# Patient Record
Sex: FEMALE | Race: WHITE
[De-identification: names, ages, dates, MRNs, and addresses within clinical notes are randomized per-mention and may not be internally consistent; named-entity substitution may affect disease eponyms.]

---

## 2019-12-04 ENCOUNTER — HOSPITAL ENCOUNTER (INPATIENT)
Dept: HOSPITAL 46 - ED | Age: 84
LOS: 5 days | Discharge: HOME HEALTH | DRG: 605 | End: 2019-12-09
Attending: SURGERY | Admitting: SURGERY
Payer: MEDICARE

## 2019-12-04 VITALS — WEIGHT: 127.01 LBS | HEIGHT: 63 IN | BODY MASS INDEX: 22.5 KG/M2

## 2019-12-04 DIAGNOSIS — Z88.8: ICD-10-CM

## 2019-12-04 DIAGNOSIS — J44.9: ICD-10-CM

## 2019-12-04 DIAGNOSIS — D62: ICD-10-CM
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
## 2019-12-04 DIAGNOSIS — Z79.891: ICD-10-CM

## 2019-12-04 DIAGNOSIS — I10: ICD-10-CM

## 2019-12-04 DIAGNOSIS — I35.0: ICD-10-CM

## 2019-12-04 DIAGNOSIS — F39: ICD-10-CM

## 2019-12-04 DIAGNOSIS — E03.9: ICD-10-CM

## 2019-12-04 DIAGNOSIS — S30.1XXA: Primary | ICD-10-CM

## 2019-12-04 DIAGNOSIS — Z79.899: ICD-10-CM

## 2019-12-04 DIAGNOSIS — Z79.1: ICD-10-CM

## 2019-12-04 DIAGNOSIS — S80.12XA: ICD-10-CM

## 2019-12-04 DIAGNOSIS — D68.51: ICD-10-CM
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
## 2019-12-04 DIAGNOSIS — G25.81: ICD-10-CM

## 2019-12-04 PROCEDURE — P9016 RBC LEUKOCYTES REDUCED: HCPCS

## 2019-12-04 NOTE — XMS
Encounter Summary
  Created on: 2019
 
 Margaret Ferreira
 External Reference #: [id number]
 : 30
 Sex: Female
 
 Demographics
 
 
+-----------------------+----------------------+
| Address               | [address]        |
|                       | [place]  [address] |
+-----------------------+----------------------+
| Home Phone            | [phone]      |
+-----------------------+----------------------+
| Preferred Language    | Unknown              |
+-----------------------+----------------------+
| Marital Status        |               |
+-----------------------+----------------------+
| Taoism Affiliation | Unknown              |
+-----------------------+----------------------+
| Race                  | Unknown              |
+-----------------------+----------------------+
| Ethnic Group          | Unknown              |
+-----------------------+----------------------+
 
 
 Author
 
 
+--------------+--------------------------------------------+
| Author       | [facility]  |
|              | and [place]                                |
+--------------+--------------------------------------------+
| Organization | [facility]  |
|              | and [place]                                |
+--------------+--------------------------------------------+
| Address      | Unknown                                    |
+--------------+--------------------------------------------+
| Phone        | Unavailable                                |
+--------------+--------------------------------------------+
 
 
 
 Support
 
 
+--------------+--------------+---------+-----------------+
| Name         | Relationship | Address | Phone           |
+--------------+--------------+---------+-----------------+
| Lawson Hanna | ECON         | Unknown | [phone] |
+--------------+--------------+---------+-----------------+
 
 
 
 Care Team Providers
 
 
 
+-----------------------+------+-----------------+
| Care Team Member Name | Role | Phone           |
+-----------------------+------+-----------------+
| Calixto Chou MD | PCP  | [phone] |
+-----------------------+------+-----------------+
 
 
 
 Reason for Referral
 Evaluate & Treat (Routine)
 
+--------+--------------+-----------+--------------+--------------+---------------+
| Status | Reason       | Specialty | Diagnoses /  | Referred By  | Referred To   |
|        |              |           | Procedures   | Contact      | Contact       |
+--------+--------------+-----------+--------------+--------------+---------------+
| Closed |   Specialty  | Physical  |   Diagnoses  |              |   [facility]       |
|        | Services     | Therapy   |  Leg         | [place],  | [facility]       |
|        | Required     |           | weakness,    | Mary PADRON,   | HOSPITAL      |
|        |              |           | bilateral    | MD  [address]    | [address] |
|        |              |           | Chronic      | [address]    |  [place]          |
|        |              |           | obstructive  | [place], | [place], [place] |
|        |              |           | pulmonary    |  [place] [address]    |  [address]   |
|        |              |           | disease,     |              | Phone:        |
|        |              |           | unspecified  |              | [phone]  |
|        |              |           | COPD type    |              |  Fax:         |
|        |              |           | ([facility], located within [facility])        |              | [phone]  |
|        |              |           | Procedures   |              |               |
|        |              |           | [place] [facility]      |              |               |
|        |              |           | THERAPY      |              |               |
|        |              |           | EVALUATION   |              |               |
|        |              |           | [place]           |              |               |
|        |              |           | THERAPEUTIC  |              |               |
|        |              |           | EXERCISES    |              |               |
+--------+--------------+-----------+--------------+--------------+---------------+
 
 
 
 
 Reason for Visit
 
 
+--------+--------------------+
| Reason | Comments           |
+--------+--------------------+
| COPD   | 3 month follow up  |
+--------+--------------------+
 
 
 
 Encounter Details
 
 
+--------+---------+----------------------+----------------+----------------------+
| Date   | Type    | Department           | Care Team      | Description          |
+--------+---------+----------------------+----------------+----------------------+
| / | Office  |   [facility] [place]          |   [place],  | Chronic obstructive  |
| 2016   | Visit   | PULMONARY  [address]     | Mary PADRON MD  | pulmonary disease,   |
|        |         | [place]  [place], |                | unspecified COPD     |
 
|        |         |  [place] [address]       |                | type (HCC);          |
|        |         | [phone]         |                | Gastroesophageal     |
|        |         |                      |                | reflux disease,      |
|        |         |                      |                | esophagitis presence |
|        |         |                      |                |  not specified; Leg  |
|        |         |                      |                | weakness, bilateral  |
+--------+---------+----------------------+----------------+----------------------+
 
 
 
 Social History
 
 
+--------------+-------+-----------+--------+------+
| Tobacco Use  | Types | Packs/Day | Years  | Date |
|              |       |           | Used   |      |
+--------------+-------+-----------+--------+------+
| Never Smoker |       |           |        |      |
+--------------+-------+-----------+--------+------+
 
 
 
+---------------------+---+---+---+
| Smokeless Tobacco:  |   |   |   |
| Never Used          |   |   |   |
+---------------------+---+---+---+
 
 
 
+-------------------------------------------------------------------+
| Tobacco Cessation: Counseling Given: No                           |
| Comments: her parents both smoked, also worked at a [employer] |
+-------------------------------------------------------------------+
 
 
 
+-------------+----------------------+---------+----------+
| Alcohol Use | Drinks/Week          | oz/Week | Comments |
+-------------+----------------------+---------+----------+
| No          |   0 Standard drinks  | 0.0     |          |
|             | or equivalent        |         |          |
+-------------+----------------------+---------+----------+
 
 
 
+------------------+---------------+
| Sex Assigned at  | Date Recorded |
| Birth            |               |
+------------------+---------------+
| Not on file      |               |
+------------------+---------------+
 
 
 
+----------------+-------------+-------------+
| Job Start Date | Occupation  | Industry    |
+----------------+-------------+-------------+
| Not on file    | Not on file | Not on file |
+----------------+-------------+-------------+
 
 
 
 
+----------------+--------------+------------+
| Travel History | Travel Start | Travel End |
+----------------+--------------+------------+
 
 
 
+-------------------------------------+
| No recent travel history available. |
+-------------------------------------+
 documented as of this encounter
 
 Last Filed Vital Signs
 
 
+-------------------+------------------+----------------------+----------+
| Vital Sign        | Reading          | Time Taken           | Comments |
+-------------------+------------------+----------------------+----------+
| Blood Pressure    | 110/64           | 2016 11:32 AM  |          |
|                   |                  | PDT                  |          |
+-------------------+------------------+----------------------+----------+
| Pulse             | 78               | 2016 11:32 AM  |          |
|                   |                  | PDT                  |          |
+-------------------+------------------+----------------------+----------+
| Temperature       | -                | -                    |          |
+-------------------+------------------+----------------------+----------+
| Respiratory Rate  | -                | -                    |          |
+-------------------+------------------+----------------------+----------+
| Oxygen Saturation | 97%              | 2016 11:32 AM  |          |
|                   |                  | PDT                  |          |
+-------------------+------------------+----------------------+----------+
| Inhaled Oxygen    | -                | -                    |          |
| Concentration     |                  |                      |          |
+-------------------+------------------+----------------------+----------+
| Weight            | 61.2 kg (135 lb) | 2016 11:32 AM  |          |
|                   |                  | PDT                  |          |
+-------------------+------------------+----------------------+----------+
| Height            | 162.6 cm (5' 4") | 2016 11:32 AM  |          |
|                   |                  | PDT                  |          |
+-------------------+------------------+----------------------+----------+
| Body Mass Index   | 23.17            | 2016 11:32 AM  |          |
|                   |                  | PDT                  |          |
+-------------------+------------------+----------------------+----------+
 documented in this encounter
 
 Patient Instructions
 Patient Instructions Mary Astudillo MD - 2016 12:29 PM PDTI am ordering the w
arm water therapy for at the [facility] 
 
 I would ask the therapist if they can do a wheelchair evaluation through [facility] and 
have them let us know, or call us so we can order one there or here. 
 
 Stay on the Spiriva once daily. I reordered the albuterol (ProAir) to use as needed as well
. 
 
 You might see Dr. Chou about the hand/wrist and see if some other brace might allow you t
o more easily get in and out of the chair. Electronically signed by Mary Astudillo MD
 at 2016 12:32 PM PDT
 documented in this encounter
 
 
 Progress Notes
 Mary Astudillo MD - 2016 11:36 AM PDTFormatting of this note might be differe
nt from the original.
 
 
 Pulmonary Follow Up
 
 HPI 
  
 Margaret Ferreira is a 85 y.o. female patient of Calixto Chou MD here today for foll
ow up of COPD. 
 
 At their last visit, we had ordered Incruse in place of the Spiriva, but this was denied by
 the insurance. Since their last visit she feels like she has been doing overall pretty stab
le. She has not had any acute illnesses.  
 
 She notes that she has not woken up at night with coughing in several months. She does coug
h some when she first wakes up and is bringing up greyish in color. She is not coughing duri
ng the daytime. 
 
 She is currently on a regimen of Spiriva one capsule inhaled daily.  She is not using the r
escue inhaler at all, but thinks she should be using it about 2-3 times a week.  She returns
 today for routine follow up. 
 
 She has been out of breath a couple of times, for example when she is getting ready for bed
, or during the daytime. She would like to get a new rescue inhaler as her current ProAir in
[facility] is quite old. She notes if she sits and rests, her breathing gets better. She has to w
alk with a walker all the time, and is not doing as much walking as she should, mostly becau
se of her legs. She has also had a lot of family stressors, losing a grandchild, and her hus
band having heart surgery recently. She does think physical therapy would help, and would li
ke to do this once her  is more stable. 
 
 She has been evaluated for nocturnal oxygen and does not need to use it.
 
 She sleeps with her head elevated 6 inches at night and is on the omeprazole daily. She is 
not noticing the heartburn right now. 
 
 Past Medical History
 Past Medical History 
 Diagnosis Date 
   COPD (chronic obstructive pulmonary disease) (HCC)  
   Peptic ulcer disease  
   GERD (gastroesophageal reflux disease)  
   Hiatal hernia  
   Hypertension  
   Hypothyroidism  
   Urine incontinence  
   Aortic valve stenosis  
   trivial 
   Hearing loss  
   Osteoarthritis  
   Osteoporosis  
   Plantar wart  
   RLS (restless legs syndrome)  
   Scoliosis  
   Pneumonia  
   Pulmonary nodule  
   Macular degeneration  
   bilateral now 
 
  
  
 Past Surgical History
 Past Surgical History 
 Procedure Laterality Date 
   Jj and bso  1996 
   Cholecystectomy  1996 
   Total hip arthroplasty Left 1989 
   Abscess drainage on calf  2003 
   Bladder suspension   
   Upper gastrointestinal endoscopy  1996 
  
 
 Social History: 
 History 
 
 Social History 
   Marital Status:  
   Spouse Name: N/A 
   Number of Children: N/A 
   Years of Education: N/A 
 
 Occupational History 
     
 
 Social History Main Topics 
   Smoking status: Never Smoker  
   Smokeless tobacco: Never Used 
    Comment: her parents both smoked, also worked at a [employer] 
   Alcohol Use: No 
   Drug Use: No 
   Sexual Activity: Not on file 
 
 Other Topics Concern 
   None 
 
 Social History Narrative 
  Lives: in [place]  
  With: her  
  Grew up: in [place]  
  Has previously lived in: [place], [place] 
   
  Exposure to toxic chemicals: has been exposed to halon (1301) before, they had to evacuate
 the computer room at the time 
  Exposure to asbestos: no 
  Exposure to tuberculosis: no  
  Has had a PPD or Quantiferon before: no 
   
  Has pets at home: no  
  Has ever owned birds: no  
  Other animal exposures: has had a dog and cat before 
   
  Hobbies: playing cards 
    
   
   
 
 Allergies:
 Allergies 
 Allergen Reactions 
 
   Lisinopril  
   Cough 
   
 
 Medications:
 Outpatient Encounter Prescriptions as of 3/22/2016 
 Medication Sig Dispense Refill 
   albuterol (PROAIR HFA) 90 mcg/puff inhaler Inhale 2 puffs into the lungs every 6 hours 
as needed for Wheezing or Shortness of Breath. 1 Inhaler 5 
   benzonatate (TESSALON PERLES) 100 mg capsule Take 100 mg by mouth 3 times daily as need
ed.   
   Calcium Carbonate (CALCIUM 600 PO) Take  by mouth Daily.   
   Cholecalciferol (VITAMIN D-3) 2000 units CAPS Take  by mouth Daily.   
   Docosahexaenoic Acid (DHA OMEGA 3) 100 MG CAPS Take  by mouth.   
   gabapentin (NEURONTIN) 300 mg capsule Take 300 mg by mouth 2 times daily.   
   levothyroxine (SYNTHROID, LEVOTHROID) 150 mcg tablet Take 150 mcg by mouth every mornin
g (before breakfast).   
   Multiple Vitamins-Minerals (MULTIVITAMIN PO) Take  by mouth Daily.   
   [DISCONTINUED] olmesartan-hydrochlorothiazide (BENICAR HCT) 40-25 MG per tablet Take 0.
5 tablets by mouth Daily.   
   omeprazole (PRILOSEC) 20 mg capsule Take 20 mg by mouth every morning (before breakfast
).   
   pramipexole (MIRAPEX) 0.25 mg tablet Take 0.25 mg by mouth nightly.   
   pseudoePHEDrine (SUDAFED) 30 mg tablet Take 30 mg by mouth every 4 hours as needed.   
   Spacer/Aero Chamber Mouthpiece MISC Use with inhaler as directed. 1 each 1 
   tiotropium (SPIRIVA) 18 mcg inhalation capsule Inhale 1 capsule into the lungs Daily. 3
0 capsule 11 
   traMADol (ULTRAM) 50 mg tablet Take 50 mg by mouth every 6 hours as needed.   
 
 No facility-administered encounter medications on file as of 3/22/2016. 
 
 Review of Systems:
 General: 
 []Weight loss/gain (over 10 lbs) []Fever/chills/sweats  []Night sweats
 EENT: 
 []Hearing loss      []Vision loss/change []Sinus congestion/nasal drainage  []Nosebleeds  [
x]Hoarseness - "by the end of the day once in awhile"
 Cardiac: 
 []Chest pain []Palpitations/heart racing  []Swelling of legs/ankles  []Waking up at night s
hort of breath []Difficulty sleeping flat
 Gastrointestinal: 
 []Nausea/vomiting []Difficulty swallowing  [x]Heartburn/acid reflux - on Omeprazole []Loss 
of appetite []Abdominal pain 
 Urologic: 
 []Blood in urine []Frequent urination at night []Burning/painful urination [x]Difficulty wi
th urination - incontinence
 
 Objective 
 
 /64 mmHg | Pulse 78 | Ht 1.626 m (5' 4") | Wt 61.236 kg (135 lb) | BMI 23.16 kg/m2 | 
SpO2 97% | Breastfeeding? No RA
 
 General Appearance:  Alert, cooperative, no distress, appears stated age, in a wheelchair, 
accompanied by her  
 Head:  Normocephalic, without obvious abnormality, atraumatic 
 Eyes:  PERRL, conjunctiva clear, no scleral icterus, EOM's intact 
 Ears:  Normal TM's, external auditory canals, normal acuity 
 Nose: Nares normal, septum midline, mucosa normal 
 Mouth: No oral lesions or exudate 
 Neck: Supple, symmetrical, no adenopathy 
 
 Lungs:   No accessory muscle use, breath sounds are diminished bilaterally with prolongatio
n of the expiratory phase, no wheezes, crackles or rhonchi 
 Chest Wall:  No deformity 
 Heart:  Regular rate and rhythm, no murmur, rub or gallop 
 Abdomen:   Soft, non-tender, non-distended 
 Extremities:  No cyanosis, clubbing, 1+ bilateral lower extremity edema 
 Pulses: Radial pulses 2+ and symmetric 
 Skin: Warm and dry 
 Lymph nodes: Cervical and supraclavicular nodes normal 
 
 Data:
 Immunization History 
 Administered Date(s) Administered 
   INFLUENZA, HIGH DOSE SEASONAL (ADULT) 10/21/2015 
   INFLUENZA, TRIVALENT PRESERVATIVE FREE (PED/ADOL/ADULT) 2014 
   PNEUMOCOCCAL CONJUGATE 13-VALENT (PCV13) 10/12/2015 
   PNEUMOCOCCAL POLYSACCHARIDE 23-VALENT (PPSV23) 2012 
 
 Assessment  
 
   ICD-10-CM ICD-9-CM  
 1. Chronic obstructive pulmonary disease, unspecified COPD type (HCC) J44.9 496 Symptoms elin hernández well controlled on Spiriva alone, with good control of cough and dyspnea (though not very 
active due to leg issues). 
 We will try to get her in to PT to increase her activity. 
 Ambulatory referral to Physical Therapy 
 2. Gastroesophageal reflux disease, esophagitis presence not specified K21.9 530.81 This se
ems to be controlled with her bed elevated, and on medication.  
 3. Leg weakness, bilateral M62.81 729.89 Notes this is a big factor in her walking more. Trevor hernández is interested in doing PT again as she lost a lot of ground with being less active surroun
ding recent events. I referred her to warm water therapy at the [facility] ([place] I also suggested a whee
lchair evaluation so she can get in and out the chair more easily without a hand injury (rec
ently sustained). They will inquire if this can be done there, and call. 
 Ambulatory referral to Physical Therapy 
 
 Plan 
 1.Continue Spiriva once daily. 
 2.Referred for warm water therapy at the [facility] 
 3.Ask if wheelchair evaluation can be done at the [facility] and if not we can order here. 
 4. Continue on omeprazole with lifestyle measures to control reflux as well. 
 5. I asked her to ask Dr. Chou if a brace for her hand/wrist would help the pain so she c
an get out of her wheelchair more easily. 
 
 She was advised to call if new pulmonary symptoms were to develop.
 
 Return to clinic as needed. 
 
 CC: Calixto Chou MD
 
 Portions of this report were transcribed using voice recognition software.  Every effort wa
s made to ensure accuracy; however, inadvertent computerized transcription errors may be pre
sent.
 
 Electronically signed by: Mary Astudillo MD  Electronically signed by Mary Astudillo MD at 2016  1:19 PM PDTdocumented in this encounter
 
 Plan of Treatment
 
 
+----------------------+-------------+--------+----------------------+---------------------+
 
| Name                 | Type        | Priori | Associated Diagnoses | Order Schedule      |
|                      |             | ty     |                      |                     |
+----------------------+-------------+--------+----------------------+---------------------+
| Ambulatory referral  | Outpatient  | Routin |   Leg weakness,      | Ordered: 2016 |
| to Physical Therapy  | Referral    | e      | bilateral  Chronic   |                     |
|                      |             |        | obstructive          |                     |
|                      |             |        | pulmonary disease,   |                     |
|                      |             |        | unspecified COPD     |                     |
|                      |             |        | type (HCC)           |                     |
+----------------------+-------------+--------+----------------------+---------------------+
 documented as of this encounter
 
 Visit Diagnoses
 
 
+------------------------------------------------------------------------------+
| Diagnosis                                                                    |
+------------------------------------------------------------------------------+
|   Chronic obstructive pulmonary disease, unspecified COPD type (HCC)         |
+------------------------------------------------------------------------------+
|   Gastroesophageal reflux disease, esophagitis presence not specified        |
+------------------------------------------------------------------------------+
|   Leg weakness, bilateral  Other musculoskeletal symptoms referable to limbs |
+------------------------------------------------------------------------------+
 documented in this encounter

## 2019-12-04 NOTE — XMS
Encounter Summary
  Created on: 2019
 
 Margaret Ferreira
 External Reference #: [id number]
 : 30
 Sex: Female
 
 Demographics
 
 
+-----------------------+------------------------+
| Address               | [address]      |
|                       | [place]  [address]   |
+-----------------------+------------------------+
| Home Phone            | [phone]        |
+-----------------------+------------------------+
| Preferred Language    | Unknown                |
+-----------------------+------------------------+
| Marital Status        |                 |
+-----------------------+------------------------+
| Yazidi Affiliation | MET                    |
+-----------------------+------------------------+
| Race                  | White                  |
+-----------------------+------------------------+
| Ethnic Group          | Not  or  |
+-----------------------+------------------------+
 
 
 Author
 
 
+--------------+------------------------------+
| Author       | [facility] |
+--------------+------------------------------+
| Organization | [facility] |
+--------------+------------------------------+
| Address      | Unknown                      |
+--------------+------------------------------+
| Phone        | Unavailable                  |
+--------------+------------------------------+
 
 
 
 Support
 
 
+--------------+--------------+---------------------+-----------------+
| Name         | Relationship | Address             | Phone           |
+--------------+--------------+---------------------+-----------------+
| Lawson Ferreira | ECON         | [address]          | [phone] |
|              |              | [place], [place]   |                 |
|              |              | [address]               |                 |
+--------------+--------------+---------------------+-----------------+
 
 
 
 Care Team Providers
 
 
 
+-----------------------+------+-------------+
| Care Team Member Name | Role | Phone       |
+-----------------------+------+-------------+
 PCP  | Unavailable |
+-----------------------+------+-------------+
 
 
 
 Encounter Details
 
 
+--------+-------------+----------------------+--------------------+-------------+
| Date   | Type        | Department           | Care Team          | Description |
+--------+-------------+----------------------+--------------------+-------------+
| / | Transcribed |   [facility] at  |   Dictation, Other | Transcribed |
|    |             | [facility]  [address] [place]     |                    |             |
|        |             | [address]      |                    |             |
|        |             | Mailcode: [address]  [address]  |                    |             |
|        |             | [address]         |                    |             |
|        |             | [place]  [place],  |                    |             |
|        |             | [place] [address]        |                    |             |
|        |             | [phone]         |                    |             |
+--------+-------------+----------------------+--------------------+-------------+
 
 
 
 Social History
 
 
+----------------+-------+-----------+--------+------+
| Tobacco Use    | Types | Packs/Day | Years  | Date |
|                |       |           | Used   |      |
+----------------+-------+-----------+--------+------+
| Never Assessed |       |           |        |      |
+----------------+-------+-----------+--------+------+
 
 
 
+------------------+---------------+
| Sex Assigned at  | Date Recorded |
| Birth            |               |
+------------------+---------------+
| Not on file      |               |
+------------------+---------------+
 
 
 
+----------------+-------------+-------------+
| Job Start Date | Occupation  | Industry    |
+----------------+-------------+-------------+
| Not on file    | Not on file | Not on file |
+----------------+-------------+-------------+
 
 
 
+----------------+--------------+------------+
| Travel History | Travel Start | Travel End |
+----------------+--------------+------------+
 
 
 
 
+-------------------------------------+
| No recent travel history available. |
+-------------------------------------+
 documented as of this encounter
 
 Progress Notes
 Interface, Transcription In - 2007  6:36 AM PDT  [address] [address] [phone] or [phone]
   Department of Obstetrics and Gynecology, [address]
   [facility]
   Phone: [phone] Fax: [phone]
  
  1996
  
  
  
  NICK COLE MD
  [address] [facility]
  [place] [address]
  
  
  RE:Margaret Ferreira
  MR#:[id number]
  
  Dear Doctor Cole:
  
  I saw Margaret Ferreira in the office today for a postoperative visit. Ms. Ferreira's surgery was unfortunately complicated by a massive intraperitoneal
  hemorrhage that occurred approximately twelve hours after surgery was
  completed. It was necessary to take her back to the Operating Room, and
  perform a laparotomy to control the bleeding. No specific bleeding points
  were identified but a large hemoperitoneum and retroplacental clot was
  evacuated. Following this she made a steady recovery and was discharged
  home six days postoperatively. The surgery reports of the primary surgery
  and emergency surgery are accompanying this letter.
  
  She has been staying with her daughter since the surgery and is making slow
  but satisfactory progress. She had some transitory urge incontinence
  postoperatively but this seems to be improving. She was also troubled by
  abdominal discomfort and constipation. Her bowel function is now normal but
  she still has some residual abdominal discomfort. The abdominal wound has
  healed well and vaginal support seems quite adequate, with no evidence of
  infection in the vaginal incisions. She is going to stay in the [place]
  area with her daughter for one more week, before returning to [place].
  
  I much appreciate the original referral of this patient. I am sorry that
  her posoperative course was stormy and complicated but she seems to be doing
  well now and I hope that her further progress will be uncomplicated.
  
  With thanks and best wishes,
  
  
  
  BRITTANY Perez M.D.
 
  Professor and ,
  Obstetrics and Gynecology
  
  EPK/eduardo
  D: 96 T: 96 11:12 P
   Electronically signed by Interface, Transcription In at 2007  6:36 AM PDTdocumented
 in this encounter
 
 Plan of Treatment
 Not on filedocumented as of this encounter
 
 Visit Diagnoses
 Not on filedocumented in this encounter

## 2019-12-04 NOTE — XMS
Encounter Summary
  Created on: 2019
 
 Margaret Ferreira
 External Reference #: [id number]
 : 30
 Sex: Female
 
 Demographics
 
 
+-----------------------+----------------------+
| Address               | [address]        |
|                       | [place]  [address] |
+-----------------------+----------------------+
| Home Phone            | [phone]      |
+-----------------------+----------------------+
| Preferred Language    | Unknown              |
+-----------------------+----------------------+
| Marital Status        |               |
+-----------------------+----------------------+
| Adventism Affiliation | Unknown              |
+-----------------------+----------------------+
| Race                  | Unknown              |
+-----------------------+----------------------+
| Ethnic Group          | Unknown              |
+-----------------------+----------------------+
 
 
 Author
 
 
+--------------+--------------------------------------------+
| Author       | [facility]  |
|              | and [place]                                |
+--------------+--------------------------------------------+
| Organization | [facility]  |
|              | and [place]                                |
+--------------+--------------------------------------------+
| Address      | Unknown                                    |
+--------------+--------------------------------------------+
| Phone        | Unavailable                                |
+--------------+--------------------------------------------+
 
 
 
 Support
 
 
+--------------+--------------+---------+-----------------+
| Name         | Relationship | Address | Phone           |
+--------------+--------------+---------+-----------------+
| Lawson Hanna | ECON         | Unknown | [phone] |
+--------------+--------------+---------+-----------------+
 
 
 
 Care Team Providers
 
 
 
+-----------------------+------+-----------------+
| Care Team Member Name | Role | Phone           |
+-----------------------+------+-----------------+
| Calixto Chou MD | PCP  | [phone] |
+-----------------------+------+-----------------+
 
 
 
 Encounter Details
 
 
+--------+-----------+----------------------+----------------+---------------------+
| Date   | Type      | Department           | Care Team      | Description         |
+--------+-----------+----------------------+----------------+---------------------+
| / | [facility]  |   [facility] |   [place],  | COPD (chronic       |
| 2015   | Encounter |  [facility]   | Mary PADRON MD  | obstructive         |
|        |           | FUNCTION  [address]      |                | pulmonary disease)  |
|        |           | [place], |                | ([facility])               |
|        |           |  [place] [address]       |                |                     |
|        |           | [phone]         |                |                     |
+--------+-----------+----------------------+----------------+---------------------+
 
 
 
 Social History
 
 
+--------------+-------+-----------+--------+------+
| Tobacco Use  | Types | Packs/Day | Years  | Date |
|              |       |           | Used   |      |
+--------------+-------+-----------+--------+------+
| Never Smoker |       |           |        |      |
+--------------+-------+-----------+--------+------+
 
 
 
+-------------------------------------------------------------------+
| Comments: her parents both smoked, also worked at [employer] |
+-------------------------------------------------------------------+
 
 
 
+-------------+-------------+---------+----------+
| Alcohol Use | Drinks/Week | oz/Week | Comments |
+-------------+-------------+---------+----------+
| No          |             |         |          |
+-------------+-------------+---------+----------+
 
 
 
+------------------+---------------+
| Sex Assigned at  | Date Recorded |
| Birth            |               |
+------------------+---------------+
| Not on file      |               |
+------------------+---------------+
 
 
 
 
+----------------+-------------+-------------+
| Job Start Date | Occupation  | Industry    |
+----------------+-------------+-------------+
| Not on file    | Not on file | Not on file |
+----------------+-------------+-------------+
 
 
 
+----------------+--------------+------------+
| Travel History | Travel Start | Travel End |
+----------------+--------------+------------+
 
 
 
+-------------------------------------+
| No recent travel history available. |
+-------------------------------------+
 documented as of this encounter
 
 Medications at Time of Discharge
 
 
+----------------------+----------------------+-----------+---------+----------+-----------+
| Medication           | Sig                  | Dispensed | Refills | Start    | End Date  |
|                      |                      |           |         | Date     |           |
+----------------------+----------------------+-----------+---------+----------+-----------+
|   Calcium Carbonate  | Take 600 mg by mouth |           | 0       |          |           |
| (CALCIUM 600 PO)     |  Daily.              |           |         |          |           |
+----------------------+----------------------+-----------+---------+----------+-----------+
|   Cholecalciferol    | Take  by mouth       |           | 0       |          |           |
| (VITAMIN D-3) 2000   | Daily.               |           |         |          |           |
| units CAPS           |                      |           |         |          |           |
+----------------------+----------------------+-----------+---------+----------+-----------+
|   Docosahexaenoic    | Take  by mouth.      |           | 0       |          |           |
| Acid (DHA OMEGA 3)   |                      |           |         |          |           |
| 100 MG CAPS          |                      |           |         |          |           |
+----------------------+----------------------+-----------+---------+----------+-----------+
|   Multiple           | Take  by mouth       |           | 0       |          |           |
| Vitamins-Minerals    | Daily.               |           |         |          |           |
| (MULTIVITAMIN PO)    |                      |           |         |          |           |
+----------------------+----------------------+-----------+---------+----------+-----------+
|   omeprazole         | Take 20 mg by mouth  |           | 0       |          |           |
| (PRILOSEC) 20 mg     | every morning        |           |         |          |           |
| capsule              | (before breakfast).  |           |         |          |           |
+----------------------+----------------------+-----------+---------+----------+-----------+
|   Spacer/Aero        | Use with inhaler as  |   1 each  | 1       | 20 |           |
| Chamber Mouthpiece   | directed.            |           |         | 15       |           |
| MISC                 |                      |           |         |          |           |
+----------------------+----------------------+-----------+---------+----------+-----------+
|   traMADol (ULTRAM)  | Take 100 mg by mouth |           | 0       |          |           |
| 50 mg tablet         |  4 times daily.      |           |         |          |           |
+----------------------+----------------------+-----------+---------+----------+-----------+
|   albuterol (PROAIR  | Inhale 2 puffs into  |   1       | 5       | 20 |  |
| HFA) 90 mcg/puff     | the lungs every 6    | Inhaler   |         | 15       | 6         |
| inhaler              | hours as needed for  |           |         |          |           |
|                      | Wheezing or          |           |         |          |           |
|                      | Shortness of Breath. |           |         |          |           |
+----------------------+----------------------+-----------+---------+----------+-----------+
|   beclomethasone     | Inhale 2 puffs into  |           | 0       |          |  |
 
| (QVAR) 40 mcg/puff   | the lungs 2 times    |           |         |          | 5         |
| inhaler              | daily.               |           |         |          |           |
+----------------------+----------------------+-----------+---------+----------+-----------+
|   benzonatate        | Take 100 mg by mouth |           | 0       |          | 11/15/201 |
| (LEANDRA FUCHS)    |  3 times daily as    |           |         |          | 8         |
| 100 mg capsule       | needed.              |           |         |          |           |
+----------------------+----------------------+-----------+---------+----------+-----------+
|   diclofenac         | Take 100 mg by mouth |           | 0       |          |  |
| (VOLTAREN-XR) 100 mg |  daily (with         |           |         |          | 5         |
|  ER tablet           | breakfast).          |           |         |          |           |
+----------------------+----------------------+-----------+---------+----------+-----------+
|   Diclofenac         | Take  by mouth.      |           | 0       |          |  |
| Potassium 50 MG PACK |                      |           |         |          | 5         |
+----------------------+----------------------+-----------+---------+----------+-----------+
|   levothyroxine      | Take 150 mcg by      |           | 0       |          |  |
| (SYNTHROID,          | mouth every morning  |           |         |          | 8         |
| LEVOTHROID) 150 mcg  | (before breakfast).  |           |         |          |           |
| tablet               |                      |           |         |          |           |
+----------------------+----------------------+-----------+---------+----------+-----------+
|                      | Take 0.5 tablets by  |           | 0       |          |  |
| olmesartan-hydrochlo | mouth Daily.         |           |         |          | 6         |
| rothiazide (BENICAR  |                      |           |         |          |           |
| HCT) 40-25 MG per    |                      |           |         |          |           |
| tablet               |                      |           |         |          |           |
+----------------------+----------------------+-----------+---------+----------+-----------+
|   pramipexole        | Take 0.25 mg by      |           | 0       |          | 07/10/201 |
| (MIRAPEX) 0.25 mg    | mouth 2 times daily. |           |         |          | 8         |
| tablet               |                      |           |         |          |           |
+----------------------+----------------------+-----------+---------+----------+-----------+
|   pseudoePHEDrine    | Take 30 mg by mouth  |           | 0       |          | 11/15/201 |
| (SUDAFED) 30 mg      | every 4 hours as     |           |         |          | 8         |
| tablet               | needed.              |           |         |          |           |
+----------------------+----------------------+-----------+---------+----------+-----------+
|   tiotropium         | Inhale 1 capsule     |   30      | 11      | 20 |  |
| (SPIRIVA HANDIHALER) | into the lungs       | capsule   |         | 15       | 5         |
|  18 mcg inhalation   | Daily.               |           |         |          |           |
| capsule              |                      |           |         |          |           |
+----------------------+----------------------+-----------+---------+----------+-----------+
 documented as of this encounter
 
 Plan of Treatment
 Not on filedocumented as of this encounter
 
 Procedures
 
 
+----------------------+--------+-------------+----------------------+----------------------
+
| Procedure Name       | Priori | Date/Time   | Associated Diagnosis | Comments             
|
|                      | ty     |             |                      |                      
|
+----------------------+--------+-------------+----------------------+----------------------
+
| PFT PULMONARY        | ASAP   | 2015  |   COPD (chronic      |   Results for this   
|
| FUNCTION TESTING     |        |  6:25 PM    | obstructive          | procedure are in the 
|
| ORDERS               |        | PST         | pulmonary disease)   |  results section.    
|
 
|                      |        |             | ([facility])                |                      
|
+----------------------+--------+-------------+----------------------+----------------------
+
| PFT PULMONARY        | ASAP   | 2015  |   COPD (chronic      |   Results for this   
|
| FUNCTION TESTING     |        |  6:25 PM    | obstructive          | procedure are in the 
|
| ORDERS               |        | PST         | pulmonary disease)   |  results section.    
|
|                      |        |             | (HCC)                |                      
|
+----------------------+--------+-------------+----------------------+----------------------
+
| PFT PULMONARY        | ASAP   | 2015  |   COPD (chronic      |   Results for this   
|
| FUNCTION TESTING     |        |  6:25 PM    | obstructive          | procedure are in the 
|
| ORDERS               |        | PST         | pulmonary disease)   |  results section.    
|
|                      |        |             | (HCC)                |                      
|
+----------------------+--------+-------------+----------------------+----------------------
+
| DIAGNOSTIC REPORT -  |        | 2015  |                      |                      
|
| EXTERNAL SCAN        |        | 12:00 AM    |                      |                      
|
|                      |        | PST         |                      |                      
|
+----------------------+--------+-------------+----------------------+----------------------
+
 documented in this encounter
 
 Results
 PFT PULMONARY FUNCTION TESTING ORDERS Full PFT (Christiano w/BD, lung volumes, diffusion)?: Yes 
(2015  6:25 PM PST)
 
+------------------------------------------------------------------------+--------------+
| Narrative                                                              | Performed At |
+------------------------------------------------------------------------+--------------+
|   Mary Astudillo MD       2015 18:25        PULMONARY      |              |
| FUNCTION TESTING      METHOD: Spirometry was obtained pre and post     |              |
| administration of   inhaled bronchodilator. Lung volumes were not      |              |
| obtained, as the   patient was unable to pant fast enough. Diffusion   |              |
| capacity was   obtained by single breath method and was not corrected  |              |
| for a   measured hemoglobin.      ATS standards were met.              |              |
| SPIROMETRY: Prior to administration of inhaled bronchodilator,   FVC   |              |
| was normal at 2.01 L or 87% of predicted. FEV1 was moderately          |              |
| reduced at 0.85 L or 51% of predicted. FEV1/FVC ratio was reduced   at |              |
|  42%. After administration of inhaled bronchodilator, FVC   increased  |              |
| by 19 % to 2.38 L or 103% of predicted. FEV1 increased   by 22 % to    |              |
| 1.04 L or 63% of predicted. FEV1/FVC ratio was reduced   at 44%.       |              |
| DIFFUSION CAPACITY: Diffusion capacity was moderately reduced at       |              |
| 10.6 mL/mmHg per minute or 54% of predicted and was not corrected      |              |
| for a measured hemoglobin.     IMPRESSION: Spirometry is consistent    |              |
| with moderate obstructive   physiology. There was a significant        |              |
| response to inhaled   bronchodilator based on change in FVC. Diffusion |              |
|  capacity is   moderately reduced and is not corrected for measured    |              |
| hemoglobin.     Electronically signed by: Mary Astudillo MD    |              |
 
| 2015   18:20  [facility]     CC:    |              |
| Calixto Chou                                                      |              |
+------------------------------------------------------------------------+--------------+
 
 
 
+------------------------------------------------------------------------------------------+
| Procedure Note                                                                           |
+------------------------------------------------------------------------------------------+
|   Mary Astudillo MD - 2015  6:20 PM PST  PULMONARY FUNCTION TESTING        |
| METHOD: Spirometry was obtained pre and post administration of inhaled bronchodilator.   |
| Lung volumes were not obtained, as the patient was unable to pant fast enough. Diffusion |
|  capacity was obtained by single breath method and was not corrected for a measured      |
| hemoglobin. ATS standards were met. SPIROMETRY: Prior to administration of inhaled       |
| bronchodilator, FVC was normal at 2.01 L or 87% of predicted. FEV1 was moderately        |
| reduced at 0.85 L or 51% of predicted. FEV1/FVC ratio was reduced at 42%. After          |
| administration of inhaled bronchodilator, FVC increased by 19 % to 2.38 L or 103% of     |
| predicted. FEV1 increased by 22 % to 1.04 L or 63% of predicted. FEV1/FVC ratio was      |
| reduced at 44%.DIFFUSION CAPACITY: Diffusion capacity was moderately reduced at 10.6     |
| mL/mmHg per minute or 54% of predicted and was not corrected for a measured              |
| hemoglobin.IMPRESSION: Spirometry is consistent with moderate obstructive physiology.    |
| There was a significant response to inhaled bronchodilator based on change in FVC.       |
| Diffusion capacity is moderately reduced and is not corrected for measured               |
| hemoglobin.Electronically signed by: Mary Astudillo MD 2015 18:20[facility]        |
[facility] Calixto Chou                                |
|Electronically signed by: Mary Astudillo MD 2015 18:20                       |
|[facility]                                                  |
|                                                                                          |
|CC: Calixto Chou                                                                     |
+------------------------------------------------------------------------------------------+
 documented in this encounter
 
 Visit Diagnoses
 
 
+----------------------------------------------------------------------------------------+
| Diagnosis                                                                              |
+----------------------------------------------------------------------------------------+
|   COPD (chronic obstructive pulmonary disease) (HCC)  Chronic airway obstruction, not  |
| elsewhere classified                                                                   |
+----------------------------------------------------------------------------------------+
 documented in this encounter
 
 Administered Medications
 
 
+-----------------------------------+--------+----------+--------+------+------+
| Medication Order                  | MAR    | Action   | Dose   | Rate | Site |
|                                   | Action | Date     |        |      |      |
+-----------------------------------+--------+----------+--------+------+------+
|   albuterol 2.5 mg/3 mL nebulizer | Given  | 20 | 2.5 mg |      |      |
|  solution 2.5 mg  2.5 mg,         |        | 15  2:07 |        |      |      |
| Nebulization, RT Once, Tue        |        |  PM PST  |        |      |      |
| 1/13/15 at 1430, For 1 dose, RT   |        |          |        |      |      |
| will administer.,                 |        |          |        |      |      |
+-----------------------------------+--------+----------+--------+------+------+
 
 
 
+---+---+
 
|   |   |
+---+---+
 documented in this encounter

## 2019-12-04 NOTE — XMS
Encounter Summary
  Created on: 2019
 
 Margaret Ferreira
 External Reference #: [id number]
 : 30
 Sex: Female
 
 Demographics
 
 
+-----------------------+----------------------+
| Address               | [address]        |
|                       | [place]  [address] |
+-----------------------+----------------------+
| Home Phone            | [phone]      |
+-----------------------+----------------------+
| Preferred Language    | Unknown              |
+-----------------------+----------------------+
| Marital Status        |               |
+-----------------------+----------------------+
| Oriental orthodox Affiliation | Unknown              |
+-----------------------+----------------------+
| Race                  | Unknown              |
+-----------------------+----------------------+
| Ethnic Group          | Unknown              |
+-----------------------+----------------------+
 
 
 Author
 
 
+--------------+--------------------------------------------+
| Author       | [facility] [place]  |
|              | and [place]                                |
+--------------+--------------------------------------------+
| Organization | [facility] [place]  |
|              | and [place]                                |
+--------------+--------------------------------------------+
| Address      | Unknown                                    |
+--------------+--------------------------------------------+
| Phone        | Unavailable                                |
+--------------+--------------------------------------------+
 
 
 
 Support
 
 
+--------------+--------------+---------+-----------------+
| Name         | Relationship | Address | Phone           |
+--------------+--------------+---------+-----------------+
| Lawson Hanna | ECON         | Unknown | [phone] |
+--------------+--------------+---------+-----------------+
 
 
 
 Care Team Providers
 
 
 
+-----------------------------+------+-----------------+
| Care Team Member Name       | Role | Phone           |
+-----------------------------+------+-----------------+
| Bessy Paulino | PCP  | [phone] |
|  PA-C                       |      |                 |
+-----------------------------+------+-----------------+
 
 
 
 Encounter Details
 
 
+--------+-------------+---------------------+----------------------+-------------+
| Date   | Type        | Department          | Care Team            | Description |
+--------+-------------+---------------------+----------------------+-------------+
| / | Orders Only |   [facility]    |   Provider,          |             |
|    |             | SYSTEM GENERIC OP   | MD [address] |             |
|        |             | CONVERSION  [address]  |  [address]        |             |
|        |             | [address]  [place], [place]  | [place] [address]     |             |
|        |             | [address]          |                      |             |
|        |             | [phone]        |                      |             |
+--------+-------------+---------------------+----------------------+-------------+
 
 
 
 Social History
 
 
+-------------------+-------+-----------+--------+------+
| Tobacco Use       | Types | Packs/Day | Years  | Date |
|                   |       |           | Used   |      |
+-------------------+-------+-----------+--------+------+
| Passive Smoke     |       |           |        |      |
| Exposure - Never  |       |           |        |      |
| Smoker            |       |           |        |      |
+-------------------+-------+-----------+--------+------+
 
 
 
+---------------------+---+---+---+
| Smokeless Tobacco:  |   |   |   |
| Never Used          |   |   |   |
+---------------------+---+---+---+
 
 
 
+-------------+----------------------+---------+----------+
| Alcohol Use | Drinks/Week          | oz/Week | Comments |
+-------------+----------------------+---------+----------+
| No          |   0 Standard drinks  | 0.0     |          |
|             | or equivalent        |         |          |
+-------------+----------------------+---------+----------+
 
 
 
+------------------+---------------+
| Sex Assigned at  | Date Recorded |
| Birth            |               |
 
+------------------+---------------+
| Not on file      |               |
+------------------+---------------+
 
 
 
+----------------+-------------+-------------+
| Job Start Date | Occupation  | Industry    |
+----------------+-------------+-------------+
| Not on file    | Not on file | Not on file |
+----------------+-------------+-------------+
 
 
 
+----------------+--------------+------------+
| Travel History | Travel Start | Travel End |
+----------------+--------------+------------+
 
 
 
+-------------------------------------+
| No recent travel history available. |
+-------------------------------------+
 documented as of this encounter
 
 Plan of Treatment
 Not on filedocumented as of this encounter
 
 Visit Diagnoses
 Not on filedocumented in this encounter

## 2019-12-04 NOTE — XMS
Encounter Summary
  Created on: 2019
 
 Margaret Ferreira
 External Reference #: [id number]
 : 30
 Sex: Female
 
 Demographics
 
 
+-----------------------+----------------------+
| Address               | [address]        |
|                       | [place]  [address] |
+-----------------------+----------------------+
| Home Phone            | [phone]      |
+-----------------------+----------------------+
| Preferred Language    | Unknown              |
+-----------------------+----------------------+
| Marital Status        |               |
+-----------------------+----------------------+
| Orthodox Affiliation | Unknown              |
+-----------------------+----------------------+
| Race                  | Unknown              |
+-----------------------+----------------------+
| Ethnic Group          | Unknown              |
+-----------------------+----------------------+
 
 
 Author
 
 
+--------------+--------------------------------------------+
| Author       | [facility] [place]  |
|              | and [place]                                |
+--------------+--------------------------------------------+
| Organization | [facility] [place]  |
|              | and [place]                                |
+--------------+--------------------------------------------+
| Address      | Unknown                                    |
+--------------+--------------------------------------------+
| Phone        | Unavailable                                |
+--------------+--------------------------------------------+
 
 
 
 Support
 
 
+--------------+--------------+---------+-----------------+
| Name         | Relationship | Address | Phone           |
+--------------+--------------+---------+-----------------+
| Lawson Hanna | ECON         | Unknown | [phone] |
+--------------+--------------+---------+-----------------+
 
 
 
 Care Team Providers
 
 
 
+-----------------------+------+-----------------+
| Care Team Member Name | Role | Phone           |
+-----------------------+------+-----------------+
| Calixto Chou MD | PCP  | [phone] |
+-----------------------+------+-----------------+
 
 
 
 Reason for Visit
 
 
+---------+---------------------+
| Reason  | Comments            |
+---------+---------------------+
| Results | exertional oximetry |
+---------+---------------------+
 
 
 
 Encounter Details
 
 
+--------+-----------+----------------------+----------------+----------------------+
| Date   | Type      | Department           | Care Team      | Description          |
+--------+-----------+----------------------+----------------+----------------------+
| / | Telephone |   [facility]  [place]          |   [place],  | Results (exertional  |
|    |           | PULMONARY  [address]     | Mary PADRON MD  | oximetry)            |
|        |           | [place]  [place], |                |                      |
|        |           |  [place] [address]       |                |                      |
|        |           | [phone]         |                |                      |
+--------+-----------+----------------------+----------------+----------------------+
 
 
 
 Social History
 
 
+--------------+-------+-----------+--------+------+
| Tobacco Use  | Types | Packs/Day | Years  | Date |
|              |       |           | Used   |      |
+--------------+-------+-----------+--------+------+
| Never Smoker |       |           |        |      |
+--------------+-------+-----------+--------+------+
 
 
 
+-------------------------------------------------------------------+
| Comments: her parents both smoked, also worked at [employer] |
+-------------------------------------------------------------------+
 
 
 
+-------------+-------------+---------+----------+
| Alcohol Use | Drinks/Week | oz/Week | Comments |
+-------------+-------------+---------+----------+
| No          |             |         |          |
+-------------+-------------+---------+----------+
 
 
 
 
+------------------+---------------+
| Sex Assigned at  | Date Recorded |
| Birth            |               |
+------------------+---------------+
| Not on file      |               |
+------------------+---------------+
 
 
 
+----------------+-------------+-------------+
| Job Start Date | Occupation  | Industry    |
+----------------+-------------+-------------+
| Not on file    | Not on file | Not on file |
+----------------+-------------+-------------+
 
 
 
+----------------+--------------+------------+
| Travel History | Travel Start | Travel End |
+----------------+--------------+------------+
 
 
 
+-------------------------------------+
| No recent travel history available. |
+-------------------------------------+
 documented as of this encounter
 
 Plan of Treatment
 Not on filedocumented as of this encounter
 
 Visit Diagnoses
 Not on filedocumented in this encounter

## 2019-12-04 NOTE — XMS
Encounter Summary
  Created on: 2019
 
 Margaret Ferreira
 External Reference #: [id number]
 : 30
 Sex: Female
 
 Demographics
 
 
+-----------------------+----------------------+
| Address               | [address]        |
|                       | [place]  [address] |
+-----------------------+----------------------+
| Home Phone            | [phone]      |
+-----------------------+----------------------+
| Preferred Language    | Unknown              |
+-----------------------+----------------------+
| Marital Status        |               |
+-----------------------+----------------------+
| Hinduism Affiliation | Unknown              |
+-----------------------+----------------------+
| Race                  | Unknown              |
+-----------------------+----------------------+
| Ethnic Group          | Unknown              |
+-----------------------+----------------------+
 
 
 Author
 
 
+--------------+--------------------------------------------+
| Author       | [facility] [place]  |
|              | and [place]                                |
+--------------+--------------------------------------------+
| Organization | [facility] [place]  |
|              | and [place]                                |
+--------------+--------------------------------------------+
| Address      | Unknown                                    |
+--------------+--------------------------------------------+
| Phone        | Unavailable                                |
+--------------+--------------------------------------------+
 
 
 
 Support
 
 
+--------------+--------------+---------+-----------------+
| Name         | Relationship | Address | Phone           |
+--------------+--------------+---------+-----------------+
| Lawson Hanna | ECON         | Unknown | [phone] |
+--------------+--------------+---------+-----------------+
 
 
 
 Care Team Providers
 
 
 
+-----------------------+------+-----------------+
| Care Team Member Name | Role | Phone           |
+-----------------------+------+-----------------+
| Calixto Chou MD | PCP  | [phone] |
+-----------------------+------+-----------------+
 
 
 
 Reason for Visit
 
 
+--------+----------+
| Reason | Comments |
+--------+----------+
| Other  | PT       |
+--------+----------+
 
 
 
 Encounter Details
 
 
+--------+-----------+----------------------+----------------+-------------+
| Date   | Type      | Department           | Care Team      | Description |
+--------+-----------+----------------------+----------------+-------------+
| / | Telephone |   [facility]  [place]          |   [place],  | Other (PT)  |
|    |           | PULMONARY  [address]     | Mary PADRON MD  |             |
|        |           | [place]  [place], |                |             |
|        |           |  [place] [address]       |                |             |
|        |           | [phone]         |                |             |
+--------+-----------+----------------------+----------------+-------------+
 
 
 
 Social History
 
 
+--------------+-------+-----------+--------+------+
| Tobacco Use  | Types | Packs/Day | Years  | Date |
|              |       |           | Used   |      |
+--------------+-------+-----------+--------+------+
| Never Smoker |       |           |        |      |
+--------------+-------+-----------+--------+------+
 
 
 
+---------------------+---+---+---+
| Smokeless Tobacco:  |   |   |   |
| Never Used          |   |   |   |
+---------------------+---+---+---+
 
 
 
+-------------------------------------------------------------------+
| Comments: her parents both smoked, also worked at [employer] |
+-------------------------------------------------------------------+
 
 
 
 
+-------------+----------------------+---------+----------+
| Alcohol Use | Drinks/Week          | oz/Week | Comments |
+-------------+----------------------+---------+----------+
| No          |   0 Standard drinks  | 0.0     |          |
|             | or equivalent        |         |          |
+-------------+----------------------+---------+----------+
 
 
 
+------------------+---------------+
| Sex Assigned at  | Date Recorded |
| Birth            |               |
+------------------+---------------+
| Not on file      |               |
+------------------+---------------+
 
 
 
+----------------+-------------+-------------+
| Job Start Date | Occupation  | Industry    |
+----------------+-------------+-------------+
| Not on file    | Not on file | Not on file |
+----------------+-------------+-------------+
 
 
 
+----------------+--------------+------------+
| Travel History | Travel Start | Travel End |
+----------------+--------------+------------+
 
 
 
+-------------------------------------+
| No recent travel history available. |
+-------------------------------------+
 documented as of this encounter
 
 Plan of Treatment
 Not on filedocumented as of this encounter
 
 Visit Diagnoses
 Not on filedocumented in this encounter

## 2019-12-04 NOTE — XMS
Encounter Summary
  Created on: 2019
 
 Margaret Ferreira
 External Reference #: [id number]
 : 30
 Sex: Female
 
 Demographics
 
 
+-----------------------+----------------------+
| Address               | [address]        |
|                       | [place]  [address] |
+-----------------------+----------------------+
| Home Phone            | [phone]      |
+-----------------------+----------------------+
| Preferred Language    | Unknown              |
+-----------------------+----------------------+
| Marital Status        |               |
+-----------------------+----------------------+
| Taoism Affiliation | Unknown              |
+-----------------------+----------------------+
| Race                  | Unknown              |
+-----------------------+----------------------+
| Ethnic Group          | Unknown              |
+-----------------------+----------------------+
 
 
 Author
 
 
+--------------+--------------------------------------------+
| Author       | [facility] [place]  |
|              | and [place]                                |
+--------------+--------------------------------------------+
| Organization | [facility] [place]  |
|              | and [place]                                |
+--------------+--------------------------------------------+
| Address      | Unknown                                    |
+--------------+--------------------------------------------+
| Phone        | Unavailable                                |
+--------------+--------------------------------------------+
 
 
 
 Support
 
 
+--------------+--------------+---------+-----------------+
| Name         | Relationship | Address | Phone           |
+--------------+--------------+---------+-----------------+
| Lawson Hanna | ECON         | Unknown | [phone] |
+--------------+--------------+---------+-----------------+
 
 
 
 Care Team Providers
 
 
 
+-----------------------------+------+-----------------+
| Care Team Member Name       | Role | Phone           |
+-----------------------------+------+-----------------+
| Bessy Paulino | PCP  | [phone] |
|  PADONNY                       |      |                 |
+-----------------------------+------+-----------------+
 
 
 
 Reason for Visit
 
 
+-------------------+----------+
| Reason            | Comments |
+-------------------+----------+
| Medication Refill |          |
+-------------------+----------+
 
 
 
 Encounter Details
 
 
+--------+--------+----------------------+---------------------+-------------------+
| Date   | Type   | Department           | Care Team           | Description       |
+--------+--------+----------------------+---------------------+-------------------+
| / | Refill |   [facility]       |   Kvng Ontiveros  | Medication Refill |
|    |        | [facility]    | E, DO  [address]   |                   |
|        |        | [facility]  [address]  | [place], [place]       |                   |
|        |        | [address]  [place],   | [address]          |                   |
|        |        | [place] [address]        | [phone]        |                   |
|        |        | [phone]         | [phone] (Fax)  |                   |
+--------+--------+----------------------+---------------------+-------------------+
 
 
 
 Social History
 
 
+--------------+-------+-----------+--------+------+
| Tobacco Use  | Types | Packs/Day | Years  | Date |
|              |       |           | Used   |      |
+--------------+-------+-----------+--------+------+
| Never Smoker |       |           |        |      |
+--------------+-------+-----------+--------+------+
 
 
 
+---------------------+---+---+---+
| Smokeless Tobacco:  |   |   |   |
| Never Used          |   |   |   |
+---------------------+---+---+---+
 
 
 
+-------------------------------------------------------------------+
| Comments: her parents both smoked, also worked at a [employer] |
+-------------------------------------------------------------------+
 
 
 
 
+-------------+----------------------+---------+----------+
| Alcohol Use | Drinks/Week          | oz/Week | Comments |
+-------------+----------------------+---------+----------+
| No          |   0 Standard drinks  | 0.0     |          |
|             | or equivalent        |         |          |
+-------------+----------------------+---------+----------+
 
 
 
+------------------+---------------+
| Sex Assigned at  | Date Recorded |
| Birth            |               |
+------------------+---------------+
| Not on file      |               |
+------------------+---------------+
 
 
 
+----------------+-------------+-------------+
| Job Start Date | Occupation  | Industry    |
+----------------+-------------+-------------+
| Not on file    | Not on file | Not on file |
+----------------+-------------+-------------+
 
 
 
+----------------+--------------+------------+
| Travel History | Travel Start | Travel End |
+----------------+--------------+------------+
 
 
 
+-------------------------------------+
| No recent travel history available. |
+-------------------------------------+
 documented as of this encounter
 
 Plan of Treatment
 Not on filedocumented as of this encounter
 
 Visit Diagnoses
 Not on filedocumented in this encounter

## 2019-12-04 NOTE — XMS
Encounter Summary
  Created on: 2019
 
 Margaret Ferreira
 External Reference #: [id number]
 : 30
 Sex: Female
 
 Demographics
 
 
+-----------------------+------------------------+
| Address               | [address]      |
|                       | [place]  [address]   |
+-----------------------+------------------------+
| Home Phone            | [phone]        |
+-----------------------+------------------------+
| Preferred Language    | Unknown                |
+-----------------------+------------------------+
| Marital Status        |                 |
+-----------------------+------------------------+
| Evangelical Affiliation | MET                    |
+-----------------------+------------------------+
| Race                  | White                  |
+-----------------------+------------------------+
| Ethnic Group          | Not  or  |
+-----------------------+------------------------+
 
 
 Author
 
 
+--------------+------------------------------+
| Author       | [facility] |
+--------------+------------------------------+
| Organization | [facility] |
+--------------+------------------------------+
| Address      | Unknown                      |
+--------------+------------------------------+
| Phone        | Unavailable                  |
+--------------+------------------------------+
 
 
 
 Support
 
 
+--------------+--------------+---------------------+-----------------+
| Name         | Relationship | Address             | Phone           |
+--------------+--------------+---------------------+-----------------+
| Lawson Ferreira | ECON         | [address]          | [phone] |
|              |              | [place], [place]   |                 |
|              |              | [address]               |                 |
+--------------+--------------+---------------------+-----------------+
 
 
 
 Care Team Providers
 
 
 
+-----------------------+------+-------------+
| Care Team Member Name | Role | Phone       |
+-----------------------+------+-------------+
 PCP  | Unavailable |
+-----------------------+------+-------------+
 
 
 
 Encounter Details
 
 
+--------+----------+----------------------+-----------+-------------+
| Date   | Type     | Department           | Care Team | Description |
+--------+----------+----------------------+-----------+-------------+
| / | Results  |   Registration  [address] |           |             |
|    | Only     |  [address] |           |             |
|        |          |  [place]  Mailcode: [id number] |           |             |
|        |          |   [place], [place]       |           |             |
|        |          | [address]           |           |             |
|        |          | [phone]         |           |             |
+--------+----------+----------------------+-----------+-------------+
 
 
 
 Social History
 
 
+----------------+-------+-----------+--------+------+
| Tobacco Use    | Types | Packs/Day | Years  | Date |
|                |       |           | Used   |      |
+----------------+-------+-----------+--------+------+
| Never Assessed |       |           |        |      |
+----------------+-------+-----------+--------+------+
 
 
 
+------------------+---------------+
| Sex Assigned at  | Date Recorded |
| Birth            |               |
+------------------+---------------+
| Not on file      |               |
+------------------+---------------+
 
 
 
+----------------+-------------+-------------+
| Job Start Date | Occupation  | Industry    |
+----------------+-------------+-------------+
| Not on file    | Not on file | Not on file |
+----------------+-------------+-------------+
 
 
 
+----------------+--------------+------------+
| Travel History | Travel Start | Travel End |
+----------------+--------------+------------+
 
 
 
 
+-------------------------------------+
| No recent travel history available. |
+-------------------------------------+
 documented as of this encounter
 
 Plan of Treatment
 Not on filedocumented as of this encounter
 
 Procedures
 
 
+----------------------+--------+-------------+----------------------+----------------------
+
| Procedure Name       | Priori | Date/Time   | Associated Diagnosis | Comments             
|
|                      | ty     |             |                      |                      
|
+----------------------+--------+-------------+----------------------+----------------------
+
| MICROBIOLOGY TESTS 1 | Routin | 1996  |                      |   Results for this   
|
|                      | e      |  2:30 PM    |                      | procedure are in the 
|
|                      |        | PST         |                      |  results section.    
|
+----------------------+--------+-------------+----------------------+----------------------
+
| MICROBIOLOGY TESTS 1 | Routin | 1996  |                      |   Results for this   
|
|                      | e      |  2:30 PM    |                      | procedure are in the 
|
|                      |        | PST         |                      |  results section.    
|
+----------------------+--------+-------------+----------------------+----------------------
+
| CBC TESTS 2          | Routin | 1996  |                      |   Results for this   
|
|                      | e      | 12:45 PM    |                      | procedure are in the 
|
|                      |        | PST         |                      |  results section.    
|
+----------------------+--------+-------------+----------------------+----------------------
+
| CHEMISTRY TESTS 4    | Routin | 1996  |                      |   Results for this   
|
|                      | e      |  6:23 AM    |                      | procedure are in the 
|
|                      |        | PST         |                      |  results section.    
|
+----------------------+--------+-------------+----------------------+----------------------
+
| CHEMISTRY TESTS 2    | Routin | 1996  |                      |   Results for this   
|
|                      | e      |  6:23 AM    |                      | procedure are in the 
|
|                      |        | PST         |                      |  results section.    
|
+----------------------+--------+-------------+----------------------+----------------------
+
 
| CHEMISTRY TESTS 4    | Routin | 1996  |                      |   Results for this   
|
|                      | e      |  8:00 PM    |                      | procedure are in the 
|
|                      |        | PST         |                      |  results section.    
|
+----------------------+--------+-------------+----------------------+----------------------
+
| CBC TESTS 2          | Routin | 1996  |                      |   Results for this   
|
|                      | e      |  8:00 PM    |                      | procedure are in the 
|
|                      |        | PST         |                      |  results section.    
|
+----------------------+--------+-------------+----------------------+----------------------
+
| COAGULATION TESTS 2  | Routin | 1996  |                      |   Results for this   
|
|                      | e      |  8:00 PM    |                      | procedure are in the 
|
|                      |        | PST         |                      |  results section.    
|
+----------------------+--------+-------------+----------------------+----------------------
+
| TRANSFUSION MEDICINE | Routin | 1996  |                      |   Results for this   
|
|  TESTS               | e      |  4:55 PM    |                      | procedure are in the 
|
|                      |        | PST         |                      |  results section.    
|
+----------------------+--------+-------------+----------------------+----------------------
+
| CBC TESTS 2          | Routin | 1996  |                      |   Results for this   
|
|                      | e      | 11:50 AM    |                      | procedure are in the 
|
|                      |        | PST         |                      |  results section.    
|
+----------------------+--------+-------------+----------------------+----------------------
+
| CHEMISTRY TESTS 4    | Routin | 1996  |                      |   Results for this   
|
|                      | e      |  7:30 AM    |                      | procedure are in the 
|
|                      |        | PST         |                      |  results section.    
|
+----------------------+--------+-------------+----------------------+----------------------
+
| CBC TESTS 2          | Routin | 1996  |                      |   Results for this   
|
|                      | e      |  7:30 AM    |                      | procedure are in the 
|
|                      |        | PST         |                      |  results section.    
|
+----------------------+--------+-------------+----------------------+----------------------
+
| CHEMISTRY TESTS 2    | Routin | 1996  |                      |   Results for this   
|
|                      | e      |  7:30 AM    |                      | procedure are in the 
|
 
|                      |        | PST         |                      |  results section.    
|
+----------------------+--------+-------------+----------------------+----------------------
+
| CBC TESTS 2          | Routin | 1996  |                      |   Results for this   
|
|                      | e      |  1:50 PM    |                      | procedure are in the 
|
|                      |        | PST         |                      |  results section.    
|
+----------------------+--------+-------------+----------------------+----------------------
+
| CHEMISTRY TESTS 4    | Routin | 1996  |                      |   Results for this   
|
|                      | e      |  5:30 AM    |                      | procedure are in the 
|
|                      |        | PST         |                      |  results section.    
|
+----------------------+--------+-------------+----------------------+----------------------
+
| CBC TESTS 2          | Routin | 1996  |                      |   Results for this   
|
|                      | e      |  5:30 AM    |                      | procedure are in the 
|
|                      |        | PST         |                      |  results section.    
|
+----------------------+--------+-------------+----------------------+----------------------
+
| CHEMISTRY TESTS 2    | Routin | 1996  |                      |   Results for this   
|
|                      | e      |  5:30 AM    |                      | procedure are in the 
|
|                      |        | PST         |                      |  results section.    
|
+----------------------+--------+-------------+----------------------+----------------------
+
| BLOOD GASES,         | Routin | 1996  |                      |   Results for this   
|
| ARTERIAL - LAB       | e      |  5:30 AM    |                      | procedure are in the 
|
|                      |        | PST         |                      |  results section.    
|
+----------------------+--------+-------------+----------------------+----------------------
+
| TRANSFUSION MEDICINE | Routin | 1996  |                      |   Results for this   
|
|  TESTS               | e      | 11:59 PM    |                      | procedure are in the 
|
|                      |        | PST         |                      |  results section.    
|
+----------------------+--------+-------------+----------------------+----------------------
+
| CHEMISTRY TESTS 4    | Routin | 1996  |                      |   Results for this   
|
|                      | e      | 10:00 PM    |                      | procedure are in the 
|
|                      |        | PST         |                      |  results section.    
|
+----------------------+--------+-------------+----------------------+----------------------
+
 
| CBC TESTS 2          | Routin | 1996  |                      |   Results for this   
|
|                      | e      | 10:00 PM    |                      | procedure are in the 
|
|                      |        | PST         |                      |  results section.    
|
+----------------------+--------+-------------+----------------------+----------------------
+
| CHEMISTRY TESTS 4    | Routin | 1996  |                      |   Results for this   
|
|                      | e      |  2:15 PM    |                      | procedure are in the 
|
|                      |        | PST         |                      |  results section.    
|
+----------------------+--------+-------------+----------------------+----------------------
+
| CBC TESTS 2          | Routin | 1996  |                      |   Results for this   
|
|                      | e      |  2:15 PM    |                      | procedure are in the 
|
|                      |        | PST         |                      |  results section.    
|
+----------------------+--------+-------------+----------------------+----------------------
+
 documented in this encounter
 
 Results
 MICROBIOLOGY TESTS 1 (1996  2:30 PM PST)
 
+-----------+--------------------------+-----------+------------+--------------+
| Component | Value                    | Ref Range | Performed  | Pathologist  |
|           |                          |           | At         | Signature    |
+-----------+--------------------------+-----------+------------+--------------+
| CULTURE   | Diagnosis                |           |            |              |
| RESULT    |   RULE OUT INFECTIONTest |           |            |              |
|           |  Ordered          VIRAL  |           |            |              |
|           | CULTURE, HERPES          |           |            |              |
|           | ONLYOrdering Loc         |           |            |              |
|           |             163Spec Set  |           |            |              |
|           | Up Date    Spec Set  |           |            |              |
|           | Up Time    18:17Specimen |           |            |              |
|           |  Type         SWAB       |           |            |              |
|           | ABDOMENReport Status     |           |            |              |
|           |      FINALPrelim Result  |           |            |              |
|           |         NO VIRUS         |           |            |              |
|           | ISOLATED TO DATECulture  |           |            |              |
|           | Result       NO VIRUS    |           |            |              |
|           | ISOLATEDDate Of Final Re |           |            |              |
|           |           [id number]          |           |            |              |
+-----------+--------------------------+-----------+------------+--------------+
 
 
 
+----------+
| Specimen |
+----------+
|          |
+----------+
 
 
 
 
+----------------------+------------------------+--------------------+--------------+
| Performing           | Address                | City/State/Zipcode | Phone Number |
| Organization         |                        |                    |              |
+----------------------+------------------------+--------------------+--------------+
|   [facility] |   [address]  | [place] [address] |              |
|  PATHOLOGY           | [address]                |                    |              |
+----------------------+------------------------+--------------------+--------------+
 MICROBIOLOGY TESTS 1 (1996  2:30 PM PST)
 
+-----------+--------------------------+-----------+------------+--------------+
| Component | Value                    | Ref Range | Performed  | Pathologist  |
|           |                          |           | At         | Signature    |
+-----------+--------------------------+-----------+------------+--------------+
| CULTURE   | Diagnosis                |           |            |              |
| RESULT    |   RULE OUT URINARY TRACT |           |            |              |
|           |  INFECTIONTest Ordered   |           |            |              |
|           |          URINE CULTURE,  |           |            |              |
|           | ROUTINEOrdering Loc      |           |            |              |
|           |                163Spec   |           |            |              |
|           | Set Up Date    Spec  |           |            |              |
|           | Set Up Time              |           |            |              |
|           | 18:17Source Body Site    |           |            |              |
|           |  CLEAN CATCHColony Count |           |            |              |
|           |           25,000 -       |           |            |              |
|           | 49,000 CFU/MLReport      |           |            |              |
|           | Status                   |           |            |              |
|           |   FINALPrelim Result     |           |            |              |
|           |      GRAM POSITIVE       |           |            |              |
|           | GROWTHCulture Result     |           |            |              |
|           |    MIXED GRAM POSITIVE   |           |            |              |
|           | GROWTHDate Of Final Re   |           |            |              |
|           |          [id number]           |           |            |              |
+-----------+--------------------------+-----------+------------+--------------+
 
 
 
+----------+
| Specimen |
+----------+
|          |
+----------+
 
 
 
+----------------------+------------------------+--------------------+--------------+
| Performing           | Address                | City/State/Zipcode | Phone Number |
| Organization         |                        |                    |              |
+----------------------+------------------------+--------------------+--------------+
|   [facility] |   [address]  | [place] [address] |              |
|  PATHOLOGY           | [address]                |                    |              |
+----------------------+------------------------+--------------------+--------------+
 CBC TESTS 2 (1996 12:45 PM PST)
 
+-------------+-----------------+-----------+------------+--------------+
| Component   | Value           | Ref Range | Performed  | Pathologist  |
|             |                 |           | At         | Signature    |
+-------------+-----------------+-----------+------------+--------------+
| WHITE CELL  |        10.5 (H) | K/CU MM   |            |              |
| COUNT       |                 |           |            |              |
 
+-------------+-----------------+-----------+------------+--------------+
| RED CELL    |         4.08    | M/CU MM   |            |              |
| COUNT       |                 |           |            |              |
+-------------+-----------------+-----------+------------+--------------+
| HEMOGLOBIN  |        12.9     | GM/DL     |            |              |
+-------------+-----------------+-----------+------------+--------------+
| HEMATOCRIT  |        36.8 (L) | %         |            |              |
+-------------+-----------------+-----------+------------+--------------+
| MCV         |        90.1     | FL        |            |              |
+-------------+-----------------+-----------+------------+--------------+
| MCH         |        31.5     | PG        |            |              |
+-------------+-----------------+-----------+------------+--------------+
| MCHC        |        34.9 (H) | GM/DL     |            |              |
+-------------+-----------------+-----------+------------+--------------+
| RDW         |        13.2     | %         |            |              |
+-------------+-----------------+-----------+------------+--------------+
| PLATELET    |       577. (H)  | K/CU MM   |            |              |
| COUNT       |                 |           |            |              |
+-------------+-----------------+-----------+------------+--------------+
| MPV         |         6.7 (L) | FL        |            |              |
+-------------+-----------------+-----------+------------+--------------+
 
 
 
+----------+
| Specimen |
+----------+
|          |
+----------+
 
 
 
+----------------------+------------------------+--------------------+--------------+
| Performing           | Address                | City/State/Zipcode | Phone Number |
| Organization         |                        |                    |              |
+----------------------+------------------------+--------------------+--------------+
|   [facility] |   [address]  | [place] [address] |              |
|  PATHOLOGY           | [address]                |                    |              |
+----------------------+------------------------+--------------------+--------------+
 CHEMISTRY TESTS 2 (1996  6:23 AM PST)
 
+-------------+----------------+-----------+------------+--------------+
| Component   | Value          | Ref Range | Performed  | Pathologist  |
|             |                |           | At         | Signature    |
+-------------+----------------+-----------+------------+--------------+
| CHOLESTEROL |       108. (L) | mg/dL     |            |              |
|   (LAB)     |                |           |            |              |
+-------------+----------------+-----------+------------+--------------+
 
 
 
+----------+
| Specimen |
+----------+
|          |
+----------+
 
 
 
+----------------------+------------------------+--------------------+--------------+
 
| Performing           | Address                | City/State/Zipcode | Phone Number |
| Organization         |                        |                    |              |
+----------------------+------------------------+--------------------+--------------+
|   [facility] |   [address]  | [place] [address] |              |
|  PATHOLOGY           | [address]                |                    |              |
+----------------------+------------------------+--------------------+--------------+
 CHEMISTRY TESTS 4 (1996  6:23 AM PST)
 
+-------------+-----------------+-----------+------------+--------------+
| Component   | Value           | Ref Range | Performed  | Pathologist  |
|             |                 |           | At         | Signature    |
+-------------+-----------------+-----------+------------+--------------+
| SODIUM,     |       138.      | mmol/l    |            |              |
| PLASMA      |                 |           |            |              |
| (LAB)       |                 |           |            |              |
+-------------+-----------------+-----------+------------+--------------+
| POTASSIUM,  |         3.5     | mmol/l    |            |              |
| PLASMA      |                 |           |            |              |
| (LAB)       |                 |           |            |              |
+-------------+-----------------+-----------+------------+--------------+
| CHLORIDE,   |       103.      | mmol/l    |            |              |
| PLASMA      |                 |           |            |              |
| (LAB)       |                 |           |            |              |
+-------------+-----------------+-----------+------------+--------------+
| TOTAL CO2,  |        31. (H)  | mmol/l    |            |              |
| PLASMA      |                 |           |            |              |
| (LAB)       |                 |           |            |              |
+-------------+-----------------+-----------+------------+--------------+
| BUN, PLASMA |         7.      | mg/dL     |            |              |
|  (LAB)      |                 |           |            |              |
+-------------+-----------------+-----------+------------+--------------+
| CREATININE  |         0.8     | mg/dL     |            |              |
| PLASMA      |                 |           |            |              |
| (LAB)       |                 |           |            |              |
+-------------+-----------------+-----------+------------+--------------+
| GLUCOSE,    |       108.      | mg/dL     |            |              |
| PLASMA      |                 |           |            |              |
| (LAB)       |                 |           |            |              |
+-------------+-----------------+-----------+------------+--------------+
| CALCIUM,    |         7.9 (L) | mg/dL     |            |              |
| PLASMA      |                 |           |            |              |
| (LAB)       |                 |           |            |              |
+-------------+-----------------+-----------+------------+--------------+
| MAGNESIUM,P |         1.6 (L) | mg/dL     |            |              |
| LASMA       |                 |           |            |              |
+-------------+-----------------+-----------+------------+--------------+
| PHOSPHORUS, |         2.7     | mg/dL     |            |              |
|  PLASMA     |                 |           |            |              |
| (LAB)       |                 |           |            |              |
+-------------+-----------------+-----------+------------+--------------+
| URIC ACID,  |         4.9     | mg/dL     |            |              |
| PLASMA      |                 |           |            |              |
| (LAB)       |                 |           |            |              |
+-------------+-----------------+-----------+------------+--------------+
| AST(SGOT)   |        15.      | U/L       |            |              |
+-------------+-----------------+-----------+------------+--------------+
| ALT (SGPT)  |        15.      | U/L       |            |              |
+-------------+-----------------+-----------+------------+--------------+
| ALK PHOS    |        50.      | U/L       |            |              |
+-------------+-----------------+-----------+------------+--------------+
 
| LD TOTAL,   |       171.      | U/L       |            |              |
| PLASMA      |                 |           |            |              |
+-------------+-----------------+-----------+------------+--------------+
| BILIRUBIN   |         0.2     | mg/dL     |            |              |
| DIRECT      |                 |           |            |              |
+-------------+-----------------+-----------+------------+--------------+
| BILIRUBIN   |         0.9     | mg/dL     |            |              |
| TOTAL       |                 |           |            |              |
+-------------+-----------------+-----------+------------+--------------+
| TOTAL       |         4.3 (L) | GM/DL     |            |              |
| PROTEIN,    |                 |           |            |              |
| PLASMA      |                 |           |            |              |
| (LAB)       |                 |           |            |              |
+-------------+-----------------+-----------+------------+--------------+
| ALBUMIN,    |         2.4 (L) | GM/DL     |            |              |
| PLASMA      |                 |           |            |              |
| (LAB)       |                 |           |            |              |
+-------------+-----------------+-----------+------------+--------------+
 
 
 
+----------+
| Specimen |
+----------+
|          |
+----------+
 
 
 
+----------------------+------------------------+--------------------+--------------+
| Performing           | Address                | City/State/Zipcode | Phone Number |
| Organization         |                        |                    |              |
+----------------------+------------------------+--------------------+--------------+
|   [facility] |   [address]  | [place] [address] |              |
|  PATHOLOGY           | [address]                |                    |              |
+----------------------+------------------------+--------------------+--------------+
 CHEMISTRY TESTS 4 (1996  8:00 PM PST)
 
+-------------+-----------------+-----------+------------+--------------+
| Component   | Value           | Ref Range | Performed  | Pathologist  |
|             |                 |           | At         | Signature    |
+-------------+-----------------+-----------+------------+--------------+
| SODIUM,     |       141.      | mmol/l    |            |              |
| PLASMA      |                 |           |            |              |
| (LAB)       |                 |           |            |              |
+-------------+-----------------+-----------+------------+--------------+
| POTASSIUM,  |         3.4 (L) | mmol/l    |            |              |
| PLASMA      |                 |           |            |              |
| (LAB)       |                 |           |            |              |
+-------------+-----------------+-----------+------------+--------------+
| CHLORIDE,   |        98.      | mmol/l    |            |              |
| PLASMA      |                 |           |            |              |
| (LAB)       |                 |           |            |              |
+-------------+-----------------+-----------+------------+--------------+
| TOTAL CO2,  |        30. (H)  | mmol/l    |            |              |
| PLASMA      |                 |           |            |              |
| (LAB)       |                 |           |            |              |
+-------------+-----------------+-----------+------------+--------------+
| BUN, PLASMA |         5. (L)  | mg/dL     |            |              |
|  (LAB)      |                 |           |            |              |
 
+-------------+-----------------+-----------+------------+--------------+
| CREATININE  |         0.7     | mg/dL     |            |              |
| PLASMA      |                 |           |            |              |
| (LAB)       |                 |           |            |              |
+-------------+-----------------+-----------+------------+--------------+
| GLUCOSE,    |       118. (H)  | mg/dL     |            |              |
| PLASMA      |                 |           |            |              |
| (LAB)       |                 |           |            |              |
+-------------+-----------------+-----------+------------+--------------+
 
 
 
+----------+
| Specimen |
+----------+
|          |
+----------+
 
 
 
+----------------------+------------------------+--------------------+--------------+
| Performing           | Address                | City/State/Zipcode | Phone Number |
| Organization         |                        |                    |              |
+----------------------+------------------------+--------------------+--------------+
|   [facility] |   [address]  | [place] [address] |              |
|  PATHOLOGY           | [address]                |                    |              |
+----------------------+------------------------+--------------------+--------------+
 COAGULATION TESTS 2 (1996  8:00 PM PST)
 
+-------------+--------------+-----------+------------+--------------+
| Component   | Value        | Ref Range | Performed  | Pathologist  |
|             |              |           | At         | Signature    |
+-------------+--------------+-----------+------------+--------------+
| PROTHROMBIN |        12.3  | SECONDS   |            |              |
|  TIME       |              |           |            |              |
+-------------+--------------+-----------+------------+--------------+
| PROTIME     |         1.   | SECONDS   |            |              |
| RATIO       |              |           |            |              |
+-------------+--------------+-----------+------------+--------------+
| PROTHROMBIN |         1.09 | INR       |            |              |
|  INR        |              |           |            |              |
+-------------+--------------+-----------+------------+--------------+
| APTT        |        24.2  | SECONDS   |            |              |
+-------------+--------------+-----------+------------+--------------+
 
 
 
+----------+
| Specimen |
+----------+
|          |
+----------+
 
 
 
+----------------------+------------------------+--------------------+--------------+
| Performing           | Address                | City/State/Zipcode | Phone Number |
| Organization         |                        |                    |              |
+----------------------+------------------------+--------------------+--------------+
|   [facility] |   [address]  | [place] [address] |              |
 
|  PATHOLOGY           | [address]                |                    |              |
+----------------------+------------------------+--------------------+--------------+
 CBC TESTS 2 (1996  8:00 PM PST)
 
+-------------+-----------------+-----------+------------+--------------+
| Component   | Value           | Ref Range | Performed  | Pathologist  |
|             |                 |           | At         | Signature    |
+-------------+-----------------+-----------+------------+--------------+
| WHITE CELL  |         9.4     | K/CU MM   |            |              |
| COUNT       |                 |           |            |              |
+-------------+-----------------+-----------+------------+--------------+
| RED CELL    |         3.6 (L) | M/CU MM   |            |              |
| COUNT       |                 |           |            |              |
+-------------+-----------------+-----------+------------+--------------+
| HEMOGLOBIN  |        11.3 (L) | GM/DL     |            |              |
+-------------+-----------------+-----------+------------+--------------+
| HEMATOCRIT  |        31.9 (L) | %         |            |              |
+-------------+-----------------+-----------+------------+--------------+
| MCV         |        88.8     | FL        |            |              |
+-------------+-----------------+-----------+------------+--------------+
| MCH         |        31.3     | PG        |            |              |
+-------------+-----------------+-----------+------------+--------------+
| MCHC        |        35.3 (H) | GM/DL     |            |              |
+-------------+-----------------+-----------+------------+--------------+
| RDW         |        13.3     | %         |            |              |
+-------------+-----------------+-----------+------------+--------------+
| PLATELET    |       146. (L)  | K/CU MM   |            |              |
| COUNT       |                 |           |            |              |
+-------------+-----------------+-----------+------------+--------------+
| MPV         |         7.5     | FL        |            |              |
+-------------+-----------------+-----------+------------+--------------+
 
 
 
+----------+
| Specimen |
+----------+
|          |
+----------+
 
 
 
+----------------------+------------------------+--------------------+--------------+
| Performing           | Address                | City/State/Zipcode | Phone Number |
| Organization         |                        |                    |              |
+----------------------+------------------------+--------------------+--------------+
|   [facility] |   [address]  | [place] [address] |              |
|  PATHOLOGY           | [address]                |                    |              |
+----------------------+------------------------+--------------------+--------------+
 TRANSFUSION MEDICINE TESTS (1996  4:55 PM PST)
 
+-----------+----------+-----------+------------+--------------+
| Component | Value    | Ref Range | Performed  | Pathologist  |
|           |          |           | At         | Signature    |
+-----------+----------+-----------+------------+--------------+
| ABO GROUP | A        |           |            |              |
+-----------+----------+-----------+------------+--------------+
| RH TYPE   | POS      |           |            |              |
+-----------+----------+-----------+------------+--------------+
| ANTIBODY  | NEGATIVE |           |            |              |
 
| SCREEN    |          |           |            |              |
+-----------+----------+-----------+------------+--------------+
 
 
 
+----------+
| Specimen |
+----------+
|          |
+----------+
 
 
 
+----------------------+------------------------+--------------------+--------------+
| Performing           | Address                | City/State/Zipcode | Phone Number |
| Organization         |                        |                    |              |
+----------------------+------------------------+--------------------+--------------+
|   [facility] |   [address]  | [place] [address] |              |
|  PATHOLOGY           | [address]                |                    |              |
+----------------------+------------------------+--------------------+--------------+
 CBC TESTS 2 (1996 11:50 AM PST)
 
+-------------+------------------+-----------+------------+--------------+
| Component   | Value            | Ref Range | Performed  | Pathologist  |
|             |                  |           | At         | Signature    |
+-------------+------------------+-----------+------------+--------------+
| WHITE CELL  |         9.9      | K/CU MM   |            |              |
| COUNT       |                  |           |            |              |
+-------------+------------------+-----------+------------+--------------+
| RED CELL    |         3.73 (L) | M/CU MM   |            |              |
| COUNT       |                  |           |            |              |
+-------------+------------------+-----------+------------+--------------+
| HEMOGLOBIN  |        11.8 (L)  | GM/DL     |            |              |
+-------------+------------------+-----------+------------+--------------+
| HEMATOCRIT  |        33.2 (L)  | %         |            |              |
+-------------+------------------+-----------+------------+--------------+
| MCV         |        88.8      | FL        |            |              |
+-------------+------------------+-----------+------------+--------------+
| MCH         |        31.7      | PG        |            |              |
+-------------+------------------+-----------+------------+--------------+
| MCHC        |        35.6 (H)  | GM/DL     |            |              |
+-------------+------------------+-----------+------------+--------------+
| RDW         |        13.3      | %         |            |              |
+-------------+------------------+-----------+------------+--------------+
| PLATELET    |       137. (L)   | K/CU MM   |            |              |
| COUNT       |                  |           |            |              |
+-------------+------------------+-----------+------------+--------------+
| MPV         |         7.7      | FL        |            |              |
+-------------+------------------+-----------+------------+--------------+
 
 
 
+----------+
| Specimen |
+----------+
|          |
+----------+
 
 
 
 
+----------------------+------------------------+--------------------+--------------+
| Performing           | Address                | City/State/Zipcode | Phone Number |
| Organization         |                        |                    |              |
+----------------------+------------------------+--------------------+--------------+
|   [facility] |   [address]  | [place] [address] |              |
|  PATHOLOGY           | [address]                |                    |              |
+----------------------+------------------------+--------------------+--------------+
 CHEMISTRY TESTS 2 (1996  7:30 AM PST)
 
+-------------+----------------+-----------+------------+--------------+
| Component   | Value          | Ref Range | Performed  | Pathologist  |
|             |                |           | At         | Signature    |
+-------------+----------------+-----------+------------+--------------+
| CHOLESTEROL |       101. (L) | mg/dL     |            |              |
|   (LAB)     |                |           |            |              |
+-------------+----------------+-----------+------------+--------------+
 
 
 
+----------+
| Specimen |
+----------+
|          |
+----------+
 
 
 
+----------------------+------------------------+--------------------+--------------+
| Performing           | Address                | City/State/Zipcode | Phone Number |
| Organization         |                        |                    |              |
+----------------------+------------------------+--------------------+--------------+
|   [facility] |   [address]  | [place] [address] |              |
|  PATHOLOGY           | [address]                |                    |              |
+----------------------+------------------------+--------------------+--------------+
 CHEMISTRY TESTS 4 (1996  7:30 AM PST)
 
+-------------+-----------------+-----------+------------+--------------+
| Component   | Value           | Ref Range | Performed  | Pathologist  |
|             |                 |           | At         | Signature    |
+-------------+-----------------+-----------+------------+--------------+
| SODIUM,     |       137.      | mmol/l    |            |              |
| PLASMA      |                 |           |            |              |
| (LAB)       |                 |           |            |              |
+-------------+-----------------+-----------+------------+--------------+
| POTASSIUM,  |         3. (L)  | mmol/l    |            |              |
| PLASMA      |                 |           |            |              |
| (LAB)       |                 |           |            |              |
+-------------+-----------------+-----------+------------+--------------+
| CHLORIDE,   |       102.      | mmol/l    |            |              |
| PLASMA      |                 |           |            |              |
| (LAB)       |                 |           |            |              |
+-------------+-----------------+-----------+------------+--------------+
| TOTAL CO2,  |        34. (H)  | mmol/l    |            |              |
| PLASMA      |                 |           |            |              |
| (LAB)       |                 |           |            |              |
+-------------+-----------------+-----------+------------+--------------+
| BUN, PLASMA |         8.      | mg/dL     |            |              |
|  (LAB)      |                 |           |            |              |
+-------------+-----------------+-----------+------------+--------------+
| CREATININE  |         0.7     | mg/dL     |            |              |
 
| PLASMA      |                 |           |            |              |
| (LAB)       |                 |           |            |              |
+-------------+-----------------+-----------+------------+--------------+
| GLUCOSE,    |       101.      | mg/dL     |            |              |
| PLASMA      |                 |           |            |              |
| (LAB)       |                 |           |            |              |
+-------------+-----------------+-----------+------------+--------------+
| CALCIUM,    |         7.7 (L) | mg/dL     |            |              |
| PLASMA      |                 |           |            |              |
| (LAB)       |                 |           |            |              |
+-------------+-----------------+-----------+------------+--------------+
| MAGNESIUM,P |         1.5 (L) | mg/dL     |            |              |
| LASMA       |                 |           |            |              |
+-------------+-----------------+-----------+------------+--------------+
| PHOSPHORUS, |         1.3 (L) | mg/dL     |            |              |
|  PLASMA     |                 |           |            |              |
| (LAB)       |                 |           |            |              |
+-------------+-----------------+-----------+------------+--------------+
| URIC ACID,  |         4.7     | mg/dL     |            |              |
| PLASMA      |                 |           |            |              |
| (LAB)       |                 |           |            |              |
+-------------+-----------------+-----------+------------+--------------+
| AST(SGOT)   |        18.      | U/L       |            |              |
+-------------+-----------------+-----------+------------+--------------+
| ALT (SGPT)  |        19.      | U/L       |            |              |
+-------------+-----------------+-----------+------------+--------------+
| ALK PHOS    |        43.      | U/L       |            |              |
+-------------+-----------------+-----------+------------+--------------+
| LD TOTAL,   |       186.      | U/L       |            |              |
| PLASMA      |                 |           |            |              |
+-------------+-----------------+-----------+------------+--------------+
| BILIRUBIN   |         0.2     | mg/dL     |            |              |
| DIRECT      |                 |           |            |              |
+-------------+-----------------+-----------+------------+--------------+
| BILIRUBIN   |         0.9     | mg/dL     |            |              |
| TOTAL       |                 |           |            |              |
+-------------+-----------------+-----------+------------+--------------+
| TOTAL       |         4.4 (L) | GM/DL     |            |              |
| PROTEIN,    |                 |           |            |              |
| PLASMA      |                 |           |            |              |
| (LAB)       |                 |           |            |              |
+-------------+-----------------+-----------+------------+--------------+
| ALBUMIN,    |         2.5 (L) | GM/DL     |            |              |
| PLASMA      |                 |           |            |              |
| (LAB)       |                 |           |            |              |
+-------------+-----------------+-----------+------------+--------------+
 
 
 
+----------+
| Specimen |
+----------+
|          |
+----------+
 
 
 
+----------------------+------------------------+--------------------+--------------+
| Performing           | Address                | City/State/Zipcode | Phone Number |
| Organization         |                        |                    |              |
 
+----------------------+------------------------+--------------------+--------------+
|   [facility] |   [address]  | [place] [address] |              |
|  PATHOLOGY           | [address]                |                    |              |
+----------------------+------------------------+--------------------+--------------+
 CBC TESTS 2 (1996  7:30 AM PST)
 
+-------------+------------------+-----------+------------+--------------+
| Component   | Value            | Ref Range | Performed  | Pathologist  |
|             |                  |           | At         | Signature    |
+-------------+------------------+-----------+------------+--------------+
| WHITE CELL  |         7.6      | K/CU MM   |            |              |
| COUNT       |                  |           |            |              |
+-------------+------------------+-----------+------------+--------------+
| RED CELL    |         2.75 (L) | M/CU MM   |            |              |
| COUNT       |                  |           |            |              |
+-------------+------------------+-----------+------------+--------------+
| HEMOGLOBIN  |         8.8 (L)  | GM/DL     |            |              |
+-------------+------------------+-----------+------------+--------------+
| HEMATOCRIT  |        25.4 (L)  | %         |            |              |
+-------------+------------------+-----------+------------+--------------+
| MCV         |        92.1      | FL        |            |              |
+-------------+------------------+-----------+------------+--------------+
| MCH         |        32.       | PG        |            |              |
+-------------+------------------+-----------+------------+--------------+
| MCHC        |        34.7 (H)  | GM/DL     |            |              |
+-------------+------------------+-----------+------------+--------------+
| RDW         |        13.5      | %         |            |              |
+-------------+------------------+-----------+------------+--------------+
| PLATELET    |       101. (L)   | K/CU MM   |            |              |
| COUNT       |                  |           |            |              |
+-------------+------------------+-----------+------------+--------------+
| MPV         |         7.8      | FL        |            |              |
+-------------+------------------+-----------+------------+--------------+
 
 
 
+----------+
| Specimen |
+----------+
|          |
+----------+
 
 
 
+----------------------+------------------------+--------------------+--------------+
| Performing           | Address                | City/State/Zipcode | Phone Number |
| Organization         |                        |                    |              |
+----------------------+------------------------+--------------------+--------------+
|   [facility] |   [address]  | [place] [address] |              |
|  PATHOLOGY           | [address]                |                    |              |
+----------------------+------------------------+--------------------+--------------+
 CBC TESTS 2 (1996  1:50 PM PST)
 
+-------------+-----------------+-----------+------------+--------------+
| Component   | Value           | Ref Range | Performed  | Pathologist  |
|             |                 |           | At         | Signature    |
+-------------+-----------------+-----------+------------+--------------+
| WHITE CELL  |         9.6     | K/CU MM   |            |              |
| COUNT       |                 |           |            |              |
+-------------+-----------------+-----------+------------+--------------+
 
| RED CELL    |         4.      | M/CU MM   |            |              |
| COUNT       |                 |           |            |              |
+-------------+-----------------+-----------+------------+--------------+
| HEMOGLOBIN  |        12.6     | GM/DL     |            |              |
+-------------+-----------------+-----------+------------+--------------+
| HEMATOCRIT  |        35.4 (L) | %         |            |              |
+-------------+-----------------+-----------+------------+--------------+
| MCV         |        88.6     | FL        |            |              |
+-------------+-----------------+-----------+------------+--------------+
| MCH         |        31.6     | PG        |            |              |
+-------------+-----------------+-----------+------------+--------------+
| MCHC        |        35.7 (H) | GM/DL     |            |              |
+-------------+-----------------+-----------+------------+--------------+
| RDW         |        13.      | %         |            |              |
+-------------+-----------------+-----------+------------+--------------+
| PLATELET    |       141. (L)  | K/CU MM   |            |              |
| COUNT       |                 |           |            |              |
+-------------+-----------------+-----------+------------+--------------+
| MPV         |         7.6     | FL        |            |              |
+-------------+-----------------+-----------+------------+--------------+
 
 
 
+----------+
| Specimen |
+----------+
|          |
+----------+
 
 
 
+----------------------+------------------------+--------------------+--------------+
| Performing           | Address                | City/State/Zipcode | Phone Number |
| Organization         |                        |                    |              |
+----------------------+------------------------+--------------------+--------------+
|   [facility] |   [address]  | [place] [address] |              |
|  PATHOLOGY           | [address]                |                    |              |
+----------------------+------------------------+--------------------+--------------+
 CBC TESTS 2 (1996  5:30 AM PST)
 
+-------------+-----------------+-----------+------------+--------------+
| Component   | Value           | Ref Range | Performed  | Pathologist  |
|             |                 |           | At         | Signature    |
+-------------+-----------------+-----------+------------+--------------+
| WHITE CELL  |        11.4 (H) | K/CU MM   |            |              |
| COUNT       |                 |           |            |              |
+-------------+-----------------+-----------+------------+--------------+
| RED CELL    |         4.63    | M/CU MM   |            |              |
| COUNT       |                 |           |            |              |
+-------------+-----------------+-----------+------------+--------------+
| HEMOGLOBIN  |        14.3     | GM/DL     |            |              |
+-------------+-----------------+-----------+------------+--------------+
| HEMATOCRIT  |        41.8     | %         |            |              |
+-------------+-----------------+-----------+------------+--------------+
| MCV         |        90.2     | FL        |            |              |
+-------------+-----------------+-----------+------------+--------------+
| MCH         |        30.8     | PG        |            |              |
+-------------+-----------------+-----------+------------+--------------+
| MCHC        |        34.2 (H) | GM/DL     |            |              |
+-------------+-----------------+-----------+------------+--------------+
 
| RDW         |        12.5     | %         |            |              |
+-------------+-----------------+-----------+------------+--------------+
| PLATELET    |       151.      | K/CU MM   |            |              |
| COUNT       |                 |           |            |              |
+-------------+-----------------+-----------+------------+--------------+
| MPV         |         7.4     | FL        |            |              |
+-------------+-----------------+-----------+------------+--------------+
| NEUTROPHIL  |        87. (H)  | %         |            |              |
| %           |                 |           |            |              |
+-------------+-----------------+-----------+------------+--------------+
| LYMPHOCYTE  |         7. (L)  | %         |            |              |
| %           |                 |           |            |              |
+-------------+-----------------+-----------+------------+--------------+
| MONOCYTE %  |         6.      | %         |            |              |
+-------------+-----------------+-----------+------------+--------------+
| EOS %       |         0.      | %         |            |              |
+-------------+-----------------+-----------+------------+--------------+
| BASO %      |         0.      | %         |            |              |
+-------------+-----------------+-----------+------------+--------------+
| NEUTROPHIL  |         9.9 (H) | K/CU MM   |            |              |
| #           |                 |           |            |              |
+-------------+-----------------+-----------+------------+--------------+
| LYMPHOCYTE  |         0.8 (L) | K/CU MM   |            |              |
| #           |                 |           |            |              |
+-------------+-----------------+-----------+------------+--------------+
| MONOCYTE #  |         0.7 (H) | K/CU MM   |            |              |
+-------------+-----------------+-----------+------------+--------------+
| EOS #       |         0.      | K/CU MM   |            |              |
+-------------+-----------------+-----------+------------+--------------+
| BASO #      |         0.      | K/CU MM   |            |              |
+-------------+-----------------+-----------+------------+--------------+
 
 
 
+----------+
| Specimen |
+----------+
|          |
+----------+
 
 
 
+----------------------+------------------------+--------------------+--------------+
| Performing           | Address                | City/State/Zipcode | Phone Number |
| Organization         |                        |                    |              |
+----------------------+------------------------+--------------------+--------------+
|   [facility] |   [address]  | [place] [address] |              |
|  PATHOLOGY           | [address]                |                    |              |
+----------------------+------------------------+--------------------+--------------+
 BLOOD GASES, ARTERIAL - LAB (1996  5:30 AM PST)
 
+-------------+------------------+------------+------------+--------------+
| Component   | Value            | Ref Range  | Performed  | Pathologist  |
|             |                  |            | At         | Signature    |
+-------------+------------------+------------+------------+--------------+
| PAT TEMP    |        37.3      | DEGREES C. |            |              |
| ARTERIAL    |                  |            |            |              |
+-------------+------------------+------------+------------+--------------+
| FIO2        |         6. (L)   | DEGREES C. |            |              |
| ARTERIAL    |                  |            |            |              |
 
+-------------+------------------+------------+------------+--------------+
| PH ARTERIAL |         7.35 (L) | DEGREES C. |            |              |
+-------------+------------------+------------+------------+--------------+
| PCO2        |        45. (H)   | MM HG      |            |              |
| ARTERIAL    |                  |            |            |              |
+-------------+------------------+------------+------------+--------------+
| PO2         |       103.       | MM HG      |            |              |
| ARTERIAL    |                  |            |            |              |
+-------------+------------------+------------+------------+--------------+
| BASE EXCESS |        -0.6      | MM HG      |            |              |
|  ARTERIAL   |                  |            |            |              |
+-------------+------------------+------------+------------+--------------+
| HCO3        |        25.       | mmol/l     |            |              |
| ARTERIAL    |                  |            |            |              |
+-------------+------------------+------------+------------+--------------+
| TOTAL CO2   |        26.       | mmol/l     |            |              |
| ARTERIAL    |                  |            |            |              |
+-------------+------------------+------------+------------+--------------+
| CALC %O2    |        97.4      | % O2 Sat   |            |              |
| SAT ARTER   |                  |            |            |              |
+-------------+------------------+------------+------------+--------------+
 
 
 
+----------+
| Specimen |
+----------+
|          |
+----------+
 
 
 
+----------------------+------------------------+--------------------+--------------+
| Performing           | Address                | City/State/Zipcode | Phone Number |
| Organization         |                        |                    |              |
+----------------------+------------------------+--------------------+--------------+
|   [facility] |   [address]  | [place] [address] |              |
|  PATHOLOGY           | [address]                |                    |              |
+----------------------+------------------------+--------------------+--------------+
 CHEMISTRY TESTS 2 (1996  5:30 AM PST)
 
+-------------+----------------+-----------+------------+--------------+
| Component   | Value          | Ref Range | Performed  | Pathologist  |
|             |                |           | At         | Signature    |
+-------------+----------------+-----------+------------+--------------+
| CHOLESTEROL |       133. (L) | mg/dL     |            |              |
|   (LAB)     |                |           |            |              |
+-------------+----------------+-----------+------------+--------------+
 
 
 
+----------+
| Specimen |
+----------+
|          |
+----------+
 
 
 
+----------------------+------------------------+--------------------+--------------+
 
| Performing           | Address                | City/State/Zipcode | Phone Number |
| Organization         |                        |                    |              |
+----------------------+------------------------+--------------------+--------------+
|   [facility] |   [address]  | [place] [address] |              |
|  PATHOLOGY           | [address]                |                    |              |
+----------------------+------------------------+--------------------+--------------+
 CHEMISTRY TESTS 4 (1996  5:30 AM PST)
 
+-------------+-----------------+-----------+------------+--------------+
| Component   | Value           | Ref Range | Performed  | Pathologist  |
|             |                 |           | At         | Signature    |
+-------------+-----------------+-----------+------------+--------------+
| SODIUM,     |       143.      | mmol/l    |            |              |
| PLASMA      |                 |           |            |              |
| (LAB)       |                 |           |            |              |
+-------------+-----------------+-----------+------------+--------------+
| POTASSIUM,  |         3.8     | mmol/l    |            |              |
| PLASMA      |                 |           |            |              |
| (LAB)       |                 |           |            |              |
+-------------+-----------------+-----------+------------+--------------+
| CHLORIDE,   |       111. (H)  | mmol/l    |            |              |
| PLASMA      |                 |           |            |              |
| (LAB)       |                 |           |            |              |
+-------------+-----------------+-----------+------------+--------------+
| TOTAL CO2,  |        23.      | mmol/l    |            |              |
| PLASMA      |                 |           |            |              |
| (LAB)       |                 |           |            |              |
+-------------+-----------------+-----------+------------+--------------+
| BUN, PLASMA |        16.      | mg/dL     |            |              |
|  (LAB)      |                 |           |            |              |
+-------------+-----------------+-----------+------------+--------------+
| CREATININE  |         0.9     | mg/dL     |            |              |
| PLASMA      |                 |           |            |              |
| (LAB)       |                 |           |            |              |
+-------------+-----------------+-----------+------------+--------------+
| GLUCOSE,    |       157. (H)  | mg/dL     |            |              |
| PLASMA      |                 |           |            |              |
| (LAB)       |                 |           |            |              |
+-------------+-----------------+-----------+------------+--------------+
| CALCIUM,    |         7.8 (L) | mg/dL     |            |              |
| PLASMA      |                 |           |            |              |
| (LAB)       |                 |           |            |              |
+-------------+-----------------+-----------+------------+--------------+
| MAGNESIUM,P |         1.3 (L) | mg/dL     |            |              |
| LASMA       |                 |           |            |              |
+-------------+-----------------+-----------+------------+--------------+
| PHOSPHORUS, |         3.1     | mg/dL     |            |              |
|  PLASMA     |                 |           |            |              |
| (LAB)       |                 |           |            |              |
+-------------+-----------------+-----------+------------+--------------+
| URIC ACID,  |         5.5     | mg/dL     |            |              |
| PLASMA      |                 |           |            |              |
| (LAB)       |                 |           |            |              |
+-------------+-----------------+-----------+------------+--------------+
| AST(SGOT)   |        30.      | U/L       |            |              |
+-------------+-----------------+-----------+------------+--------------+
| ALT (SGPT)  |        23.      | U/L       |            |              |
+-------------+-----------------+-----------+------------+--------------+
| ALK PHOS    |        46.      | U/L       |            |              |
+-------------+-----------------+-----------+------------+--------------+
 
| LD TOTAL,   |       476. (H)  | U/L       |            |              |
| PLASMA      |                 |           |            |              |
+-------------+-----------------+-----------+------------+--------------+
| BILIRUBIN   |         0.3     | mg/dL     |            |              |
| DIRECT      |                 |           |            |              |
+-------------+-----------------+-----------+------------+--------------+
| BILIRUBIN   |         1.5 (H) | mg/dL     |            |              |
| TOTAL       |                 |           |            |              |
+-------------+-----------------+-----------+------------+--------------+
| TOTAL       |         5.2 (L) | GM/DL     |            |              |
| PROTEIN,    |                 |           |            |              |
| PLASMA      |                 |           |            |              |
| (LAB)       |                 |           |            |              |
+-------------+-----------------+-----------+------------+--------------+
| ALBUMIN,    |         3. (L)  | GM/DL     |            |              |
| PLASMA      |                 |           |            |              |
| (LAB)       |                 |           |            |              |
+-------------+-----------------+-----------+------------+--------------+
 
 
 
+----------+
| Specimen |
+----------+
|          |
+----------+
 
 
 
+----------------------+------------------------+--------------------+--------------+
| Performing           | Address                | City/State/Zipcode | Phone Number |
| Organization         |                        |                    |              |
+----------------------+------------------------+--------------------+--------------+
|   [facility] |   [address]  | [place] [address] |              |
|  PATHOLOGY           | [address]                |                    |              |
+----------------------+------------------------+--------------------+--------------+
 TRANSFUSION MEDICINE TESTS (1996 11:59 PM PST)
 
+-----------+----------+-----------+------------+--------------+
| Component | Value    | Ref Range | Performed  | Pathologist  |
|           |          |           | At         | Signature    |
+-----------+----------+-----------+------------+--------------+
| ABO GROUP | A        |           |            |              |
+-----------+----------+-----------+------------+--------------+
| RH TYPE   | POS      |           |            |              |
+-----------+----------+-----------+------------+--------------+
| ANTIBODY  | NEGATIVE |           |            |              |
| SCREEN    |          |           |            |              |
+-----------+----------+-----------+------------+--------------+
 
 
 
+----------+
| Specimen |
+----------+
|          |
+----------+
 
 
 
 
+----------------------+------------------------+--------------------+--------------+
| Performing           | Address                | City/State/Zipcode | Phone Number |
| Organization         |                        |                    |              |
+----------------------+------------------------+--------------------+--------------+
|   [facility] |   [address]  | [place] [address] |              |
|  PATHOLOGY           | [address]                |                    |              |
+----------------------+------------------------+--------------------+--------------+
 CHEMISTRY TESTS 4 (1996 10:00 PM PST)
 
+-------------+-------------+-----------+------------+--------------+
| Component   | Value       | Ref Range | Performed  | Pathologist  |
|             |             |           | At         | Signature    |
+-------------+-------------+-----------+------------+--------------+
| SODIUM,     |       137.  | mmol/l    |            |              |
| PLASMA      |             |           |            |              |
| (LAB)       |             |           |            |              |
+-------------+-------------+-----------+------------+--------------+
| POTASSIUM,  |         3.8 | mmol/l    |            |              |
| PLASMA      |             |           |            |              |
| (LAB)       |             |           |            |              |
+-------------+-------------+-----------+------------+--------------+
 
 
 
+----------+
| Specimen |
+----------+
|          |
+----------+
 
 
 
+----------------------+------------------------+--------------------+--------------+
| Performing           | Address                | City/State/Zipcode | Phone Number |
| Organization         |                        |                    |              |
+----------------------+------------------------+--------------------+--------------+
|   [facility] |   [address]  | [place] [address] |              |
|  PATHOLOGY           | [address]                |                    |              |
+----------------------+------------------------+--------------------+--------------+
 CBC TESTS 2 (1996 10:00 PM PST)
 
+-------------+------------------+-----------+------------+--------------+
| Component   | Value            | Ref Range | Performed  | Pathologist  |
|             |                  |           | At         | Signature    |
+-------------+------------------+-----------+------------+--------------+
| WHITE CELL  |        14.6 (H)  | K/CU MM   |            |              |
| COUNT       |                  |           |            |              |
+-------------+------------------+-----------+------------+--------------+
| RED CELL    |         2.75 (L) | M/CU MM   |            |              |
| COUNT       |                  |           |            |              |
+-------------+------------------+-----------+------------+--------------+
| HEMOGLOBIN  |         8.4 (L)  | GM/DL     |            |              |
+-------------+------------------+-----------+------------+--------------+
| HEMATOCRIT  |        24.3 (L)  | %         |            |              |
+-------------+------------------+-----------+------------+--------------+
| MCV         |        88.1      | FL        |            |              |
+-------------+------------------+-----------+------------+--------------+
| MCH         |        30.5      | PG        |            |              |
+-------------+------------------+-----------+------------+--------------+
| MCHC        |        34.5 (H)  | GM/DL     |            |              |
 
+-------------+------------------+-----------+------------+--------------+
| RDW         |        12.2      | %         |            |              |
+-------------+------------------+-----------+------------+--------------+
| PLATELET    |       267.       | K/CU MM   |            |              |
| COUNT       |                  |           |            |              |
+-------------+------------------+-----------+------------+--------------+
| MPV         |         7.7      | FL        |            |              |
+-------------+------------------+-----------+------------+--------------+
 
 
 
+----------+
| Specimen |
+----------+
|          |
+----------+
 
 
 
+----------------------+------------------------+--------------------+--------------+
| Performing           | Address                | City/State/Zipcode | Phone Number |
| Organization         |                        |                    |              |
+----------------------+------------------------+--------------------+--------------+
|   [facility] |   [address]  | [place] [address] |              |
|  PATHOLOGY           | [address]                |                    |              |
+----------------------+------------------------+--------------------+--------------+
 CHEMISTRY TESTS 4 (1996  2:15 PM PST)
 
+-------------+----------------+-----------+------------+--------------+
| Component   | Value          | Ref Range | Performed  | Pathologist  |
|             |                |           | At         | Signature    |
+-------------+----------------+-----------+------------+--------------+
| SODIUM,     |       140.     | mmol/l    |            |              |
| PLASMA      |                |           |            |              |
| (LAB)       |                |           |            |              |
+-------------+----------------+-----------+------------+--------------+
| POTASSIUM,  |         4.     | mmol/l    |            |              |
| PLASMA      |                |           |            |              |
| (LAB)       |                |           |            |              |
+-------------+----------------+-----------+------------+--------------+
| CHLORIDE,   |       102.     | mmol/l    |            |              |
| PLASMA      |                |           |            |              |
| (LAB)       |                |           |            |              |
+-------------+----------------+-----------+------------+--------------+
| TOTAL CO2,  |        31. (H) | mmol/l    |            |              |
| PLASMA      |                |           |            |              |
| (LAB)       |                |           |            |              |
+-------------+----------------+-----------+------------+--------------+
| BUN, PLASMA |        22.     | mg/dL     |            |              |
|  (LAB)      |                |           |            |              |
+-------------+----------------+-----------+------------+--------------+
| CREATININE  |         0.9    | mg/dL     |            |              |
| PLASMA      |                |           |            |              |
| (LAB)       |                |           |            |              |
+-------------+----------------+-----------+------------+--------------+
| GLUCOSE,    |        95.     | mg/dL     |            |              |
| PLASMA      |                |           |            |              |
| (LAB)       |                |           |            |              |
+-------------+----------------+-----------+------------+--------------+
 
 
 
 
+----------+
| Specimen |
+----------+
|          |
+----------+
 
 
 
+----------------------+------------------------+--------------------+--------------+
| Performing           | Address                | City/State/Zipcode | Phone Number |
| Organization         |                        |                    |              |
+----------------------+------------------------+--------------------+--------------+
|   [facility] |   [address]  | [place] [address] |              |
|  PATHOLOGY           | [address]                |                    |              |
+----------------------+------------------------+--------------------+--------------+
 CBC TESTS 2 (1996  2:15 PM PST)
 
+-------------+----------------+-----------+------------+--------------+
| Component   | Value          | Ref Range | Performed  | Pathologist  |
|             |                |           | At         | Signature    |
+-------------+----------------+-----------+------------+--------------+
| WHITE CELL  |         5.7    | K/CU MM   |            |              |
| COUNT       |                |           |            |              |
+-------------+----------------+-----------+------------+--------------+
| RED CELL    |         4.54   | M/CU MM   |            |              |
| COUNT       |                |           |            |              |
+-------------+----------------+-----------+------------+--------------+
| HEMOGLOBIN  |        13.8    | GM/DL     |            |              |
+-------------+----------------+-----------+------------+--------------+
| HEMATOCRIT  |        40.6    | %         |            |              |
+-------------+----------------+-----------+------------+--------------+
| MCV         |        89.5    | FL        |            |              |
+-------------+----------------+-----------+------------+--------------+
| MCH         |        30.4    | PG        |            |              |
+-------------+----------------+-----------+------------+--------------+
| MCHC        |        34. (H) | GM/DL     |            |              |
+-------------+----------------+-----------+------------+--------------+
| RDW         |        12.3    | %         |            |              |
+-------------+----------------+-----------+------------+--------------+
| PLATELET    |       306.     | K/CU MM   |            |              |
| COUNT       |                |           |            |              |
+-------------+----------------+-----------+------------+--------------+
| MPV         |         8.     | FL        |            |              |
+-------------+----------------+-----------+------------+--------------+
 
 
 
+----------+
| Specimen |
+----------+
|          |
+----------+
 
 
 
+----------------------+------------------------+--------------------+--------------+
| Performing           | Address                | City/State/Zipcode | Phone Number |
| Organization         |                        |                    |              |
 
+----------------------+------------------------+--------------------+--------------+
|   [facility] |   [address]  | [place] [address] |              |
|  PATHOLOGY           | [address]                |                    |              |
+----------------------+------------------------+--------------------+--------------+
 documented in this encounter
 
 Visit Diagnoses
 Not on filedocumented in this encounter

## 2019-12-04 NOTE — XMS
Clinical Summary
  Created on: 2019
 
 Margaret Ferreira
 External Reference #: [id number]
 : 30
 Sex: Female
 
 Demographics
 
 
+-----------------------+----------------------+
| Address               | [address]        |
|                       | [place]  [address] |
+-----------------------+----------------------+
| Home Phone            | [phone]      |
+-----------------------+----------------------+
| Preferred Language    | Unknown              |
+-----------------------+----------------------+
| Marital Status        |               |
+-----------------------+----------------------+
| Syn[employer] Affiliation | Unknown              |
+-----------------------+----------------------+
| Race                  | Unknown              |
+-----------------------+----------------------+
| Ethnic Group          | Unknown              |
+-----------------------+----------------------+
 
 
 Author
 
 
+--------------+--------------------------------------------+
| Author       | [facility]  |
|              | and [place]                                |
+--------------+--------------------------------------------+
| Organization | [facility]  |
|              | and [place]                                |
+--------------+--------------------------------------------+
| Address      | Unknown                                    |
+--------------+--------------------------------------------+
| Phone        | Unavailable                                |
+--------------+--------------------------------------------+
 
 
 
 Support
 
 
+--------------+--------------+---------+-----------------+
| Name         | Relationship | Address | Phone           |
+--------------+--------------+---------+-----------------+
| Lawson Hanna | ECON         | Unknown | [phone] |
+--------------+--------------+---------+-----------------+
 
 
 
 Care Team Providers
 
 
 
+-----------------------------+------+-----------------+
| Care Team Member Name       | Role | Phone           |
+-----------------------------+------+-----------------+
| Bessy Paulino | PCP  | [phone] |
|  PA-C                       |      |                 |
+-----------------------------+------+-----------------+
 
 
 
 Allergies
 
 
+----------------+----------------------+----------+----------+----------------------+
| Active Allergy | Reactions            | Severity | Noted    | Comments             |
|                |                      |          | Date     |                      |
+----------------+----------------------+----------+----------+----------------------+
| Lisinopril     | Other (See Comments) | Low      | 20 |   Cough              |
|                |                      |          | 14       |                      |
+----------------+----------------------+----------+----------+----------------------+
| Naproxen       | Other (See Comments) |          | 20 |   Reaction not       |
|                |                      |          | 18       | specified in outside |
|                |                      |          |          |  medical records     |
+----------------+----------------------+----------+----------+----------------------+
 
 
 
 Medications
 
 
+----------------------+----------------------+-----------+---------+------+------+-------+
| Medication           | Sig                  | Dispensed | Refills | Star | End  | Statu |
|                      |                      |           |         | t    | Date | s     |
|                      |                      |           |         | Date |      |       |
+----------------------+----------------------+-----------+---------+------+------+-------+
|   Calcium Carbonate  | Take 600 mg by mouth |           | 0       |      |      | Activ |
| (CALCIUM 600 PO)     |  Daily.              |           |         |      |      | e     |
+----------------------+----------------------+-----------+---------+------+------+-------+
|   Docosahexaenoic    | Take  by mouth.      |           | 0       |      |      | Activ |
| Acid (DHA OMEGA 3)   |                      |           |         |      |      | e     |
| 100 MG CAPS          |                      |           |         |      |      |       |
+----------------------+----------------------+-----------+---------+------+------+-------+
|   Multiple           | Take  by mouth       |           | 0       |      |      | Activ |
| Vitamins-Minerals    | Daily.               |           |         |      |      | e     |
| (MULTIVITAMIN PO)    |                      |           |         |      |      |       |
+----------------------+----------------------+-----------+---------+------+------+-------+
|   omeprazole         | Take 20 mg by mouth  |           | 0       |      |      | Activ |
| (PRILOSEC) 20 mg     | every morning        |           |         |      |      | e     |
| capsule              | (before breakfast).  |           |         |      |      |       |
+----------------------+----------------------+-----------+---------+------+------+-------+
|   traMADol (ULTRAM)  | Take 100 mg by mouth |           | 0       |      |      | Activ |
| 50 mg tablet         |  4 times daily.      |           |         |      |      | e     |
+----------------------+----------------------+-----------+---------+------+------+-------+
|   Cholecalciferol    | Take  by mouth       |           | 0       |      |      | Activ |
| (VITAMIN D-3) 2000   | Daily.               |           |         |      |      | e     |
| units CAPS           |                      |           |         |      |      |       |
+----------------------+----------------------+-----------+---------+------+------+-------+
|   Spacer/Aero        | Use with inhaler as  |   1 each  | 1       | 01/1 |      | Activ |
| Chamber Mouthpiece   | directed.            |           |         | 3/20 |      | e     |
 
| MISC                 |                      |           |         | 15   |      |       |
+----------------------+----------------------+-----------+---------+------+------+-------+
|   albuterol (PROAIR  | Inhale 2 puffs into  |   1       | 5       | 03/2 |      | Activ |
| HFA) 90 mcg/puff     | the lungs every 6    | Inhaler   |         | 2/20 |      | e     |
| inhaler              | hours as needed for  |           |         | 16   |      |       |
|                      | Wheezing or          |           |         |      |      |       |
|                      | Shortness of Breath. |           |         |      |      |       |
+----------------------+----------------------+-----------+---------+------+------+-------+
|   losartan (COZAAR)  | Take 100 mg by mouth |           | 0       |      |      | Activ |
| 100 MG tablet        |  Daily.              |           |         |      |      | e     |
+----------------------+----------------------+-----------+---------+------+------+-------+
|   NITROFURANTOIN     | Take  by mouth       |           | 0       |      |      | Activ |
| MACROCRYSTAL PO      | Daily.               |           |         |      |      | e     |
+----------------------+----------------------+-----------+---------+------+------+-------+
|   DULoxetine         | Take 60 mg by mouth  |           | 0       |      |      | Activ |
| (CYMBALTA) 60 mg DR  | Daily.               |           |         |      |      | e     |
| capsule              |                      |           |         |      |      |       |
+----------------------+----------------------+-----------+---------+------+------+-------+
|   gabapentin         | Take 300 mg by mouth |           | 0       |      |      | Activ |
| (NEURONTIN) 300 mg   |  3 times daily.      |           |         |      |      | e     |
| capsule              |                      |           |         |      |      |       |
+----------------------+----------------------+-----------+---------+------+------+-------+
|                      | Inhale 1 puff into   |           | 0       | 04/1 |      | Activ |
| umeclidinium-vilante | the lungs.           |           |         | 0/20 |      | e     |
| rol (ANORO ELLIPTA)  |                      |           |         | 18   |      |       |
| 62.5-25 mcg/puff     |                      |           |         |      |      |       |
| inhaler              |                      |           |         |      |      |       |
+----------------------+----------------------+-----------+---------+------+------+-------+
|   diclofenac         | Take 50 mg by mouth  |           | 0       |      |      | Activ |
| (VOLTAREN) 50 mg EC  | 2 times daily.       |           |         |      |      | e     |
| tablet               |                      |           |         |      |      |       |
+----------------------+----------------------+-----------+---------+------+------+-------+
|   pramipexole        | TAKE ONE TABLET BY   |   180     | 0       | 09/0 |      | Activ |
| (MIRAPEX) 0.25 mg    | MOUTH TWICE A DAY    | tablet    |         | 5/20 |      | e     |
| tablet               |                      |           |         | 18   |      |       |
+----------------------+----------------------+-----------+---------+------+------+-------+
|   levothyroxine      | Take 150 mcg by      |           | 0       |      |      | Activ |
| (SYNTHROID) 150 mcg  | mouth every morning. |           |         |      |      | e     |
| tablet               |                      |           |         |      |      |       |
+----------------------+----------------------+-----------+---------+------+------+-------+
|   Misc Natural       | Take  by mouth       |           | 0       |      |      | Activ |
| Products             | Daily.               |           |         |      |      | e     |
| (GLUCOS-CHONDROIT-MS |                      |           |         |      |      |       |
| M COMPLEX) TABS      |                      |           |         |      |      |       |
+----------------------+----------------------+-----------+---------+------+------+-------+
|                      | Take 1 tablet by     |           | 0       |      |      | Activ |
| HYDROcodone-acetamin | mouth every 6 hours  |           |         |      |      | e     |
| ophen (NORCO) 5-325  | as needed for Pain.  |           |         |      |      |       |
| mg per tablet        |                      |           |         |      |      |       |
+----------------------+----------------------+-----------+---------+------+------+-------+
|                      | Take  by mouth.      |           | 0       |      |      | Activ |
| GLUCOSAMINE-CHONDROI |                      |           |         |      |      | e     |
| TIN PO               |                      |           |         |      |      |       |
+----------------------+----------------------+-----------+---------+------+------+-------+
|   albuterol 90       | Inhale 2 puffs into  |           | 0       | 02/2 | 02/2 | Activ |
| mcg/puff inhaler     | the lungs every 4    |           |         | 0/20 | 0/20 | e     |
|                      | hours as needed for  |           |         | 19   | 20   |       |
|                      | Wheezing.            |           |         |      |      |       |
+----------------------+----------------------+-----------+---------+------+------+-------+
|                      | Inhale 1 puff into   |           | 0       | 07/2 |      | Activ |
 
| umeclidinium-vilante | the lungs Daily.     |           |         | 4/20 |      | e     |
| rol (ANORO ELLIPTA)  |                      |           |         | 19   |      |       |
| 62.5-25 mcg/puff     |                      |           |         |      |      |       |
| inhaler              |                      |           |         |      |      |       |
+----------------------+----------------------+-----------+---------+------+------+-------+
 
 
 
 Active Problems
 
 
+----------------------------------------------+------------+
| Problem                                      | Noted Date |
+----------------------------------------------+------------+
| COPD (chronic obstructive pulmonary disease) | 2014 |
+----------------------------------------------+------------+
| GERD (gastroesophageal reflux disease)       |            |
+----------------------------------------------+------------+
| Hiatal hernia                                |            |
+----------------------------------------------+------------+
| Hypertension                                 |            |
+----------------------------------------------+------------+
| Hypothyroidism                               |            |
+----------------------------------------------+------------+
| Urinary incontinence                         |            |
+----------------------------------------------+------------+
| Aortic valve stenosis                        |            |
+----------------------------------------------+------------+
 
 
 
+-----------------------+
|   Overview:   trivial |
+-----------------------+
 
 
 
+------------------------------+---+
| Hearing loss                 |   |
+------------------------------+---+
| Osteoarthrosis               |   |
+------------------------------+---+
| Osteoporosis                 |   |
+------------------------------+---+
| RLS (restless legs syndrome) |   |
+------------------------------+---+
| Scoliosis                    |   |
+------------------------------+---+
| Macular degeneration         |   |
+------------------------------+---+
 
 
 
 Resolved Problems
 
 
+-----------------------------------------------------------------+----------+-----------+
| Problem                                                         | Noted    | Resolved  |
|                                                                 | Date     | Date      |
+-----------------------------------------------------------------+----------+-----------+
 
| Cough                                                           | 20 |  |
|                                                                 | 15       | 6         |
+-----------------------------------------------------------------+----------+-----------+
| Need for vaccination with 13-polyvalent pneumococcal conjugate  | 20 |  |
| vaccine                                                         | 15       | 6         |
+-----------------------------------------------------------------+----------+-----------+
 
 
 
 Immunizations
 
 
+----------------------+----------------------+----------+
| Name                 | Administration Dates | Next Due |
+----------------------+----------------------+----------+
| INFLUENZA 65 Y OR >, | 10/21/2015           |          |
|  TRIVALENT HIGH-DOSE |                      |          |
+----------------------+----------------------+----------+
| INFLUENZA PF 18 Y OR | 2014           |          |
|  >,TRIVALENT         |                      |          |
| RECOMBINANT          |                      |          |
+----------------------+----------------------+----------+
| PNEUMOCOCCAL         | 10/12/2015           |          |
| CONJUGATE 13-VALENT  |                      |          |
| (PCV13)              |                      |          |
+----------------------+----------------------+----------+
| PNEUMOCOCCAL         | 2012           |          |
| POLYSACCHARIDE       |                      |          |
| 23-VALENT (PPSV23)   |                      |          |
+----------------------+----------------------+----------+
| TD PF (5 LF TETANUS) | 2009           |          |
|  (ADOL/ADULT)        |                      |          |
+----------------------+----------------------+----------+
 
 
 
 Family History
 
 
+-----------------+----------+------+----------+
| Medical History | Relation | Name | Comments |
+-----------------+----------+------+----------+
| Allergies       | Father   |      |          |
+-----------------+----------+------+----------+
| Asthma          | Father   |      |          |
+-----------------+----------+------+----------+
| COPD            | Father   |      |          |
+-----------------+----------+------+----------+
| Hypertension    | Father   |      |          |
+-----------------+----------+------+----------+
| Tobacco Use     | Father   |      |          |
+-----------------+----------+------+----------+
| Hypertension    | Mother   |      |          |
+-----------------+----------+------+----------+
| Stroke          | Mother   |      |          |
+-----------------+----------+------+----------+
| Asthma          | Sister   |      |          |
+-----------------+----------+------+----------+
 
 
 
 
+----------+------+----------+----------+
| Relation | Name | Status   | Comments |
+----------+------+----------+----------+
| Father   |      |  | COPD     |
+----------+------+----------+----------+
| Mother   |      |  | old age  |
+----------+------+----------+----------+
| Sister   |      | Alive    |          |
+----------+------+----------+----------+
 
 
 
 Social History
 
 
+-------------------+-------+-----------+--------+------+
| Tobacco Use       | Types | Packs/Day | Years  | Date |
|                   |       |           | Used   |      |
+-------------------+-------+-----------+--------+------+
| Passive Smoke     |       |           |        |      |
| Exposure - Never  |       |           |        |      |
| Smoker            |       |           |        |      |
+-------------------+-------+-----------+--------+------+
 
 
 
+---------------------+---+---+---+
| Smokeless Tobacco:  |   |   |   |
| Never Used          |   |   |   |
+---------------------+---+---+---+
 
 
 
+-----------------------------------------+
| Tobacco Cessation: Counseling Given: No |
+-----------------------------------------+
 
 
 
+-------------+----------------------+---------+----------+
| Alcohol Use | Drinks/Week          | oz/Week | Comments |
+-------------+----------------------+---------+----------+
| No          |   0 Standard drinks  | 0.0     |          |
|             | or equivalent        |         |          |
+-------------+----------------------+---------+----------+
 
 
 
+------------------+---------------+
| Sex Assigned at  | Date Recorded |
| Birth            |               |
+------------------+---------------+
| Not on file      |               |
+------------------+---------------+
 
 
 
+----------------+-------------+-------------+
| Job Start Date | Occupation  | Industry    |
 
+----------------+-------------+-------------+
| Not on file    | Not on file | Not on file |
+----------------+-------------+-------------+
 
 
 
+----------------+--------------+------------+
| Travel History | Travel Start | Travel End |
+----------------+--------------+------------+
 
 
 
+-------------------------------------+
| No recent travel history available. |
+-------------------------------------+
 
 
 
 Last Filed Vital Signs
 
 
+-------------------+---------------------+----------------------+----------+
| Vital Sign        | Reading             | Time Taken           | Comments |
+-------------------+---------------------+----------------------+----------+
| Blood Pressure    | 115/64              | 11/15/2018 11:06 AM  |          |
|                   |                     | PST                  |          |
+-------------------+---------------------+----------------------+----------+
| Pulse             | 77                  | 11/15/2018 11:06 AM  |          |
|                   |                     | PST                  |          |
+-------------------+---------------------+----------------------+----------+
| Temperature       | 36.8   C (98.2   F) | 2018 10:07 AM  |          |
|                   |                     | PDT                  |          |
+-------------------+---------------------+----------------------+----------+
| Respiratory Rate  | -                   | -                    |          |
+-------------------+---------------------+----------------------+----------+
| Oxygen Saturation | 97%                 | 2016 11:32 AM  |          |
|                   |                     | PDT                  |          |
+-------------------+---------------------+----------------------+----------+
| Inhaled Oxygen    | -                   | -                    |          |
| Concentration     |                     |                      |          |
+-------------------+---------------------+----------------------+----------+
| Weight            | 61.2 kg (135 lb)    | 11/15/2018 11:06 AM  |          |
|                   |                     | PST                  |          |
+-------------------+---------------------+----------------------+----------+
| Height            | 162.6 cm (5' 4")    | 11/15/2018 11:06 AM  |          |
|                   |                     | PST                  |          |
+-------------------+---------------------+----------------------+----------+
| Body Mass Index   | 23.17               | 11/15/2018 11:06 AM  |          |
|                   |                     | PST                  |          |
+-------------------+---------------------+----------------------+----------+
 
 
 
 Plan of Treatment
 
 
+---------------------+-----------+------------------------+----------+
| Health Maintenance  | Due Date  | Last Done              | Comments |
+---------------------+-----------+------------------------+----------+
| Vaccine: Zoster (1  |  |                        |          |
 
| of 2)               | 0         |                        |          |
+---------------------+-----------+------------------------+----------+
| Vaccine:            |  | 2009             |          |
| Dtap/Tdap/Td (1 -   | 9         |                        |          |
| Tdap)               |           |                        |          |
+---------------------+-----------+------------------------+----------+
| Adult Annual        |  |                        |          |
| Wellness Visit      | 5         |                        |          |
+---------------------+-----------+------------------------+----------+
| Vaccine: Influenza  |  | 10/21/2015, 2014 |          |
| (#1)                | 9         |                        |          |
+---------------------+-----------+------------------------+----------+
| Vaccine:            | Completed | 10/12/2015, 2012 |          |
| Pneumococcal 65+    |           |                        |          |
+---------------------+-----------+------------------------+----------+
 
 
 
 Results
 Not on filefrom Last 3 Months
 
 Insurance
 
 
+----------+--------+-------------+--------+-------------+---------+--------+
| Payer    | Benefi | Subscriber  | Effect | Phone       | Address | Type   |
|          | t Plan | ID          | venecia    |             |         |        |
|          |  /     |             | Dates  |             |         |        |
|          | Group  |             |        |             |         |        |
+----------+--------+-------------+--------+-------------+---------+--------+
| MEDICARE | MEDICA | [id number]  | 19 | [phone] |         | [employer] |
|          | RE     |             | 95-Pre | 5           |         | re     |
|          | PART A |             | sent   |             |         |        |
|          |  AND B |             |        |             |         |        |
+----------+--------+-------------+--------+-------------+---------+--------+
| [employer]     | [employer]ARP   | [id number] | 20 | [phone] |         | Indemn |
|          | MDCR   |             | 15-Pre | 0           |         | ity    |
|          | SUPPL  |             | sent   |             |         |        |
+----------+--------+-------------+--------+-------------+---------+--------+
 
 
 
+----------------------+--------+-------------+--------+-------------+---------------------+
| Guarantor Name       | Accoun | Relation to | Date   | Phone       | Billing Address     |
|                      | t Type |  Patient    | of     |             |                     |
|                      |        |             | Birth  |             |                     |
+----------------------+--------+-------------+--------+-------------+---------------------+
| Margaret Ferreira | Person | Self        | / |             |   [address]     |
|                      | al/Fam |             | 1930   | [phone] | [place] [address] |
|                      | angélica    |             |        | [address] (Home)    |                     |
+----------------------+--------+-------------+--------+-------------+---------------------+
 
 
 
 Advance Directives
 
 
+-----------+-----------------+----------------+-------------+
| Type      | Date Recorded   | Patient        | Explanation |
|           |                 | Representative |             |
 
+-----------+-----------------+----------------+-------------+
| Power of  |                 |                |             |
|   |                 |                |             |
+-----------+-----------------+----------------+-------------+
| Advance   | 2015  1:04 |                |             |
| Directive |  PM             |                |             |
+-----------+-----------------+----------------+-------------+

## 2019-12-04 NOTE — XMS
Encounter Summary
  Created on: 2019
 
 Margaret Ferreira
 External Reference #: [id number]
 : 30
 Sex: Female
 
 Demographics
 
 
+-----------------------+------------------------+
| Address               | [address]      |
|                       | [place]  [address]   |
+-----------------------+------------------------+
| Home Phone            | [phone]        |
+-----------------------+------------------------+
| Preferred Language    | Unknown                |
+-----------------------+------------------------+
| Marital Status        |                 |
+-----------------------+------------------------+
| Protestant Affiliation | MET                    |
+-----------------------+------------------------+
| Race                  | White                  |
+-----------------------+------------------------+
| Ethnic Group          | Not  or  |
+-----------------------+------------------------+
 
 
 Author
 
 
+--------------+------------------------------+
| Author       | [facility] |
+--------------+------------------------------+
| Organization | [facility] |
+--------------+------------------------------+
| Address      | Unknown                      |
+--------------+------------------------------+
| Phone        | Unavailable                  |
+--------------+------------------------------+
 
 
 
 Support
 
 
+--------------+--------------+---------------------+-----------------+
| Name         | Relationship | Address             | Phone           |
+--------------+--------------+---------------------+-----------------+
| Lawson Ferreira | ECON         | [address]          | [phone] |
|              |              | [place], [place]   |                 |
|              |              | [address]               |                 |
+--------------+--------------+---------------------+-----------------+
 
 
 
 Care Team Providers
 
 
 
+-----------------------+------+-------------+
| Care Team Member Name | Role | Phone       |
+-----------------------+------+-------------+
 PCP  | Unavailable |
+-----------------------+------+-------------+
 
 
 
 Encounter Details
 
 
+--------+----------+----------------------+----------------------+-------------+
| Date   | Type     | Department           | Care Team            | Description |
+--------+----------+----------------------+----------------------+-------------+
| / | Results  |   [facility] |   Félix Perez, |             |
|    | Only     |  [facility]  |  MD  [address]     |             |
|        |          |  [address] | [address]      |             |
|        |          |  Brigette Lovett  Mailcode:  | [place], [place]         |             |
|        |          | [id number]   | [address]           |             |
|        |          | [address]         |                      |             |
|        |          | [place], [place]         |                      |             |
|        |          | [address]           |                      |             |
|        |          | [phone]         |                      |             |
+--------+----------+----------------------+----------------------+-------------+
 
 
 
 Social History
 
 
+----------------+-------+-----------+--------+------+
| Tobacco Use    | Types | Packs/Day | Years  | Date |
|                |       |           | Used   |      |
+----------------+-------+-----------+--------+------+
| Never Assessed |       |           |        |      |
+----------------+-------+-----------+--------+------+
 
 
 
+------------------+---------------+
| Sex Assigned at  | Date Recorded |
| Birth            |               |
+------------------+---------------+
| Not on file      |               |
+------------------+---------------+
 
 
 
+----------------+-------------+-------------+
| Job Start Date | Occupation  | Industry    |
+----------------+-------------+-------------+
| Not on file    | Not on file | Not on file |
+----------------+-------------+-------------+
 
 
 
+----------------+--------------+------------+
| Travel History | Travel Start | Travel End |
 
+----------------+--------------+------------+
 
 
 
+-------------------------------------+
| No recent travel history available. |
+-------------------------------------+
 documented as of this encounter
 
 Plan of Treatment
 Not on filedocumented as of this encounter
 
 Procedures
 
 
+--------------------+--------+-------------+----------------------+----------------------+
| Procedure Name     | Priori | Date/Time   | Associated Diagnosis | Comments             |
|                    | ty     |             |                      |                      |
+--------------------+--------+-------------+----------------------+----------------------+
| X-RAY ABDOMEN 2    | Routin | 1996  |                      |   Results for this   |
| VIEWS              | e      |  2:38 PM    |                      | procedure are in the |
|                    |        | PST         |                      |  results section.    |
+--------------------+--------+-------------+----------------------+----------------------+
| X-RAY CHEST 2 VIEW | Routin | 1996  |                      |   Results for this   |
|                    | e      |  1:40 PM    |                      | procedure are in the |
|                    |        | PST         |                      |  results section.    |
+--------------------+--------+-------------+----------------------+----------------------+
 documented in this encounter
 
 Results
 ABDOMEN 2 VIEWS (1996  2:38 PM PST)
 
+-------------+--------------------------+-----------+------------+--------------+
| Component   | Value                    | Ref Range | Performed  | Pathologist  |
|             |                          |           | At         | Signature    |
+-------------+--------------------------+-----------+------------+--------------+
| ABDOMEN, 2  | Radiologist 1: Kezia LESTER           |            |              |
| [place]       |  JOAQUIN LEE            |           |            |              |
|             | M.D.-Radiologist 2:      |           |            |              |
|             | MARKIE GRUBBS           |           |            |              |
|             | MARGARET RALPH |           |            |              |
|             |                          |           |            |              |
|             |                          |           |            |              |
|             |             [phone]  |           |            |              |
|             |  ABDOMINAL, TWO VIEWS:   |           |            |              |
|             |   96   Dictated:    |           |            |              |
|             |   96 FINDINGS:   No |           |            |              |
|             |  previous films are      |           |            |              |
|             | available for            |           |            |              |
|             | comparison. The          |           |            |              |
|             | previously described     |           |            |              |
|             | 1-1/2 cm pulmonary       |           |            |              |
|             | nodule in the left       |           |            |              |
|             | lungbase is again noted. |           |            |              |
|             |    On supine examination |           |            |              |
|             |  gas is present in       |           |            |              |
|             | bothnondilated loops of  |           |            |              |
|             | both large and small     |           |            |              |
|             | bowel with a moderate    |           |            |              |
|             | amountof stool present   |           |            |              |
 
|             | in the ascending colon   |           |            |              |
|             | and cecal region.   Gas  |           |            |              |
|             | ispresent in the rectum. |           |            |              |
|             |    On the upright        |           |            |              |
|             | examination no air fluid |           |            |              |
|             |  levelsare identified.   |           |            |              |
|             |   There is a severe      |           |            |              |
|             | scoliosis of the lumbar  |           |            |              |
|             | spine withthe convexity  |           |            |              |
|             | oriented to the left.    |           |            |              |
|             |   A moderate amount of   |           |            |              |
|             | sclerosisadjacent to the |           |            |              |
|             |  endplates of the lumbar |           |            |              |
|             |  vertebral bodies in the |           |            |              |
|             |  regionof facet joints   |           |            |              |
|             | consistent with          |           |            |              |
|             | degenerative disease.    |           |            |              |
|             |   Left total             |           |            |              |
|             | hiparthroplasty is seen. |           |            |              |
|             |    There is a focal      |           |            |              |
|             | calcification            |           |            |              |
|             | presentoverlying the     |           |            |              |
|             | left renal shadow in the |           |            |              |
|             |  region of the mid to    |           |            |              |
|             | lower polemeasuring      |           |            |              |
|             | approximately 4 mm.   No |           |            |              |
|             |  abnormal calcifications |           |            |              |
|             |  are seenwithin the      |           |            |              |
|             | pelvis.  IMPRESSION: 1.  |           |            |              |
|             |   1-1/2 cm pulmonary     |           |            |              |
|             | nodule in the left lung  |           |            |              |
|             | base.   Comparison       |           |            |              |
|             | tomore remote chest      |           |            |              |
|             | radiographs is           |           |            |              |
|             | recommended to assess    |           |            |              |
|             | for stability. 2.        |           |            |              |
|             |   Probable left renal    |           |            |              |
|             | calculus. 3.             |           |            |              |
|             |   Nonspecific bowel gas  |           |            |              |
|             | pattern without evidence |           |            |              |
|             |  of obstruction          |           |            |              |
|             | orperforation. 4.        |           |            |              |
|             |   Severe scoliosis of    |           |            |              |
|             | the lumbar spine with    |           |            |              |
|             | degenerative disease.    |           |            |              |
|             | END OF IMPRESSION:       |           |            |              |
+-------------+--------------------------+-----------+------------+--------------+
 
 
 
+----------+
| Specimen |
+----------+
|          |
+----------+
 
 
 
+----------------------+---------+--------------------+--------------+
| Performing           | Address | City/State/Zipcode | Phone Number |
 
| Organization         |         |                    |              |
+----------------------+---------+--------------------+--------------+
|   [facility] DEPARTMENT OF |         |                    |              |
|  RADIOLOGY           |         |                    |              |
+----------------------+---------+--------------------+--------------+
 CHEST 2 VIEW (1996  1:40 PM PST)
 
+-----------+--------------------------+-----------+------------+--------------+
| Component | Value                    | Ref Range | Performed  | Pathologist  |
|           |                          |           | At         | Signature    |
+-----------+--------------------------+-----------+------------+--------------+
| CHEST, 2  | Radiologist 1: ROBBY,   |           |            |              |
| [place]  | [facility]              |           |            |              |
| [facility]    | J.-Radiologist 2: VAN    |           |            |              |
|           | [place],         |           |            |              |
|           | MARGARET MG       |           |            |              |
|           |                          |           |            |              |
|           |                          |           |            |              |
|           |        CHEST, |           |            |              |
|           |  2 VIEWS:   96 at   |           |            |              |
|           | 1340   hrs   Dictated:   |           |            |              |
|           |   96 COMPARISON:    |           |            |              |
|           |   No previous films are  |           |            |              |
|           | available for            |           |            |              |
|           | comparison. FINDINGS:    |           |            |              |
|           |   If prior chest         |           |            |              |
|           | radiographs are          |           |            |              |
|           | available, they would    |           |            |              |
|           | behelpful for comparing  |           |            |              |
|           | to the current study,    |           |            |              |
|           | which the                |           |            |              |
|           | RadiologyDepartment can  |           |            |              |
|           | do if these old films    |           |            |              |
|           | could be provided. Lung  |           |            |              |
|           | volumes are normal.      |           |            |              |
|           |   Cardiomediastinal      |           |            |              |
|           | silhouette               |           |            |              |
|           | isunremarkable without   |           |            |              |
|           | evidence of              |           |            |              |
|           | lymphadenopathy.   The   |           |            |              |
|           | righthemidiaphragm is    |           |            |              |
|           | somewhat eventrated.     |           |            |              |
|           |   No pleural effusion    |           |            |              |
|           | orpneumothorax is        |           |            |              |
|           | identified. A 1.5 x 1.5  |           |            |              |
|           | cm lung nodule is noted  |           |            |              |
|           | within the mid left      |           |            |              |
|           | lower lobe.This is       |           |            |              |
|           | suspicious for           |           |            |              |
|           | metastatic disease       |           |            |              |
|           | versus primary lung      |           |            |              |
|           | lesion. IMPRESSION: 1.   |           |            |              |
|           |   1.5 x 1.5 cm left      |           |            |              |
|           | lower lobe lung nodule.  |           |            |              |
|           | 2.   The lungs are       |           |            |              |
|           | otherwise grossly clear. |           |            |              |
|           |    END OF IMPRESSION:    |           |            |              |
+-----------+--------------------------+-----------+------------+--------------+
 
 
 
 
+----------+
| Specimen |
+----------+
|          |
+----------+
 
 
 
+----------------------+---------+--------------------+--------------+
| Performing           | Address | City/State/Zipcode | Phone Number |
| Organization         |         |                    |              |
+----------------------+---------+--------------------+--------------+
|   [facility] DEPARTMENT OF |         |                    |              |
|  RADIOLOGY           |         |                    |              |
+----------------------+---------+--------------------+--------------+
 documented in this encounter
 
 Visit Diagnoses
 Not on filedocumented in this encounter

## 2019-12-04 NOTE — XMS
Encounter Summary
  Created on: 2019
 
 Margaret Ferreira
 External Reference #: [id number]
 : 30
 Sex: Female
 
 Demographics
 
 
+-----------------------+----------------------+
| Address               | [address]        |
|                       | [place]  [address] |
+-----------------------+----------------------+
| Home Phone            | [phone]      |
+-----------------------+----------------------+
| Preferred Language    | Unknown              |
+-----------------------+----------------------+
| Marital Status        |               |
+-----------------------+----------------------+
| Buddhist Affiliation | Unknown              |
+-----------------------+----------------------+
| Race                  | Unknown              |
+-----------------------+----------------------+
| Ethnic Group          | Unknown              |
+-----------------------+----------------------+
 
 
 Author
 
 
+--------------+--------------------------------------------+
| Author       | [facility]  |
|              | and [place]                                |
+--------------+--------------------------------------------+
| Organization | [facility]  |
|              | and [place]                                |
+--------------+--------------------------------------------+
| Address      | Unknown                                    |
+--------------+--------------------------------------------+
| Phone        | Unavailable                                |
+--------------+--------------------------------------------+
 
 
 
 Support
 
 
+--------------+--------------+---------+-----------------+
| Name         | Relationship | Address | Phone           |
+--------------+--------------+---------+-----------------+
| Lawson Hanna | ECON         | Unknown | [phone] |
+--------------+--------------+---------+-----------------+
 
 
 
 Care Team Providers
 
 
 
+-----------------------+------+-----------------+
| Care Team Member Name | Role | Phone           |
+-----------------------+------+-----------------+
| Calixto Chou MD | PCP  | [phone] |
+-----------------------+------+-----------------+
 
 
 
 Reason for Visit
 
 
+---------+---------------------+
| Reason  | Comments            |
+---------+---------------------+
| Results | exertional oximetry |
+---------+---------------------+
 
 
 
 Encounter Details
 
 
+--------+-----------+----------------------+----------------+----------------------+
| Date   | Type      | Department           | Care Team      | Description          |
+--------+-----------+----------------------+----------------+----------------------+
| / | Telephone |   [facility]  [place]          |   [place],  | Results (exertional  |
|    |           | PULMONARY  [address]     | Mary PADRON MD  | oximetry)            |
|        |           | [place]  [place], |                |                      |
|        |           |  [place] [address]       |                |                      |
|        |           | [phone]         |                |                      |
+--------+-----------+----------------------+----------------+----------------------+
 
 
 
 Social History
 
 
+--------------+-------+-----------+--------+------+
| Tobacco Use  | Types | Packs/Day | Years  | Date |
|              |       |           | Used   |      |
+--------------+-------+-----------+--------+------+
| Never Smoker |       |           |        |      |
+--------------+-------+-----------+--------+------+
 
 
 
+-------------------------------------------------------------------+
| Comments: her parents both smoked, also worked at [employer] |
+-------------------------------------------------------------------+
 
 
 
+-------------+-------------+---------+----------+
| Alcohol Use | Drinks/Week | oz/Week | Comments |
+-------------+-------------+---------+----------+
| No          |             |         |          |
+-------------+-------------+---------+----------+
 
 
 
 
+------------------+---------------+
| Sex Assigned at  | Date Recorded |
| Birth            |               |
+------------------+---------------+
| Not on file      |               |
+------------------+---------------+
 
 
 
+----------------+-------------+-------------+
| Job Start Date | Occupation  | Industry    |
+----------------+-------------+-------------+
| Not on file    | Not on file | Not on file |
+----------------+-------------+-------------+
 
 
 
+----------------+--------------+------------+
| Travel History | Travel Start | Travel End |
+----------------+--------------+------------+
 
 
 
+-------------------------------------+
| No recent travel history available. |
+-------------------------------------+
 documented as of this encounter
 
 Plan of Treatment
 Not on filedocumented as of this encounter
 
 Visit Diagnoses
 Not on filedocumented in this encounter

## 2019-12-04 NOTE — HP
[facility]
                                    [address], [place]  [address]
_________________________________________________________________________________________
                                                                 Signed   
 
 
ADMISSION DATE:  
12/04/2019
 
REASON FOR ADMISSION:  
Ground level fall with expanding right flank hematoma.
 
HISTORY OF PRESENT ILLNESS:  
This very fragile 89-year-old white woman is accompanied by her .  At
approximately 7 in the morning, she was getting out of bed and fell initially bruising
her left leg, but with some amount of trauma by hitting a nightstand on her right flank
area.  She presented promptly to the emergency room traveling by ambulance and evaluated
thoroughly by Dr. Cottrell.  His evaluation included noting a contusion of her left leg,
but also her right flank and chest wall area.  She was noted to have expansion of
swelling and an obvious hematoma developing.  Initial x-ray of her leg showed no sign of
leg fracture, only a small hematoma.  On the chest wall, the hematoma that had developed
was associated with some tenderness and the chest x-ray showed no sign of pneumothorax.
On that basis, a CT scan of the chest was ordered, which did not clearly show any rib
fracture per se, but absolutely showed a hematoma of the chest wall and what appeared to
be possible active extravasation, but this is superficial in the skin and not near the
chest wall itself.  Equivocal buckle fractures of the 10th and 11th ribs were noted. 
 
I was called at that point and advised compression to the area, which has been
undertaken. 
 
Currently, the patient has no shortness of breath.  She is not tachycardic or otherwise
hemodynamically compromised.  The patient denies any use of anticoagulants other than a
diclofenac pill from time to time. 
 
MEDICATIONS:  
Review of her medications shows that she does take pramipexole, tramadol, omeprazole,
losartan, Synthroid, duloxetine, diclofenac, and a calcium tablet. 
 
ALLERGIES:  
She has no known drug allergies, though is sensitive to lisinopril, probably related to
cough. 
 
REVIEW OF SYSTEMS:  
She denies any shortness of breath or pain other than her right flank and posterior
chest area.  Left leg has some soreness. 
 
LABORATORY DATA:  
Initial lab studies showed a hematocrit of 34.5, a platelet count of 295,000,
 
    Electronically Signed By: AAMIR FOUNTAIN MD  12/04/19 2018
_________________________________________________________________________________________
PATIENT NAME:     PAMELA VALENCIA                  
MEDICAL RECORD #: [id number]            HISTORY AND PHYSICAL          
          ACCT #: [id number]  
DATE OF BIRTH:   [birth year]            REPORT #: [id number]      
PHYSICIAN:        AAMIR FOUNTAIN MD                 
PCP:              NO PRIMARY CARE PHYSICIAN     
REPORT IS CONFIDENTIAL AND NOT TO BE RELEASED WITHOUT AUTHORIZATION
 
 
                                  [facility]
                                    [address], [place]  [address]
_________________________________________________________________________________________
                                                                 Signed   
 
 
eosinophils 9.3.  Chem profile was normal.  Creatinine 0.67.  Liver enzymes normal.
Total protein 5.3, globulin 1.9, albumin 3.4.  Coag studies are pending. 
 
PHYSICAL EXAMINATION:
GENERAL:  A pleasant white woman, who looks to be alert and oriented.  She is
accompanied by her . 
NECK:  Trachea is midline.  There is no jugular venous distention.   
LUNGS:  Breath sounds are diminished bilaterally.  There is no wheeze or rhonchi.  A
large Ace wrap is noted around her torso with an ice pack.  Upon removal of this, there
is a sizable hematoma and ecchymosis of the soft tissue on right lateral chest wall and
flank area.  There is no sign of fluctuance particularly. 
ABDOMEN:  Soft and nontender.   
PELVIS:  Appears clinically stable.   
EXTREMITIES:  Left leg shows a small hematoma on the left lateral calf area.
 
DIAGNOSTIC DATA:  
CT scan was reviewed in detail.  Imaging as previously described.
 
ASSESSMENT:  
The patient has sustained a ground level fall and may or may not have had 10th and 11th
rib fracture associated with this, but certainly no pneumothorax.  Soft tissue swelling
of the right flank area and chest wall is clearly a hematoma based on imaging studies of
the CT scan.  It developed rapidly upon presentation to emergency room, but appears to
be stable clinically at this time. 
 
If there was active bleeding, then exploration and control of bleeding would certainly
be needed obviously, appears stable at this time.  I have asked an IV be started as she
is only with a Hep-Lock at this time.  I asked also that a CBC be repeated.  She still
may require evacuation of hematoma, though that is generally avoided if possible, so as
to avoid contamination and infection, but certainly exploration would be undertaken
without delay if ongoing bleeding was noted clinically or otherwise.  The patient is
disappointed and wants to go home, but she needs to be observed at minimum and managed
as described. 
 
 
 
            ________________________________________
            Aamir Fountain MD 
 
 
/MODL
Job #:  [id number]
 
    Electronically Signed By: AAMIR FOUNTAIN MD  12/04/19 2018
_________________________________________________________________________________________
PATIENT NAME:     PAMELA VALENCIA                  
MEDICAL RECORD #: [id number]            HISTORY AND PHYSICAL          
          ACCT #: [id number]  
DATE OF BIRTH:   [birth year]            REPORT #: [id number]      
PHYSICIAN:        AAMIR FOUNTAIN MD                 
PCP:              NO PRIMARY CARE PHYSICIAN     
REPORT IS CONFIDENTIAL AND NOT TO BE RELEASED WITHOUT AUTHORIZATION
 
 
                                  [address]  [address]
_________________________________________________________________________________________
                                                                 Signed   
 
 
DD:  12/04/2019 13:11:23
DT:  12/04/2019 19:23:23
 
cc:            Dr. Cottrell
 
 
Copies:                                
~
 
 
 
 
 
 
 
 
 
 
 
 
 
 
 
 
 
 
 
 
 
 
 
 
 
 
 
 
 
 
 
 
 
 
 
    Electronically Signed By: AAMIR FOUNTAIN MD  12/04/19 2018
_________________________________________________________________________________________
PATIENT NAME:     PAMELA VALENCIA CARMINE                  
MEDICAL RECORD #: [id number]            HISTORY AND PHYSICAL          
          ACCT #: [id number]  
DATE OF BIRTH:   [birth year]            REPORT #: [id number]      
PHYSICIAN:        AAMIR FOUNTAIN MD                 
PCP:              NO PRIMARY CARE PHYSICIAN     
REPORT IS CONFIDENTIAL AND NOT TO BE RELEASED WITHOUT AUTHORIZATION

## 2019-12-04 NOTE — XMS
Encounter Summary
  Created on: 2019
 
 Margaret Ferreira
 External Reference #: [id number]
 : 30
 Sex: Female
 
 Demographics
 
 
+-----------------------+----------------------+
| Address               | [address]        |
|                       | [place]  [address] |
+-----------------------+----------------------+
| Home Phone            | [phone]      |
+-----------------------+----------------------+
| Preferred Language    | Unknown              |
+-----------------------+----------------------+
| Marital Status        |               |
+-----------------------+----------------------+
| [employer] Affiliation | Unknown              |
+-----------------------+----------------------+
| Race                  | Unknown              |
+-----------------------+----------------------+
| Ethnic Group          | Unknown              |
+-----------------------+----------------------+
 
 
 Author
 
 
+--------------+--------------------------------------------+
| Author       | Located within [facility]  |
|              | and [place]                                |
+--------------+--------------------------------------------+
| Organization | Located within [facility]  |
|              | and [place]                                |
+--------------+--------------------------------------------+
| Address      | Unknown                                    |
+--------------+--------------------------------------------+
| Phone        | Unavailable                                |
+--------------+--------------------------------------------+
 
 
 
 Support
 
 
+--------------+--------------+---------+-----------------+
| Name         | Relationship | Address | Phone           |
+--------------+--------------+---------+-----------------+
| Lawson Hanna | ECON         | Unknown | [phone] |
+--------------+--------------+---------+-----------------+
 
 
 
 Care Team Providers
 
 
 
+-----------------------+------+-----------------+
| Care Team Member Name | Role | Phone           |
+-----------------------+------+-----------------+
| Calixto Chou MD | PCP  | [phone] |
+-----------------------+------+-----------------+
 
 
 
 Reason for Visit
 
 
+-------------------+----------+
| Reason            | Comments |
+-------------------+----------+
| Medication Refill |          |
+-------------------+----------+
 
 
 
 Encounter Details
 
 
+--------+-----------+----------------------+----------------+-------------------+
| Date   | Type      | Department           | Care Team      | Description       |
+--------+-----------+----------------------+----------------+-------------------+
| / | Telephone |   [facility]          |   [place],  | Medication Refill |
| 2016   |           | PULMONARY  [address]     | Mary PADRON MD  |                   |
|        |           | [place]  [place], |                |                   |
|        |           |  [place] [address]       |                |                   |
|        |           | [phone]         |                |                   |
+--------+-----------+----------------------+----------------+-------------------+
 
 
 
 Social History
 
 
+--------------+-------+-----------+--------+------+
| Tobacco Use  | Types | Packs/Day | Years  | Date |
|              |       |           | Used   |      |
+--------------+-------+-----------+--------+------+
| Never Smoker |       |           |        |      |
+--------------+-------+-----------+--------+------+
 
 
 
+---------------------+---+---+---+
| Smokeless Tobacco:  |   |   |   |
| Never Used          |   |   |   |
+---------------------+---+---+---+
 
 
 
+-------------------------------------------------------------------+
| Comments: her parents both smoked, also worked at a [employer] |
+-------------------------------------------------------------------+
 
 
 
 
+-------------+----------------------+---------+----------+
| Alcohol Use | Drinks/Week          | oz/Week | Comments |
+-------------+----------------------+---------+----------+
| No          |   0 Standard drinks  | 0.0     |          |
|             | or equivalent        |         |          |
+-------------+----------------------+---------+----------+
 
 
 
+------------------+---------------+
| Sex Assigned at  | Date Recorded |
| Birth            |               |
+------------------+---------------+
| Not on file      |               |
+------------------+---------------+
 
 
 
+----------------+-------------+-------------+
| Job Start Date | Occupation  | Industry    |
+----------------+-------------+-------------+
| Not on file    | Not on file | Not on file |
+----------------+-------------+-------------+
 
 
 
+----------------+--------------+------------+
| Travel History | Travel Start | Travel End |
+----------------+--------------+------------+
 
 
 
+-------------------------------------+
| No recent travel history available. |
+-------------------------------------+
 documented as of this encounter
 
 Plan of Treatment
 Not on filedocumented as of this encounter
 
 Visit Diagnoses
 Not on filedocumented in this encounter

## 2019-12-04 NOTE — XMS
Encounter Summary
  Created on: 2019
 
 Margaret Ferreira
 External Reference #: [id number]
 : 30
 Sex: Female
 
 Demographics
 
 
+-----------------------+----------------------+
| Address               | [address]        |
|                       | [place]  [address] |
+-----------------------+----------------------+
| Home Phone            | [phone]      |
+-----------------------+----------------------+
| Preferred Language    | Unknown              |
+-----------------------+----------------------+
| Marital Status        |               |
+-----------------------+----------------------+
| Rastafarian Affiliation | Unknown              |
+-----------------------+----------------------+
| Race                  | Unknown              |
+-----------------------+----------------------+
| Ethnic Group          | Unknown              |
+-----------------------+----------------------+
 
 
 Author
 
 
+--------------+--------------------------------------------+
| Author       | [facility]  |
|              | and [place]                                |
+--------------+--------------------------------------------+
| Organization | [facility]  |
|              | and [place]                                |
+--------------+--------------------------------------------+
| Address      | Unknown                                    |
+--------------+--------------------------------------------+
| Phone        | Unavailable                                |
+--------------+--------------------------------------------+
 
 
 
 Support
 
 
+--------------+--------------+---------+-----------------+
| Name         | Relationship | Address | Phone           |
+--------------+--------------+---------+-----------------+
| Lawson Hanna | ECON         | Unknown | [phone] |
+--------------+--------------+---------+-----------------+
 
 
 
 Care Team Providers
 
 
 
+-----------------------+------+-----------------+
| Care Team Member Name | Role | Phone           |
+-----------------------+------+-----------------+
| Calixto Chou MD | PCP  | [phone] |
+-----------------------+------+-----------------+
 
 
 
 Encounter Details
 
 
+--------+-----------+----------------------+----------------+---------------------+
| Date   | Type      | Department           | Care Team      | Description         |
+--------+-----------+----------------------+----------------+---------------------+
| / | [facility]  |   [facility] |   [place],  | COPD (chronic       |
| 2015   | Encounter |  [facility]   | Mary PADRON MD  | obstructive         |
|        |           | FUNCTION  [address]      |                | pulmonary disease)  |
|        |           | [place], |                | ([facility])               |
|        |           |  [place] [address]       |                |                     |
|        |           | [phone]         |                |                     |
+--------+-----------+----------------------+----------------+---------------------+
 
 
 
 Social History
 
 
+--------------+-------+-----------+--------+------+
| Tobacco Use  | Types | Packs/Day | Years  | Date |
|              |       |           | Used   |      |
+--------------+-------+-----------+--------+------+
| Never Smoker |       |           |        |      |
+--------------+-------+-----------+--------+------+
 
 
 
+-------------------------------------------------------------------+
| Comments: her parents both smoked, also worked at [employer] |
+-------------------------------------------------------------------+
 
 
 
+-------------+-------------+---------+----------+
| Alcohol Use | Drinks/Week | oz/Week | Comments |
+-------------+-------------+---------+----------+
| No          |             |         |          |
+-------------+-------------+---------+----------+
 
 
 
+------------------+---------------+
| Sex Assigned at  | Date Recorded |
| Birth            |               |
+------------------+---------------+
| Not on file      |               |
+------------------+---------------+
 
 
 
 
+----------------+-------------+-------------+
| Job Start Date | Occupation  | Industry    |
+----------------+-------------+-------------+
| Not on file    | Not on file | Not on file |
+----------------+-------------+-------------+
 
 
 
+----------------+--------------+------------+
| Travel History | Travel Start | Travel End |
+----------------+--------------+------------+
 
 
 
+-------------------------------------+
| No recent travel history available. |
+-------------------------------------+
 documented as of this encounter
 
 Medications at Time of Discharge
 
 
+----------------------+----------------------+-----------+---------+----------+-----------+
| Medication           | Sig                  | Dispensed | Refills | Start    | End Date  |
|                      |                      |           |         | Date     |           |
+----------------------+----------------------+-----------+---------+----------+-----------+
|   Calcium Carbonate  | Take 600 mg by mouth |           | 0       |          |           |
| (CALCIUM 600 PO)     |  Daily.              |           |         |          |           |
+----------------------+----------------------+-----------+---------+----------+-----------+
|   Cholecalciferol    | Take  by mouth       |           | 0       |          |           |
| (VITAMIN D-3) 2000   | Daily.               |           |         |          |           |
| units CAPS           |                      |           |         |          |           |
+----------------------+----------------------+-----------+---------+----------+-----------+
|   Docosahexaenoic    | Take  by mouth.      |           | 0       |          |           |
| Acid (DHA OMEGA 3)   |                      |           |         |          |           |
| 100 MG CAPS          |                      |           |         |          |           |
+----------------------+----------------------+-----------+---------+----------+-----------+
|   Multiple           | Take  by mouth       |           | 0       |          |           |
| Vitamins-Minerals    | Daily.               |           |         |          |           |
| (MULTIVITAMIN PO)    |                      |           |         |          |           |
+----------------------+----------------------+-----------+---------+----------+-----------+
|   omeprazole         | Take 20 mg by mouth  |           | 0       |          |           |
| (PRILOSEC) 20 mg     | every morning        |           |         |          |           |
| capsule              | (before breakfast).  |           |         |          |           |
+----------------------+----------------------+-----------+---------+----------+-----------+
|   Spacer/Aero        | Use with inhaler as  |   1 each  | 1       | 20 |           |
| Chamber Mouthpiece   | directed.            |           |         | 15       |           |
| MISC                 |                      |           |         |          |           |
+----------------------+----------------------+-----------+---------+----------+-----------+
|   traMADol (ULTRAM)  | Take 100 mg by mouth |           | 0       |          |           |
| 50 mg tablet         |  4 times daily.      |           |         |          |           |
+----------------------+----------------------+-----------+---------+----------+-----------+
|   albuterol (PROAIR  | Inhale 2 puffs into  |   1       | 5       | 20 |  |
| HFA) 90 mcg/puff     | the lungs every 6    | Inhaler   |         | 15       | 6         |
| inhaler              | hours as needed for  |           |         |          |           |
|                      | Wheezing or          |           |         |          |           |
|                      | Shortness of Breath. |           |         |          |           |
+----------------------+----------------------+-----------+---------+----------+-----------+
|   beclomethasone     | Inhale 2 puffs into  |           | 0       |          |  |
 
| (QVAR) 40 mcg/puff   | the lungs 2 times    |           |         |          | 5         |
| inhaler              | daily.               |           |         |          |           |
+----------------------+----------------------+-----------+---------+----------+-----------+
|   benzonatate        | Take 100 mg by mouth |           | 0       |          | 11/15/201 |
| (LEANDRA FUCHS)    |  3 times daily as    |           |         |          | 8         |
| 100 mg capsule       | needed.              |           |         |          |           |
+----------------------+----------------------+-----------+---------+----------+-----------+
|   diclofenac         | Take 100 mg by mouth |           | 0       |          |  |
| (VOLTAREN-XR) 100 mg |  daily (with         |           |         |          | 5         |
|  ER tablet           | breakfast).          |           |         |          |           |
+----------------------+----------------------+-----------+---------+----------+-----------+
|   Diclofenac         | Take  by mouth.      |           | 0       |          |  |
| Potassium 50 MG PACK |                      |           |         |          | 5         |
+----------------------+----------------------+-----------+---------+----------+-----------+
|   levothyroxine      | Take 150 mcg by      |           | 0       |          |  |
| (SYNTHROID,          | mouth every morning  |           |         |          | 8         |
| LEVOTHROID) 150 mcg  | (before breakfast).  |           |         |          |           |
| tablet               |                      |           |         |          |           |
+----------------------+----------------------+-----------+---------+----------+-----------+
|                      | Take 0.5 tablets by  |           | 0       |          |  |
| olmesartan-hydrochlo | mouth Daily.         |           |         |          | 6         |
| rothiazide (BENICAR  |                      |           |         |          |           |
| HCT) 40-25 MG per    |                      |           |         |          |           |
| tablet               |                      |           |         |          |           |
+----------------------+----------------------+-----------+---------+----------+-----------+
|   pramipexole        | Take 0.25 mg by      |           | 0       |          | 07/10/201 |
| (MIRAPEX) 0.25 mg    | mouth 2 times daily. |           |         |          | 8         |
| tablet               |                      |           |         |          |           |
+----------------------+----------------------+-----------+---------+----------+-----------+
|   pseudoePHEDrine    | Take 30 mg by mouth  |           | 0       |          | 11/15/201 |
| (SUDAFED) 30 mg      | every 4 hours as     |           |         |          | 8         |
| tablet               | needed.              |           |         |          |           |
+----------------------+----------------------+-----------+---------+----------+-----------+
|   tiotropium         | Inhale 1 capsule     |   30      | 11      | 20 |  |
| (SPIRIVA HANDIHALER) | into the lungs       | capsule   |         | 15       | 5         |
|  18 mcg inhalation   | Daily.               |           |         |          |           |
| capsule              |                      |           |         |          |           |
+----------------------+----------------------+-----------+---------+----------+-----------+
 documented as of this encounter
 
 Plan of Treatment
 Not on filedocumented as of this encounter
 
 Procedures
 
 
+----------------------+--------+-------------+----------------------+----------------------
+
| Procedure Name       | Priori | Date/Time   | Associated Diagnosis | Comments             
|
|                      | ty     |             |                      |                      
|
+----------------------+--------+-------------+----------------------+----------------------
+
| PFT PULMONARY        | ASAP   | 2015  |   COPD (chronic      |   Results for this   
|
| FUNCTION TESTING     |        |  6:25 PM    | obstructive          | procedure are in the 
|
| ORDERS               |        | PST         | pulmonary disease)   |  results section.    
|
 
|                      |        |             | ([facility])                |                      
|
+----------------------+--------+-------------+----------------------+----------------------
+
| PFT PULMONARY        | ASAP   | 2015  |   COPD (chronic      |   Results for this   
|
| FUNCTION TESTING     |        |  6:25 PM    | obstructive          | procedure are in the 
|
| ORDERS               |        | PST         | pulmonary disease)   |  results section.    
|
|                      |        |             | (HCC)                |                      
|
+----------------------+--------+-------------+----------------------+----------------------
+
| PFT PULMONARY        | ASAP   | 2015  |   COPD (chronic      |   Results for this   
|
| FUNCTION TESTING     |        |  6:25 PM    | obstructive          | procedure are in the 
|
| ORDERS               |        | PST         | pulmonary disease)   |  results section.    
|
|                      |        |             | (HCC)                |                      
|
+----------------------+--------+-------------+----------------------+----------------------
+
| DIAGNOSTIC REPORT -  |        | 2015  |                      |                      
|
| EXTERNAL SCAN        |        | 12:00 AM    |                      |                      
|
|                      |        | PST         |                      |                      
|
+----------------------+--------+-------------+----------------------+----------------------
+
 documented in this encounter
 
 Results
 PFT PULMONARY FUNCTION TESTING ORDERS Full PFT (Christiano w/BD, lung volumes, diffusion)?: Yes 
(2015  6:25 PM PST)
 
+------------------------------------------------------------------------+--------------+
| Narrative                                                              | Performed At |
+------------------------------------------------------------------------+--------------+
|   Mary Astudillo MD       2015 18:25        PULMONARY      |              |
| FUNCTION TESTING      METHOD: Spirometry was obtained pre and post     |              |
| administration of   inhaled bronchodilator. Lung volumes were not      |              |
| obtained, as the   patient was unable to pant fast enough. Diffusion   |              |
| capacity was   obtained by single breath method and was not corrected  |              |
| for a   measured hemoglobin.      ATS standards were met.              |              |
| SPIROMETRY: Prior to administration of inhaled bronchodilator,   FVC   |              |
| was normal at 2.01 L or 87% of predicted. FEV1 was moderately          |              |
| reduced at 0.85 L or 51% of predicted. FEV1/FVC ratio was reduced   at |              |
|  42%. After administration of inhaled bronchodilator, FVC   increased  |              |
| by 19 % to 2.38 L or 103% of predicted. FEV1 increased   by 22 % to    |              |
| 1.04 L or 63% of predicted. FEV1/FVC ratio was reduced   at 44%.       |              |
| DIFFUSION CAPACITY: Diffusion capacity was moderately reduced at       |              |
| 10.6 mL/mmHg per minute or 54% of predicted and was not corrected      |              |
| for a measured hemoglobin.     IMPRESSION: Spirometry is consistent    |              |
| with moderate obstructive   physiology. There was a significant        |              |
| response to inhaled   bronchodilator based on change in FVC. Diffusion |              |
|  capacity is   moderately reduced and is not corrected for measured    |              |
| hemoglobin.     Electronically signed by: Mary Astudillo MD    |              |
 
| 2015   18:20  [facility]     CC:    |              |
| Calixto Chou                                                      |              |
+------------------------------------------------------------------------+--------------+
 
 
 
+------------------------------------------------------------------------------------------+
| Procedure Note                                                                           |
+------------------------------------------------------------------------------------------+
|   Mary Astudillo MD - 2015  6:20 PM PST  PULMONARY FUNCTION TESTING        |
| METHOD: Spirometry was obtained pre and post administration of inhaled bronchodilator.   |
| Lung volumes were not obtained, as the patient was unable to pant fast enough. Diffusion |
|  capacity was obtained by single breath method and was not corrected for a measured      |
| hemoglobin. ATS standards were met. SPIROMETRY: Prior to administration of inhaled       |
| bronchodilator, FVC was normal at 2.01 L or 87% of predicted. FEV1 was moderately        |
| reduced at 0.85 L or 51% of predicted. FEV1/FVC ratio was reduced at 42%. After          |
| administration of inhaled bronchodilator, FVC increased by 19 % to 2.38 L or 103% of     |
| predicted. FEV1 increased by 22 % to 1.04 L or 63% of predicted. FEV1/FVC ratio was      |
| reduced at 44%.DIFFUSION CAPACITY: Diffusion capacity was moderately reduced at 10.6     |
| mL/mmHg per minute or 54% of predicted and was not corrected for a measured              |
| hemoglobin.IMPRESSION: Spirometry is consistent with moderate obstructive physiology.    |
| There was a significant response to inhaled bronchodilator based on change in FVC.       |
| Diffusion capacity is moderately reduced and is not corrected for measured               |
| hemoglobin.Electronically signed by: Mary Astudillo MD 2015 18:20[facility]        |
[facility] Calixto Chou                                |
|Electronically signed by: Mary Astudillo MD 2015 18:20                       |
|[facility]                                                  |
|                                                                                          |
|CC: Calixto Chou                                                                     |
+------------------------------------------------------------------------------------------+
 documented in this encounter
 
 Visit Diagnoses
 
 
+----------------------------------------------------------------------------------------+
| Diagnosis                                                                              |
+----------------------------------------------------------------------------------------+
|   COPD (chronic obstructive pulmonary disease) (HCC)  Chronic airway obstruction, not  |
| elsewhere classified                                                                   |
+----------------------------------------------------------------------------------------+
 documented in this encounter
 
 Administered Medications
 
 
+-----------------------------------+--------+----------+--------+------+------+
| Medication Order                  | MAR    | Action   | Dose   | Rate | Site |
|                                   | Action | Date     |        |      |      |
+-----------------------------------+--------+----------+--------+------+------+
|   albuterol 2.5 mg/3 mL nebulizer | Given  | 20 | 2.5 mg |      |      |
|  solution 2.5 mg  2.5 mg,         |        | 15  2:07 |        |      |      |
| Nebulization, RT Once, Tue        |        |  PM PST  |        |      |      |
| 1/13/15 at 1430, For 1 dose, RT   |        |          |        |      |      |
| will administer.,                 |        |          |        |      |      |
+-----------------------------------+--------+----------+--------+------+------+
 
 
 
+---+---+
 
|   |   |
+---+---+
 documented in this encounter

## 2019-12-04 NOTE — XMS
Encounter Summary
  Created on: 2019
 
 Margaret Ferreira
 External Reference #: [id number]
 : 30
 Sex: Female
 
 Demographics
 
 
+-----------------------+----------------------+
| Address               | [address]        |
|                       | [place]  [address] |
+-----------------------+----------------------+
| Home Phone            | [phone]      |
+-----------------------+----------------------+
| Preferred Language    | Unknown              |
+-----------------------+----------------------+
| Marital Status        |               |
+-----------------------+----------------------+
| Church Affiliation | Unknown              |
+-----------------------+----------------------+
| Race                  | Unknown              |
+-----------------------+----------------------+
| Ethnic Group          | Unknown              |
+-----------------------+----------------------+
 
 
 Author
 
 
+--------------+--------------------------------------------+
| Author       | [facility]  |
|              | and [place]                                |
+--------------+--------------------------------------------+
| Organization | [facility]  |
|              | and [place]                                |
+--------------+--------------------------------------------+
| Address      | Unknown                                    |
+--------------+--------------------------------------------+
| Phone        | Unavailable                                |
+--------------+--------------------------------------------+
 
 
 
 Support
 
 
+--------------+--------------+---------+-----------------+
| Name         | Relationship | Address | Phone           |
+--------------+--------------+---------+-----------------+
| Lawson Hanna | ECON         | Unknown | [phone] |
+--------------+--------------+---------+-----------------+
 
 
 
 Care Team Providers
 
 
 
+-----------------------------+------+-----------------+
| Care Team Member Name       | Role | Phone           |
+-----------------------------+------+-----------------+
| Bessy Paulino | PCP  | [phone] |
|  [facility]                       |      |                 |
+-----------------------------+------+-----------------+
 
 
 
 Reason for Visit
 
 
+-------------------+----------+
| Reason            | Comments |
+-------------------+----------+
| Medication Refill |          |
+-------------------+----------+
 
 
 
 Encounter Details
 
 
+--------+--------+----------------------+---------------------+-------------------+
| Date   | Type   | Department           | Care Team           | Description       |
+--------+--------+----------------------+---------------------+-------------------+
| / | Refill |   [facility]       |   Kv[facility] Ontiveros  | Medication Refill |
|    |        | [facility]    | E, DO  [address]   |                   |
|        |        | [address] [facility]  [address]  | [place], [place]       |                   |
|        |        | [address]  [place],   | [address]          |                   |
|        |        | [place] [address]        | [phone]        |                   |
|        |        | [phone]         | [phone] (Fax)  |                   |
+--------+--------+----------------------+---------------------+-------------------+
 
 
 
 Social History
 
 
+--------------+-------+-----------+--------+------+
| Tobacco Use  | Types | Packs/Day | Years  | Date |
|              |       |           | Used   |      |
+--------------+-------+-----------+--------+------+
| Never Smoker |       |           |        |      |
+--------------+-------+-----------+--------+------+
 
 
 
+---------------------+---+---+---+
| Smokeless Tobacco:  |   |   |   |
| Never Used          |   |   |   |
+---------------------+---+---+---+
 
 
 
+-------------------------------------------------------------------+
| Comments: her parents both smoked, also worked at a [employer] |
+-------------------------------------------------------------------+
 
 
 
 
+-------------+----------------------+---------+----------+
| Alcohol Use | Drinks/Week          | oz/Week | Comments |
+-------------+----------------------+---------+----------+
| No          |   0 Standard drinks  | 0.0     |          |
|             | or equivalent        |         |          |
+-------------+----------------------+---------+----------+
 
 
 
+------------------+---------------+
| Sex Assigned at  | Date Recorded |
| Birth            |               |
+------------------+---------------+
| Not on file      |               |
+------------------+---------------+
 
 
 
+----------------+-------------+-------------+
| Job Start Date | Occupation  | Industry    |
+----------------+-------------+-------------+
| Not on file    | Not on file | Not on file |
+----------------+-------------+-------------+
 
 
 
+----------------+--------------+------------+
| Travel History | Travel Start | Travel End |
+----------------+--------------+------------+
 
 
 
+-------------------------------------+
| No recent travel history available. |
+-------------------------------------+
 documented as of this encounter
 
 Plan of Treatment
 Not on filedocumented as of this encounter
 
 Visit Diagnoses
 Not on filedocumented in this encounter

## 2019-12-04 NOTE — XMS
Clinical Summary
  Created on: 2019
 
 PaulMargaret baum
 : 30
 Sex: Female
 
 Demographics
 
 
+-----------------------+------------------------+
| Address               | [address]      |
|                       | [place]  [address]   |
+-----------------------+------------------------+
| Home Phone            | [phone]        |
+-----------------------+------------------------+
| Preferred Language    | Unknown                |
+-----------------------+------------------------+
| Marital Status        |                 |
+-----------------------+------------------------+
| [employer] Affiliation | MET                    |
+-----------------------+------------------------+
| Race                  | White                  |
+-----------------------+------------------------+
| Ethnic Group          | Not  or  |
+-----------------------+------------------------+
 
 
 Author
 
 
+--------------+-------------+
| Organization | Unknown     |
+--------------+-------------+
| Address      | Unknown     |
+--------------+-------------+
| Phone        | Unavailable |
+--------------+-------------+
 
 
 
 Support
 
 
+--------------+--------------+---------------------+-----------------+
| Name         | Relationship | Address             | Phone           |
+--------------+--------------+---------------------+-----------------+
| Lawson Ferreira | ECON         | [address]          | [phone] |
|              |              | [place], [place]   |                 |
|              |              | [address]               |                 |
+--------------+--------------+---------------------+-----------------+
 
 
 
 Care Team Providers
 
 
+-----------------------+------+-------------+
 
| Care Team Member Name | Role | Phone       |
+-----------------------+------+-------------+
 PCP  | Unavailable |
+-----------------------+------+-------------+
 
 
 
 Source Comments
 [facility] is fully live on both [facility] and [facility]
 
 Allergies
 No Known Allergies
 
 Medications
 Not on file
 
 Active Problems
 Not on file
 
 Social History
 
 
+----------------+-------+-----------+--------+------+
| Tobacco Use    | Types | Packs/Day | Years  | Date |
|                |       |           | Used   |      |
+----------------+-------+-----------+--------+------+
| Never Assessed |       |           |        |      |
+----------------+-------+-----------+--------+------+
 
 
 
+------------------+---------------+
| Sex Assigned at  | Date Recorded |
| Birth            |               |
+------------------+---------------+
| Not on file      |               |
+------------------+---------------+
 
 
 
+----------------+-------------+-------------+
| Job Start Date | Occupation  | Industry    |
+----------------+-------------+-------------+
| Not on file    | Not on file | Not on file |
+----------------+-------------+-------------+
 
 
 
+----------------+--------------+------------+
| Travel History | Travel Start | Travel End |
+----------------+--------------+------------+
 
 
 
+-------------------------------------+
| No recent travel history available. |
+-------------------------------------+
 
 
 
 
 Last Filed Vital Signs
 Not on file
 
 Plan of Treatment
 
 
+----------------------+-----------+-----------+----------+
| Health Maintenance   | Due Date  | Last Done | Comments |
+----------------------+-----------+-----------+----------+
| Pneumococcal         |  |           |          |
| vaccination (1 of 2  | 5         |           |          |
| - PCV13)             |           |           |          |
+----------------------+-----------+-----------+----------+
| Influenza (Flu)      | 10/01/201 |           |          |
| vaccination (#1)     | 9         |           |          |
+----------------------+-----------+-----------+----------+
 
 
 
 Results
 Not on filefrom Last 3 Months

## 2019-12-04 NOTE — XMS
Encounter Summary
  Created on: 2019
 
 Margaret Ferreira
 External Reference #: [id number]
 : 30
 Sex: Female
 
 Demographics
 
 
+-----------------------+----------------------+
| Address               | [address]        |
|                       | [place]  [address] |
+-----------------------+----------------------+
| Home Phone            | [phone]      |
+-----------------------+----------------------+
| Preferred Language    | Unknown              |
+-----------------------+----------------------+
| Marital Status        |               |
+-----------------------+----------------------+
| [employer]ology Affiliation | Unknown              |
+-----------------------+----------------------+
| Race                  | Unknown              |
+-----------------------+----------------------+
| Ethnic Group          | Unknown              |
+-----------------------+----------------------+
 
 
 Author
 
 
+--------------+--------------------------------------------+
| Author       | [facility]  |
|              | and [place]                                |
+--------------+--------------------------------------------+
| Organization | [facility]  |
|              | and [place]                                |
+--------------+--------------------------------------------+
| Address      | Unknown                                    |
+--------------+--------------------------------------------+
| Phone        | Unavailable                                |
+--------------+--------------------------------------------+
 
 
 
 Support
 
 
+--------------+--------------+---------+-----------------+
| Name         | Relationship | Address | Phone           |
+--------------+--------------+---------+-----------------+
| Lawson Hanna | ECON         | Unknown | [phone] |
+--------------+--------------+---------+-----------------+
 
 
 
 Care Team Providers
 
 
 
+-----------------------------+------+-----------------+
| Care Team Member Name       | Role | Phone           |
+-----------------------------+------+-----------------+
| Bessy Paulino | PCP  | [phone] |
|  [place]                       |      |                 |
+-----------------------------+------+-----------------+
 
 
 
 Reason for Visit
 
 
+-------------------+----------+
| Reason            | Comments |
+-------------------+----------+
| Medication Refill |          |
+-------------------+----------+
 
 
 
 Encounter Details
 
 
+--------+--------+----------------------+---------------------+-------------------+
| Date   | Type   | Department           | Care Team           | Description       |
+--------+--------+----------------------+---------------------+-------------------+
| / | Refill |   [facility]       |   Kvng Ontiveros  | Medication Refill |
|    |        | [facility]    | E, DO  [address]   |                   |
|        |        | [address]  | [place], [place]       |                   |
|        |        | [address]  [place],   | [address]          |                   |
|        |        | [place] [address]        | [phone]        |                   |
|        |        | [phone]         | [phone] (Fax)  |                   |
+--------+--------+----------------------+---------------------+-------------------+
 
 
 
 Social History
 
 
+-------------------+-------+-----------+--------+------+
| Tobacco Use       | Types | Packs/Day | Years  | Date |
|                   |       |           | Used   |      |
+-------------------+-------+-----------+--------+------+
| Passive Smoke     |       |           |        |      |
| Exposure - Never  |       |           |        |      |
| Smoker            |       |           |        |      |
+-------------------+-------+-----------+--------+------+
 
 
 
+---------------------+---+---+---+
| Smokeless Tobacco:  |   |   |   |
| Never Used          |   |   |   |
+---------------------+---+---+---+
 
 
 
+-------------+----------------------+---------+----------+
 
| Alcohol Use | Drinks/Week          | oz/Week | Comments |
+-------------+----------------------+---------+----------+
| No          |   0 Standard drinks  | 0.0     |          |
|             | or equivalent        |         |          |
+-------------+----------------------+---------+----------+
 
 
 
+------------------+---------------+
| Sex Assigned at  | Date Recorded |
| Birth            |               |
+------------------+---------------+
| Not on file      |               |
+------------------+---------------+
 
 
 
+----------------+-------------+-------------+
| Job Start Date | Occupation  | Industry    |
+----------------+-------------+-------------+
| Not on file    | Not on file | Not on file |
+----------------+-------------+-------------+
 
 
 
+----------------+--------------+------------+
| Travel History | Travel Start | Travel End |
+----------------+--------------+------------+
 
 
 
+-------------------------------------+
| No recent travel history available. |
+-------------------------------------+
 documented as of this encounter
 
 Plan of Treatment
 Not on filedocumented as of this encounter
 
 Visit Diagnoses
 Not on filedocumented in this encounter

## 2019-12-04 NOTE — XMS
Encounter Summary
  Created on: 2019
 
 Margaret Ferreira
 External Reference #: [id number]
 : 30
 Sex: Female
 
 Demographics
 
 
+-----------------------+------------------------+
| Address               | [address]      |
|                       | [place]  [address]   |
+-----------------------+------------------------+
| Home Phone            | [phone]        |
+-----------------------+------------------------+
| Preferred Language    | Unknown                |
+-----------------------+------------------------+
| Marital Status        |                 |
+-----------------------+------------------------+
| Confucianism Affiliation | MET                    |
+-----------------------+------------------------+
| Race                  | White                  |
+-----------------------+------------------------+
| Ethnic Group          | Not  or  |
+-----------------------+------------------------+
 
 
 Author
 
 
+--------------+------------------------------+
| Author       | [facility] |
+--------------+------------------------------+
| Organization | [facility] |
+--------------+------------------------------+
| Address      | Unknown                      |
+--------------+------------------------------+
| Phone        | Unavailable                  |
+--------------+------------------------------+
 
 
 
 Support
 
 
+--------------+--------------+---------------------+-----------------+
| Name         | Relationship | Address             | Phone           |
+--------------+--------------+---------------------+-----------------+
| Lawson Ferreira | ECON         | [address]          | [phone] |
|              |              | [place], [place]   |                 |
|              |              | [address]               |                 |
+--------------+--------------+---------------------+-----------------+
 
 
 
 Care Team Providers
 
 
 
+-----------------------+------+-------------+
| Care Team Member Name | Role | Phone       |
+-----------------------+------+-------------+
 PCP  | Unavailable |
+-----------------------+------+-------------+
 
 
 
 Encounter Details
 
 
+--------+-------------+----------------------+---------------------+------------------+
| Date   | Type        | Department           | Care Team           | Description      |
+--------+-------------+----------------------+---------------------+------------------+
| / | Procedure - |   Digestive Health   |   Record, Operation | Operative Report |
|    |             | [place] [facility]  [address] |                     |                  |
|        | Transcribed |  [facility]         |                     |                  |
|        |             | Mailcode:  Center    |                     |                  |
|        |             | for [facility] and       |                     |                  |
|        |             | Healing, [address]  |                     |                  |
|        |             |  [place], [place]        |                     |                  |
|        |             | [address]           |                     |                  |
|        |             | [phone]         |                     |                  |
+--------+-------------+----------------------+---------------------+------------------+
 
 
 
 Social History
 
 
+----------------+-------+-----------+--------+------+
| Tobacco Use    | Types | Packs/Day | Years  | Date |
|                |       |           | Used   |      |
+----------------+-------+-----------+--------+------+
| Never Assessed |       |           |        |      |
+----------------+-------+-----------+--------+------+
 
 
 
+------------------+---------------+
| Sex Assigned at  | Date Recorded |
| Birth            |               |
+------------------+---------------+
| Not on file      |               |
+------------------+---------------+
 
 
 
+----------------+-------------+-------------+
| Job Start Date | Occupation  | Industry    |
+----------------+-------------+-------------+
| Not on file    | Not on file | Not on file |
+----------------+-------------+-------------+
 
 
 
+----------------+--------------+------------+
| Travel History | Travel Start | Travel End |
 
+----------------+--------------+------------+
 
 
 
+-------------------------------------+
| No recent travel history available. |
+-------------------------------------+
 documented as of this encounter
 
 Plan of Treatment
 Not on filedocumented as of this encounter
 
 Procedures
 
 
+------------------+--------+-------------+----------------------+----------------------+
| Procedure Name   | Priori | Date/Time   | Associated Diagnosis | Comments             |
|                  | ty     |             |                      |                      |
+------------------+--------+-------------+----------------------+----------------------+
| OPERATION RECORD |        | 1996  |                      |   Results for this   |
|                  |        | 12:00 AM    |                      | procedure are in the |
|                  |        | PST         |                      |  results section.    |
+------------------+--------+-------------+----------------------+----------------------+
 documented in this encounter
 
 Results
 OPERATION RECORD (1996 12:00 AM PST)
 
+-------------------------------------------------------------------------------+
| Procedure Note                                                                |
+-------------------------------------------------------------------------------+
|   1996 12:00 AM [facility]                                           |
|   [facility]                                                  |
|  [address] [place], [place] [address] [phone]   |
|   [facility]                                            |
|                                                                               |
|  OPERATION RECORD                                                             |
|                                                                               |
|  Med Rec No.: [id number] Date: 96                                      |
|                                                                               |
|  Name: Margaret Ferreira                                                       |
|                                                                               |
|                                                                               |
|  ATTENDING SURGEON: BRITTANY Perez M.D.                                        |
|   Professor and ,                                                     |
|   Obstetrics and Gynecology                                                   |
|                                                                               |
|  ASSISTANT(S): Nadja Carcamo M.D.                                                 |
|   Resident, Obstetrics and Gynecology                                         |
|                                                                               |
|  POSTOPERATIVE DIAGNOSIS(ES): 1. Complete procidentia.                        |
|   2. Cystocele.                                                               |
|                                                                               |
|  OPERATION(S) PERFORMED: 1. Total vaginal hysterectomy.                       |
|   2. Anterior colporrhaphy.                                                   |
|   3. Transvaginal sacrospinous ligament                                       |
|   fixation.                                                                   |
|                                                                               |
|  ANESTHESIA: General endotracheal anesthesia.                                 |
|                                                                               |
 
|  ESTIMATED BLOOD LOSS: 450 cc.                                                |
|                                                                               |
|  SPECIMEN(S) REMOVED: Uterus with cervix and vaginal mucosa to                |
|   Pathology.                                                                  |
|                                                                               |
|  FLUIDS: 2100 cc Crystalloid.                                                 |
|                                                                               |
|  INDICATIONS: The patient is a 65-year-old, multiparous                       |
|   woman with a progressively worsening vaginal                                |
|   vault relaxation. She                                                       |
|  presented with complete procidentia. She had no associated with              |
|  incontinence of stool or urine. She presented for definite surgical          |
|  correction of her vaginal vault relaxation.                                  |
|                                                                               |
|  FINDINGS: There was complete eversion of the vaginal                         |
|   vault and prolapse of the cervix and uterus.                                |
|   There was a large                                                           |
|  cystocele and an unsupported vaginal apex. At the termination of the case,   |
|  there was excellent elevation of the vaginal apex. There was no obvious      |
|  rectocele or enterocele sac present. The ovaries were not well seen but      |
|  were palpably quite atrophic and small.                                      |
|                                                                               |
|  PROCEDURE: The patient was taken to the Operating Room                       |
|   where she was placed in the dorsolithotomy                                  |
|   position with her legs in                                                   |
|  sling stirrups. She was positioned while awake to help position her          |
|  comfortably given her history of left hip replacement. General anesthesia    |
|  was administered without complication. She was prepped and draped in the     |
|  usual sterile fashion. Her bladder was emptied prior to beginning the case.  |
|                                                                               |
|  The hysterectomy was performed first. The cervix was grasped with a          |
|  single-tooth tenaculum. The mucosa was injected at the cervicovaginal        |
|  junction using 1% lidocaine with dilute epinephrine. The vaginal mucosa was  |
|  incised using the scalpel at the cervicovaginal junction circumferentially   |
|  around the cervix. The anterior colpotomy was then attempted but was not     |
|  initially successful. Therefore, attention was turned to a posterior         |
|  colpotomy. Using sharp dissection, the posterior peritoneum and the          |
|  posterior cul-de-sac were identified and entered sharply without trauma to   |
|  bowel. The uterosacral ligaments were clamped bilaterally, transected, and   |
|  ligated using 0 Vicryl suture ligature.                                      |
|                                                                               |
|  The anterior colpotomy was performed, carefully identifying the anterior     |
|  peritoneum. The vesicouterine pouch was entered atraumatically. The          |
|  cardinal ligaments were sequentially clamped, cut, and ligated using 0       |
|  Vicryl suture ligature. Ends of suture for both the uterosacral ligament     |
|  and cardinal ligaments were held for plication at the termination of the     |
|  case. The uterine vessels were clamped bilaterally, transected, and doubly   |
|  ligated using 0 Vicryl suture ligature. The utero-ovarian ligaments were     |
|  identified, clamped, and transected. These were ligated bilaterally using a  |
|  free tie of 0 Vicryl suture ligature. The uterus was removed. Pedicles       |
|  were inspected and all were found to be hemostatic. The cuff was packed      |
|  temporarily using a pediatric lap sponge.                                    |
|                                                                               |
|  Attention was turned to the anterior colporrhaphy. The vaginal mucosa was    |
|  grasped at the apex using Allis clamps. Using 1% lidocaine with dilute       |
|  epinephrine, the vaginal mucosa was injected in a linear fashion from just   |
|  below the urethra to the vaginal apex in the midline. The vaginal mucosa     |
|  was undermined using Metzenbaum scissors and incised in the midline to       |
|  approximately 2 cm below the urethral meatus. The vaginal mucosa was         |
|  dissected laterally away from the underlying bladder, with care being taken  |
 
|  to avoid injury to bladder during the dissection. When the redundant         |
|  vaginal mucosa had been completely dissected away from the bladder, the      |
|  vagina was plicated in the midline using 0 Vicryl suture ligature in         |
|  interrupted stitches to completely obliterate the cystocele. Upon placing    |
|  the initial stitch on the right side of the urethra, some bleeding was       |
|  encountered. Hemostasis was obtained using figure-of-eight stitches. When    |
|  the cystocele had been completely obliterated by medial plication, excess    |
|  vaginal mucosa was excised. The vaginal mucosa was closed using mattress     |
|  sutures with 0 Vicryl suture ligature.                                       |
|                                                                               |
|  Attention was now turned to the sacrospinous ligament fixation. An incision  |
|  was made just inside the hymenal ring in the posterior vaginal mucosa. The   |
|                                                                               |
|  vaginal mucosa was undermined for a length of approximately 3 cm for entry   |
|  into the right ischiorectal fossa. Using sharp and blunt dissection, the     |
|  sacrospinous ligament was identified and dissected for adequate              |
|  visualization. Using retractors for visualization and a Delgado ligature        |
|  carrier, two stitches of Riverside-Jerman suture were placed through the coccygeus   |
|  muscle and uterosacral ligament, approximately 1-1/2 to 2 fingerbreadths     |
|  medial to the ischial spine. These sutures were plicated to the apex of the  |
|  vagina. During plication of the second stitch, it was noted to have pulled   |
|  through the coccygeus muscle and was removed. The remaining stitch was       |
|  plicated in a pulley-like stitch and tied down, thus elevating the apex of   |
|  the vagina in the usual fashion. The vaginal mucosa was closed using 0       |
|  Vicryl suture in interrupted mattress stitches. The vagina was packed with   |
|  vaginal packing soaked in Sultrin Cream.                                     |
|                                                                               |
|  After the termination of the surgical procedure and just prior to attempts   |
|  at extubation, the patient was noted to have a bradycardic episode to        |
|  approximately 46 beats/minute with associated hypotension. Her lowest blood  |
|  pressure was 80/44. She received 10 mg of ephedrine and 200 cc of            |
|  additional Crystalloid fluid. She responded to this with an elevation in     |
|  her blood pressure to approximately 100/50 and an increase in her pulse rate |
|  to the 60s.                                                                  |
|                                                                               |
|  Sponge and needle counts were correct times two. The patient tolerated the   |
|  procedure well. She was taken to the Post Anesthesia Care Unit, awake but    |
|  intubated. Once the Post Anesthesia Care Unit, she was extubated without     |
|  difficulty. She was breathing spontaneously.                                 |
|                                                                               |
|  KAREN Fontanez M.D.                                            |
|  Resident, Professor and ,                                            |
|  Obstetrics & Gynecology Obstetrics and Gynecology                            |
|                                                                               |
|  CL[facility]/marietat                                                                        |
|  D: 96                                                                  |
|  T: 96 9:15 A                                                           |
|                                                                               |
|  cc:                                                                          |
|                                                                               |
|  GWEN RAMESH MD                                                             |
|  [address] [facility] [address]                                                       |
|  [place] [address]                                                           |
|                                                                               |
 
|  NICK COLE MD                                                          |
|   [address]                                                                  |
|  [place] [address]                                                           |
|                                                                               |
+-------------------------------------------------------------------------------+
 documented in this encounter
 
 Visit Diagnoses
 Not on filedocumented in this encounter

## 2019-12-04 NOTE — XMS
Encounter Summary
  Created on: 2019
 
 Margaret Ferreira
 External Reference #: [id number]
 : 30
 Sex: Female
 
 Demographics
 
 
+-----------------------+------------------------+
| Address               | [address]      |
|                       | [place]  [address]   |
+-----------------------+------------------------+
| Home Phone            | [phone]        |
+-----------------------+------------------------+
| Preferred Language    | Unknown                |
+-----------------------+------------------------+
| Marital Status        |                 |
+-----------------------+------------------------+
| Evangelical Affiliation | MET                    |
+-----------------------+------------------------+
| Race                  | White                  |
+-----------------------+------------------------+
| Ethnic Group          | Not  or  |
+-----------------------+------------------------+
 
 
 Author
 
 
+--------------+------------------------------+
| Author       | [facility] |
+--------------+------------------------------+
| Organization | [facility] |
+--------------+------------------------------+
| Address      | Unknown                      |
+--------------+------------------------------+
| Phone        | Unavailable                  |
+--------------+------------------------------+
 
 
 
 Support
 
 
+--------------+--------------+---------------------+-----------------+
| Name         | Relationship | Address             | Phone           |
+--------------+--------------+---------------------+-----------------+
| Lawson Ferreira | ECON         | [address]          | [phone] |
|              |              | [place], [place]   |                 |
|              |              | [address]               |                 |
+--------------+--------------+---------------------+-----------------+
 
 
 
 Care Team Providers
 
 
 
+-----------------------+------+-------------+
| Care Team Member Name | Role | Phone       |
+-----------------------+------+-------------+
 PCP  | Unavailable |
+-----------------------+------+-------------+
 
 
 
 Encounter Details
 
 
+--------+-------------+----------------------+--------------------+-------------+
| Date   | Type        | Department           | Care Team          | Description |
+--------+-------------+----------------------+--------------------+-------------+
| / | Transcribed |   Allergy Clinic at  |   Dictation, Other | Transcribed |
|    |             | [facility]  [address]     |                    |             |
|        |             | [address]      |                    |             |
|        |             | Mailcode: [address]  [address]  |                    |             |
|        |             | [address]         |                    |             |
|        |             | [place]  [place],  |                    |             |
|        |             | [place] [address]        |                    |             |
|        |             | [phone]         |                    |             |
+--------+-------------+----------------------+--------------------+-------------+
 
 
 
 Social History
 
 
+----------------+-------+-----------+--------+------+
| Tobacco Use    | Types | Packs/Day | Years  | Date |
|                |       |           | Used   |      |
+----------------+-------+-----------+--------+------+
| Never Assessed |       |           |        |      |
+----------------+-------+-----------+--------+------+
 
 
 
+------------------+---------------+
| Sex Assigned at  | Date Recorded |
| Birth            |               |
+------------------+---------------+
| Not on file      |               |
+------------------+---------------+
 
 
 
+----------------+-------------+-------------+
| Job Start Date | Occupation  | Industry    |
+----------------+-------------+-------------+
| Not on file    | Not on file | Not on file |
+----------------+-------------+-------------+
 
 
 
+----------------+--------------+------------+
| Travel History | Travel Start | Travel End |
+----------------+--------------+------------+
 
 
 
 
+-------------------------------------+
| No recent travel history available. |
+-------------------------------------+
 documented as of this encounter
 
 Progress Notes
 Interface, Transcription In - 2007  5:06 AM PDT  [address] [address] [phone] or [phone]
   Department of Obstetrics and Gynecology, [address]
   [facility]
   Telephone: [phone] Fax: [phone]
  
  
  1996
  
  
  BHUMIKA BOBO MD
  [address] [place] [address]
  
  
  RE: MARGARET FERREIRA
  MR#: [id number]
  
  Dear Dr. Bobo:
  
  I am enclosing some operative reports of your patient, Margaret Ferreira. We
  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
  the surgery was complicated by postoperative hemorrhage necessitating a
  laparotomy. She had a somewhat stormy immediate postoperative course but has
  subsequently done quite well. She now feels more confident about her
  progress. She has previously been under the care of Dr. Frazier, in [place],
  for her hypertension and arthritis, and I have continued to see her
  postoperatively, where her main residual concern has been of urge
  incontinence that troubles her, particularly at nighttime. On today's visit,
  I gave her some Levsinex for use at night to see whether we could suppress
  some of the bladder irritability. She is going to be staying in
  [place] for the next month and was anxious that you should be fully
  informed of the recent medical events in case she needs to consult with you.
  
  Thank you for being available. If you have any questions, I could be reached
  at [phone].
  
  With thanks,
  
  Yours sincerely,
  
  
  
  BRITTANY Perez M.D.
  Professor and ,
  Obstetrics and Gynecology
  
  EPK:ks
 
  D: 96
  T: 96
  C: 96 rh
  
  enclosures (2)
  operative report
  discharge summary
   Electronically signed by Interface, Transcription In at 2007  5:06 AM PDTdocumented
 in this encounter
 
 Plan of Treatment
 Not on filedocumented as of this encounter
 
 Visit Diagnoses
 Not on filedocumented in this encounter

## 2019-12-04 NOTE — XMS
Encounter Summary
  Created on: 2019
 
 Margaret Ferreira
 External Reference #: [id number]
 : 30
 Sex: Female
 
 Demographics
 
 
+-----------------------+----------------------+
| Address               | [address]        |
|                       | [place]  [address] |
+-----------------------+----------------------+
| Home Phone            | [phone]      |
+-----------------------+----------------------+
| Preferred Language    | Unknown              |
+-----------------------+----------------------+
| Marital Status        |               |
+-----------------------+----------------------+
| Taoism Affiliation | Unknown              |
+-----------------------+----------------------+
| Race                  | Unknown              |
+-----------------------+----------------------+
| Ethnic Group          | Unknown              |
+-----------------------+----------------------+
 
 
 Author
 
 
+--------------+--------------------------------------------+
| Author       | [facility]  |
|              | and [place]                                |
+--------------+--------------------------------------------+
| Organization | [facility]  |
|              | and [place]                                |
+--------------+--------------------------------------------+
| Address      | Unknown                                    |
+--------------+--------------------------------------------+
| Phone        | Unavailable                                |
+--------------+--------------------------------------------+
 
 
 
 Support
 
 
+--------------+--------------+---------+-----------------+
| Name         | Relationship | Address | Phone           |
+--------------+--------------+---------+-----------------+
| Lawson Hanna | ECON         | Unknown | [phone] |
+--------------+--------------+---------+-----------------+
 
 
 
 Care Team Providers
 
 
 
+-----------------------------+------+-----------------+
| Care Team Member Name       | Role | Phone           |
+-----------------------------+------+-----------------+
| Bessy Paulino | PCP  | [phone] |
|  PA-C                       |      |                 |
+-----------------------------+------+-----------------+
 
 
 
 Encounter Details
 
 
+--------+-------------+---------------------+----------------------+-------------+
| Date   | Type        | Department          | Care Team            | Description |
+--------+-------------+---------------------+----------------------+-------------+
| / | Orders Only |   [facility]    |   Provider,          |             |
|    |             | SYSTEM GENERIC OP   | [address] |             |
|        |             | CONVERSION  [address]  |  [address]        |             |
|        |             | [address]  [place], [place]  | [place] [address]     |             |
|        |             | [address]          |                      |             |
|        |             | [phone]        |                      |             |
+--------+-------------+---------------------+----------------------+-------------+
 
 
 
 Social History
 
 
+-------------------+-------+-----------+--------+------+
| Tobacco Use       | Types | Packs/Day | Years  | Date |
|                   |       |           | Used   |      |
+-------------------+-------+-----------+--------+------+
| Passive Smoke     |       |           |        |      |
| Exposure - Never  |       |           |        |      |
| Smoker            |       |           |        |      |
+-------------------+-------+-----------+--------+------+
 
 
 
+---------------------+---+---+---+
| Smokeless Tobacco:  |   |   |   |
| Never Used          |   |   |   |
+---------------------+---+---+---+
 
 
 
+-------------+----------------------+---------+----------+
| Alcohol Use | Drinks/Week          | oz/Week | Comments |
+-------------+----------------------+---------+----------+
| No          |   0 Standard drinks  | 0.0     |          |
|             | or equivalent        |         |          |
+-------------+----------------------+---------+----------+
 
 
 
+------------------+---------------+
| Sex Assigned at  | Date Recorded |
| Birth            |               |
 
+------------------+---------------+
| Not on file      |               |
+------------------+---------------+
 
 
 
+----------------+-------------+-------------+
| Job Start Date | Occupation  | Industry    |
+----------------+-------------+-------------+
| Not on file    | Not on file | Not on file |
+----------------+-------------+-------------+
 
 
 
+----------------+--------------+------------+
| Travel History | Travel Start | Travel End |
+----------------+--------------+------------+
 
 
 
+-------------------------------------+
| No recent travel history available. |
+-------------------------------------+
 documented as of this encounter
 
 Plan of Treatment
 Not on filedocumented as of this encounter
 
 Visit Diagnoses
 Not on filedocumented in this encounter

## 2019-12-04 NOTE — XMS
Encounter Summary
  Created on: 2019
 
 Margaret Ferreira
 External Reference #: [id number]
 : 30
 Sex: Female
 
 Demographics
 
 
+-----------------------+------------------------+
| Address               | [address]      |
|                       | [place]  [address]   |
+-----------------------+------------------------+
| Home Phone            | [phone]        |
+-----------------------+------------------------+
| Preferred Language    | Unknown                |
+-----------------------+------------------------+
| Marital Status        |                 |
+-----------------------+------------------------+
| Islam Affiliation | MET                    |
+-----------------------+------------------------+
| Race                  | White                  |
+-----------------------+------------------------+
| Ethnic Group          | Not  or  |
+-----------------------+------------------------+
 
 
 Author
 
 
+--------------+------------------------------+
| Author       | [facility] |
+--------------+------------------------------+
| Organization | [facility] |
+--------------+------------------------------+
| Address      | Unknown                      |
+--------------+------------------------------+
| Phone        | Unavailable                  |
+--------------+------------------------------+
 
 
 
 Support
 
 
+--------------+--------------+---------------------+-----------------+
| Name         | Relationship | Address             | Phone           |
+--------------+--------------+---------------------+-----------------+
| Lawson Ferreira | ECON         | [address]          | [phone] |
|              |              | [place], [place]   |                 |
|              |              | [address]               |                 |
+--------------+--------------+---------------------+-----------------+
 
 
 
 Care Team Providers
 
 
 
+-----------------------+------+-------------+
| Care Team Member Name | Role | Phone       |
+-----------------------+------+-------------+
 PCP  | Unavailable |
+-----------------------+------+-------------+
 
 
 
 Encounter Details
 
 
+--------+----------+----------------------+----------------------+-------------+
| Date   | Type     | Department           | Care Team            | Description |
+--------+----------+----------------------+----------------------+-------------+
| / | Results  |   [facility] |   Félix Perez, |             |
|    | Only     |  [facility]  |  MD  [address]     |             |
|        |          |  [address] | [address]      |             |
|        |          |  Brigette Lovett  Mailcode:  | [place], [place]         |             |
|        |          | [id number]   | [address]           |             |
|        |          | [address]         |                      |             |
|        |          | [place], [place]         |                      |             |
|        |          | [address]           |                      |             |
|        |          | [phone]         |                      |             |
+--------+----------+----------------------+----------------------+-------------+
 
 
 
 Social History
 
 
+----------------+-------+-----------+--------+------+
| Tobacco Use    | Types | Packs/Day | Years  | Date |
|                |       |           | Used   |      |
+----------------+-------+-----------+--------+------+
| Never Assessed |       |           |        |      |
+----------------+-------+-----------+--------+------+
 
 
 
+------------------+---------------+
| Sex Assigned at  | Date Recorded |
| Birth            |               |
+------------------+---------------+
| Not on file      |               |
+------------------+---------------+
 
 
 
+----------------+-------------+-------------+
| Job Start Date | Occupation  | Industry    |
+----------------+-------------+-------------+
| Not on file    | Not on file | Not on file |
+----------------+-------------+-------------+
 
 
 
+----------------+--------------+------------+
| Travel History | Travel Start | Travel End |
 
+----------------+--------------+------------+
 
 
 
+-------------------------------------+
| No recent travel history available. |
+-------------------------------------+
 documented as of this encounter
 
 Plan of Treatment
 Not on filedocumented as of this encounter
 
 Procedures
 
 
+--------------------+--------+-------------+----------------------+----------------------+
| Procedure Name     | Priori | Date/Time   | Associated Diagnosis | Comments             |
|                    | ty     |             |                      |                      |
+--------------------+--------+-------------+----------------------+----------------------+
| X-RAY ABDOMEN 2    | Routin | 1996  |                      |   Results for this   |
| VIEWS              | e      |  2:38 PM    |                      | procedure are in the |
|                    |        | PST         |                      |  results section.    |
+--------------------+--------+-------------+----------------------+----------------------+
| X-RAY CHEST 2 VIEW | Routin | 1996  |                      |   Results for this   |
|                    | e      |  1:40 PM    |                      | procedure are in the |
|                    |        | PST         |                      |  results section.    |
+--------------------+--------+-------------+----------------------+----------------------+
 documented in this encounter
 
 Results
 ABDOMEN 2 VIEWS (1996  2:38 PM PST)
 
+-------------+--------------------------+-----------+------------+--------------+
| Component   | Value                    | Ref Range | Performed  | Pathologist  |
|             |                          |           | At         | Signature    |
+-------------+--------------------------+-----------+------------+--------------+
| ABDOMEN, 2  | Radiologist 1: Kezia LESTER           |            |              |
| [place]       |  JOAQUIN LEE            |           |            |              |
|             | M.D.-Radiologist 2:      |           |            |              |
|             | MARKIE GRUBBS           |           |            |              |
|             | MARGARET RALPH |           |            |              |
|             |                          |           |            |              |
|             |                          |           |            |              |
|             |             [phone]  |           |            |              |
|             |  ABDOMINAL, TWO VIEWS:   |           |            |              |
|             |   96   Dictated:    |           |            |              |
|             |   96 FINDINGS:   No |           |            |              |
|             |  previous films are      |           |            |              |
|             | available for            |           |            |              |
|             | comparison. The          |           |            |              |
|             | previously described     |           |            |              |
|             | 1-1/2 cm pulmonary       |           |            |              |
|             | nodule in the left       |           |            |              |
|             | lungbase is again noted. |           |            |              |
|             |    On supine examination |           |            |              |
|             |  gas is present in       |           |            |              |
|             | bothnondilated loops of  |           |            |              |
|             | both large and small     |           |            |              |
|             | bowel with a moderate    |           |            |              |
|             | amountof stool present   |           |            |              |
 
|             | in the ascending colon   |           |            |              |
|             | and cecal region.   Gas  |           |            |              |
|             | ispresent in the rectum. |           |            |              |
|             |    On the upright        |           |            |              |
|             | examination no air fluid |           |            |              |
|             |  levelsare identified.   |           |            |              |
|             |   There is a severe      |           |            |              |
|             | scoliosis of the lumbar  |           |            |              |
|             | spine withthe convexity  |           |            |              |
|             | oriented to the left.    |           |            |              |
|             |   A moderate amount of   |           |            |              |
|             | sclerosisadjacent to the |           |            |              |
|             |  endplates of the lumbar |           |            |              |
|             |  vertebral bodies in the |           |            |              |
|             |  regionof facet joints   |           |            |              |
|             | consistent with          |           |            |              |
|             | degenerative disease.    |           |            |              |
|             |   Left total             |           |            |              |
|             | hiparthroplasty is seen. |           |            |              |
|             |    There is a focal      |           |            |              |
|             | calcification            |           |            |              |
|             | presentoverlying the     |           |            |              |
|             | left renal shadow in the |           |            |              |
|             |  region of the mid to    |           |            |              |
|             | lower polemeasuring      |           |            |              |
|             | approximately 4 mm.   No |           |            |              |
|             |  abnormal calcifications |           |            |              |
|             |  are seenwithin the      |           |            |              |
|             | pelvis.  IMPRESSION: 1.  |           |            |              |
|             |   1-1/2 cm pulmonary     |           |            |              |
|             | nodule in the left lung  |           |            |              |
|             | base.   Comparison       |           |            |              |
|             | tomore remote chest      |           |            |              |
|             | radiographs is           |           |            |              |
|             | recommended to assess    |           |            |              |
|             | for stability. 2.        |           |            |              |
|             |   Probable left renal    |           |            |              |
|             | calculus. 3.             |           |            |              |
|             |   Nonspecific bowel gas  |           |            |              |
|             | pattern without evidence |           |            |              |
|             |  of obstruction          |           |            |              |
|             | orperforation. 4.        |           |            |              |
|             |   Severe scoliosis of    |           |            |              |
|             | the lumbar spine with    |           |            |              |
|             | degenerative disease.    |           |            |              |
|             | END OF IMPRESSION:       |           |            |              |
+-------------+--------------------------+-----------+------------+--------------+
 
 
 
+----------+
| Specimen |
+----------+
|          |
+----------+
 
 
 
+----------------------+---------+--------------------+--------------+
| Performing           | Address | City/State/Zipcode | Phone Number |
 
| Organization         |         |                    |              |
+----------------------+---------+--------------------+--------------+
|   [facility] DEPARTMENT OF |         |                    |              |
|  RADIOLOGY           |         |                    |              |
+----------------------+---------+--------------------+--------------+
 CHEST 2 VIEW (1996  1:40 PM PST)
 
+-----------+--------------------------+-----------+------------+--------------+
| Component | Value                    | Ref Range | Performed  | Pathologist  |
|           |                          |           | At         | Signature    |
+-----------+--------------------------+-----------+------------+--------------+
| CHEST, 2  | Radiologist 1: ROBBY,   |           |            |              |
| [place]  | [facility]              |           |            |              |
| [facility]    | J.-Radiologist 2: VAN    |           |            |              |
|           | [address],         |           |            |              |
|           | MARGARET MG       |           |            |              |
|           |                          |           |            |              |
|           |                          |           |            |              |
|           |        CHEST, |           |            |              |
|           |  2 VIEWS:   96 at   |           |            |              |
|           | 1340   hrs   Dictated:   |           |            |              |
|           |   96 COMPARISON:    |           |            |              |
|           |   No previous films are  |           |            |              |
|           | available for            |           |            |              |
|           | comparison. FINDINGS:    |           |            |              |
|           |   If prior chest         |           |            |              |
|           | radiographs are          |           |            |              |
|           | available, they would    |           |            |              |
|           | behelpful for comparing  |           |            |              |
|           | to the current study,    |           |            |              |
|           | which the                |           |            |              |
|           | RadiologyDepartment can  |           |            |              |
|           | do if these old films    |           |            |              |
|           | could be provided. Lung  |           |            |              |
|           | volumes are normal.      |           |            |              |
|           |   Cardiomediastinal      |           |            |              |
|           | silhouette               |           |            |              |
|           | isunremarkable without   |           |            |              |
|           | evidence of              |           |            |              |
|           | lymphadenopathy.   The   |           |            |              |
|           | righthemidiaphragm is    |           |            |              |
|           | somewhat eventrated.     |           |            |              |
|           |   No pleural effusion    |           |            |              |
|           | orpneumothorax is        |           |            |              |
|           | identified. A 1.5 x 1.5  |           |            |              |
|           | cm lung nodule is noted  |           |            |              |
|           | within the mid left      |           |            |              |
|           | lower lobe.This is       |           |            |              |
|           | suspicious for           |           |            |              |
|           | metastatic disease       |           |            |              |
|           | versus primary lung      |           |            |              |
|           | lesion. IMPRESSION: 1.   |           |            |              |
|           |   1.5 x 1.5 cm left      |           |            |              |
|           | lower lobe lung nodule.  |           |            |              |
|           | 2.   The lungs are       |           |            |              |
|           | otherwise grossly clear. |           |            |              |
|           |    END OF IMPRESSION:    |           |            |              |
+-----------+--------------------------+-----------+------------+--------------+
 
 
 
 
+----------+
| Specimen |
+----------+
|          |
+----------+
 
 
 
+----------------------+---------+--------------------+--------------+
| Performing           | Address | City/State/Zipcode | Phone Number |
| Organization         |         |                    |              |
+----------------------+---------+--------------------+--------------+
|   [facility] DEPARTMENT OF |         |                    |              |
|  RADIOLOGY           |         |                    |              |
+----------------------+---------+--------------------+--------------+
 documented in this encounter
 
 Visit Diagnoses
 Not on filedocumented in this encounter

## 2019-12-04 NOTE — XMS
Encounter Summary
  Created on: 2019
 
 Margaret Ferreira
 External Reference #: [id number]
 : 30
 Sex: Female
 
 Demographics
 
 
+-----------------------+----------------------+
| Address               | [address]        |
|                       | [place]  [address] |
+-----------------------+----------------------+
| Home Phone            | [phone]      |
+-----------------------+----------------------+
| Preferred Language    | Unknown              |
+-----------------------+----------------------+
| Marital Status        |               |
+-----------------------+----------------------+
| Synagogue Affiliation | Unknown              |
+-----------------------+----------------------+
| Race                  | Unknown              |
+-----------------------+----------------------+
| Ethnic Group          | Unknown              |
+-----------------------+----------------------+
 
 
 Author
 
 
+--------------+--------------------------------------------+
| Author       | [facility]  |
|              | and [place]                                |
+--------------+--------------------------------------------+
| Organization | [facility]  |
|              | and [place]                                |
+--------------+--------------------------------------------+
| Address      | Unknown                                    |
+--------------+--------------------------------------------+
| Phone        | Unavailable                                |
+--------------+--------------------------------------------+
 
 
 
 Support
 
 
+--------------+--------------+---------+-----------------+
| Name         | Relationship | Address | Phone           |
+--------------+--------------+---------+-----------------+
| Lawson Hanna | ECON         | Unknown | [phone] |
+--------------+--------------+---------+-----------------+
 
 
 
 Care Team Providers
 
 
 
+-----------------------------+------+-----------------+
| Care Team Member Name       | Role | Phone           |
+-----------------------------+------+-----------------+
| Bessy Paulino | PCP  | [phone] |
|  [place]                       |      |                 |
+-----------------------------+------+-----------------+
 
 
 
 Reason for Visit
 
 
+-------------------+----------+
| Reason            | Comments |
+-------------------+----------+
| Medication Refill |          |
+-------------------+----------+
 
 
 
 Encounter Details
 
 
+--------+--------+----------------------+---------------------+-------------------+
| Date   | Type   | Department           | Care Team           | Description       |
+--------+--------+----------------------+---------------------+-------------------+
| / | Refill |   [facility]       |   Kvng Ontiveros  | Medication Refill |
|    |        | [facility]    | E, DO  [address]   |                   |
|        |        | [facility]  [address]  | [place], [place]       |                   |
|        |        | [address]  [place],   | [address]          |                   |
|        |        | [place] [address]        | [phone]        |                   |
|        |        | [phone]         | [phone] (Fax)  |                   |
+--------+--------+----------------------+---------------------+-------------------+
 
 
 
 Social History
 
 
+--------------+-------+-----------+--------+------+
| Tobacco Use  | Types | Packs/Day | Years  | Date |
|              |       |           | Used   |      |
+--------------+-------+-----------+--------+------+
| Never Smoker |       |           |        |      |
+--------------+-------+-----------+--------+------+
 
 
 
+---------------------+---+---+---+
| Smokeless Tobacco:  |   |   |   |
| Never Used          |   |   |   |
+---------------------+---+---+---+
 
 
 
+-------------------------------------------------------------------+
| Comments: her parents both smoked, also worked at a [employer] |
+-------------------------------------------------------------------+
 
 
 
 
+-------------+----------------------+---------+----------+
| Alcohol Use | Drinks/Week          | oz/Week | Comments |
+-------------+----------------------+---------+----------+
| No          |   0 Standard drinks  | 0.0     |          |
|             | or equivalent        |         |          |
+-------------+----------------------+---------+----------+
 
 
 
+------------------+---------------+
| Sex Assigned at  | Date Recorded |
| Birth            |               |
+------------------+---------------+
| Not on file      |               |
+------------------+---------------+
 
 
 
+----------------+-------------+-------------+
| Job Start Date | Occupation  | Industry    |
+----------------+-------------+-------------+
| Not on file    | Not on file | Not on file |
+----------------+-------------+-------------+
 
 
 
+----------------+--------------+------------+
| Travel History | Travel Start | Travel End |
+----------------+--------------+------------+
 
 
 
+-------------------------------------+
| No recent travel history available. |
+-------------------------------------+
 documented as of this encounter
 
 Plan of Treatment
 Not on filedocumented as of this encounter
 
 Visit Diagnoses
 Not on filedocumented in this encounter

## 2019-12-04 NOTE — XMS
Encounter Summary
  Created on: 2019
 
 Margaret Ferreira
 External Reference #: [id number]
 : 30
 Sex: Female
 
 Demographics
 
 
+-----------------------+----------------------+
| Address               | [address]        |
|                       | [place]  [address] |
+-----------------------+----------------------+
| Home Phone            | [phone]      |
+-----------------------+----------------------+
| Preferred Language    | Unknown              |
+-----------------------+----------------------+
| Marital Status        |               |
+-----------------------+----------------------+
| Gnosticism Affiliation | Unknown              |
+-----------------------+----------------------+
| Race                  | Unknown              |
+-----------------------+----------------------+
| Ethnic Group          | Unknown              |
+-----------------------+----------------------+
 
 
 Author
 
 
+--------------+--------------------------------------------+
| Author       | [facility] [place]  |
|              | and [place]                                |
+--------------+--------------------------------------------+
| Organization | [facility] [place]  |
|              | and [place]                                |
+--------------+--------------------------------------------+
| Address      | Unknown                                    |
+--------------+--------------------------------------------+
| Phone        | Unavailable                                |
+--------------+--------------------------------------------+
 
 
 
 Support
 
 
+--------------+--------------+---------+-----------------+
| Name         | Relationship | Address | Phone           |
+--------------+--------------+---------+-----------------+
| Lawson Hanna | ECON         | Unknown | [phone] |
+--------------+--------------+---------+-----------------+
 
 
 
 Care Team Providers
 
 
 
+-----------------------+------+-------------+
| Care Team Member Name | Role | Phone       |
+-----------------------+------+-------------+
 PCP  | Unavailable |
+-----------------------+------+-------------+
 
 
 
 Encounter Details
 
 
+--------+-----------+----------------------+---------------------+-------------+
| Date   | Type      | Department           | Care Team           | Description |
+--------+-----------+----------------------+---------------------+-------------+
| / | Hospital  |   [facility] |   Sarmad Rangel  |             |
|    | Encounter |  MED CTR XRAY  [address]    |             |
|        |           |  [place]       | [address] [place]  |             |
|        |           | [place] [address] | [place] [address]     |             |
|        |           |   [phone]       | [phone]        |             |
|        |           |                      | [phone] (Fax)  |             |
+--------+-----------+----------------------+---------------------+-------------+
 
 
 
 Social History
 
 
+----------------+-------+-----------+--------+------+
| Tobacco Use    | Types | Packs/Day | Years  | Date |
|                |       |           | Used   |      |
+----------------+-------+-----------+--------+------+
| Never Assessed |       |           |        |      |
+----------------+-------+-----------+--------+------+
 
 
 
+------------------+---------------+
| Sex Assigned at  | Date Recorded |
| Birth            |               |
+------------------+---------------+
| Not on file      |               |
+------------------+---------------+
 
 
 
+----------------+-------------+-------------+
| Job Start Date | Occupation  | Industry    |
+----------------+-------------+-------------+
| Not on file    | Not on file | Not on file |
+----------------+-------------+-------------+
 
 
 
+----------------+--------------+------------+
| Travel History | Travel Start | Travel End |
+----------------+--------------+------------+
 
 
 
 
+-------------------------------------+
| No recent travel history available. |
+-------------------------------------+
 documented as of this encounter
 
 Plan of Treatment
 Not on filedocumented as of this encounter
 
 Visit Diagnoses
 Not on filedocumented in this encounter

## 2019-12-04 NOTE — XMS
Encounter Summary
  Created on: 2019
 
 Margaret Ferreira
 External Reference #: [id number]
 : 30
 Sex: Female
 
 Demographics
 
 
+-----------------------+----------------------+
| Address               | [address]        |
|                       | [place]  [address] |
+-----------------------+----------------------+
| Home Phone            | [phone]      |
+-----------------------+----------------------+
| Preferred Language    | Unknown              |
+-----------------------+----------------------+
| Marital Status        |               |
+-----------------------+----------------------+
| Amish Affiliation | Unknown              |
+-----------------------+----------------------+
| Race                  | Unknown              |
+-----------------------+----------------------+
| Ethnic Group          | Unknown              |
+-----------------------+----------------------+
 
 
 Author
 
 
+--------------+--------------------------------------------+
| Author       | [facility] [place]  |
|              | and [place]                                |
+--------------+--------------------------------------------+
| Organization | [facility] [place]  |
|              | and [place]                                |
+--------------+--------------------------------------------+
| Address      | Unknown                                    |
+--------------+--------------------------------------------+
| Phone        | Unavailable                                |
+--------------+--------------------------------------------+
 
 
 
 Support
 
 
+--------------+--------------+---------+-----------------+
| Name         | Relationship | Address | Phone           |
+--------------+--------------+---------+-----------------+
| Lawson Hanna | ECON         | Unknown | [phone] |
+--------------+--------------+---------+-----------------+
 
 
 
 Care Team Providers
 
 
 
+-----------------------+------+-----------------+
| Care Team Member Name | Role | Phone           |
+-----------------------+------+-----------------+
| Calixto Chou MD | PCP  | [phone] |
+-----------------------+------+-----------------+
 
 
 
 Encounter Details
 
 
+--------+----------+----------------------+----------------------+-------------+
| Date   | Type     | Department           | Care Team            | Description |
+--------+----------+----------------------+----------------------+-------------+
| / | Abstract |   [facility] [place]          |   Michelle Vanegas,  |             |
|    |          | PULMONARY  [address]     | Medical Assistant    |             |
|        |          | [place], |                      |             |
|        |          |  [place] [address]       |                      |             |
|        |          | [phone]         |                      |             |
+--------+----------+----------------------+----------------------+-------------+
 
 
 
 Social History
 
 
+--------------+-------+-----------+--------+------+
| Tobacco Use  | Types | Packs/Day | Years  | Date |
|              |       |           | Used   |      |
+--------------+-------+-----------+--------+------+
| Never Smoker |       |           |        |      |
+--------------+-------+-----------+--------+------+
 
 
 
+---------------------+---+---+---+
| Smokeless Tobacco:  |   |   |   |
| Never Used          |   |   |   |
+---------------------+---+---+---+
 
 
 
+-------------------------------------------------------------------+
| Comments: her parents both smoked, also worked at a [employer] |
+-------------------------------------------------------------------+
 
 
 
+-------------+----------------------+---------+----------+
| Alcohol Use | Drinks/Week          | oz/Week | Comments |
+-------------+----------------------+---------+----------+
| No          |   0 Standard drinks  | 0.0     |          |
|             | or equivalent        |         |          |
+-------------+----------------------+---------+----------+
 
 
 
+------------------+---------------+
 
| Sex Assigned at  | Date Recorded |
| Birth            |               |
+------------------+---------------+
| Not on file      |               |
+------------------+---------------+
 
 
 
+----------------+-------------+-------------+
| Job Start Date | Occupation  | Industry    |
+----------------+-------------+-------------+
| Not on file    | Not on file | Not on file |
+----------------+-------------+-------------+
 
 
 
+----------------+--------------+------------+
| Travel History | Travel Start | Travel End |
+----------------+--------------+------------+
 
 
 
+-------------------------------------+
| No recent travel history available. |
+-------------------------------------+
 documented as of this encounter
 
 Plan of Treatment
 Not on filedocumented as of this encounter
 
 Visit Diagnoses
 Not on filedocumented in this encounter

## 2019-12-04 NOTE — XMS
Encounter Summary
  Created on: 2019
 
 Margaret Ferreira
 External Reference #: [id number]
 : 30
 Sex: Female
 
 Demographics
 
 
+-----------------------+----------------------+
| Address               | [address]        |
|                       | [place]  [address] |
+-----------------------+----------------------+
| Home Phone            | [phone]      |
+-----------------------+----------------------+
| Preferred Language    | Unknown              |
+-----------------------+----------------------+
| Marital Status        |               |
+-----------------------+----------------------+
| Cheondoism Affiliation | Unknown              |
+-----------------------+----------------------+
| Race                  | Unknown              |
+-----------------------+----------------------+
| Ethnic Group          | Unknown              |
+-----------------------+----------------------+
 
 
 Author
 
 
+--------------+--------------------------------------------+
| Author       | [facility] [place]  |
|              | and [place]                                |
+--------------+--------------------------------------------+
| Organization | [facility] [place]  |
|              | and [place]                                |
+--------------+--------------------------------------------+
| Address      | Unknown                                    |
+--------------+--------------------------------------------+
| Phone        | Unavailable                                |
+--------------+--------------------------------------------+
 
 
 
 Support
 
 
+--------------+--------------+---------+-----------------+
| Name         | Relationship | Address | Phone           |
+--------------+--------------+---------+-----------------+
| Lawson Hanna | ECON         | Unknown | [phone] |
+--------------+--------------+---------+-----------------+
 
 
 
 Care Team Providers
 
 
 
+-----------------------+------+-----------------+
| Care Team Member Name | Role | Phone           |
+-----------------------+------+-----------------+
| Calixto Chou MD | PCP  | [phone] |
+-----------------------+------+-----------------+
 
 
 
 Reason for Visit
 
 
+-------------------+----------+
| Reason            | Comments |
+-------------------+----------+
| Medication Refill |          |
+-------------------+----------+
 
 
 
 Encounter Details
 
 
+--------+-----------+----------------------+----------------+-------------------+
| Date   | Type      | Department           | Care Team      | Description       |
+--------+-----------+----------------------+----------------+-------------------+
| / | Telephone |   [facility] [place]          |   Off[place],  | Medication Refill |
| 2016   |           | PULMONARY  [address]     | Mary PADRON MD  |                   |
|        |           | [place]  [place], |                |                   |
|        |           |  [place] [address]       |                |                   |
|        |           | [phone]         |                |                   |
+--------+-----------+----------------------+----------------+-------------------+
 
 
 
 Social History
 
 
+--------------+-------+-----------+--------+------+
| Tobacco Use  | Types | Packs/Day | Years  | Date |
|              |       |           | Used   |      |
+--------------+-------+-----------+--------+------+
| Never Smoker |       |           |        |      |
+--------------+-------+-----------+--------+------+
 
 
 
+---------------------+---+---+---+
| Smokeless Tobacco:  |   |   |   |
| Never Used          |   |   |   |
+---------------------+---+---+---+
 
 
 
+-------------------------------------------------------------------+
| Comments: her parents both smoked, also worked at a [employer] |
+-------------------------------------------------------------------+
 
 
 
 
+-------------+----------------------+---------+----------+
| Alcohol Use | Drinks/Week          | oz/Week | Comments |
+-------------+----------------------+---------+----------+
| No          |   0 Standard drinks  | 0.0     |          |
|             | or equivalent        |         |          |
+-------------+----------------------+---------+----------+
 
 
 
+------------------+---------------+
| Sex Assigned at  | Date Recorded |
| Birth            |               |
+------------------+---------------+
| Not on file      |               |
+------------------+---------------+
 
 
 
+----------------+-------------+-------------+
| Job Start Date | Occupation  | Industry    |
+----------------+-------------+-------------+
| Not on file    | Not on file | Not on file |
+----------------+-------------+-------------+
 
 
 
+----------------+--------------+------------+
| Travel History | Travel Start | Travel End |
+----------------+--------------+------------+
 
 
 
+-------------------------------------+
| No recent travel history available. |
+-------------------------------------+
 documented as of this encounter
 
 Plan of Treatment
 Not on filedocumented as of this encounter
 
 Visit Diagnoses
 Not on filedocumented in this encounter

## 2019-12-04 NOTE — XMS
Encounter Summary
  Created on: 2019
 
 Margaret Ferreira
 External Reference #: [id number]
 : 30
 Sex: Female
 
 Demographics
 
 
+-----------------------+------------------------+
| Address               | [address]      |
|                       | [place]  [address]   |
+-----------------------+------------------------+
| Home Phone            | [phone]        |
+-----------------------+------------------------+
| Preferred Language    | Unknown                |
+-----------------------+------------------------+
| Marital Status        |                 |
+-----------------------+------------------------+
| Congregational Affiliation | MET                    |
+-----------------------+------------------------+
| Race                  | White                  |
+-----------------------+------------------------+
| Ethnic Group          | Not  or  |
+-----------------------+------------------------+
 
 
 Author
 
 
+--------------+------------------------------+
| Author       | [facility] |
+--------------+------------------------------+
| Organization | [facility] |
+--------------+------------------------------+
| Address      | Unknown                      |
+--------------+------------------------------+
| Phone        | Unavailable                  |
+--------------+------------------------------+
 
 
 
 Support
 
 
+--------------+--------------+---------------------+-----------------+
| Name         | Relationship | Address             | Phone           |
+--------------+--------------+---------------------+-----------------+
| Lawson Ferreira | [facility]         | [address]          | [phone] |
|              |              | [place], [place]   |                 |
|              |              | [address]               |                 |
+--------------+--------------+---------------------+-----------------+
 
 
 
 Care Team Providers
 
 
 
+-----------------------+------+-------------+
| Care Team Member Name | Role | Phone       |
+-----------------------+------+-------------+
 PCP  | Unavailable |
+-----------------------+------+-------------+
 
 
 
 Encounter Details
 
 
+--------+----------+----------------------+--------------------+-------------+
| Date   | Type     | Department           | Care Team          | Description |
+--------+----------+----------------------+--------------------+-------------+
| / | Results  |   [facility]  [address]  |   [facility]   |             |
|    | Only     | [address]  | [phone]       |             |
|        |          |  [place], [place]        |                    |             |
|        |          | [address]           |                    |             |
|        |          | [phone]         |                    |             |
+--------+----------+----------------------+--------------------+-------------+
 
 
 
 Social History
 
 
+----------------+-------+-----------+--------+------+
| Tobacco Use    | Types | Packs/Day | Years  | Date |
|                |       |           | Used   |      |
+----------------+-------+-----------+--------+------+
| Never Assessed |       |           |        |      |
+----------------+-------+-----------+--------+------+
 
 
 
+------------------+---------------+
| Sex Assigned at  | Date Recorded |
| Birth            |               |
+------------------+---------------+
| Not on file      |               |
+------------------+---------------+
 
 
 
+----------------+-------------+-------------+
| Job Start Date | Occupation  | Industry    |
+----------------+-------------+-------------+
| Not on file    | Not on file | Not on file |
+----------------+-------------+-------------+
 
 
 
+----------------+--------------+------------+
| Travel History | Travel Start | Travel End |
+----------------+--------------+------------+
 
 
 
 
+-------------------------------------+
| No recent travel history available. |
+-------------------------------------+
 documented as of this encounter
 
 Plan of Treatment
 Not on filedocumented as of this encounter
 
 Procedures
 
 
+--------------------+--------+------------+----------------------+----------------------+
| Procedure Name     | Priori | Date/Time  | Associated Diagnosis | Comments             |
|                    | ty     |            |                      |                      |
+--------------------+--------+------------+----------------------+----------------------+
| SURGICAL PATHOLOGY | Routin | 1996 |                      |   Results for this   |
|                    | e      |            |                      | procedure are in the |
|                    |        |            |                      |  results section.    |
+--------------------+--------+------------+----------------------+----------------------+
 documented in this encounter
 
 Results
 SURGICAL PATHOLOGY (1996)
 
+-----------+--------------------------+-----------+-------------+--------------+
| Component | Value                    | Ref Range | Performed   | Pathologist  |
|           |                          |           | At          | Signature    |
+-----------+--------------------------+-----------+-------------+--------------+
| SURGICAL  | SOURCE OF SPECIMEN: [facility]  |           | [facility]        |              |
| PATHOLOGY | RESULTS                  |           | DEPARTMENT  |              |
|           | Preliminary              |           | OF          |              |
|           | History:CLINICAL         |           | PATHOLOGY   |              |
|           | HISTORYThe patient is a  |           |             |              |
|           | 65 year old              |           |             |              |
|           | postmenopausal female    |           |             |              |
|           | with                     |           |             |              |
|           | completeprocidentia.     |           |             |              |
|           |   She is  4 para  |           |             |              |
|           | 4 and does not receive   |           |             |              |
|           | any hormonaltherapy.     |           |             |              |
|           |    GROSS DESCRIPTIONTwo  |           |             |              |
|           | specimens are received   |           |             |              |
|           | labelled as follows:     |           |             |              |
|           |    #1 UTERUS AND CERVIX: |           |             |              |
|           |  The specimen is         |           |             |              |
|           | received fresh and       |           |             |              |
|           | consists of auterus with |           |             |              |
|           |  attached cervix and     |           |             |              |
|           | vaginal cuff weighing 50 |           |             |              |
|           |  grams in toto.Bilateral |           |             |              |
|           |  fallopian tubes,        |           |             |              |
|           | ovaries and parametria   |           |             |              |
|           | are not present.         |           |             |              |
|           | Theuterus and cervix     |           |             |              |
|           | measure 8.5 cm x 5.0 x   |           |             |              |
|           | 2.5 cm. The serosal      |           |             |              |
|           | surface issmooth,        |           |             |              |
|           | pink-tan and glistening. |           |             |              |
|           |  The uterus is opened at |           |             |              |
|           |  3:00. Thevaginal cuff   |           |             |              |
 
|           | is 0.2 to 0.3 cm in      |           |             |              |
|           | length and has a smooth  |           |             |              |
|           | white-tanmucosa without  |           |             |              |
|           | grossly evident lesions. |           |             |              |
|           |  The ectocervical mucosa |           |             |              |
|           |  issmooth and pale tan.  |           |             |              |
|           | A firm nodule measuring  |           |             |              |
|           | 1.5 x 0.8 x 0.8 cm       |           |             |              |
|           | ispalpated within the    |           |             |              |
|           | posterior portion of the |           |             |              |
|           |  cervix. The cervical    |           |             |              |
|           | mucosais white-tan,      |           |             |              |
|           | smooth and glistening.   |           |             |              |
|           | The endometrial cavity   |           |             |              |
|           | istriangular and         |           |             |              |
|           | measures 2.5 cm in       |           |             |              |
|           | intercornual diameter    |           |             |              |
|           | and 3.5 cm inlength. The |           |             |              |
|           |  endometrium is          |           |             |              |
|           | white-tan and thin,      |           |             |              |
|           | measuring 1 mm           |           |             |              |
|           | inthickness. No focal    |           |             |              |
|           | lesions are seen. Serial |           |             |              |
|           |  cross sections reveal   |           |             |              |
|           | themyometrium to be      |           |             |              |
|           | generally tan and        |           |             |              |
|           | unremarkable, measuring  |           |             |              |
|           | 1.5 cm ingreatest        |           |             |              |
|           | thickness.               |           |             |              |
|           | Representative sections  |           |             |              |
|           | are submitted as         |           |             |              |
|           | follows:Cassette A,      |           |             |              |
|           | posterior ectocervix;    |           |             |              |
|           | Cassette B, anterior     |           |             |              |
|           | cervix; CassetteC, full  |           |             |              |
|           | thickness section of     |           |             |              |
|           | endomyometrium; Cassette |           |             |              |
|           |  D,                      |           |             |              |
|           | representativesections   |           |             |              |
|           | of endometrium.       #2 |           |             |              |
|           |    VAGINAL MUCOSA:   The |           |             |              |
|           |  specimen is received in |           |             |              |
|           |  formalin and consistsof |           |             |              |
|           |  two fragments of        |           |             |              |
|           | white-tan mucosa.   One  |           |             |              |
|           | fragment measures 7.0 x  |           |             |              |
|           | 3.0 x0.5 cm and the      |           |             |              |
|           | other fragment measures  |           |             |              |
|           | 6.0 x 3.0 x 0.5 cm.      |           |             |              |
|           |   The mucosalsurfaces    |           |             |              |
|           | are white-tan and shiny  |           |             |              |
|           | with no grossly visible  |           |             |              |
|           | lesions.   Thespecimen   |           |             |              |
|           | is serially sectioned    |           |             |              |
|           | and representative       |           |             |              |
|           | sections are submittedin |           |             |              |
|           |  cassettes 2A-B.         |           |             |              |
|           | All tissue sections      |           |             |              |
|           | taken are submitted for  |           |             |              |
|           | microscopic              |           |             |              |
 
|           | evaluation.Case dictated |           |             |              |
|           |  by: Daniel Mulligan        |           |             |              |
|           | MSIII/cc/kag             |           |             |              |
|           |    FINAL DIAGNOSISUTERUS |           |             |              |
|           |  AND CERVIX:   CERVIX:   |           |             |              |
|           |      NABOTHIAN CYSTS     |           |             |              |
|           |   SQUAMOUS METAPLASIA    |           |             |              |
|           |    HYPERKERATOSIS        |           |             |              |
|           |   ENDOMETRIUM:      NO   |           |             |              |
|           | DIAGNOSTIC ABNORMALITY   |           |             |              |
|           |   MYOMETRIUM:            |           |             |              |
|           |   ADENOMYOSISVAGINAL     |           |             |              |
|           | MUCOSA:   NO DIAGNOSTIC  |           |             |              |
|           | ABNORMALITY       Case   |           |             |              |
|           | reviewed by:   Marshall   |           |             |              |
|           | KAREN WashingtonT:          |           |             |              |
|           | 96/f       My       |           |             |              |
|           | electronic signature     |           |             |              |
|           | indicates that I have    |           |             |              |
|           | personally reviewed      |           |             |              |
|           | alldiagnostic slides,    |           |             |              |
|           | the gross and/or         |           |             |              |
|           | microscopic portion of   |           |             |              |
|           | thisreport and           |           |             |              |
|           | formulated the final     |           |             |              |
|           | diagnosis.               |           |             |              |
+-----------+--------------------------+-----------+-------------+--------------+
 
 
 
+----------+
| Specimen |
+----------+
| Other    |
+----------+
 
 
 
+----------------------+------------------------+--------------------+--------------+
| Performing           | Address                | City/State/Zipcode | Phone Number |
| Organization         |                        |                    |              |
+----------------------+------------------------+--------------------+--------------+
|   [facility] |   [address]  | [place] [address] |              |
|  PATHOLOGY           | [address]                |                    |              |
+----------------------+------------------------+--------------------+--------------+
 documented in this encounter
 
 Visit Diagnoses
 Not on filedocumented in this encounter

## 2019-12-04 NOTE — NUR
Patient laying in bed with family at bedside. Vitals taken. Hematoma on right
abdomen ecchymotic within ouline. Swelling appears unchanged from previous
assessment. Patient denies pain. No further needs at this time, call light
within reach.

## 2019-12-04 NOTE — XMS
Encounter Summary
  Created on: 2019
 
 Margaret Ferreira
 External Reference #: [id number]
 : 30
 Sex: Female
 
 Demographics
 
 
+-----------------------+----------------------+
| Address               | [address]        |
|                       | [place]  [address] |
+-----------------------+----------------------+
| Home Phone            | [phone]      |
+-----------------------+----------------------+
| Preferred Language    | Unknown              |
+-----------------------+----------------------+
| Marital Status        |               |
+-----------------------+----------------------+
| [employer] Affiliation | Unknown              |
+-----------------------+----------------------+
| Race                  | Unknown              |
+-----------------------+----------------------+
| Ethnic Group          | Unknown              |
+-----------------------+----------------------+
 
 
 Author
 
 
+--------------+--------------------------------------------+
| Author       | [facility]  |
|              | and [place]                                |
+--------------+--------------------------------------------+
| Organization | [facility]  |
|              | and [place]                                |
+--------------+--------------------------------------------+
| Address      | Unknown                                    |
+--------------+--------------------------------------------+
| Phone        | Unavailable                                |
+--------------+--------------------------------------------+
 
 
 
 Support
 
 
+--------------+--------------+---------+-----------------+
| Name         | Relationship | Address | Phone           |
+--------------+--------------+---------+-----------------+
| Lawson Hanna | ECON         | Unknown | [phone] |
+--------------+--------------+---------+-----------------+
 
 
 
 Care Team Providers
 
 
 
+-----------------------------+------+-----------------+
| Care Team Member Name       | Role | Phone           |
+-----------------------------+------+-----------------+
| Bessy Paulino | PCP  | [phone] |
|  PA-C                       |      |                 |
+-----------------------------+------+-----------------+
 
 
 
 Reason for Referral
 Diagnostic/Screening (Routine)
 
+--------+--------+-----------+--------------+--------------+---------------+
| Status | Reason | Specialty | Diagnoses /  | Referred By  | Referred To   |
|        |        |           | Procedures   | Contact      | Contact       |
+--------+--------+-----------+--------------+--------------+---------------+
| Closed |        | Radiology |   Diagnoses  |   [place]     |   Jason Mri     |
|        |        |           |  Gait        | Joel SCALES MD | [address]  |
|        |        |           | abnormality  |   [address]      |  [place], |
|        |        |           |  Postural    | [address]    |  [place]           |
|        |        |           | kyphosis of  | [place], | [address]    |
|        |        |           | thoracic     |  [place] [address]    | Phone:        |
|        |        |           | region       | Phone:       | [phone]  |
|        |        |           | Weakness of  | [phone] |  Fax:         |
|        |        |           | both lower   |   Fax:       | [phone]  |
|        |        |           | extremities  | [phone] |               |
|        |        |           |  Chronic     |              |               |
|        |        |           | bilateral    |              |               |
|        |        |           | low back     |              |               |
|        |        |           | pain without |              |               |
|        |        |           |  sciatica    |              |               |
|        |        |           | Urinary      |              |               |
|        |        |           | incontinence |              |               |
|        |        |           | ,            |              |               |
|        |        |           | unspecified  |              |               |
|        |        |           | type         |              |               |
|        |        |           | Procedures   |              |               |
|        |        |           | MRI Lumbar   |              |               |
|        |        |           | Spine wo     |              |               |
|        |        |           | Contrast     |              |               |
+--------+--------+-----------+--------------+--------------+---------------+
 
 
 Diagnostic/Screening (Routine)
 
+--------+--------+-----------+--------------+--------------+---------------+
| Status | Reason | Specialty | Diagnoses /  | Referred By  | Referred To   |
|        |        |           | Procedures   | Contact      | Contact       |
+--------+--------+-----------+--------------+--------------+---------------+
| Closed |        | Radiology |   Diagnoses  |   [place],     |   [address] |
|        |        |           |  Gait        | Joel E A, MD |  [address]     |
|        |        |           | abnormality  |   [address]      | [place],  |
|        |        |           |  Postural    | [address]    | [place] [address] |
|        |        |           | kyphosis of  | [place], |   Phone:      |
|        |        |           | thoracic     |  [place] [address]    | [phone]  |
|        |        |           | region       | Phone:       |  Fax:         |
|        |        |           | Weakness of  | [phone] | [phone]  |
|        |        |           | both lower   |   Fax:       |               |
 
|        |        |           | extremities  | [phone] |               |
|        |        |           |  Chronic     |              |               |
|        |        |           | bilateral    |              |               |
|        |        |           | low back     |              |               |
|        |        |           | pain without |              |               |
|        |        |           |  sciatica    |              |               |
|        |        |           | Urinary      |              |               |
|        |        |           | incontinence |              |               |
|        |        |           | ,            |              |               |
|        |        |           | unspecified  |              |               |
|        |        |           | type  NPH    |              |               |
|        |        |           | (normal      |              |               |
|        |        |           | pressure     |              |               |
|        |        |           | hydrocephalu |              |               |
|        |        |           | s) (HCC)     |              |               |
|        |        |           | Procedures   |              |               |
|        |        |           | CT Head wo   |              |               |
|        |        |           | Contrast     |              |               |
+--------+--------+-----------+--------------+--------------+---------------+
 
 
 
 
 Reason for Visit
 
 
+--------------+----------+
| Reason       | Comments |
+--------------+----------+
| Gait Problem |          |
+--------------+----------+
| Back Pain    |          |
+--------------+----------+
 Evaluate & Treat (Routine)
 
+--------+--------+---------------+--------------+--------------+---------------+
| Status | Reason | Specialty     | Diagnoses /  | Referred By  | Referred To   |
|        |        |               | Procedures   | Contact      | Contact       |
+--------+--------+---------------+--------------+--------------+---------------+
| Closed |        | Physical      |   Diagnoses  |   [place],   |   Joel Cottrell |
|        |        | Medicine and  |  Abnormality | [place],   |  MARQUITA SCALES MD  [address] |
|        |        | Rehabilitatio |  of gait and | DO  [address]  |  [address]  |
|        |        | n             |  mobility    | [address]  [place]       |  [place], |
|        |        |               |              | [place]   |  [place] [address]     |
|        |        |               |              | [address]   | Phone:        |
|        |        |               |              | Phone:       | [phone]  |
|        |        |               |              | [phone] |  Fax:         |
|        |        |               |              |   Fax:       | [phone]  |
|        |        |               |              | [phone] |               |
+--------+--------+---------------+--------------+--------------+---------------+
 
 
 
 
 Encounter Details
 
 
+--------+---------+----------------------+----------------------+----------------------+
| Date   | Type    | Department           | Care Team            | Description          |
+--------+---------+----------------------+----------------------+----------------------+
 
| 11/15/ | Office  |   [facility]          |   Joel Cottrell,   | Gait abnormality     |
| 2018   | Visit   | PHYSIATRY  [address]     | MD  [address]  | (Primary Dx);        |
|        |         | [address]  [place], |  [place], [place]     | Postural kyphosis of |
|        |         |  [place] [address]       | [address]  [phone]  |  thoracic region;    |
|        |         | [phone]         |  [phone] (Fax)  | Weakness of both     |
|        |         |                      |                      | lower extremities;   |
|        |         |                      |                      | Chronic bilateral    |
|        |         |                      |                      | low back pain        |
|        |         |                      |                      | without sciatica;    |
|        |         |                      |                      | Urinary              |
|        |         |                      |                      | incontinence,        |
|        |         |                      |                      | unspecified type;    |
|        |         |                      |                      | Hyperreflexia;       |
|        |         |                      |                      | Impaired mobility    |
|        |         |                      |                      | and activities of    |
|        |         |                      |                      | daily living; NPH    |
|        |         |                      |                      | (normal pressure     |
|        |         |                      |                      | hydrocephalus)       |
+--------+---------+----------------------+----------------------+----------------------+
 
 
 
 Social History
 
 
+-------------------+-------+-----------+--------+------+
| Tobacco Use       | Types | Packs/Day | Years  | Date |
|                   |       |           | Used   |      |
+-------------------+-------+-----------+--------+------+
| Passive Smoke     |       |           |        |      |
| Exposure - Never  |       |           |        |      |
| Smoker            |       |           |        |      |
+-------------------+-------+-----------+--------+------+
 
 
 
+---------------------+---+---+---+
| Smokeless Tobacco:  |   |   |   |
| Never Used          |   |   |   |
+---------------------+---+---+---+
 
 
 
+-------------+----------------------+---------+----------+
| Alcohol Use | Drinks/Week          | oz/Week | Comments |
+-------------+----------------------+---------+----------+
| No          |   0 Standard drinks  | 0.0     |          |
|             | or equivalent        |         |          |
+-------------+----------------------+---------+----------+
 
 
 
+------------------+---------------+
| Sex Assigned at  | Date Recorded |
| Birth            |               |
+------------------+---------------+
| Not on file      |               |
+------------------+---------------+
 
 
 
 
+----------------+-------------+-------------+
| Job Start Date | Occupation  | Industry    |
+----------------+-------------+-------------+
| Not on file    | Not on file | Not on file |
+----------------+-------------+-------------+
 
 
 
+----------------+--------------+------------+
| Travel History | Travel Start | Travel End |
+----------------+--------------+------------+
 
 
 
+-------------------------------------+
| No recent travel history available. |
+-------------------------------------+
 documented as of this encounter
 
 Last Filed Vital Signs
 
 
+-------------------+------------------+----------------------+----------+
| Vital Sign        | Reading          | Time Taken           | Comments |
+-------------------+------------------+----------------------+----------+
| Blood Pressure    | 115/64           | 11/15/2018 11:06 AM  |          |
|                   |                  | PST                  |          |
+-------------------+------------------+----------------------+----------+
| Pulse             | 77               | 11/15/2018 11:06 AM  |          |
|                   |                  | PST                  |          |
+-------------------+------------------+----------------------+----------+
| Temperature       | -                | -                    |          |
+-------------------+------------------+----------------------+----------+
| Respiratory Rate  | -                | -                    |          |
+-------------------+------------------+----------------------+----------+
| Oxygen Saturation | -                | -                    |          |
+-------------------+------------------+----------------------+----------+
| Inhaled Oxygen    | -                | -                    |          |
| Concentration     |                  |                      |          |
+-------------------+------------------+----------------------+----------+
| Weight            | 61.2 kg (135 lb) | 11/15/2018 11:06 AM  |          |
|                   |                  | PST                  |          |
+-------------------+------------------+----------------------+----------+
| Height            | 162.6 cm (5' 4") | 11/15/2018 11:06 AM  |          |
|                   |                  | PST                  |          |
+-------------------+------------------+----------------------+----------+
| Body Mass Index   | 23.17            | 11/15/2018 11:06 AM  |          |
|                   |                  | PST                  |          |
+-------------------+------------------+----------------------+----------+
 documented in this encounter
 
 Patient Instructions
 Patient Instructions Ashleigh Abad, Medical Assistant - 11/15/2018 10:40 AM PSTX-rays
 have been requested.  Please go to the x-ray department after your appointment to complete 
these x-rays.  The results of your x-rays will be reviewed at your next appointment.  If you
r x-rays demonstrate any emergent results, the clinic will contact you.
 
 A MRI has been requested.  Please complete the requested imaging.  Within one week, you mayra
uld receive a call to schedule your MRI.  If you have not heard from anyone within one week,
 
 please call the clinic.  The results of your MRI will be reviewed at your next appointment.
  If your MRI demonstrates any emergent results, the clinic will contact you.
 
 A CT scan has been requested.  Please complete the requested imaging.  Within one week, you
 should receive a call to schedule your CT.  If you have not heard from anyone within one we
ek, please call the clinic.  The results of your CT scan will be reviewed at your next appoi
ntment.  If your CT scan demonstrates any emergent results, the clinic will contact you.
 
 Electronically signed by Ashleigh Abad, Medical Assistant at 11/15/2018 11:51 AM PST
 documented in this encounter
 
 Progress Notes
 Joel Cottrell MD - 11/15/2018 10:40 AM PSTFormatting of this note might be different fro
m the original.
 Joel Cottrell MD
 [address], [address]
 [place] [address] [phone]
 FAX: [phone]
 
 PHYSICAL MEDICINE AND REHABILITATION H&P
 
 CHIEF COMPLAINT: 
 Chief Complaint 
 Patient presents with 
   Gait Problem 
   Back Pain 
 
 HISTORY OF PRESENT ILLNESS:  Margaret Ferreira s a 88 y.o. female being seen today at 
e request of Bessy John PA-C for the complaint of back pain and gait abnomality. 
 She  reports that the pain started without any inciting event  The symptoms have been gradu
ally worsening.  
 
 Margaret Ferreira reports that pain interferes with walking. She reports that she can no
t walk for more than 10 minutes. Margaret Fererira reports that she ambulates with walker
 at home. She uses wheel chair when outside of the home. 
 
 Margaret Ferreira reports balance impairment. Margaret Ferreira denies memory changes
. 
 
 Margaret Ferreira rates the pain as mild.  The symptoms are intermittent.  Margaret Ferreira describes the pain as sharp.  
 
 Margaret Ferreira denies lower extremity pain. Margaret Ferreira denies spasticity in
 the bilateral lower extremities.  The patient  does not report numbness in the bilateral lo
wer extremities.  She  does report weakness of the bilateral lower extremities. 
 
 Margaret Ferreira does not report any change in bowel or bladder function or saddle anes
thesia recently.  
 
 Her symptoms improve with sitting.
 
 Her symptoms worsen with walking and laying down. 
 
 Margaret Ferreira  has tried PT and NSAIDS.   Margaret Ferreira most recent course of
 aquatic therapy was completed this summer with no improvement in pain. 
 
 PAST MEDICAL HISTORY:
 Past Medical History: 
 Diagnosis Date 
 
   Aortic valve stenosis  
  trivial 
   Back problem  
  reported prior back trouble 
   Caffeine use  
   Chronic pain  
   COPD (chronic obstructive pulmonary disease) (HCC)  
   Essential hypertension  
   Gait disturbance  
   GERD (gastroesophageal reflux disease)  
   Hearing loss  
   Hiatal hernia  
   History of scarlet fever  
   Hypothyroidism  
   Macular degeneration  
  bilateral now 
   Osteoarthritis  
  localized vertebral 
   Osteoarthritis of both knees  
   Osteoporosis  
   Peptic ulcer disease  
   Plantar wart  
   Pneumonia  
   Pulmonary nodule  
   RLS (restless legs syndrome)  
   Scarlet fever  
   Scoliosis  
   Systemic hypertension  
   Unspecified abnormalities of gait and mobility  
   Urine incontinence  
   UTI (urinary tract infection)  
 
 PAST SURGICAL HISTORY:
 Past Surgical History: 
 Procedure Laterality Date 
   ABSCESS DRAINAGE ON CALF   
   BLADDER SUSPENSION  1996 
   CHOLECYSTECTOMY  1996 
   PEPPER AND BSO  1996 
   TOTAL HIP ARTHROPLASTY Left  
   UPPER GASTROINTESTINAL ENDOSCOPY   
 
 CURRENT MEDICATIONS: 
 Current Outpatient Prescriptions 
 Medication Sig Dispense Refill 
   albuterol (PROAIR HFA) 90 mcg/puff inhaler Inhale 2 puffs into the lungs every 6 hours 
as needed for Wheezing or Shortness of Breath. 1 Inhaler 5 
   Calcium Carbonate (CALCIUM 600 PO) Take 600 mg by mouth Daily.   
   Cholecalciferol (VITAMIN D-3) 2000 units CAPS Take  by mouth Daily.   
   diclofenac (VOLTAREN) 50 mg EC tablet Take 50 mg by mouth 2 times daily.   
   Docosahexaenoic Acid (DHA OMEGA 3) 100 MG CAPS Take  by mouth.   
   DULoxetine (CYMBALTA) 60 mg DR capsule Take 60 mg by mouth Daily.   
   gabapentin (NEURONTIN) 300 mg capsule Take 300 mg by mouth 3 times daily.   
   GLUCOSAMINE-CHONDROITIN PO Take  by mouth.   
   HYDROcodone-acetaminophen (NORCO) 5-325 mg per tablet Take 1 tablet by mouth every 6 ho
urs as needed for Pain.   
   levothyroxine (SYNTHROID) 150 mcg tablet Take 150 mcg by mouth every morning.   
   losartan (COZAAR) 100 MG tablet Take 100 mg by mouth Daily.   
   Misc Natural Products (GLUCOS-CHONDROIT-MSM COMPLEX) TABS Take  by mouth Daily.   
   Multiple Vitamins-Minerals (MULTIVITAMIN PO) Take  by mouth Daily.   
 
   NITROFURANTOIN MACROCRYSTAL PO Take  by mouth Daily.   
   omeprazole (PRILOSEC) 20 mg capsule Take 20 mg by mouth every morning (before breakfast
).   
   pramipexole (MIRAPEX) 0.25 mg tablet TAKE ONE TABLET BY MOUTH TWICE A  tablet 0 
   Spacer/Aero Chamber Mouthpiece MISC Use with inhaler as directed. 1 each 1 
   traMADol (ULTRAM) 50 mg tablet Take 100 mg by mouth 4 times daily.   
   umeclidinium-vilanterol (ANORO ELLIPTA) 62.5-25 mcg/puff inhaler Inhale 1 puff into the
 lungs.   
 
 No current facility-administered medications for this visit.  
 
 ALLERGIES: 
 Allergies 
 Allergen Reactions 
   Naproxen Other (See Comments) 
   Reaction not specified in outside medical records
  
   Lisinopril Other (See Comments) 
   Cough 
 
 SOCIAL HISTORY:
 The patient  reports that she is a non-smoker but has been exposed to tobacco smoke. She ha
s never used smokeless tobacco. She reports that she does not drink alcohol or use drugs.
 
 FAMILY HISTORY:
 Family History 
 Problem Relation Age of Onset 
   COPD Father  
   Asthma Father  
   Allergies Father  
   Hypertension Father  
   Tobacco Use Father  
   Asthma Sister  
   Hypertension Mother  
   Stroke Mother  
 
 REVIEW OF SYSTEMS: ROS
 
 GENERALLY:   No fever,+ night sweats, no anemia, no fatigue, no recent profound weight russell
ges.  
 EYES:  No eye problems, no use of corrective lenses, no eye injury, no double vision, no bl
indness.
 EARS, NOSE, AND THROAT:  No changes in taste or smell, + hearing difficulty, no ringing in 
the ears, no ear drainage, no dizziness, no voice changes, + difficulty swallowing, no signi
ficant snoring, no sleep apnea, no sinus problems, + major dental work.
 NEUROLOGICALLY:The patient has no numbness/pain of arms, no numbness/pain of legs, no awake
 with numbness/pain, +weakness, no muscle aching, + coordination difficulty,+ change in walk
, no head injury, no neck injury, no back injury, + pain in neck, + pain in back, no stroke,
 no fainting spells, no loss of consciousness, no tremor/shaking, no seizures, no headaches,
 no migraine, no memory loss, no speech difficulty, no confusion and no numbness of face.
 PSYCHIATRIC:  No depression, no sleep disorders, no anxiety, no bipolar disorder, no psycho
tic episodes.
 CARDIOVASCULAR:  No heart attacks, no heart murmur, no heart fluttering, no chest pain, no 
ankle swelling.  
 LUNG DISEASE:  No shortness of breath, no cough, no tuberculosis, no bloody cough,  no asth
ma, +emphysema/COPD.
 GASTROINTESTINAL:  No bowel disease, no nausea or vomiting, no rectal bleeding, no constipa
tion, no stool incontinence, no liver disease, no gallbladder disease, no abdominal pain, no
 ulcers.
 KIDNEY DISEASE:  No urinary frequency, no painful or difficult urination, + incontinence.
 
 ENDOCRINE:  No diabetes, + thyroid disease, + osteopenia or osteoporosis, no breast drainag
e.  
 SKIN:  No breast lumps, no skin changes, no rashes, no itches.
 HEMATOLOGIC/LYMPHATIC:  No enlarged lymph nodes, no easy or unusual bleeding, no personal h
istory of cancer. 
 RHEUMATOLOGIC:  + joint arthritis, no rheumatoid arthritis.
 
 PHYSICAL EXAMINATION:
 Blood pressure 115/64, pulse 77, height 1.626 m (5' 4"), weight 61.2 kg (135 lb), not curre
ntly breastfeeding. Body mass index is 23.17 kg/m.
 
 GENERAL: She does not appear uncomfortable when seated.   
 HEENT:
 HEAD/FACE:
 EYES:
  
 Normocephalic and atraumatic.  There are no areas of recent trauma.  
 Normal sclerae without icterus.   
 SKIN There are not scars in the lumbar region.   
 CHEST: The patient is in no acute respiratory distress with unlabored respirations.   
 HEART: There is not lower extremity edema.   
 ABDOMEN: The patient is not overweight.   
 NEUROLOGIC: The patient is awake, alert, and oriented to time, place, person.  
 She follows simple and complex commands.
 Her speech is fluent. She comprehends speech well.  
 She has no apparent deficits with short or long term memory.
 She has appropriate fund of knowledge
 
 Cranial nerves appear grossly intact.
 
 MOTOR EXAM:
 
 (5 IS NORMAL)
 
 * Indicates pain limited 
 MUSCLE/   MOVEMENT: 
 RIGHT  
 LEFT 
 Hip Flexion 4 3 
 Hip Extension 5 5 
 Knee Flexion 4 4 
 Knee Extension 5 4 
 Extensor Hallicus Longus 5 5 
 Ankle Dorsiflexion 4 5 
 Plantarflexion 5 5
  
  
 
 REFLEX: 
 RIGHT 
 LEFT 
 PATELLAR 4+ 4+ 
 ACHILLES 4+ 4+ 
  
 MUSCULOSKELETAL Crepitis within left knee
 
 Atrophy of intrinsic muscles of the hands
 
 Uribe's test negative bilaterally 
 
 
 Mild ataxia with finger to nose test bilaterally 
 
  
 
 RADIOGRAPHIC REVIEW:
 No new imaging is available for review. 
 
 IMPRESSION:
 1. Gait abnormality  
 2. Postural kyphosis of thoracic region  
 3. Weakness of both lower extremities  
 4. Chronic bilateral low back pain without sciatica  
 5. Urinary incontinence, unspecified type  
 6. Hyperreflexia  
 7. Impaired mobility and activities of daily living  
 
 PLAN:
 1. Margaret Ferreira presents to clinic today for the treatment of gait abnormality and 
back pain. Differential diagnosis includes but is not limited to: normal pressure hydrocepha
kristal, parkinson's disease, cervical spinal stenosis, thoracic spinal stenosis, lumbar spinal 
stenosis and lumbar radiculopathy. There is gait abnormality and significant lower extremity
 weakness. There is a history of urinary incontinence.   Today we reviewed treatment options
 for low back pain include physical therapy, neuropathic pain medication, lumbar steroid inj
ections and last resort surgery. Treatment for hydrocephalus include surgical treatment.  
 
 2. Margaret Ferreira  has completed physical therapy in the past with no improvement in 
pain. Margaret Ferreira would significantly benefit from subacute therapy in hopes of reg
aining strength to maintain functions such as walking and standing. Upon exam there is signi
ficant weakness in bilateral lower extremities.  Today we emphasized the importance of maint
aining strength through daily exercise. Query significant lumbar spondylosis contributing to
 lower extremity weakness. 
 
 Physical therapy was offered today for the treatment of back pain, gait abnormality and low
er extremity weakness. Margaret Ferreira declines formal physical therapy. Margaret MONCADA
roger reports that she would like to work on at home exercises and strengthening before con
sidering repeat physical therapy. 
 
 3. Neuropathic pain medicaitons were discussed today. No medicaiton changes were made today
. Margaret Ferreira is currently taking Tramadol, Cymbalta, Gabapentin and Hydrocodone. ANG Ferreira has a significant fall risk due to gait instability and lower extremity 
weakness. Medications such as Gabapentin, Hydrocodone and Tramadol may contribute to fall ri
sk. 
 
 4. Surgical intervention was discussed today with Margaret Ferreira. We discussed that s
urgical treatment is usually considered last resort. We discussed that surgical treatment 
is recommended if weakness is affecting function. We discussed that surgical treatment is 
recommended if there are changes to bowel/bladder function. We discussed that surgical oscar
atment may be considered if there is progressive weakness. We discussed that surgical stephany
tment may be helpful for radicular symptoms, but back pain may persist after surgical treatm
ent. Surgery may not be a beneficial consideration due to poor health and age. 
 
 5. Based on physical exam there is significant lower extremity weakness and  lower extremit
y hyperreflexia.  There is history of urinary incontinence. Margaret Ferreira is persisti
ng for more than 3 months. Query underlying lumbar spinal stenosis or neural foraminal narro
wing contributing to lower extremity weakness. Margaret Ferreira will have lumbar xray to
 evaluate for lumbar spondylosis or instability that may be contributing to lower extremity 
weakness. 
 
 In addition Margaret Ferreira will have lumbar MRI to evaluate for lumbar spinal stenosi
s or neural foraminal narrowing that may be contributing to symptoms. There is history of ur
 
inary incontinence. Imaging may help from a diagnostic stand point to evaluate origin of low
er extremity weakness. Margaret Ferreira is unable to maintain strength to stand without 
assistance. 
 
 6. Margaret Ferreira will have head CT to evaluate for hydrocephalus that may be contrib
uting to gait abnormality. Margaret Ferreira has history of urinary incontinence. She den
ies changes in memory. Today we reviewed pathology of hydrocephaly and its contributing fact
ors to gait, bladder control and memory. 
 
 7. Margaret Ferreira should return to clinic as scheduled to review imaging. Future cons
iderations include subacute physical therapy, assisted walking and ambulation evaluation and
 lumbar steroid injections. May consider neurosurgery consult pending head CT. 
 
 ----------------------
 
 Margaret Ferreira has a complex presentation.  She has profound lower extremity weakness
.  She is loosing her ability to stand and walk.  She does limited walking and standing at t
his time.  She presents to the clinic in a wheel chair.  She has incontinence.  She has hype
rreflexia.  She has gait instability.  She may have upper motor neuron pathology; such as ce
rvical or thoracic spinal stenosis.  She may have NPH, but she maintains intact memory and c
ognition.  She has profound weakness in the lower extremities.  She has severe deformity of 
back posture.  She likely has superimposed lumbar spinal stenosis.  She has tried and failed
 conservative measures.  I would like her to return to PT with the goal of maintaining her a
bility to walk.  We discussed the importance of daily activity.  We discussed the goals of P
M&R is to maintain her function, if possible and also determine etiology of her symptoms and
 if it is treatable.  She may have a surgical condition affecting her spine.  Unfortunately 
because of her multiple medical co-morbidities, she may not be a surgical candidate.  She lerma
s severe arthritis in both knees.  She reports that she was not offered knee replacement bec
ause of her overall health problems.
 
 Once we have determined origin of symptoms we will further discuss treatment options beyond
 continued PT.  We may consider neurology consult if origin of symptoms cannot be establishe
d with brain CT and lumbar MRI.  I suspect she has both lumbar spinal stenosis and intracran
ial pathology.
 
 Thank you for allowing me to be involved in the care of your patient.  If you have any ques
tions regarding the care of your patient please don't hesitate to call.
 
 Approximately 60 minutes was spent face to face with Margaret Ferreira, over half of [facility]
ch was spent formulating and discussing their medical treatment plan.
 
 I, Joel Cottrell MD personally performed the services described in this documentation, 
as scribed by in my presence, JASPAL Mason and are both accurate and complete.
 
 Joel Cottrell MD - 11/15/2018
 
 Electronically signed by Joel Cottrell MD at 11/15/2018  1:47 PM PSTdocumented in this en
counter
 
 Plan of Treatment
 
 
+----------------------+---------+--------+----------------------+----------------------+
| Name                 | Type    | Priori | Associated Diagnoses | Order Schedule       |
|                      |         | ty     |                      |                      |
+----------------------+---------+--------+----------------------+----------------------+
| CT Head wo Contrast  | Imaging | Routin |   Gait abnormality   | Expected:            |
|                      |         | e      | Postural kyphosis of | 11/15/2018, Expires: |
|                      |         |        |  thoracic region     |  11/15/2019          |
|                      |         |        | Weakness of both     |                      |
 
|                      |         |        | lower extremities    |                      |
|                      |         |        | Chronic bilateral    |                      |
|                      |         |        | low back pain        |                      |
|                      |         |        | without sciatica     |                      |
|                      |         |        | Urinary              |                      |
|                      |         |        | incontinence,        |                      |
|                      |         |        | unspecified type     |                      |
|                      |         |        | NPH (normal pressure |                      |
|                      |         |        |  hydrocephalus)      |                      |
+----------------------+---------+--------+----------------------+----------------------+
| XR Lumbar Spine 4 +  | Imaging | Routin |   Gait abnormality   | Expected:            |
| Vw                   |         | e      | Postural kyphosis of | 11/15/2018, Expires: |
|                      |         |        |  thoracic region     |  11/15/2019          |
|                      |         |        | Weakness of both     |                      |
|                      |         |        | lower extremities    |                      |
|                      |         |        | Chronic bilateral    |                      |
|                      |         |        | low back pain        |                      |
|                      |         |        | without sciatica     |                      |
|                      |         |        | Urinary              |                      |
|                      |         |        | incontinence,        |                      |
|                      |         |        | unspecified type     |                      |
+----------------------+---------+--------+----------------------+----------------------+
| MRI Lumbar Spine wo  | Imaging | Routin |   Gait abnormality   | Expected:            |
| Contrast             |         | e      | Postural kyphosis of | 11/15/2018, Expires: |
|                      |         |        |  thoracic region     |  11/15/2019          |
|                      |         |        | Weakness of both     |                      |
|                      |         |        | lower extremities    |                      |
|                      |         |        | Chronic bilateral    |                      |
|                      |         |        | low back pain        |                      |
|                      |         |        | without sciatica     |                      |
|                      |         |        | Urinary              |                      |
|                      |         |        | incontinence,        |                      |
|                      |         |        | unspecified type     |                      |
+----------------------+---------+--------+----------------------+----------------------+
 documented as of this encounter
 
 Visit Diagnoses
 
 
+----------------------------------------------------------------------------------------+
| Diagnosis                                                                              |
+----------------------------------------------------------------------------------------+
|   Gait abnormality - Primary  Abnormality of gait                                      |
+----------------------------------------------------------------------------------------+
|   Postural kyphosis of thoracic region  Kyphosis (acquired) (postural)                 |
+----------------------------------------------------------------------------------------+
|   Weakness of both lower extremities                                                   |
+----------------------------------------------------------------------------------------+
|   Chronic bilateral low back pain without sciatica                                     |
+----------------------------------------------------------------------------------------+
|   Urinary incontinence, unspecified type                                               |
+----------------------------------------------------------------------------------------+
|   Hyperreflexia  Abnormal reflex                                                       |
+----------------------------------------------------------------------------------------+
|   Impaired mobility and activities of daily living  Mechanical problems with limbs     |
+----------------------------------------------------------------------------------------+
|   NPH (normal pressure hydrocephalus) (HCC)  Idiopathic normal pressure hydrocephalus  |
| (INPH)                                                                                 |
+----------------------------------------------------------------------------------------+
 documented in this encounter

## 2019-12-04 NOTE — EKG
[facility]
                                    [address]
                                  [place], [place]  [address]
_________________________________________________________________________________________
                                                                 Signed   
 
 
Normal sinus rhythm
Possible Left atrial enlargement
Borderline ECG
No previous ECGs available
Confirmed by LINDA GEORGE DO ([id number]) on 12/4/2019 5:50:35 PM
 
 
 
 
 
 
 
 
 
 
 
 
 
 
 
 
 
 
 
 
 
 
 
 
 
 
 
 
 
 
 
 
 
 
 
 
 
 
 
 
    Electronically Signed By: LINDA GEORGE DO  12/04/19 1750
_________________________________________________________________________________________
PATIENT NAME:     PAMELA VALENCIA                  
MEDICAL RECORD #: [id number]                     Electrocardiogram             
          ACCT #: [id number]  
DATE OF BIRTH:   [birth year]                                       
PHYSICIAN:   LINDA GEORGE DO                     REPORT #: [id number]
REPORT IS CONFIDENTIAL AND NOT TO BE RELEASED WITHOUT AUTHORIZATION

## 2019-12-04 NOTE — NUR
ASSESSMENT COMPLETE, SCHEDULED MEDS GIVEN (SEE EMAR). PT RESTING IN BED AT
THIS TIME, DENIES PAIN AT REST. WILL MONITOR. NO CHANGES IN HEMATOMA SINCE
SHIFT REPORT, HEMATOMA REMIANS WITHIN OUTLINED AREA, SOMEWHAT FIRM AND TENDER
TO THE TOUCH. IV FLUIDS INFUSING AT 85MLS/HR, SITE WNL. NO FURTHER NEEDS, CALL
LIGHT IN REACH.

## 2019-12-04 NOTE — XMS
Encounter Summary
  Created on: 2019
 
 Margaret Ferreira
 External Reference #: [id number]
 : 30
 Sex: Female
 
 Demographics
 
 
+-----------------------+----------------------+
| Address               | [address]        |
|                       | [place]  [address] |
+-----------------------+----------------------+
| Home Phone            | [phone]      |
+-----------------------+----------------------+
| Preferred Language    | Unknown              |
+-----------------------+----------------------+
| Marital Status        |               |
+-----------------------+----------------------+
| Synagogue Affiliation | Unknown              |
+-----------------------+----------------------+
| Race                  | Unknown              |
+-----------------------+----------------------+
| Ethnic Group          | Unknown              |
+-----------------------+----------------------+
 
 
 Author
 
 
+--------------+--------------------------------------------+
| Author       | [facility]  |
|              | and [place]                                |
+--------------+--------------------------------------------+
| Organization | [facility]  |
|              | and [place]                                |
+--------------+--------------------------------------------+
| Address      | Unknown                                    |
+--------------+--------------------------------------------+
| Phone        | Unavailable                                |
+--------------+--------------------------------------------+
 
 
 
 Support
 
 
+--------------+--------------+---------+-----------------+
| Name         | Relationship | Address | Phone           |
+--------------+--------------+---------+-----------------+
| Lawson Hanna | ECON         | Unknown | [phone] |
+--------------+--------------+---------+-----------------+
 
 
 
 Care Team Providers
 
 
 
+-----------------------+------+-----------------+
| Care Team Member Name | Role | Phone           |
+-----------------------+------+-----------------+
| Calixto Chou MD | PCP  | [phone] |
+-----------------------+------+-----------------+
 
 
 
 Encounter Details
 
 
+--------+----------+----------------------+----------------+---------------------+
| Date   | Type     | Department           | Care Team      | Description         |
+--------+----------+----------------------+----------------+---------------------+
| / | Abstract |   [facility] [place]          |   [place],  | COPD (chronic       |
|    |          | PULMONARY  [address]     | Mary PADRON MD  | obstructive         |
|        |          | [place]  [place], |                | pulmonary disease)  |
|        |          |  [place] [address]       |                | ([facility]) (Primary Dx)  |
|        |          | [phone]         |                |                     |
+--------+----------+----------------------+----------------+---------------------+
 
 
 
 Social History
 
 
+----------------+-------+-----------+--------+------+
| Tobacco Use    | Types | Packs/Day | Years  | Date |
|                |       |           | Used   |      |
+----------------+-------+-----------+--------+------+
| Never Assessed |       |           |        |      |
+----------------+-------+-----------+--------+------+
 
 
 
+------------------+---------------+
| Sex Assigned at  | Date Recorded |
| Birth            |               |
+------------------+---------------+
| Not on file      |               |
+------------------+---------------+
 
 
 
+----------------+-------------+-------------+
| Job Start Date | Occupation  | Industry    |
+----------------+-------------+-------------+
| Not on file    | Not on file | Not on file |
+----------------+-------------+-------------+
 
 
 
+----------------+--------------+------------+
| Travel History | Travel Start | Travel End |
+----------------+--------------+------------+
 
 
 
 
+-------------------------------------+
| No recent travel history available. |
+-------------------------------------+
 documented as of this encounter
 
 Plan of Treatment
 Not on filedocumented as of this encounter
 
 Visit Diagnoses
 
 
+---------------------------------------------------------------------------------+
| Diagnosis                                                                       |
+---------------------------------------------------------------------------------+
|   COPD (chronic obstructive pulmonary disease) (HCC) - Primary  Chronic airway  |
| obstruction, not elsewhere classified                                           |
+---------------------------------------------------------------------------------+
 documented in this encounter

## 2019-12-04 NOTE — XMS
Encounter Summary
  Created on: 2019
 
 Margaret Ferreira
 External Reference #: [id number]
 : 30
 Sex: Female
 
 Demographics
 
 
+-----------------------+----------------------+
| Address               | [address]        |
|                       | [place]  [address] |
+-----------------------+----------------------+
| Home Phone            | [phone]      |
+-----------------------+----------------------+
| Preferred Language    | Unknown              |
+-----------------------+----------------------+
| Marital Status        |               |
+-----------------------+----------------------+
| [employer] Affiliation | Unknown              |
+-----------------------+----------------------+
| Race                  | Unknown              |
+-----------------------+----------------------+
| Ethnic Group          | Unknown              |
+-----------------------+----------------------+
 
 
 Author
 
 
+--------------+--------------------------------------------+
| Author       | [facility] [place]  |
|              | and [place]                                |
+--------------+--------------------------------------------+
| Organization | [facility]  |
|              | and [place]                                |
+--------------+--------------------------------------------+
| Address      | Unknown                                    |
+--------------+--------------------------------------------+
| Phone        | Unavailable                                |
+--------------+--------------------------------------------+
 
 
 
 Support
 
 
+--------------+--------------+---------+-----------------+
| Name         | Relationship | Address | Phone           |
+--------------+--------------+---------+-----------------+
| Lawson Hanna | ECON         | Unknown | [phone] |
+--------------+--------------+---------+-----------------+
 
 
 
 Care Team Providers
 
 
 
+-----------------------+------+-------------+
| Care Team Member Name | Role | Phone       |
+-----------------------+------+-------------+
 PCP  | Unavailable |
+-----------------------+------+-------------+
 
 
 
 Encounter Details
 
 
+--------+-----------+----------------------+---------------------+-------------+
| Date   | Type      | Department           | Care Team           | Description |
+--------+-----------+----------------------+---------------------+-------------+
| / | Hospital  |   [facility] |   Sarmad Rangel  |             |
|    | Encounter |  MED [facility] XRAY  [address]    |             |
|        |           |  [address]       | [place] [address]  |             |
|        |           | [place] [address] | [place] [address]     |             |
|        |           |   [phone]       | [phone]        |             |
|        |           |                      | [phone] (Fax)  |             |
+--------+-----------+----------------------+---------------------+-------------+
 
 
 
 Social History
 
 
+----------------+-------+-----------+--------+------+
| Tobacco Use    | Types | Packs/Day | Years  | Date |
|                |       |           | Used   |      |
+----------------+-------+-----------+--------+------+
| Never Assessed |       |           |        |      |
+----------------+-------+-----------+--------+------+
 
 
 
+------------------+---------------+
| Sex Assigned at  | Date Recorded |
| Birth            |               |
+------------------+---------------+
| Not on file      |               |
+------------------+---------------+
 
 
 
+----------------+-------------+-------------+
| Job Start Date | Occupation  | Industry    |
+----------------+-------------+-------------+
| Not on file    | Not on file | Not on file |
+----------------+-------------+-------------+
 
 
 
+----------------+--------------+------------+
| Travel History | Travel Start | Travel End |
+----------------+--------------+------------+
 
 
 
 
+-------------------------------------+
| No recent travel history available. |
+-------------------------------------+
 documented as of this encounter
 
 Plan of Treatment
 Not on filedocumented as of this encounter
 
 Visit Diagnoses
 Not on filedocumented in this encounter

## 2019-12-04 NOTE — XMS
Encounter Summary
  Created on: 2019
 
 Margaret Ferreira
 External Reference #: [id number]
 : 30
 Sex: Female
 
 Demographics
 
 
+-----------------------+------------------------+
| Address               | [address]      |
|                       | [place]  [address]   |
+-----------------------+------------------------+
| Home Phone            | [phone]        |
+-----------------------+------------------------+
| Preferred Language    | Unknown                |
+-----------------------+------------------------+
| Marital Status        |                 |
+-----------------------+------------------------+
| Protestant Affiliation | MET                    |
+-----------------------+------------------------+
| Race                  | White                  |
+-----------------------+------------------------+
| Ethnic Group          | Not  or  |
+-----------------------+------------------------+
 
 
 Author
 
 
+--------------+------------------------------+
| Author       | [facility] |
+--------------+------------------------------+
| Organization | [facility] |
+--------------+------------------------------+
| Address      | Unknown                      |
+--------------+------------------------------+
| Phone        | Unavailable                  |
+--------------+------------------------------+
 
 
 
 Support
 
 
+--------------+--------------+---------------------+-----------------+
| Name         | Relationship | Address             | Phone           |
+--------------+--------------+---------------------+-----------------+
| Lawson Ferreira | ECON         | [address]          | [phone] |
|              |              | [place], [place]   |                 |
|              |              | [address]               |                 |
+--------------+--------------+---------------------+-----------------+
 
 
 
 Care Team Providers
 
 
 
+-----------------------+------+-------------+
| Care Team Member Name | Role | Phone       |
+-----------------------+------+-------------+
 PCP  | Unavailable |
+-----------------------+------+-------------+
 
 
 
 Encounter Details
 
 
+--------+-------------+----------------------+---------------------+---------------+
| Date   | Type        | Department           | Care Team           | Description   |
+--------+-------------+----------------------+---------------------+---------------+
| / | Office      |   General Internal   |   Note, Outpatient  | Progress Note |
|    | Visit-Trans | Medicine  [address]    | Clinic              |               |
|        | [address]      | [address]  |                     |               |
|        |             |  Mailcode: [address]      |                     |               |
|        |             | Outpatient Clinic    |                     |               |
|        |             | [address]       |                     |               |
|        |             | [place], [place]         |                     |               |
|        |             | [address]           |                     |               |
|        |             | [phone]         |                     |               |
+--------+-------------+----------------------+---------------------+---------------+
 
 
 
 Social History
 
 
+----------------+-------+-----------+--------+------+
| Tobacco Use    | Types | Packs/Day | Years  | Date |
|                |       |           | Used   |      |
+----------------+-------+-----------+--------+------+
| Never Assessed |       |           |        |      |
+----------------+-------+-----------+--------+------+
 
 
 
+------------------+---------------+
| Sex Assigned at  | Date Recorded |
| Birth            |               |
+------------------+---------------+
| Not on file      |               |
+------------------+---------------+
 
 
 
+----------------+-------------+-------------+
| Job Start Date | Occupation  | Industry    |
+----------------+-------------+-------------+
| Not on file    | Not on file | Not on file |
+----------------+-------------+-------------+
 
 
 
+----------------+--------------+------------+
| Travel History | Travel Start | Travel End |
 
+----------------+--------------+------------+
 
 
 
+-------------------------------------+
| No recent travel history available. |
+-------------------------------------+
 documented as of this encounter
 
 Progress Notes
 Interface, Transcription In - 2007  6:36 AM PDT CLINIC DATE: 96
  
  [facility]
  
  The patient is seen for her second postoperative visit on 96. She
  continues to make satisfactory although rather slow progress following her
  recent hospital admission at which time she had a pelvic floor repair
  including vaginal hysterectomy and sacrospinous fixation. Her surgery was
  complicated by intraperitoneal bleed requiring laparotomy. She continues to
  stay with her daughter in [place] and is becoming more active, has a
  reasonable appetite and normal bowel function. She continues to report some
  urgency and urge incontinence. On examination, the abdominal wound is
  well-healed. There are no local areas of induration, though there is still
  some tenderness on palpation of the rectus muscle. The patient still needs
  a fair amount of pain medication and is moving cautiously. Overall,
  however, she appears to be making a slow but steady and satisfactory
  postoperative recovery. She will remain in the [place] area for a few more
  days before returning to [place]. A further follow-up appointment has not
  been given but the patient has been asked to report back by phone to clinic
  before she returns to [place].
  
  
  
  BRITTANY Perez M.D.
  Professor and 
  Obstetrics and Gynecology
  
  EPK:carolina
  D: 96
  T: 96
  
  cc:
  
  
  
  THALIA COLE MD
  [address] [place] [address] 8
  [place] [address]
   Electronically signed by Interface, Transcription In at 2007  6:36 AM PDTdocumented
 in this encounter
 
 Plan of Treatment
 Not on filedocumented as of this encounter
 
 Visit Diagnoses
 Not on filedocumented in this encounter

## 2019-12-04 NOTE — XMS
Encounter Summary
  Created on: 2019
 
 Margaret Ferreira
 External Reference #: [id number]
 : 30
 Sex: Female
 
 Demographics
 
 
+-----------------------+----------------------+
| Address               | [address]        |
|                       | [place]  [address] |
+-----------------------+----------------------+
| Home Phone            | [phone]      |
+-----------------------+----------------------+
| Preferred Language    | Unknown              |
+-----------------------+----------------------+
| Marital Status        |               |
+-----------------------+----------------------+
| Faith Affiliation | Unknown              |
+-----------------------+----------------------+
| Race                  | Unknown              |
+-----------------------+----------------------+
| Ethnic Group          | Unknown              |
+-----------------------+----------------------+
 
 
 Author
 
 
+--------------+--------------------------------------------+
| Author       | [facility]  |
|              | and [place]                                |
+--------------+--------------------------------------------+
| Organization | [facility]  |
|              | and [place]                                |
+--------------+--------------------------------------------+
| Address      | Unknown                                    |
+--------------+--------------------------------------------+
| Phone        | Unavailable                                |
+--------------+--------------------------------------------+
 
 
 
 Support
 
 
+--------------+--------------+---------+-----------------+
| Name         | Relationship | Address | Phone           |
+--------------+--------------+---------+-----------------+
| Lawson Hanna | ECON         | Unknown | [phone] |
+--------------+--------------+---------+-----------------+
 
 
 
 Care Team Providers
 
 
 
+-----------------------+------+-----------------+
| Care Team Member Name | Role | Phone           |
+-----------------------+------+-----------------+
| Calixto Chou MD | PCP  | [phone] |
+-----------------------+------+-----------------+
 
 
 
 Reason for Visit
 
 
+-------------------+----------+
| Reason            | Comments |
+-------------------+----------+
| Medication Refill |          |
+-------------------+----------+
 
 
 
 Encounter Details
 
 
+--------+--------+----------------------+----------------+-------------------+
| Date   | Type   | Department           | Care Team      | Description       |
+--------+--------+----------------------+----------------+-------------------+
| / | Refill |   [facility]          |   [place],  | Medication Refill |
| 2016   |        | PULMONARY  [address]     | Mary PADRON MD  |                   |
|        |        | [place]  [place], |                |                   |
|        |        |  [place] [address]       |                |                   |
|        |        | [phone]         |                |                   |
+--------+--------+----------------------+----------------+-------------------+
 
 
 
 Social History
 
 
+--------------+-------+-----------+--------+------+
| Tobacco Use  | Types | Packs/Day | Years  | Date |
|              |       |           | Used   |      |
+--------------+-------+-----------+--------+------+
| Never Smoker |       |           |        |      |
+--------------+-------+-----------+--------+------+
 
 
 
+---------------------+---+---+---+
| Smokeless Tobacco:  |   |   |   |
| Never Used          |   |   |   |
+---------------------+---+---+---+
 
 
 
+-------------------------------------------------------------------+
| Comments: her parents both smoked, also worked at a [employer] |
+-------------------------------------------------------------------+
 
 
 
 
+-------------+----------------------+---------+----------+
| Alcohol Use | Drinks/Week          | oz/Week | Comments |
+-------------+----------------------+---------+----------+
| No          |   0 Standard drinks  | 0.0     |          |
|             | or equivalent        |         |          |
+-------------+----------------------+---------+----------+
 
 
 
+------------------+---------------+
| Sex Assigned at  | Date Recorded |
| Birth            |               |
+------------------+---------------+
| Not on file      |               |
+------------------+---------------+
 
 
 
+----------------+-------------+-------------+
| Job Start Date | Occupation  | Industry    |
+----------------+-------------+-------------+
| Not on file    | Not on file | Not on file |
+----------------+-------------+-------------+
 
 
 
+----------------+--------------+------------+
| Travel History | Travel Start | Travel End |
+----------------+--------------+------------+
 
 
 
+-------------------------------------+
| No recent travel history available. |
+-------------------------------------+
 documented as of this encounter
 
 Plan of Treatment
 Not on filedocumented as of this encounter
 
 Visit Diagnoses
 Not on filedocumented in this encounter

## 2019-12-04 NOTE — XMS
Encounter Summary
  Created on: 2019
 
 RejiMargaret hernández ANG
 : 30
 Sex: Female
 
 Demographics
 
 
+-----------------------+------------------------+
| Address               | [address]      |
|                       | [place]  [address]   |
+-----------------------+------------------------+
| Home Phone            | [phone]        |
+-----------------------+------------------------+
| Preferred Language    | Unknown                |
+-----------------------+------------------------+
| Marital Status        |                 |
+-----------------------+------------------------+
| Hoahaoism Affiliation | MET                    |
+-----------------------+------------------------+
| Race                  | White                  |
+-----------------------+------------------------+
| Ethnic Group          | Not  or  |
+-----------------------+------------------------+
 
 
 Author
 
 
+--------------+-------------+
| Organization | Unknown     |
+--------------+-------------+
| Address      | Unknown     |
+--------------+-------------+
| Phone        | Unavailable |
+--------------+-------------+
 
 
 
 Support
 
 
+--------------+--------------+---------------------+-----------------+
| Name         | Relationship | Address             | Phone           |
+--------------+--------------+---------------------+-----------------+
| Lawson Ferreira | [facility]         | [address]          | [phone] |
|              |              | [place], [place]   |                 |
|              |              | [id number]               |                 |
+--------------+--------------+---------------------+-----------------+
 
 
 
 Care Team Providers
 
 
+-----------------------+------+-------------+
 
| Care Team Member Name | Role | Phone       |
+-----------------------+------+-------------+
 PCP  | Unavailable |
+-----------------------+------+-------------+
 
 
 
 Encounter Details
 
 
+--------+----------+------------+--------------------+-------------+
| Date   | Type     | Department | Care Team          | Description |
+--------+----------+------------+--------------------+-------------+
| / | Results  |            |   Other, Faculty   |             |
|    | Only     |            | [phone]       |             |
+--------+----------+------------+--------------------+-------------+
 
 
 
 Social History
 
 
+----------------+-------+-----------+--------+------+
| Tobacco Use    | Types | Packs/Day | Years  | Date |
|                |       |           | Used   |      |
+----------------+-------+-----------+--------+------+
| Never Assessed |       |           |        |      |
+----------------+-------+-----------+--------+------+
 
 
 
+------------------+---------------+
| Sex Assigned at  | Date Recorded |
| Birth            |               |
+------------------+---------------+
| Not on file      |               |
+------------------+---------------+
 
 
 
+----------------+-------------+-------------+
| Job Start Date | Occupation  | Industry    |
+----------------+-------------+-------------+
| Not on file    | Not on file | Not on file |
+----------------+-------------+-------------+
 
 
 
+----------------+--------------+------------+
| Travel History | Travel Start | Travel End |
+----------------+--------------+------------+
 
 
 
+-------------------------------------+
| No recent travel history available. |
+-------------------------------------+
 documented as of this encounter
 
 Plan of Treatment
 
 Not on filedocumented as of this encounter
 
 Procedures
 
 
+--------------------+--------+-------------+----------------------+----------------------+
| Procedure Name     | Priori | Date/Time   | Associated Diagnosis | Comments             |
|                    | ty     |             |                      |                      |
+--------------------+--------+-------------+----------------------+----------------------+
| CHEST, 1 VIEW,     | Urgent | 1996  |                      |   Results for this   |
| PORTABLE           |        |  3:00 AM    |                      | procedure are in the |
|                    |        | PST         |                      |  results section.    |
+--------------------+--------+-------------+----------------------+----------------------+
| SURGICAL PATHOLOGY | Routin | 1996  |                      |   Results for this   |
|                    | e      |             |                      | procedure are in the |
|                    |        |             |                      |  results section.    |
+--------------------+--------+-------------+----------------------+----------------------+
 documented in this encounter
 
 Results
 CHEST, 1 VIEW, PORTABLE (1996  3:00 AM PST)
 
+-----------+--------------------------+-----------+------------+--------------+
| Component | Value                    | Ref Range | Performed  | Pathologist  |
|           |                          |           | At         | Signature    |
+-----------+--------------------------+-----------+------------+--------------+
| CHEST, 1  | Radiologist 1: MARZENA,  |           |            |              |
| VIEW,     Kezia BARROW M.D.-Radiologist |           |            |              |
| PORTABLE  |  2: Nicolas Bryant, |           |            |              |
|           |  [facility]ARG[facility] HERNANDEZ  |           |            |              |
|           |                          |           |            |              |
|           |                          |           |            |              |
|           |          AP   |           |            |              |
|           | PORTABLE CHEST done on   |           |            |              |
|           | 96 at              |           |            |              |
|           | 03:00Dictated on         |           |            |              |
|           | 96 COMPARISON:     |           |            |              |
|           |   Exam of 96        |           |            |              |
|           | FINDINGS:   A right      |           |            |              |
|           | internal jugular         |           |            |              |
|           | intravenous catheter is  |           |            |              |
|           | in place.Its tip         |           |            |              |
|           | projects into the distal |           |            |              |
|           |  superior vena cava.     |           |            |              |
|           |   There is               |           |            |              |
|           | nopneumothorax. The lung |           |            |              |
|           |  volumes are low         |           |            |              |
|           | bilaterally.   There is  |           |            |              |
|           | interval increase inthe  |           |            |              |
|           | pulmonary vessels and    |           |            |              |
|           | their markings appear    |           |            |              |
|           | indistinct.   There      |           |            |              |
|           | isobscuration of the     |           |            |              |
|           | left hemidiaphragm by    |           |            |              |
|           | basilar opacity.         |           |            |              |
|           |   Heartappears grossly   |           |            |              |
|           | stable.   IMPRESSION: 1. |           |            |              |
|           |    Increasing pulmonary  |           |            |              |
|           | edema. 2.   Low lung     |           |            |              |
|           | volumes with left        |           |            |              |
 
|           | basilar opacity          |           |            |              |
|           | representing             |           |            |              |
|           | eitheratelectasis or     |           |            |              |
|           | infiltrate. 3.   The tip |           |            |              |
|           |  of the internal jugular |           |            |              |
|           |  line is in the superior |           |            |              |
|           |  vena cavaand there is   |           |            |              |
|           | no pneumothorax. END OF  |           |            |              |
|           | IMPRESSION:              |           |            |              |
+-----------+--------------------------+-----------+------------+--------------+
 
 
 
+----------+
| Specimen |
+----------+
|          |
+----------+
 
 
 
+---------------------------------+--------------+
| Narrative                       | Performed At |
+---------------------------------+--------------+
|   Ordered by OLMAN [facility]    |              |
+---------------------------------+--------------+
 
 
 
+----------------------+---------+--------------------+--------------+
| Performing           | Address | City/State/Zipcode | Phone Number |
| Organization         |         |                    |              |
+----------------------+---------+--------------------+--------------+
|   [facility] |         |                    |              |
|  RADIOLOGY           |         |                    |              |
+----------------------+---------+--------------------+--------------+
 SURGICAL PATHOLOGY (1996)
 
+-----------+--------------------------+-----------+-------------+--------------+
| Component | Value                    | Ref Range | Performed   | Pathologist  |
|           |                          |           | At          | Signature    |
+-----------+--------------------------+-----------+-------------+--------------+
| SURGICAL  | SOURCE OF SPECIMEN: SEE  |           | [facility]        |              |
| PATHOLOGY | RESULTS                  |           | DEPARTMENT  |              |
|           | Preliminary              |           | OF          |              |
|           | History:CLINICAL         |           | PATHOLOGY   |              |
|           | HISTORYThe patient is a  |           |             |              |
|           | 65 year old woman. NO    |           |             |              |
|           | HISTORY PROVIDED.        |           |             |              |
|           | GROSS DESCRIPTIONTwo     |           |             |              |
|           | specimens are received   |           |             |              |
|           | in formalin labeled as   |           |             |              |
|           | follows:       #1 RIGHT  |           |             |              |
|           | OVARY: Present is a      |           |             |              |
|           | distal portion of        |           |             |              |
|           | fallopian tube and       |           |             |              |
|           | ovarymeasuring 3.0 x 2.8 |           |             |              |
|           |  x 1.5 cm. in aggregate. |           |             |              |
|           |  The fallopian tube is   |           |             |              |
|           | ofnormal course and      |           |             |              |
 
|           | caliber, and has a       |           |             |              |
|           | smooth, pink serosal     |           |             |              |
|           | surface.Adjacent to the  |           |             |              |
|           | fimbriated edge of the   |           |             |              |
|           | tube is a cystic         |           |             |              |
|           | structure filledwith     |           |             |              |
|           | clear fluid. It measures |           |             |              |
|           |  1.1 x 0.8 x 0.6 cm. The |           |             |              |
|           |  ovary has aslightly     |           |             |              |
|           | nodular, white surface   |           |             |              |
|           | and measures 2.2 x 1.0 x |           |             |              |
|           |  0.6 cm. Oncross section |           |             |              |
|           |  the ovary has numerous  |           |             |              |
|           | cysts filled with clear  |           |             |              |
|           | fluid,ranging from 0.3   |           |             |              |
|           | to 0.5 cm. in diameter.  |           |             |              |
|           | The remainder of the     |           |             |              |
|           | parenchymais firm and    |           |             |              |
|           | white. Representative    |           |             |              |
|           | sections are submitted   |           |             |              |
|           | as follows:Cassette 1A   |           |             |              |
|           | contains fallopian tube  |           |             |              |
|           | and cyst adjacent to     |           |             |              |
|           | fallopian tube;cassette  |           |             |              |
|           | 1B contains ovary.       |           |             |              |
|           |  #2 LEFT OVARY: Present  |           |             |              |
|           | are a portion of         |           |             |              |
|           | fallopian tube and an    |           |             |              |
|           | ovarymeasuring 3.3 x 2.0 |           |             |              |
|           |  x 0.7 cm. in aggregate. |           |             |              |
|           |  The fallopian tube is   |           |             |              |
|           | ofnormal course and      |           |             |              |
|           | caliber. Its serosal     |           |             |              |
|           | surface is smooth and    |           |             |              |
|           | pink. Theovary itself    |           |             |              |
|           | measures 2.5 x 1.3 x 0.8 |           |             |              |
|           |  cm. The surface is      |           |             |              |
|           | white andslightly        |           |             |              |
|           | irregular. On cut        |           |             |              |
|           | section the parenchyma   |           |             |              |
|           | has a vague              |           |             |              |
|           | nodularityand is         |           |             |              |
|           | yellow-white. No cysts   |           |             |              |
|           | are identified.          |           |             |              |
|           | Representative sections  |           |             |              |
|           | offallopian tube and     |           |             |              |
|           | ovary are submitted in   |           |             |              |
|           | cassette 2A.       All   |           |             |              |
|           | tissue sections taken    |           |             |              |
|           | are submitted for        |           |             |              |
|           | microscopic              |           |             |              |
|           | evaluation.Case dictated |           |             |              |
|           |  by:   Андрей Costa,    |           |             |              |
|           | M.D./f                   |           |             |              |
|           | FINAL DIAGNOSISRIGHT     |           |             |              |
|           | OVARY:   FALLOPIAN TUBE  |           |             |              |
|           | WITH NO DIAGNOSTIC       |           |             |              |
|           | ABNORMALITY   PARATUBAL  |           |             |              |
|           | CYST   SENESCENT OVARY   |           |             |              |
|           |   INCLUSION CYSTSLEFT    |           |             |              |
 
|           | OVARY:   FALLOPIAN TUBE  |           |             |              |
|           | WITH NO DIAGNOSTIC       |           |             |              |
|           | ABNORMALITY   SENESCENT  |           |             |              |
|           | OVARY       Note: In the |           |             |              |
|           |  absence of clinical     |           |             |              |
|           | history, the diagnosis   |           |             |              |
|           | is based ongross and/or  |           |             |              |
|           | histologic findings      |           |             |              |
|           | only.       Case         |           |             |              |
|           | reviewed by:   Marshall   |           |             |              |
|           | Felix                 |           |             |              |
|           | M.D.T:96:dj         |           |             |              |
|           | My electronic signature  |           |             |              |
|           | indicates that I have    |           |             |              |
|           | personally reviewed      |           |             |              |
|           | alldiagnostic slides,    |           |             |              |
|           | the gross and/or         |           |             |              |
|           | microscopic portion of   |           |             |              |
|           | thisreport and           |           |             |              |
|           | formulated the final     |           |             |              |
|           | diagnosis.               |           |             |              |
+-----------+--------------------------+-----------+-------------+--------------+
 
 
 
+----------+
| Specimen |
+----------+
| Other    |
+----------+
 
 
 
+----------------------+------------------------+--------------------+--------------+
| Performing           | Address                | City/State/Zipcode | Phone Number |
| Organization         |                        |                    |              |
+----------------------+------------------------+--------------------+--------------+
|   [facility] |   [address]  | [place] [address] |              |
|  PATHOLOGY           | [address]                |                    |              |
+----------------------+------------------------+--------------------+--------------+
 documented in this encounter
 
 Visit Diagnoses
 Not on filedocumented in this encounter

## 2019-12-04 NOTE — XMS
Encounter Summary
  Created on: 2019
 
 Margaret Ferreira
 External Reference #: [id number]
 : 30
 Sex: Female
 
 Demographics
 
 
+-----------------------+----------------------+
| Address               | [address]        |
|                       | [place]  [address] |
+-----------------------+----------------------+
| Home Phone            | [phone]      |
+-----------------------+----------------------+
| Preferred Language    | Unknown              |
+-----------------------+----------------------+
| Marital Status        |               |
+-----------------------+----------------------+
| Restoration Affiliation | Unknown              |
+-----------------------+----------------------+
| Race                  | Unknown              |
+-----------------------+----------------------+
| Ethnic Group          | Unknown              |
+-----------------------+----------------------+
 
 
 Author
 
 
+--------------+--------------------------------------------+
| Author       | [facility]  |
|              | and [place]                                |
+--------------+--------------------------------------------+
| Organization | [facility]  |
|              | and [place]                                |
+--------------+--------------------------------------------+
| Address      | Unknown                                    |
+--------------+--------------------------------------------+
| Phone        | Unavailable                                |
+--------------+--------------------------------------------+
 
 
 
 Support
 
 
+--------------+--------------+---------+-----------------+
| Name         | Relationship | Address | Phone           |
+--------------+--------------+---------+-----------------+
| Lawson Hanna | ECON         | Unknown | [phone] |
+--------------+--------------+---------+-----------------+
 
 
 
 Care Team Providers
 
 
 
+-----------------------------+------+-----------------+
| Care Team Member Name       | Role | Phone           |
+-----------------------------+------+-----------------+
| Bessy Paulino | PCP  | [phone] |
|  PADONNY                       |      |                 |
+-----------------------------+------+-----------------+
 
 
 
 Reason for Visit
 
 
+-------------------+----------+
| Reason            | Comments |
+-------------------+----------+
| Medication Refill |          |
+-------------------+----------+
 
 
 
 Encounter Details
 
 
+--------+--------+----------------------+---------------------+-------------------+
| Date   | Type   | Department           | Care Team           | Description       |
+--------+--------+----------------------+---------------------+-------------------+
| / | Refill |   [facility]       |   Kvng Ontiveros  | Medication Refill |
|    |        | [facility]    | E, DO  [address]   |                   |
|        |        | [facility]  [address]  | [place], [place]       |                   |
|        |        | [address]  [place],   | [address]          |                   |
|        |        | [place] [address]        | [phone]        |                   |
|        |        | [phone]         | [phone] (Fax)  |                   |
+--------+--------+----------------------+---------------------+-------------------+
 
 
 
 Social History
 
 
+-------------------+-------+-----------+--------+------+
| Tobacco Use       | Types | Packs/Day | Years  | Date |
|                   |       |           | Used   |      |
+-------------------+-------+-----------+--------+------+
| Passive Smoke     |       |           |        |      |
| Exposure - Never  |       |           |        |      |
| Smoker            |       |           |        |      |
+-------------------+-------+-----------+--------+------+
 
 
 
+---------------------+---+---+---+
| Smokeless Tobacco:  |   |   |   |
| Never Used          |   |   |   |
+---------------------+---+---+---+
 
 
 
+-------------+----------------------+---------+----------+
 
| Alcohol Use | Drinks/Week          | oz/Week | Comments |
+-------------+----------------------+---------+----------+
| No          |   0 Standard drinks  | 0.0     |          |
|             | or equivalent        |         |          |
+-------------+----------------------+---------+----------+
 
 
 
+------------------+---------------+
| Sex Assigned at  | Date Recorded |
| Birth            |               |
+------------------+---------------+
| Not on file      |               |
+------------------+---------------+
 
 
 
+----------------+-------------+-------------+
| Job Start Date | Occupation  | Industry    |
+----------------+-------------+-------------+
| Not on file    | Not on file | Not on file |
+----------------+-------------+-------------+
 
 
 
+----------------+--------------+------------+
| Travel History | Travel Start | Travel End |
+----------------+--------------+------------+
 
 
 
+-------------------------------------+
| No recent travel history available. |
+-------------------------------------+
 documented as of this encounter
 
 Plan of Treatment
 Not on filedocumented as of this encounter
 
 Visit Diagnoses
 Not on filedocumented in this encounter

## 2019-12-04 NOTE — XMS
Encounter Summary
  Created on: 2019
 
 Margaret Ferreira
 External Reference #: [id number]
 : 30
 Sex: Female
 
 Demographics
 
 
+-----------------------+----------------------+
| Address               | [address]        |
|                       | [place]  [address] |
+-----------------------+----------------------+
| Home Phone            | [phone]      |
+-----------------------+----------------------+
| Preferred Language    | Unknown              |
+-----------------------+----------------------+
| Marital Status        |               |
+-----------------------+----------------------+
| Bahai Affiliation | Unknown              |
+-----------------------+----------------------+
| Race                  | Unknown              |
+-----------------------+----------------------+
| Ethnic Group          | Unknown              |
+-----------------------+----------------------+
 
 
 Author
 
 
+--------------+--------------------------------------------+
| Author       | [facility] [place]  |
|              | and [place]                                |
+--------------+--------------------------------------------+
| Organization | [facility]  |
|              | and [place]                                |
+--------------+--------------------------------------------+
| Address      | Unknown                                    |
+--------------+--------------------------------------------+
| Phone        | Unavailable                                |
+--------------+--------------------------------------------+
 
 
 
 Support
 
 
+--------------+--------------+---------+-----------------+
| Name         | Relationship | Address | Phone           |
+--------------+--------------+---------+-----------------+
| Lawson Hanna | ECON         | Unknown | [phone] |
+--------------+--------------+---------+-----------------+
 
 
 
 Care Team Providers
 
 
 
+-----------------------+------+-----------------+
| Care Team Member Name | Role | Phone           |
+-----------------------+------+-----------------+
| Calixto Chou MD | PCP  | [phone] |
+-----------------------+------+-----------------+
 
 
 
 Encounter Details
 
 
+--------+----------+----------------------+----------------------+-------------+
| Date   | Type     | Department           | Care Team            | Description |
+--------+----------+----------------------+----------------------+-------------+
| / | Abstract |   [facility]          |   Michelle Vanegas,  |             |
|    |          | PULMONARY  [address]     | Medical Assistant    |             |
|        |          | [place], |                      |             |
|        |          |  [place] [address]       |                      |             |
|        |          | [phone]         |                      |             |
+--------+----------+----------------------+----------------------+-------------+
 
 
 
 Social History
 
 
+--------------+-------+-----------+--------+------+
| Tobacco Use  | Types | Packs/Day | Years  | Date |
|              |       |           | Used   |      |
+--------------+-------+-----------+--------+------+
| Never Smoker |       |           |        |      |
+--------------+-------+-----------+--------+------+
 
 
 
+---------------------+---+---+---+
| Smokeless Tobacco:  |   |   |   |
| Never Used          |   |   |   |
+---------------------+---+---+---+
 
 
 
+-------------------------------------------------------------------+
| Comments: her parents both smoked, also worked at a [employer] |
+-------------------------------------------------------------------+
 
 
 
+-------------+----------------------+---------+----------+
| Alcohol Use | Drinks/Week          | oz/Week | Comments |
+-------------+----------------------+---------+----------+
| No          |   0 Standard drinks  | 0.0     |          |
|             | or equivalent        |         |          |
+-------------+----------------------+---------+----------+
 
 
 
+------------------+---------------+
 
| Sex Assigned at  | Date Recorded |
| Birth            |               |
+------------------+---------------+
| Not on file      |               |
+------------------+---------------+
 
 
 
+----------------+-------------+-------------+
| Job Start Date | Occupation  | Industry    |
+----------------+-------------+-------------+
| Not on file    | Not on file | Not on file |
+----------------+-------------+-------------+
 
 
 
+----------------+--------------+------------+
| Travel History | Travel Start | Travel End |
+----------------+--------------+------------+
 
 
 
+-------------------------------------+
| No recent travel history available. |
+-------------------------------------+
 documented as of this encounter
 
 Plan of Treatment
 Not on filedocumented as of this encounter
 
 Visit Diagnoses
 Not on filedocumented in this encounter

## 2019-12-04 NOTE — XMS
Encounter Summary
  Created on: 2019
 
 Margaret Ferreira
 External Reference #: [id number]
 : 30
 Sex: Female
 
 Demographics
 
 
+-----------------------+------------------------+
| Address               | [address]      |
|                       | [place]  [address]   |
+-----------------------+------------------------+
| Home Phone            | [phone]        |
+-----------------------+------------------------+
| Preferred Language    | Unknown                |
+-----------------------+------------------------+
| Marital Status        |                 |
+-----------------------+------------------------+
| Restoration Affiliation | MET                    |
+-----------------------+------------------------+
| Race                  | White                  |
+-----------------------+------------------------+
| Ethnic Group          | Not  or  |
+-----------------------+------------------------+
 
 
 Author
 
 
+--------------+------------------------------+
| Author       | [facility] |
+--------------+------------------------------+
| Organization | [facility] |
+--------------+------------------------------+
| Address      | Unknown                      |
+--------------+------------------------------+
| Phone        | Unavailable                  |
+--------------+------------------------------+
 
 
 
 Support
 
 
+--------------+--------------+---------------------+-----------------+
| Name         | Relationship | Address             | Phone           |
+--------------+--------------+---------------------+-----------------+
| Lawson Ferreira | ECON         | [address]          | [phone] |
|              |              | [place], [place]   |                 |
|              |              | [address]               |                 |
+--------------+--------------+---------------------+-----------------+
 
 
 
 Care Team Providers
 
 
 
+-----------------------+------+-------------+
| Care Team Member Name | Role | Phone       |
+-----------------------+------+-------------+
 PCP  | Unavailable |
+-----------------------+------+-------------+
 
 
 
 Encounter Details
 
 
+--------+-------------+----------------------+--------------------+-------------+
| Date   | Type        | Department           | Care Team          | Description |
+--------+-------------+----------------------+--------------------+-------------+
| / | Transcribed |   [facility]  |   Dictation, Other | Transcribed |
|    |             | [facility]  [address]     |                    |             |
|        |             | [address]      |                    |             |
|        |             | Mailcode: OP34  [address]  |                    |             |
|        |             | [address]         |                    |             |
|        |             | [place]  [place],  |                    |             |
|        |             | [place] [address]        |                    |             |
|        |             | [phone]         |                    |             |
+--------+-------------+----------------------+--------------------+-------------+
 
 
 
 Social History
 
 
+----------------+-------+-----------+--------+------+
| Tobacco Use    | Types | Packs/Day | Years  | Date |
|                |       |           | Used   |      |
+----------------+-------+-----------+--------+------+
| Never Assessed |       |           |        |      |
+----------------+-------+-----------+--------+------+
 
 
 
+------------------+---------------+
| Sex Assigned at  | Date Recorded |
| Birth            |               |
+------------------+---------------+
| Not on file      |               |
+------------------+---------------+
 
 
 
+----------------+-------------+-------------+
| Job Start Date | Occupation  | Industry    |
+----------------+-------------+-------------+
| Not on file    | Not on file | Not on file |
+----------------+-------------+-------------+
 
 
 
+----------------+--------------+------------+
| Travel History | Travel Start | Travel End |
+----------------+--------------+------------+
 
 
 
 
+-------------------------------------+
| No recent travel history available. |
+-------------------------------------+
 documented as of this encounter
 
 Progress Notes
 Interface, Transcription In - 2007  6:36 AM PDT  [address] [address] [phone] or [phone]
   Department of Obstetrics and Gynecology, [address]
   [facility]
   Phone: [phone] Fax: [phone]
  
  1996
  
  
  
  NICK COLE MD
  [address] [facility]
  [place] [address]
  
  
  RE:Margaret Ferreira
  MR#:[id number]
  
  Dear Doctor Cole:
  
  I saw Margaret Ferreira in the office today for a postoperative visit. Ms. Ferreira's surgery was unfortunately complicated by a massive intraperitoneal
  hemorrhage that occurred approximately twelve hours after surgery was
  completed. It was necessary to take her back to the Operating Room, and
  perform a laparotomy to control the bleeding. No specific bleeding points
  were identified but a large hemoperitoneum and retroplacental clot was
  evacuated. Following this she made a steady recovery and was discharged
  home six days postoperatively. The surgery reports of the primary surgery
  and emergency surgery are accompanying this letter.
  
  She has been staying with her daughter since the surgery and is making slow
  but satisfactory progress. She had some transitory urge incontinence
  postoperatively but this seems to be improving. She was also troubled by
  abdominal discomfort and constipation. Her bowel function is now normal but
  she still has some residual abdominal discomfort. The abdominal wound has
  healed well and vaginal support seems quite adequate, with no evidence of
  infection in the vaginal incisions. She is going to stay in the [place]
  area with her daughter for one more week, before returning to [place].
  
  I much appreciate the original referral of this patient. I am sorry that
  her posoperative course was stormy and complicated but she seems to be doing
  well now and I hope that her further progress will be uncomplicated.
  
  With thanks and best wishes,
  
  
  
  BRITTANY Perez M.D.
 
  Professor and ,
  Obstetrics and Gynecology
  
  EPK/eduardo
  D: 96 T: 96 11:12 P
   Electronically signed by Interface, Transcription In at 2007  6:36 AM PDTdocumented
 in this encounter
 
 Plan of Treatment
 Not on filedocumented as of this encounter
 
 Visit Diagnoses
 Not on filedocumented in this encounter

## 2019-12-04 NOTE — XMS
Encounter Summary
  Created on: 2019
 
 Margaret Ferreira
 External Reference #: [id number]
 : 30
 Sex: Female
 
 Demographics
 
 
+-----------------------+----------------------+
| Address               | [address]        |
|                       | [place]  [address] |
+-----------------------+----------------------+
| Home Phone            | [phone]      |
+-----------------------+----------------------+
| Preferred Language    | Unknown              |
+-----------------------+----------------------+
| Marital Status        |               |
+-----------------------+----------------------+
| Faith Affiliation | Unknown              |
+-----------------------+----------------------+
| Race                  | Unknown              |
+-----------------------+----------------------+
| Ethnic Group          | Unknown              |
+-----------------------+----------------------+
 
 
 Author
 
 
+--------------+--------------------------------------------+
| Author       | [facility]  |
|              | and [place]                                |
+--------------+--------------------------------------------+
| Organization | [facility]  |
|              | and [place]                                |
+--------------+--------------------------------------------+
| Address      | Unknown                                    |
+--------------+--------------------------------------------+
| Phone        | Unavailable                                |
+--------------+--------------------------------------------+
 
 
 
 Support
 
 
+--------------+--------------+---------+-----------------+
| Name         | Relationship | Address | Phone           |
+--------------+--------------+---------+-----------------+
| Lawson Hanna | ECON         | Unknown | [phone] |
+--------------+--------------+---------+-----------------+
 
 
 
 Care Team Providers
 
 
 
+-----------------------+------+-----------------+
| Care Team Member Name | Role | Phone           |
+-----------------------+------+-----------------+
| Calixto Chou MD | PCP  | [phone] |
+-----------------------+------+-----------------+
 
 
 
 Encounter Details
 
 
+--------+----------+----------------------+----------------+---------------------+
| Date   | Type     | Department           | Care Team      | Description         |
+--------+----------+----------------------+----------------+---------------------+
| / | Abstract |   [facility] [place]          |   [place],  | COPD (chronic       |
|    |          | PULMONARY  [address]     | Mary PADRON MD  | obstructive         |
|        |          | [place], |                | pulmonary disease)  |
|        |          |  [place] [address]       |                | ([facility]) (Primary Dx)  |
|        |          | [phone]         |                |                     |
+--------+----------+----------------------+----------------+---------------------+
 
 
 
 Social History
 
 
+----------------+-------+-----------+--------+------+
| Tobacco Use    | Types | Packs/Day | Years  | Date |
|                |       |           | Used   |      |
+----------------+-------+-----------+--------+------+
| Never Assessed |       |           |        |      |
+----------------+-------+-----------+--------+------+
 
 
 
+------------------+---------------+
| Sex Assigned at  | Date Recorded |
| Birth            |               |
+------------------+---------------+
| Not on file      |               |
+------------------+---------------+
 
 
 
+----------------+-------------+-------------+
| Job Start Date | Occupation  | Industry    |
+----------------+-------------+-------------+
| Not on file    | Not on file | Not on file |
+----------------+-------------+-------------+
 
 
 
+----------------+--------------+------------+
| Travel History | Travel Start | Travel End |
+----------------+--------------+------------+
 
 
 
 
+-------------------------------------+
| No recent travel history available. |
+-------------------------------------+
 documented as of this encounter
 
 Plan of Treatment
 Not on filedocumented as of this encounter
 
 Visit Diagnoses
 
 
+---------------------------------------------------------------------------------+
| Diagnosis                                                                       |
+---------------------------------------------------------------------------------+
|   COPD (chronic obstructive pulmonary disease) (HCC) - Primary  Chronic airway  |
| obstruction, not elsewhere classified                                           |
+---------------------------------------------------------------------------------+
 documented in this encounter

## 2019-12-04 NOTE — XMS
Encounter Summary
  Created on: 2019
 
 Margaret eFrreira
 External Reference #: [id number]
 : 30
 Sex: Female
 
 Demographics
 
 
+-----------------------+----------------------+
| Address               | [address]        |
|                       | [place]  [address] |
+-----------------------+----------------------+
| Home Phone            | [phone]      |
+-----------------------+----------------------+
| Preferred Language    | Unknown              |
+-----------------------+----------------------+
| Marital Status        |               |
+-----------------------+----------------------+
| Mu-ism Affiliation | Unknown              |
+-----------------------+----------------------+
| Race                  | Unknown              |
+-----------------------+----------------------+
| Ethnic Group          | Unknown              |
+-----------------------+----------------------+
 
 
 Author
 
 
+--------------+--------------------------------------------+
| Author       | Merged with [facility]  |
|              | and [facility]                                |
+--------------+--------------------------------------------+
| Organization | Merged with [facility]  |
|              | and [place]                                |
+--------------+--------------------------------------------+
| Address      | Unknown                                    |
+--------------+--------------------------------------------+
| Phone        | Unavailable                                |
+--------------+--------------------------------------------+
 
 
 
 Support
 
 
+--------------+--------------+---------+-----------------+
| Name         | Relationship | Address | Phone           |
+--------------+--------------+---------+-----------------+
| Lawson Hanna | ECON         | Unknown | [phone] |
+--------------+--------------+---------+-----------------+
 
 
 
 Care Team Providers
 
 
 
+-----------------------------+------+-----------------+
| Care Team Member Name       | Role | Phone           |
+-----------------------------+------+-----------------+
| Bessy Paulino | PCP  | [phone] |
|  [facility]                       |      |                 |
+-----------------------------+------+-----------------+
 
 
 
 Reason for Visit
 
 
+-------------------+----------+
| Reason            | Comments |
+-------------------+----------+
| Medication Refill |          |
+-------------------+----------+
 
 
 
 Encounter Details
 
 
+--------+--------+----------------------+---------------------+-------------------+
| Date   | Type   | Department           | Care Team           | Description       |
+--------+--------+----------------------+---------------------+-------------------+
| / | Refill |   [facility]       |   [facility] Ont[facility]  | Medication Refill |
|    |        | [facility]    | E, DO  [address]   |                   |
|        |        | [facility]  [address]  | [place], [place]       |                   |
|        |        | [address]  [place],   | [address]          |                   |
|        |        | [place] [address]        | [phone]        |                   |
|        |        | [phone]         | [phone] (Fax)  |                   |
+--------+--------+----------------------+---------------------+-------------------+
 
 
 
 Social History
 
 
+-------------------+-------+-----------+--------+------+
| Tobacco Use       | Types | Packs/Day | Years  | Date |
|                   |       |           | Used   |      |
+-------------------+-------+-----------+--------+------+
| Passive Smoke     |       |           |        |      |
| Exposure - Never  |       |           |        |      |
| Smoker            |       |           |        |      |
+-------------------+-------+-----------+--------+------+
 
 
 
+---------------------+---+---+---+
| Smokeless Tobacco:  |   |   |   |
| Never Used          |   |   |   |
+---------------------+---+---+---+
 
 
 
+-------------+----------------------+---------+----------+
 
| Alcohol Use | Drinks/Week          | oz/Week | Comments |
+-------------+----------------------+---------+----------+
| No          |   0 Standard drinks  | 0.0     |          |
|             | or equivalent        |         |          |
+-------------+----------------------+---------+----------+
 
 
 
+------------------+---------------+
| Sex Assigned at  | Date Recorded |
| Birth            |               |
+------------------+---------------+
| Not on file      |               |
+------------------+---------------+
 
 
 
+----------------+-------------+-------------+
| Job Start Date | Occupation  | Industry    |
+----------------+-------------+-------------+
| Not on file    | Not on file | Not on file |
+----------------+-------------+-------------+
 
 
 
+----------------+--------------+------------+
| Travel History | Travel Start | Travel End |
+----------------+--------------+------------+
 
 
 
+-------------------------------------+
| No recent travel history available. |
+-------------------------------------+
 documented as of this encounter
 
 Plan of Treatment
 Not on filedocumented as of this encounter
 
 Visit Diagnoses
 Not on filedocumented in this encounter

## 2019-12-04 NOTE — XMS
Encounter Summary
  Created on: 2019
 
 Margaret Ferreira
 External Reference #: [id number]
 : 30
 Sex: Female
 
 Demographics
 
 
+-----------------------+----------------------+
| Address               | [address]        |
|                       | [place]  [address] |
+-----------------------+----------------------+
| Home Phone            | [phone]      |
+-----------------------+----------------------+
| Preferred Language    | Unknown              |
+-----------------------+----------------------+
| Marital Status        |               |
+-----------------------+----------------------+
| Buddhism Affiliation | Unknown              |
+-----------------------+----------------------+
| Race                  | Unknown              |
+-----------------------+----------------------+
| Ethnic Group          | Unknown              |
+-----------------------+----------------------+
 
 
 Author
 
 
+--------------+--------------------------------------------+
| Author       | [facility]  |
|              | and [place]                                |
+--------------+--------------------------------------------+
| Organization | [facility]  |
|              | and [place]                                |
+--------------+--------------------------------------------+
| Address      | Unknown                                    |
+--------------+--------------------------------------------+
| Phone        | Unavailable                                |
+--------------+--------------------------------------------+
 
 
 
 Support
 
 
+--------------+--------------+---------+-----------------+
| Name         | Relationship | Address | Phone           |
+--------------+--------------+---------+-----------------+
| Lawson Hanna | ECON         | Unknown | [phone] |
+--------------+--------------+---------+-----------------+
 
 
 
 Care Team Providers
 
 
 
+-----------------------------+------+-----------------+
| Care Team Member Name       | Role | Phone           |
+-----------------------------+------+-----------------+
| Bessy Paulino | PCP  | [phone] |
|  PA-C                       |      |                 |
+-----------------------------+------+-----------------+
 
 
 
 Reason for Referral
 Diagnostic/Screening (Routine)
 
+--------+--------+-----------+--------------+--------------+---------------+
| Status | Reason | Specialty | Diagnoses /  | Referred By  | Referred To   |
|        |        |           | Procedures   | Contact      | Contact       |
+--------+--------+-----------+--------------+--------------+---------------+
| Closed |        | Radiology |   Diagnoses  |   [facility]     |   Jason Mri     |
|        |        |           |  Gait        | Joel SCALES MD | [address]  |
|        |        |           | abnormality  |   [address]      |  [place], |
|        |        |           |  Postural    | [address]    |  [place]           |
|        |        |           | kyphosis of  | [place], | [address]    |
|        |        |           | thoracic     |  [place] [address]    | Phone:        |
|        |        |           | region       | Phone:       | [phone]  |
|        |        |           | Weakness of  | [phone] |  Fax:         |
|        |        |           | both lower   |   Fax:       | [phone]  |
|        |        |           | extremities  | [phone] |               |
|        |        |           |  Chronic     |              |               |
|        |        |           | bilateral    |              |               |
|        |        |           | low back     |              |               |
|        |        |           | pain without |              |               |
|        |        |           |  sciatica    |              |               |
|        |        |           | Urinary      |              |               |
|        |        |           | incontinence |              |               |
|        |        |           | ,            |              |               |
|        |        |           | unspecified  |              |               |
|        |        |           | type         |              |               |
|        |        |           | Procedures   |              |               |
|        |        |           | MRI Lumbar   |              |               |
|        |        |           | Spine wo     |              |               |
|        |        |           | Contrast     |              |               |
+--------+--------+-----------+--------------+--------------+---------------+
 
 
 Diagnostic/Screening (Routine)
 
+--------+--------+-----------+--------------+--------------+---------------+
| Status | Reason | Specialty | Diagnoses /  | Referred By  | Referred To   |
|        |        |           | Procedures   | Contact      | Contact       |
+--------+--------+-----------+--------------+--------------+---------------+
| Closed |        | Radiology |   Diagnoses  |   [place],     |   [address] |
|        |        |           |  Gait        | Joel E A, MD |  [address]     |
|        |        |           | abnormality  |   [address]      | [place],  |
|        |        |           |  Postural    | [address]    | [place] [address] |
|        |        |           | kyphosis of  | [place], |   Phone:      |
|        |        |           | thoracic     |  [place] [address]    | [phone]  |
|        |        |           | region       | Phone:       |  Fax:         |
|        |        |           | Weakness of  | [phone] | [phone]  |
|        |        |           | both lower   |   Fax:       |               |
 
|        |        |           | extremities  | [phone] |               |
|        |        |           |  Chronic     |              |               |
|        |        |           | bilateral    |              |               |
|        |        |           | low back     |              |               |
|        |        |           | pain without |              |               |
|        |        |           |  sciatica    |              |               |
|        |        |           | Urinary      |              |               |
|        |        |           | incontinence |              |               |
|        |        |           | ,            |              |               |
|        |        |           | unspecified  |              |               |
|        |        |           | type  NPH    |              |               |
|        |        |           | (normal      |              |               |
|        |        |           | pressure     |              |               |
|        |        |           | hydrocephalu |              |               |
|        |        |           | s) (HCC)     |              |               |
|        |        |           | Procedures   |              |               |
|        |        |           | CT Head wo   |              |               |
|        |        |           | Contrast     |              |               |
+--------+--------+-----------+--------------+--------------+---------------+
 
 
 
 
 Reason for Visit
 
 
+--------------+----------+
| Reason       | Comments |
+--------------+----------+
| Gait Problem |          |
+--------------+----------+
| Back Pain    |          |
+--------------+----------+
 Evaluate & Treat (Routine)
 
+--------+--------+---------------+--------------+--------------+---------------+
| Status | Reason | Specialty     | Diagnoses /  | Referred By  | Referred To   |
|        |        |               | Procedures   | Contact      | Contact       |
+--------+--------+---------------+--------------+--------------+---------------+
| Closed |        | Physical      |   Diagnoses  |   [place],   |   Joel Cottrell |
|        |        | Medicine and  |  Abnormality | [place],   |  MARQUITA SCALES MD  [address] |
|        |        | Rehabilitatio |  of gait and | DO  [address]  |  [address]  |
|        |        | n             |  mobility    | [address]  [place]       |  [place], |
|        |        |               |              | [place]   |  [place] [address]     |
|        |        |               |              | [address]   | Phone:        |
|        |        |               |              | Phone:       | [phone]  |
|        |        |               |              | [phone] |  Fax:         |
|        |        |               |              |   Fax:       | [phone]  |
|        |        |               |              | [phone] |               |
+--------+--------+---------------+--------------+--------------+---------------+
 
 
 
 
 Encounter Details
 
 
+--------+---------+----------------------+----------------------+----------------------+
| Date   | Type    | Department           | Care Team            | Description          |
+--------+---------+----------------------+----------------------+----------------------+
 
| 11/15/ | Office  |   [facility]          |   Joel Cottrell,   | Gait abnormality     |
| 2018   | Visit   | PHYSIATRY  [address]     | MD  [address]  | (Primary Dx);        |
|        |         | [address]  [place], |  [place], [place]     | Postural kyphosis of |
|        |         |  [place] [address]       | [address]  [phone]  |  thoracic region;    |
|        |         | [phone]         |  [phone] (Fax)  | Weakness of both     |
|        |         |                      |                      | lower extremities;   |
|        |         |                      |                      | Chronic bilateral    |
|        |         |                      |                      | low back pain        |
|        |         |                      |                      | without sciatica;    |
|        |         |                      |                      | Urinary              |
|        |         |                      |                      | incontinence,        |
|        |         |                      |                      | unspecified type;    |
|        |         |                      |                      | Hyperreflexia;       |
|        |         |                      |                      | Impaired mobility    |
|        |         |                      |                      | and activities of    |
|        |         |                      |                      | daily living; NPH    |
|        |         |                      |                      | (normal pressure     |
|        |         |                      |                      | hydrocephalus)       |
+--------+---------+----------------------+----------------------+----------------------+
 
 
 
 Social History
 
 
+-------------------+-------+-----------+--------+------+
| Tobacco Use       | Types | Packs/Day | Years  | Date |
|                   |       |           | Used   |      |
+-------------------+-------+-----------+--------+------+
| Passive Smoke     |       |           |        |      |
| Exposure - Never  |       |           |        |      |
| Smoker            |       |           |        |      |
+-------------------+-------+-----------+--------+------+
 
 
 
+---------------------+---+---+---+
| Smokeless Tobacco:  |   |   |   |
| Never Used          |   |   |   |
+---------------------+---+---+---+
 
 
 
+-------------+----------------------+---------+----------+
| Alcohol Use | Drinks/Week          | oz/Week | Comments |
+-------------+----------------------+---------+----------+
| No          |   0 Standard drinks  | 0.0     |          |
|             | or equivalent        |         |          |
+-------------+----------------------+---------+----------+
 
 
 
+------------------+---------------+
| Sex Assigned at  | Date Recorded |
| Birth            |               |
+------------------+---------------+
| Not on file      |               |
+------------------+---------------+
 
 
 
 
+----------------+-------------+-------------+
| Job Start Date | Occupation  | Industry    |
+----------------+-------------+-------------+
| Not on file    | Not on file | Not on file |
+----------------+-------------+-------------+
 
 
 
+----------------+--------------+------------+
| Travel History | Travel Start | Travel End |
+----------------+--------------+------------+
 
 
 
+-------------------------------------+
| No recent travel history available. |
+-------------------------------------+
 documented as of this encounter
 
 Last Filed Vital Signs
 
 
+-------------------+------------------+----------------------+----------+
| Vital Sign        | Reading          | Time Taken           | Comments |
+-------------------+------------------+----------------------+----------+
| Blood Pressure    | 115/64           | 11/15/2018 11:06 AM  |          |
|                   |                  | PST                  |          |
+-------------------+------------------+----------------------+----------+
| Pulse             | 77               | 11/15/2018 11:06 AM  |          |
|                   |                  | PST                  |          |
+-------------------+------------------+----------------------+----------+
| Temperature       | -                | -                    |          |
+-------------------+------------------+----------------------+----------+
| Respiratory Rate  | -                | -                    |          |
+-------------------+------------------+----------------------+----------+
| Oxygen Saturation | -                | -                    |          |
+-------------------+------------------+----------------------+----------+
| Inhaled Oxygen    | -                | -                    |          |
| Concentration     |                  |                      |          |
+-------------------+------------------+----------------------+----------+
| Weight            | 61.2 kg (135 lb) | 11/15/2018 11:06 AM  |          |
|                   |                  | PST                  |          |
+-------------------+------------------+----------------------+----------+
| Height            | 162.6 cm (5' 4") | 11/15/2018 11:06 AM  |          |
|                   |                  | PST                  |          |
+-------------------+------------------+----------------------+----------+
| Body Mass Index   | 23.17            | 11/15/2018 11:06 AM  |          |
|                   |                  | PST                  |          |
+-------------------+------------------+----------------------+----------+
 documented in this encounter
 
 Patient Instructions
 Patient Instructions Ashleigh Abad, Medical Assistant - 11/15/2018 10:40 AM PSTX-rays
 have been requested.  Please go to the x-ray department after your appointment to complete 
these x-rays.  The results of your x-rays will be reviewed at your next appointment.  If you
r x-rays demonstrate any emergent results, the clinic will contact you.
 
 A MRI has been requested.  Please complete the requested imaging.  Within one week, you mayra
uld receive a call to schedule your MRI.  If you have not heard from anyone within one week,
 
 please call the clinic.  The results of your MRI will be reviewed at your next appointment.
  If your MRI demonstrates any emergent results, the clinic will contact you.
 
 A CT scan has been requested.  Please complete the requested imaging.  Within one week, you
 should receive a call to schedule your CT.  If you have not heard from anyone within one we
ek, please call the clinic.  The results of your CT scan will be reviewed at your next appoi
ntment.  If your CT scan demonstrates any emergent results, the clinic will contact you.
 
 Electronically signed by Ashleigh Abad, Medical Assistant at 11/15/2018 11:51 AM PST
 documented in this encounter
 
 Progress Notes
 Joel Cottrell MD - 11/15/2018 10:40 AM PSTFormatting of this note might be different fro
m the original.
 Joel Cottrell MD
 [address] [facility], [address]
 [place] [address] [phone]
 FAX: [phone]
 
 PHYSICAL MEDICINE AND REHABILITATION H&P
 
 CHIEF COMPLAINT: 
 Chief Complaint 
 Patient presents with 
   Gait Problem 
   Back Pain 
 
 HISTORY OF PRESENT ILLNESS:  Margaret Ferreira s a 88 y.o. female being seen today at 
e request of Bessy John PA-C for the complaint of back pain and gait abnomality. 
 She  reports that the pain started without any inciting event  The symptoms have been gradu
ally worsening.  
 
 Margaret Ferreira reports that pain interferes with walking. She reports that she can no
t walk for more than 10 minutes. Margaret Ferreira reports that she ambulates with walker
 at home. She uses wheel chair when outside of the home. 
 
 Margaret Ferreira reports balance impairment. Margaret Ferreira denies memory changes
. 
 
 Margaret Ferreira rates the pain as mild.  The symptoms are intermittent.  Margaret Ferreira describes the pain as sharp.  
 
 Margaret Ferreira denies lower extremity pain. Margaret Ferreira denies spasticity in
 the bilateral lower extremities.  The patient  does not report numbness in the bilateral lo
wer extremities.  She  does report weakness of the bilateral lower extremities. 
 
 Margaret Ferreira does not report any change in bowel or bladder function or saddle anes
thesia recently.  
 
 Her symptoms improve with sitting.
 
 Her symptoms worsen with walking and laying down. 
 
 Margaret Ferreira  has tried PT and NSAIDS.   Margaret Ferreira most recent course of
 aquatic therapy was completed this summer with no improvement in pain. 
 
 PAST MEDICAL HISTORY:
 Past Medical History: 
 Diagnosis Date 
 
   Aortic valve stenosis  
  trivial 
   Back problem  
  reported prior back trouble 
   Caffeine use  
   Chronic pain  
   COPD (chronic obstructive pulmonary disease) (HCC)  
   Essential hypertension  
   Gait disturbance  
   GERD (gastroesophageal reflux disease)  
   Hearing loss  
   Hiatal hernia  
   History of scarlet fever  
   Hypothyroidism  
   Macular degeneration  
  bilateral now 
   Osteoarthritis  
  localized vertebral 
   Osteoarthritis of both knees  
   Osteoporosis  
   Peptic ulcer disease  
   Plantar wart  
   Pneumonia  
   Pulmonary nodule  
   RLS (restless legs syndrome)  
   Scarlet fever  
   Scoliosis  
   Systemic hypertension  
   Unspecified abnormalities of gait and mobility  
   Urine incontinence  
   UTI (urinary tract infection)  
 
 PAST SURGICAL HISTORY:
 Past Surgical History: 
 Procedure Laterality Date 
   ABSCESS DRAINAGE ON CALF   
   BLADDER SUSPENSION  1996 
   CHOLECYSTECTOMY  1996 
   PEPPER AND BSO  1996 
   TOTAL HIP ARTHROPLASTY Left  
   UPPER GASTROINTESTINAL ENDOSCOPY   
 
 CURRENT MEDICATIONS: 
 Current Outpatient Prescriptions 
 Medication Sig Dispense Refill 
   albuterol (PROAIR HFA) 90 mcg/puff inhaler Inhale 2 puffs into the lungs every 6 hours 
as needed for Wheezing or Shortness of Breath. 1 Inhaler 5 
   Calcium Carbonate (CALCIUM 600 PO) Take 600 mg by mouth Daily.   
   Cholecalciferol (VITAMIN D-3) 2000 units CAPS Take  by mouth Daily.   
   diclofenac (VOLTAREN) 50 mg EC tablet Take 50 mg by mouth 2 times daily.   
   Docosahexaenoic Acid (DHA OMEGA 3) 100 MG CAPS Take  by mouth.   
   DULoxetine (CYMBALTA) 60 mg DR capsule Take 60 mg by mouth Daily.   
   gabapentin (NEURONTIN) 300 mg capsule Take 300 mg by mouth 3 times daily.   
   GLUCOSAMINE-CHONDROITIN PO Take  by mouth.   
   HYDROcodone-acetaminophen (NORCO) 5-325 mg per tablet Take 1 tablet by mouth every 6 ho
urs as needed for Pain.   
   levothyroxine (SYNTHROID) 150 mcg tablet Take 150 mcg by mouth every morning.   
   losartan (COZAAR) 100 MG tablet Take 100 mg by mouth Daily.   
   Misc Natural Products (GLUCOS-CHONDROIT-MSM COMPLEX) TABS Take  by mouth Daily.   
   Multiple Vitamins-Minerals (MULTIVITAMIN PO) Take  by mouth Daily.   
 
   NITROFURANTOIN MACROCRYSTAL PO Take  by mouth Daily.   
   omeprazole (PRILOSEC) 20 mg capsule Take 20 mg by mouth every morning (before breakfast
).   
   pramipexole (MIRAPEX) 0.25 mg tablet TAKE ONE TABLET BY MOUTH TWICE A  tablet 0 
   Spacer/Aero Chamber Mouthpiece MISC Use with inhaler as directed. 1 each 1 
   traMADol (ULTRAM) 50 mg tablet Take 100 mg by mouth 4 times daily.   
   umeclidinium-vilanterol (ANORO ELLIPTA) 62.5-25 mcg/puff inhaler Inhale 1 puff into the
 lungs.   
 
 No current facility-administered medications for this visit.  
 
 ALLERGIES: 
 Allergies 
 Allergen Reactions 
   Naproxen Other (See Comments) 
   Reaction not specified in outside medical records
  
   Lisinopril Other (See Comments) 
   Cough 
 
 SOCIAL HISTORY:
 The patient  reports that she is a non-smoker but has been exposed to tobacco smoke. She ha
s never used smokeless tobacco. She reports that she does not drink alcohol or use drugs.
 
 FAMILY HISTORY:
 Family History 
 Problem Relation Age of Onset 
   COPD Father  
   Asthma Father  
   Allergies Father  
   Hypertension Father  
   Tobacco Use Father  
   Asthma Sister  
   Hypertension Mother  
   Stroke Mother  
 
 REVIEW OF SYSTEMS: ROS
 
 GENERALLY:   No fever,+ night sweats, no anemia, no fatigue, no recent profound weight russell
ges.  
 EYES:  No eye problems, no use of corrective lenses, no eye injury, no double vision, no bl
indness.
 EARS, NOSE, AND THROAT:  No changes in taste or smell, + hearing difficulty, no ringing in 
the ears, no ear drainage, no dizziness, no voice changes, + difficulty swallowing, no signi
ficant snoring, no sleep apnea, no sinus problems, + major dental work.
 NEUROLOGICALLY:The patient has no numbness/pain of arms, no numbness/pain of legs, no awake
 with numbness/pain, +weakness, no muscle aching, + coordination difficulty,+ change in walk
, no head injury, no neck injury, no back injury, + pain in neck, + pain in back, no stroke,
 no fainting spells, no loss of consciousness, no tremor/shaking, no seizures, no headaches,
 no migraine, no memory loss, no speech difficulty, no confusion and no numbness of face.
 PSYCHIATRIC:  No depression, no sleep disorders, no anxiety, no bipolar disorder, no psycho
tic episodes.
 CARDIOVASCULAR:  No heart attacks, no heart murmur, no heart fluttering, no chest pain, no 
ankle swelling.  
 LUNG DISEASE:  No shortness of breath, no cough, no tuberculosis, no bloody cough,  no asth
ma, +emphysema/COPD.
 GASTROINTESTINAL:  No bowel disease, no nausea or vomiting, no rectal bleeding, no constipa
tion, no stool incontinence, no liver disease, no gallbladder disease, no abdominal pain, no
 ulcers.
 KIDNEY DISEASE:  No urinary frequency, no painful or difficult urination, + incontinence.
 
 ENDOCRINE:  No diabetes, + thyroid disease, + osteopenia or osteoporosis, no breast drainag
e.  
 SKIN:  No breast lumps, no skin changes, no rashes, no itches.
 HEMATOLOGIC/LYMPHATIC:  No enlarged lymph nodes, no easy or unusual bleeding, no personal h
istory of cancer. 
 RHEUMATOLOGIC:  + joint arthritis, no rheumatoid arthritis.
 
 PHYSICAL EXAMINATION:
 Blood pressure 115/64, pulse 77, height 1.626 m (5' 4"), weight 61.2 kg (135 lb), not curre
ntly breastfeeding. Body mass index is 23.17 kg/m.
 
 GENERAL: She does not appear uncomfortable when seated.   
 HEENT:
 HEAD/FACE:
 EYES:
  
 Normocephalic and atraumatic.  There are no areas of recent trauma.  
 Normal sclerae without icterus.   
 SKIN There are not scars in the lumbar region.   
 CHEST: The patient is in no acute respiratory distress with unlabored respirations.   
 HEART: There is not lower extremity edema.   
 ABDOMEN: The patient is not overweight.   
 NEUROLOGIC: The patient is awake, alert, and oriented to time, place, person.  
 She follows simple and complex commands.
 Her speech is fluent. She comprehends speech well.  
 She has no apparent deficits with short or long term memory.
 She has appropriate fund of knowledge
 
 Cranial nerves appear grossly intact.
 
 MOTOR EXAM:
 
 (5 IS NORMAL)
 
 * Indicates pain limited 
 MUSCLE/   MOVEMENT: 
 RIGHT  
 LEFT 
 Hip Flexion 4 3 
 Hip Extension 5 5 
 Knee Flexion 4 4 
 Knee Extension 5 4 
 Extensor Hallicus Longus 5 5 
 Ankle Dorsiflexion 4 5 
 Plantarflexion 5 5
  
  
 
 REFLEX: 
 RIGHT 
 LEFT 
 PATELLAR 4+ 4+ 
 ACHILLES 4+ 4+ 
  
 MUSCULOSKELETAL Crepitis within left knee
 
 Atrophy of intrinsic muscles of the hands
 
 Uribe's test negative bilaterally 
 
 
 Mild ataxia with finger to nose test bilaterally 
 
  
 
 RADIOGRAPHIC REVIEW:
 No new imaging is available for review. 
 
 IMPRESSION:
 1. Gait abnormality  
 2. Postural kyphosis of thoracic region  
 3. Weakness of both lower extremities  
 4. Chronic bilateral low back pain without sciatica  
 5. Urinary incontinence, unspecified type  
 6. Hyperreflexia  
 7. Impaired mobility and activities of daily living  
 
 PLAN:
 1. Margaret Ferreira presents to clinic today for the treatment of gait abnormality and 
back pain. Differential diagnosis includes but is not limited to: normal pressure hydrocepha
kristal, parkinson's disease, cervical spinal stenosis, thoracic spinal stenosis, lumbar spinal 
stenosis and lumbar radiculopathy. There is gait abnormality and significant lower extremity
 weakness. There is a history of urinary incontinence.   Today we reviewed treatment options
 for low back pain include physical therapy, neuropathic pain medication, lumbar steroid inj
ections and last resort surgery. Treatment for hydrocephalus include surgical treatment.  
 
 2. Mragaret Ferreira  has completed physical therapy in the past with no improvement in 
pain. Margaret Ferreira would significantly benefit from subacute therapy in hopes of reg
aining strength to maintain functions such as walking and standing. Upon exam there is signi
ficant weakness in bilateral lower extremities.  Today we emphasized the importance of maint
aining strength through daily exercise. Query significant lumbar spondylosis contributing to
 lower extremity weakness. 
 
 Physical therapy was offered today for the treatment of back pain, gait abnormality and low
er extremity weakness. Margaret Ferreira declines formal physical therapy. Margaret MONCADA
roger reports that she would like to work on at home exercises and strengthening before con
sidering repeat physical therapy. 
 
 3. Neuropathic pain medicaitons were discussed today. No medicaiton changes were made today
. Margaret Ferreira is currently taking Tramadol, Cymbalta, Gabapentin and Hydrocodone. ANG Ferreira has a significant fall risk due to gait instability and lower extremity 
weakness. Medications such as Gabapentin, Hydrocodone and Tramadol may contribute to fall ri
sk. 
 
 4. Surgical intervention was discussed today with Margaret Ferreira. We discussed that s
urgical treatment is usually considered last resort. We discussed that surgical treatment 
is recommended if weakness is affecting function. We discussed that surgical treatment is 
recommended if there are changes to bowel/bladder function. We discussed that surgical oscar
atment may be considered if there is progressive weakness. We discussed that surgical stephany
tment may be helpful for radicular symptoms, but back pain may persist after surgical treatm
ent. Surgery may not be a beneficial consideration due to poor health and age. 
 
 5. Based on physical exam there is significant lower extremity weakness and  lower extremit
y hyperreflexia.  There is history of urinary incontinence. Margaret Ferreira is persisti
ng for more than 3 months. Query underlying lumbar spinal stenosis or neural foraminal narro
wing contributing to lower extremity weakness. Margaret Ferreira will have lumbar xray to
 evaluate for lumbar spondylosis or instability that may be contributing to lower extremity 
weakness. 
 
 In addition Margaret Ferreira will have lumbar MRI to evaluate for lumbar spinal stenosi
s or neural foraminal narrowing that may be contributing to symptoms. There is history of ur
 
inary incontinence. Imaging may help from a diagnostic stand point to evaluate origin of low
er extremity weakness. Margaret Ferreira is unable to maintain strength to stand without 
assistance. 
 
 6. Margaret Ferreira will have head CT to evaluate for hydrocephalus that may be contrib
uting to gait abnormality. Margaret Ferreira has history of urinary incontinence. She den
ies changes in memory. Today we reviewed pathology of hydrocephaly and its contributing fact
ors to gait, bladder control and memory. 
 
 7. Margaret Ferreira should return to clinic as scheduled to review imaging. Future cons
iderations include subacute physical therapy, assisted walking and ambulation evaluation and
 lumbar steroid injections. May consider neurosurgery consult pending head CT. 
 
 ----------------------
 
 Margaret Ferreira has a complex presentation.  She has profound lower extremity weakness
.  She is loosing her ability to stand and walk.  She does limited walking and standing at t
his time.  She presents to the clinic in a wheel chair.  She has incontinence.  She has hype
rreflexia.  She has gait instability.  She may have upper motor neuron pathology; such as ce
rvical or thoracic spinal stenosis.  She may have NPH, but she maintains intact memory and c
ognition.  She has profound weakness in the lower extremities.  She has severe deformity of 
back posture.  She likely has superimposed lumbar spinal stenosis.  She has tried and failed
 conservative measures.  I would like her to return to PT with the goal of maintaining her a
bility to walk.  We discussed the importance of daily activity.  We discussed the goals of P
M&R is to maintain her function, if possible and also determine etiology of her symptoms and
 if it is treatable.  She may have a surgical condition affecting her spine.  Unfortunately 
because of her multiple medical co-morbidities, she may not be a surgical candidate.  She lerma
s severe arthritis in both knees.  She reports that she was not offered knee replacement bec
ause of her overall health problems.
 
 Once we have determined origin of symptoms we will further discuss treatment options beyond
 continued PT.  We may consider neurology consult if origin of symptoms cannot be establishe
d with brain CT and lumbar MRI.  I suspect she has both lumbar spinal stenosis and intracran
ial pathology.
 
 Thank you for allowing me to be involved in the care of your patient.  If you have any ques
tions regarding the care of your patient please don't hesitate to call.
 
 Approximately 60 minutes was spent face to face with Margaret Ferreira, over half of [facility]
ch was spent formulating and discussing their medical treatment plan.
 
 I, Joel Cottrell MD personally performed the services described in this documentation, 
as scribed by in my presence, JASPAL Mason and are both accurate and complete.
 
 Joel Cottrell MD - 11/15/2018
 
 Electronically signed by Joel Cottrell MD at 11/15/2018  1:47 PM PSTdocumented in this en
counter
 
 Plan of Treatment
 
 
+----------------------+---------+--------+----------------------+----------------------+
| Name                 | Type    | Priori | Associated Diagnoses | Order Schedule       |
|                      |         | ty     |                      |                      |
+----------------------+---------+--------+----------------------+----------------------+
| CT Head wo Contrast  | Imaging | Routin |   Gait abnormality   | Expected:            |
|                      |         | e      | Postural kyphosis of | 11/15/2018, Expires: |
|                      |         |        |  thoracic region     |  11/15/2019          |
|                      |         |        | Weakness of both     |                      |
 
|                      |         |        | lower extremities    |                      |
|                      |         |        | Chronic bilateral    |                      |
|                      |         |        | low back pain        |                      |
|                      |         |        | without sciatica     |                      |
|                      |         |        | Urinary              |                      |
|                      |         |        | incontinence,        |                      |
|                      |         |        | unspecified type     |                      |
|                      |         |        | NPH (normal pressure |                      |
|                      |         |        |  hydrocephalus)      |                      |
+----------------------+---------+--------+----------------------+----------------------+
| XR Lumbar Spine 4 +  | Imaging | Routin |   Gait abnormality   | Expected:            |
| Vw                   |         | e      | Postural kyphosis of | 11/15/2018, Expires: |
|                      |         |        |  thoracic region     |  11/15/2019          |
|                      |         |        | Weakness of both     |                      |
|                      |         |        | lower extremities    |                      |
|                      |         |        | Chronic bilateral    |                      |
|                      |         |        | low back pain        |                      |
|                      |         |        | without sciatica     |                      |
|                      |         |        | Urinary              |                      |
|                      |         |        | incontinence,        |                      |
|                      |         |        | unspecified type     |                      |
+----------------------+---------+--------+----------------------+----------------------+
| MRI Lumbar Spine wo  | Imaging | Routin |   Gait abnormality   | Expected:            |
| Contrast             |         | e      | Postural kyphosis of | 11/15/2018, Expires: |
|                      |         |        |  thoracic region     |  11/15/2019          |
|                      |         |        | Weakness of both     |                      |
|                      |         |        | lower extremities    |                      |
|                      |         |        | Chronic bilateral    |                      |
|                      |         |        | low back pain        |                      |
|                      |         |        | without sciatica     |                      |
|                      |         |        | Urinary              |                      |
|                      |         |        | incontinence,        |                      |
|                      |         |        | unspecified type     |                      |
+----------------------+---------+--------+----------------------+----------------------+
 documented as of this encounter
 
 Visit Diagnoses
 
 
+----------------------------------------------------------------------------------------+
| Diagnosis                                                                              |
+----------------------------------------------------------------------------------------+
|   Gait abnormality - Primary  Abnormality of gait                                      |
+----------------------------------------------------------------------------------------+
|   Postural kyphosis of thoracic region  Kyphosis (acquired) (postural)                 |
+----------------------------------------------------------------------------------------+
|   Weakness of both lower extremities                                                   |
+----------------------------------------------------------------------------------------+
|   Chronic bilateral low back pain without sciatica                                     |
+----------------------------------------------------------------------------------------+
|   Urinary incontinence, unspecified type                                               |
+----------------------------------------------------------------------------------------+
|   Hyperreflexia  Abnormal reflex                                                       |
+----------------------------------------------------------------------------------------+
|   Impaired mobility and activities of daily living  Mechanical problems with limbs     |
+----------------------------------------------------------------------------------------+
|   NPH (normal pressure hydrocephalus) (HCC)  Idiopathic normal pressure hydrocephalus  |
| (INPH)                                                                                 |
+----------------------------------------------------------------------------------------+
 documented in this encounter

## 2019-12-04 NOTE — XMS
Encounter Summary
  Created on: 2019
 
 Margaret Ferreira
 External Reference #: [id number]
 : 30
 Sex: Female
 
 Demographics
 
 
+-----------------------+----------------------+
| Address               | [address]        |
|                       | [place]  [address] |
+-----------------------+----------------------+
| Home Phone            | [phone]      |
+-----------------------+----------------------+
| Preferred Language    | Unknown              |
+-----------------------+----------------------+
| Marital Status        |               |
+-----------------------+----------------------+
| Mu-ism Affiliation | Unknown              |
+-----------------------+----------------------+
| Race                  | Unknown              |
+-----------------------+----------------------+
| Ethnic Group          | Unknown              |
+-----------------------+----------------------+
 
 
 Author
 
 
+--------------+--------------------------------------------+
| Author       | [facility]  |
|              | and [place]                                |
+--------------+--------------------------------------------+
| Organization | [facility]  |
|              | and [place]                                |
+--------------+--------------------------------------------+
| Address      | Unknown                                    |
+--------------+--------------------------------------------+
| Phone        | Unavailable                                |
+--------------+--------------------------------------------+
 
 
 
 Support
 
 
+--------------+--------------+---------+-----------------+
| Name         | Relationship | Address | Phone           |
+--------------+--------------+---------+-----------------+
| Lawson Hanna | ECON         | Unknown | [phone] |
+--------------+--------------+---------+-----------------+
 
 
 
 Care Team Providers
 
 
 
+-----------------------------+------+-----------------+
| Care Team Member Name       | Role | Phone           |
+-----------------------------+------+-----------------+
| Bessy Paulino | PCP  | [phone] |
|  PA-C                       |      |                 |
+-----------------------------+------+-----------------+
 
 
 
 Encounter Details
 
 
+--------+----------+----------------------+----------------------+-------------+
| Date   | Type     | Department           | Care Team            | Description |
+--------+----------+----------------------+----------------------+-------------+
| / | Abstract |   [facility]          |   Provider,          |             |
|    |          | PHYSIATRY  [address]     | [address] |             |
|        |          | [place] |  [place]        |             |
|        |          |  [place] [address]       | [place] [address]     |             |
|        |          | [phone]         |                      |             |
+--------+----------+----------------------+----------------------+-------------+
 
 
 
 Social History
 
 
+-------------------+-------+-----------+--------+------+
| Tobacco Use       | Types | Packs/Day | Years  | Date |
|                   |       |           | Used   |      |
+-------------------+-------+-----------+--------+------+
| Passive Smoke     |       |           |        |      |
| Exposure - Never  |       |           |        |      |
| Smoker            |       |           |        |      |
+-------------------+-------+-----------+--------+------+
 
 
 
+---------------------+---+---+---+
| Smokeless Tobacco:  |   |   |   |
| Never Used          |   |   |   |
+---------------------+---+---+---+
 
 
 
+-------------+----------------------+---------+----------+
| Alcohol Use | Drinks/Week          | oz/Week | Comments |
+-------------+----------------------+---------+----------+
| No          |   0 Standard drinks  | 0.0     |          |
|             | or equivalent        |         |          |
+-------------+----------------------+---------+----------+
 
 
 
+------------------+---------------+
| Sex Assigned at  | Date Recorded |
| Birth            |               |
+------------------+---------------+
 
| Not on file      |               |
+------------------+---------------+
 
 
 
+----------------+-------------+-------------+
| Job Start Date | Occupation  | Industry    |
+----------------+-------------+-------------+
| Not on file    | Not on file | Not on file |
+----------------+-------------+-------------+
 
 
 
+----------------+--------------+------------+
| Travel History | Travel Start | Travel End |
+----------------+--------------+------------+
 
 
 
+-------------------------------------+
| No recent travel history available. |
+-------------------------------------+
 documented as of this encounter
 
 Plan of Treatment
 Not on filedocumented as of this encounter
 
 Visit Diagnoses
 Not on filedocumented in this encounter

## 2019-12-04 NOTE — XMS
Encounter Summary
  Created on: 2019
 
 Margaret Ferreira
 External Reference #: [id number]
 : 30
 Sex: Female
 
 Demographics
 
 
+-----------------------+------------------------+
| Address               | [address]      |
|                       | [place]  [address]   |
+-----------------------+------------------------+
| Home Phone            | [phone]        |
+-----------------------+------------------------+
| Preferred Language    | Unknown                |
+-----------------------+------------------------+
| Marital Status        |                 |
+-----------------------+------------------------+
| Taoist Affiliation | MET                    |
+-----------------------+------------------------+
| Race                  | White                  |
+-----------------------+------------------------+
| Ethnic Group          | Not  or  |
+-----------------------+------------------------+
 
 
 Author
 
 
+--------------+------------------------------+
| Author       | [facility] |
+--------------+------------------------------+
| Organization | [facility] |
+--------------+------------------------------+
| Address      | Unknown                      |
+--------------+------------------------------+
| Phone        | Unavailable                  |
+--------------+------------------------------+
 
 
 
 Support
 
 
+--------------+--------------+---------------------+-----------------+
| Name         | Relationship | Address             | Phone           |
+--------------+--------------+---------------------+-----------------+
| Lawson Ferreira | ECON         | [address]          | [phone] |
|              |              | [place], [place]   |                 |
|              |              | [address]               |                 |
+--------------+--------------+---------------------+-----------------+
 
 
 
 Care Team Providers
 
 
 
+-----------------------+------+-------------+
| Care Team Member Name | Role | Phone       |
+-----------------------+------+-------------+
 PCP  | Unavailable |
+-----------------------+------+-------------+
 
 
 
 Encounter Details
 
 
+--------+----------+----------------------+-----------+-------------+
| Date   | Type     | Department           | Care Team | Description |
+--------+----------+----------------------+-----------+-------------+
| / | Results  |   Registration  [address] |           |             |
|    | Only     |  [address] |           |             |
|        |          |  [address]  Mailcode: [id number] |           |             |
|        |          |   [place], [place]       |           |             |
|        |          | [address]           |           |             |
|        |          | [phone]         |           |             |
+--------+----------+----------------------+-----------+-------------+
 
 
 
 Social History
 
 
+----------------+-------+-----------+--------+------+
| Tobacco Use    | Types | Packs/Day | Years  | Date |
|                |       |           | Used   |      |
+----------------+-------+-----------+--------+------+
| Never Assessed |       |           |        |      |
+----------------+-------+-----------+--------+------+
 
 
 
+------------------+---------------+
| Sex Assigned at  | Date Recorded |
| Birth            |               |
+------------------+---------------+
| Not on file      |               |
+------------------+---------------+
 
 
 
+----------------+-------------+-------------+
| Job Start Date | Occupation  | Industry    |
+----------------+-------------+-------------+
| Not on file    | Not on file | Not on file |
+----------------+-------------+-------------+
 
 
 
+----------------+--------------+------------+
| Travel History | Travel Start | Travel End |
+----------------+--------------+------------+
 
 
 
 
+-------------------------------------+
| No recent travel history available. |
+-------------------------------------+
 documented as of this encounter
 
 Plan of Treatment
 Not on filedocumented as of this encounter
 
 Procedures
 
 
+----------------------+--------+-------------+----------------------+----------------------
+
| Procedure Name       | Priori | Date/Time   | Associated Diagnosis | Comments             
|
|                      | ty     |             |                      |                      
|
+----------------------+--------+-------------+----------------------+----------------------
+
| MICROBIOLOGY TESTS 1 | Routin | 1996  |                      |   Results for this   
|
|                      | e      |  2:30 PM    |                      | procedure are in the 
|
|                      |        | PST         |                      |  results section.    
|
+----------------------+--------+-------------+----------------------+----------------------
+
| MICROBIOLOGY TESTS 1 | Routin | 1996  |                      |   Results for this   
|
|                      | e      |  2:30 PM    |                      | procedure are in the 
|
|                      |        | PST         |                      |  results section.    
|
+----------------------+--------+-------------+----------------------+----------------------
+
| CBC TESTS 2          | Routin | 1996  |                      |   Results for this   
|
|                      | e      | 12:45 PM    |                      | procedure are in the 
|
|                      |        | PST         |                      |  results section.    
|
+----------------------+--------+-------------+----------------------+----------------------
+
| CHEMISTRY TESTS 4    | Routin | 1996  |                      |   Results for this   
|
|                      | e      |  6:23 AM    |                      | procedure are in the 
|
|                      |        | PST         |                      |  results section.    
|
+----------------------+--------+-------------+----------------------+----------------------
+
| CHEMISTRY TESTS 2    | Routin | 1996  |                      |   Results for this   
|
|                      | e      |  6:23 AM    |                      | procedure are in the 
|
|                      |        | PST         |                      |  results section.    
|
+----------------------+--------+-------------+----------------------+----------------------
+
 
| CHEMISTRY TESTS 4    | Routin | 1996  |                      |   Results for this   
|
|                      | e      |  8:00 PM    |                      | procedure are in the 
|
|                      |        | PST         |                      |  results section.    
|
+----------------------+--------+-------------+----------------------+----------------------
+
| CBC TESTS 2          | Routin | 1996  |                      |   Results for this   
|
|                      | e      |  8:00 PM    |                      | procedure are in the 
|
|                      |        | PST         |                      |  results section.    
|
+----------------------+--------+-------------+----------------------+----------------------
+
| COAGULATION TESTS 2  | Routin | 1996  |                      |   Results for this   
|
|                      | e      |  8:00 PM    |                      | procedure are in the 
|
|                      |        | PST         |                      |  results section.    
|
+----------------------+--------+-------------+----------------------+----------------------
+
| TRANSFUSION MEDICINE | Routin | 1996  |                      |   Results for this   
|
|  TESTS               | e      |  4:55 PM    |                      | procedure are in the 
|
|                      |        | PST         |                      |  results section.    
|
+----------------------+--------+-------------+----------------------+----------------------
+
| CBC TESTS 2          | Routin | 1996  |                      |   Results for this   
|
|                      | e      | 11:50 AM    |                      | procedure are in the 
|
|                      |        | PST         |                      |  results section.    
|
+----------------------+--------+-------------+----------------------+----------------------
+
| CHEMISTRY TESTS 4    | Routin | 1996  |                      |   Results for this   
|
|                      | e      |  7:30 AM    |                      | procedure are in the 
|
|                      |        | PST         |                      |  results section.    
|
+----------------------+--------+-------------+----------------------+----------------------
+
| CBC TESTS 2          | Routin | 1996  |                      |   Results for this   
|
|                      | e      |  7:30 AM    |                      | procedure are in the 
|
|                      |        | PST         |                      |  results section.    
|
+----------------------+--------+-------------+----------------------+----------------------
+
| CHEMISTRY TESTS 2    | Routin | 1996  |                      |   Results for this   
|
|                      | e      |  7:30 AM    |                      | procedure are in the 
|
 
|                      |        | PST         |                      |  results section.    
|
+----------------------+--------+-------------+----------------------+----------------------
+
| CBC TESTS 2          | Routin | 1996  |                      |   Results for this   
|
|                      | e      |  1:50 PM    |                      | procedure are in the 
|
|                      |        | PST         |                      |  results section.    
|
+----------------------+--------+-------------+----------------------+----------------------
+
| CHEMISTRY TESTS 4    | Routin | 1996  |                      |   Results for this   
|
|                      | e      |  5:30 AM    |                      | procedure are in the 
|
|                      |        | PST         |                      |  results section.    
|
+----------------------+--------+-------------+----------------------+----------------------
+
| CBC TESTS 2          | Routin | 1996  |                      |   Results for this   
|
|                      | e      |  5:30 AM    |                      | procedure are in the 
|
|                      |        | PST         |                      |  results section.    
|
+----------------------+--------+-------------+----------------------+----------------------
+
| CHEMISTRY TESTS 2    | Routin | 1996  |                      |   Results for this   
|
|                      | e      |  5:30 AM    |                      | procedure are in the 
|
|                      |        | PST         |                      |  results section.    
|
+----------------------+--------+-------------+----------------------+----------------------
+
| BLOOD GASES,         | Routin | 1996  |                      |   Results for this   
|
| ARTERIAL - LAB       | e      |  5:30 AM    |                      | procedure are in the 
|
|                      |        | PST         |                      |  results section.    
|
+----------------------+--------+-------------+----------------------+----------------------
+
| TRANSFUSION MEDICINE | Routin | 1996  |                      |   Results for this   
|
|  TESTS               | e      | 11:59 PM    |                      | procedure are in the 
|
|                      |        | PST         |                      |  results section.    
|
+----------------------+--------+-------------+----------------------+----------------------
+
| CHEMISTRY TESTS 4    | Routin | 1996  |                      |   Results for this   
|
|                      | e      | 10:00 PM    |                      | procedure are in the 
|
|                      |        | PST         |                      |  results section.    
|
+----------------------+--------+-------------+----------------------+----------------------
+
 
| CBC TESTS 2          | Routin | 1996  |                      |   Results for this   
|
|                      | e      | 10:00 PM    |                      | procedure are in the 
|
|                      |        | PST         |                      |  results section.    
|
+----------------------+--------+-------------+----------------------+----------------------
+
| CHEMISTRY TESTS 4    | Routin | 1996  |                      |   Results for this   
|
|                      | e      |  2:15 PM    |                      | procedure are in the 
|
|                      |        | PST         |                      |  results section.    
|
+----------------------+--------+-------------+----------------------+----------------------
+
| CBC TESTS 2          | Routin | 1996  |                      |   Results for this   
|
|                      | e      |  2:15 PM    |                      | procedure are in the 
|
|                      |        | PST         |                      |  results section.    
|
+----------------------+--------+-------------+----------------------+----------------------
+
 documented in this encounter
 
 Results
 MICROBIOLOGY TESTS 1 (1996  2:30 PM PST)
 
+-----------+--------------------------+-----------+------------+--------------+
| Component | Value                    | Ref Range | Performed  | Pathologist  |
|           |                          |           | At         | Signature    |
+-----------+--------------------------+-----------+------------+--------------+
| CULTURE   | Diagnosis                |           |            |              |
| RESULT    |   RULE OUT INFECTIONTest |           |            |              |
|           |  Ordered          VIRAL  |           |            |              |
|           | CULTURE, HERPES          |           |            |              |
|           | ONLYOrdering Loc         |           |            |              |
|           |             163Spec Set  |           |            |              |
|           | Up Date    Spec Set  |           |            |              |
|           | Up Time    18:17Specimen |           |            |              |
|           |  Type         SWAB       |           |            |              |
|           | ABDOMENReport Status     |           |            |              |
|           |      FINALPrelim Result  |           |            |              |
|           |         NO VIRUS         |           |            |              |
|           | ISOLATED TO DATECulture  |           |            |              |
|           | Result       NO VIRUS    |           |            |              |
|           | ISOLATEDDate Of Final Re |           |            |              |
|           |           [id number]          |           |            |              |
+-----------+--------------------------+-----------+------------+--------------+
 
 
 
+----------+
| Specimen |
+----------+
|          |
+----------+
 
 
 
 
+----------------------+------------------------+--------------------+--------------+
| Performing           | Address                | City/State/Zipcode | Phone Number |
| Organization         |                        |                    |              |
+----------------------+------------------------+--------------------+--------------+
|   [facility] |   [address]  | [place] [address] |              |
|  PATHOLOGY           | [address]                |                    |              |
+----------------------+------------------------+--------------------+--------------+
 MICROBIOLOGY TESTS 1 (1996  2:30 PM PST)
 
+-----------+--------------------------+-----------+------------+--------------+
| Component | Value                    | Ref Range | Performed  | Pathologist  |
|           |                          |           | At         | Signature    |
+-----------+--------------------------+-----------+------------+--------------+
| CULTURE   | Diagnosis                |           |            |              |
| RESULT    |   RULE OUT URINARY TRACT |           |            |              |
|           |  INFECTIONTest Ordered   |           |            |              |
|           |          URINE CULTURE,  |           |            |              |
|           | ROUTINEOrdering Loc      |           |            |              |
|           |                163Spec   |           |            |              |
|           | Set Up Date    Spec  |           |            |              |
|           | Set Up Time              |           |            |              |
|           | 18:17Source Body Site    |           |            |              |
|           |  CLEAN CATCHColony Count |           |            |              |
|           |           25,000 -       |           |            |              |
|           | 49,000 CFU/MLReport      |           |            |              |
|           | Status                   |           |            |              |
|           |   FINALPrelim Result     |           |            |              |
|           |      GRAM POSITIVE       |           |            |              |
|           | GROWTHCulture Result     |           |            |              |
|           |    MIXED GRAM POSITIVE   |           |            |              |
|           | GROWTHDate Of Final Re   |           |            |              |
|           |          [address]           |           |            |              |
+-----------+--------------------------+-----------+------------+--------------+
 
 
 
+----------+
| Specimen |
+----------+
|          |
+----------+
 
 
 
+----------------------+------------------------+--------------------+--------------+
| Performing           | Address                | City/State/Zipcode | Phone Number |
| Organization         |                        |                    |              |
+----------------------+------------------------+--------------------+--------------+
|   [facility] |   [address]  | [place] [address] |              |
|  PATHOLOGY           | [address]                |                    |              |
+----------------------+------------------------+--------------------+--------------+
 CBC TESTS 2 (1996 12:45 PM PST)
 
+-------------+-----------------+-----------+------------+--------------+
| Component   | Value           | Ref Range | Performed  | Pathologist  |
|             |                 |           | At         | Signature    |
+-------------+-----------------+-----------+------------+--------------+
| WHITE CELL  |        10.5 (H) | K/CU MM   |            |              |
| COUNT       |                 |           |            |              |
 
+-------------+-----------------+-----------+------------+--------------+
| RED CELL    |         4.08    | M/CU MM   |            |              |
| COUNT       |                 |           |            |              |
+-------------+-----------------+-----------+------------+--------------+
| HEMOGLOBIN  |        12.9     | GM/DL     |            |              |
+-------------+-----------------+-----------+------------+--------------+
| HEMATOCRIT  |        36.8 (L) | %         |            |              |
+-------------+-----------------+-----------+------------+--------------+
| MCV         |        90.1     | FL        |            |              |
+-------------+-----------------+-----------+------------+--------------+
| MCH         |        31.5     | PG        |            |              |
+-------------+-----------------+-----------+------------+--------------+
| MCHC        |        34.9 (H) | GM/DL     |            |              |
+-------------+-----------------+-----------+------------+--------------+
| RDW         |        13.2     | %         |            |              |
+-------------+-----------------+-----------+------------+--------------+
| PLATELET    |       577. (H)  | K/CU MM   |            |              |
| COUNT       |                 |           |            |              |
+-------------+-----------------+-----------+------------+--------------+
| MPV         |         6.7 (L) | FL        |            |              |
+-------------+-----------------+-----------+------------+--------------+
 
 
 
+----------+
| Specimen |
+----------+
|          |
+----------+
 
 
 
+----------------------+------------------------+--------------------+--------------+
| Performing           | Address                | City/State/Zipcode | Phone Number |
| Organization         |                        |                    |              |
+----------------------+------------------------+--------------------+--------------+
|   [facility] |   [address]  | [place] [address] |              |
|  PATHOLOGY           | [address]                |                    |              |
+----------------------+------------------------+--------------------+--------------+
 CHEMISTRY TESTS 2 (1996  6:23 AM PST)
 
+-------------+----------------+-----------+------------+--------------+
| Component   | Value          | Ref Range | Performed  | Pathologist  |
|             |                |           | At         | Signature    |
+-------------+----------------+-----------+------------+--------------+
| CHOLESTEROL |       108. (L) | mg/dL     |            |              |
|   (LAB)     |                |           |            |              |
+-------------+----------------+-----------+------------+--------------+
 
 
 
+----------+
| Specimen |
+----------+
|          |
+----------+
 
 
 
+----------------------+------------------------+--------------------+--------------+
 
| Performing           | Address                | City/State/Zipcode | Phone Number |
| Organization         |                        |                    |              |
+----------------------+------------------------+--------------------+--------------+
|   [facility] |   [address]  | [place] [address] |              |
|  PATHOLOGY           | [address]                |                    |              |
+----------------------+------------------------+--------------------+--------------+
 CHEMISTRY TESTS 4 (1996  6:23 AM PST)
 
+-------------+-----------------+-----------+------------+--------------+
| Component   | Value           | Ref Range | Performed  | Pathologist  |
|             |                 |           | At         | Signature    |
+-------------+-----------------+-----------+------------+--------------+
| SODIUM,     |       138.      | mmol/l    |            |              |
| PLASMA      |                 |           |            |              |
| (LAB)       |                 |           |            |              |
+-------------+-----------------+-----------+------------+--------------+
| POTASSIUM,  |         3.5     | mmol/l    |            |              |
| PLASMA      |                 |           |            |              |
| (LAB)       |                 |           |            |              |
+-------------+-----------------+-----------+------------+--------------+
| CHLORIDE,   |       103.      | mmol/l    |            |              |
| PLASMA      |                 |           |            |              |
| (LAB)       |                 |           |            |              |
+-------------+-----------------+-----------+------------+--------------+
| TOTAL CO2,  |        31. (H)  | mmol/l    |            |              |
| PLASMA      |                 |           |            |              |
| (LAB)       |                 |           |            |              |
+-------------+-----------------+-----------+------------+--------------+
| BUN, PLASMA |         7.      | mg/dL     |            |              |
|  (LAB)      |                 |           |            |              |
+-------------+-----------------+-----------+------------+--------------+
| CREATININE  |         0.8     | mg/dL     |            |              |
| PLASMA      |                 |           |            |              |
| (LAB)       |                 |           |            |              |
+-------------+-----------------+-----------+------------+--------------+
| GLUCOSE,    |       108.      | mg/dL     |            |              |
| PLASMA      |                 |           |            |              |
| (LAB)       |                 |           |            |              |
+-------------+-----------------+-----------+------------+--------------+
| CALCIUM,    |         7.9 (L) | mg/dL     |            |              |
| PLASMA      |                 |           |            |              |
| (LAB)       |                 |           |            |              |
+-------------+-----------------+-----------+------------+--------------+
| MAGNESIUM,P |         1.6 (L) | mg/dL     |            |              |
| LASMA       |                 |           |            |              |
+-------------+-----------------+-----------+------------+--------------+
| PHOSPHORUS, |         2.7     | mg/dL     |            |              |
|  PLASMA     |                 |           |            |              |
| (LAB)       |                 |           |            |              |
+-------------+-----------------+-----------+------------+--------------+
| URIC ACID,  |         4.9     | mg/dL     |            |              |
| PLASMA      |                 |           |            |              |
| (LAB)       |                 |           |            |              |
+-------------+-----------------+-----------+------------+--------------+
| AST(SGOT)   |        15.      | U/L       |            |              |
+-------------+-----------------+-----------+------------+--------------+
| ALT (SGPT)  |        15.      | U/L       |            |              |
+-------------+-----------------+-----------+------------+--------------+
| ALK PHOS    |        50.      | U/L       |            |              |
+-------------+-----------------+-----------+------------+--------------+
 
| LD TOTAL,   |       171.      | U/L       |            |              |
| PLASMA      |                 |           |            |              |
+-------------+-----------------+-----------+------------+--------------+
| BILIRUBIN   |         0.2     | mg/dL     |            |              |
| DIRECT      |                 |           |            |              |
+-------------+-----------------+-----------+------------+--------------+
| BILIRUBIN   |         0.9     | mg/dL     |            |              |
| TOTAL       |                 |           |            |              |
+-------------+-----------------+-----------+------------+--------------+
| TOTAL       |         4.3 (L) | GM/DL     |            |              |
| PROTEIN,    |                 |           |            |              |
| PLASMA      |                 |           |            |              |
| (LAB)       |                 |           |            |              |
+-------------+-----------------+-----------+------------+--------------+
| ALBUMIN,    |         2.4 (L) | GM/DL     |            |              |
| PLASMA      |                 |           |            |              |
| (LAB)       |                 |           |            |              |
+-------------+-----------------+-----------+------------+--------------+
 
 
 
+----------+
| Specimen |
+----------+
|          |
+----------+
 
 
 
+----------------------+------------------------+--------------------+--------------+
| Performing           | Address                | City/State/Zipcode | Phone Number |
| Organization         |                        |                    |              |
+----------------------+------------------------+--------------------+--------------+
|   [facility] |   [address]  | [place] [address] |              |
|  PATHOLOGY           | [address]                |                    |              |
+----------------------+------------------------+--------------------+--------------+
 CHEMISTRY TESTS 4 (1996  8:00 PM PST)
 
+-------------+-----------------+-----------+------------+--------------+
| Component   | Value           | Ref Range | Performed  | Pathologist  |
|             |                 |           | At         | Signature    |
+-------------+-----------------+-----------+------------+--------------+
| SODIUM,     |       141.      | mmol/l    |            |              |
| PLASMA      |                 |           |            |              |
| (LAB)       |                 |           |            |              |
+-------------+-----------------+-----------+------------+--------------+
| POTASSIUM,  |         3.4 (L) | mmol/l    |            |              |
| PLASMA      |                 |           |            |              |
| (LAB)       |                 |           |            |              |
+-------------+-----------------+-----------+------------+--------------+
| CHLORIDE,   |        98.      | mmol/l    |            |              |
| PLASMA      |                 |           |            |              |
| (LAB)       |                 |           |            |              |
+-------------+-----------------+-----------+------------+--------------+
| TOTAL CO2,  |        30. (H)  | mmol/l    |            |              |
| PLASMA      |                 |           |            |              |
| (LAB)       |                 |           |            |              |
+-------------+-----------------+-----------+------------+--------------+
| BUN, PLASMA |         5. (L)  | mg/dL     |            |              |
|  (LAB)      |                 |           |            |              |
 
+-------------+-----------------+-----------+------------+--------------+
| CREATININE  |         0.7     | mg/dL     |            |              |
| PLASMA      |                 |           |            |              |
| (LAB)       |                 |           |            |              |
+-------------+-----------------+-----------+------------+--------------+
| GLUCOSE,    |       118. (H)  | mg/dL     |            |              |
| PLASMA      |                 |           |            |              |
| (LAB)       |                 |           |            |              |
+-------------+-----------------+-----------+------------+--------------+
 
 
 
+----------+
| Specimen |
+----------+
|          |
+----------+
 
 
 
+----------------------+------------------------+--------------------+--------------+
| Performing           | Address                | City/State/Zipcode | Phone Number |
| Organization         |                        |                    |              |
+----------------------+------------------------+--------------------+--------------+
|   [facility] |   [address]  | [place] [address] |              |
|  PATHOLOGY           | [address]                |                    |              |
+----------------------+------------------------+--------------------+--------------+
 COAGULATION TESTS 2 (1996  8:00 PM PST)
 
+-------------+--------------+-----------+------------+--------------+
| Component   | Value        | Ref Range | Performed  | Pathologist  |
|             |              |           | At         | Signature    |
+-------------+--------------+-----------+------------+--------------+
| PROTHROMBIN |        12.3  | SECONDS   |            |              |
|  TIME       |              |           |            |              |
+-------------+--------------+-----------+------------+--------------+
| PROTIME     |         1.   | SECONDS   |            |              |
| RATIO       |              |           |            |              |
+-------------+--------------+-----------+------------+--------------+
| PROTHROMBIN |         1.09 | INR       |            |              |
|  INR        |              |           |            |              |
+-------------+--------------+-----------+------------+--------------+
| APTT        |        24.2  | SECONDS   |            |              |
+-------------+--------------+-----------+------------+--------------+
 
 
 
+----------+
| Specimen |
+----------+
|          |
+----------+
 
 
 
+----------------------+------------------------+--------------------+--------------+
| Performing           | Address                | City/State/Zipcode | Phone Number |
| Organization         |                        |                    |              |
+----------------------+------------------------+--------------------+--------------+
|   [facility] |   [address]  | [place] [address] |              |
 
|  PATHOLOGY           | [address]                |                    |              |
+----------------------+------------------------+--------------------+--------------+
 CBC TESTS 2 (1996  8:00 PM PST)
 
+-------------+-----------------+-----------+------------+--------------+
| Component   | Value           | Ref Range | Performed  | Pathologist  |
|             |                 |           | At         | Signature    |
+-------------+-----------------+-----------+------------+--------------+
| WHITE CELL  |         9.4     | K/CU MM   |            |              |
| COUNT       |                 |           |            |              |
+-------------+-----------------+-----------+------------+--------------+
| RED CELL    |         3.6 (L) | M/CU MM   |            |              |
| COUNT       |                 |           |            |              |
+-------------+-----------------+-----------+------------+--------------+
| HEMOGLOBIN  |        11.3 (L) | GM/DL     |            |              |
+-------------+-----------------+-----------+------------+--------------+
| HEMATOCRIT  |        31.9 (L) | %         |            |              |
+-------------+-----------------+-----------+------------+--------------+
| MCV         |        88.8     | FL        |            |              |
+-------------+-----------------+-----------+------------+--------------+
| MCH         |        31.3     | PG        |            |              |
+-------------+-----------------+-----------+------------+--------------+
| MCHC        |        35.3 (H) | GM/DL     |            |              |
+-------------+-----------------+-----------+------------+--------------+
| RDW         |        13.3     | %         |            |              |
+-------------+-----------------+-----------+------------+--------------+
| PLATELET    |       146. (L)  | K/CU MM   |            |              |
| COUNT       |                 |           |            |              |
+-------------+-----------------+-----------+------------+--------------+
| MPV         |         7.5     | FL        |            |              |
+-------------+-----------------+-----------+------------+--------------+
 
 
 
+----------+
| Specimen |
+----------+
|          |
+----------+
 
 
 
+----------------------+------------------------+--------------------+--------------+
| Performing           | Address                | City/State/Zipcode | Phone Number |
| Organization         |                        |                    |              |
+----------------------+------------------------+--------------------+--------------+
|   [facility] |   [address]  | [place] [address] |              |
|  PATHOLOGY           | [address]                |                    |              |
+----------------------+------------------------+--------------------+--------------+
 TRANSFUSION MEDICINE TESTS (1996  4:55 PM PST)
 
+-----------+----------+-----------+------------+--------------+
| Component | Value    | Ref Range | Performed  | Pathologist  |
|           |          |           | At         | Signature    |
+-----------+----------+-----------+------------+--------------+
| ABO GROUP | A        |           |            |              |
+-----------+----------+-----------+------------+--------------+
| RH TYPE   | POS      |           |            |              |
+-----------+----------+-----------+------------+--------------+
| ANTIBODY  | NEGATIVE |           |            |              |
 
| SCREEN    |          |           |            |              |
+-----------+----------+-----------+------------+--------------+
 
 
 
+----------+
| Specimen |
+----------+
|          |
+----------+
 
 
 
+----------------------+------------------------+--------------------+--------------+
| Performing           | Address                | City/State/Zipcode | Phone Number |
| Organization         |                        |                    |              |
+----------------------+------------------------+--------------------+--------------+
|   [facility] |   [address]  | [place] [address] |              |
|  PATHOLOGY           | [address]                |                    |              |
+----------------------+------------------------+--------------------+--------------+
 CBC TESTS 2 (1996 11:50 AM PST)
 
+-------------+------------------+-----------+------------+--------------+
| Component   | Value            | Ref Range | Performed  | Pathologist  |
|             |                  |           | At         | Signature    |
+-------------+------------------+-----------+------------+--------------+
| WHITE CELL  |         9.9      | K/CU MM   |            |              |
| COUNT       |                  |           |            |              |
+-------------+------------------+-----------+------------+--------------+
| RED CELL    |         3.73 (L) | M/CU MM   |            |              |
| COUNT       |                  |           |            |              |
+-------------+------------------+-----------+------------+--------------+
| HEMOGLOBIN  |        11.8 (L)  | GM/DL     |            |              |
+-------------+------------------+-----------+------------+--------------+
| HEMATOCRIT  |        33.2 (L)  | %         |            |              |
+-------------+------------------+-----------+------------+--------------+
| MCV         |        88.8      | FL        |            |              |
+-------------+------------------+-----------+------------+--------------+
| MCH         |        31.7      | PG        |            |              |
+-------------+------------------+-----------+------------+--------------+
| MCHC        |        35.6 (H)  | GM/DL     |            |              |
+-------------+------------------+-----------+------------+--------------+
| RDW         |        13.3      | %         |            |              |
+-------------+------------------+-----------+------------+--------------+
| PLATELET    |       137. (L)   | K/CU MM   |            |              |
| COUNT       |                  |           |            |              |
+-------------+------------------+-----------+------------+--------------+
| MPV         |         7.7      | FL        |            |              |
+-------------+------------------+-----------+------------+--------------+
 
 
 
+----------+
| Specimen |
+----------+
|          |
+----------+
 
 
 
 
+----------------------+------------------------+--------------------+--------------+
| Performing           | Address                | City/State/Zipcode | Phone Number |
| Organization         |                        |                    |              |
+----------------------+------------------------+--------------------+--------------+
|   [facility] |   [address]  | [place] [address] |              |
|  PATHOLOGY           | [address]                |                    |              |
+----------------------+------------------------+--------------------+--------------+
 CHEMISTRY TESTS 2 (1996  7:30 AM PST)
 
+-------------+----------------+-----------+------------+--------------+
| Component   | Value          | Ref Range | Performed  | Pathologist  |
|             |                |           | At         | Signature    |
+-------------+----------------+-----------+------------+--------------+
| CHOLESTEROL |       101. (L) | mg/dL     |            |              |
|   (LAB)     |                |           |            |              |
+-------------+----------------+-----------+------------+--------------+
 
 
 
+----------+
| Specimen |
+----------+
|          |
+----------+
 
 
 
+----------------------+------------------------+--------------------+--------------+
| Performing           | Address                | City/State/Zipcode | Phone Number |
| Organization         |                        |                    |              |
+----------------------+------------------------+--------------------+--------------+
|   [facility] |   [address]  | [place] [address] |              |
|  PATHOLOGY           | [address]                |                    |              |
+----------------------+------------------------+--------------------+--------------+
 CHEMISTRY TESTS 4 (1996  7:30 AM PST)
 
+-------------+-----------------+-----------+------------+--------------+
| Component   | Value           | Ref Range | Performed  | Pathologist  |
|             |                 |           | At         | Signature    |
+-------------+-----------------+-----------+------------+--------------+
| SODIUM,     |       137.      | mmol/l    |            |              |
| PLASMA      |                 |           |            |              |
| (LAB)       |                 |           |            |              |
+-------------+-----------------+-----------+------------+--------------+
| POTASSIUM,  |         3. (L)  | mmol/l    |            |              |
| PLASMA      |                 |           |            |              |
| (LAB)       |                 |           |            |              |
+-------------+-----------------+-----------+------------+--------------+
| CHLORIDE,   |       102.      | mmol/l    |            |              |
| PLASMA      |                 |           |            |              |
| (LAB)       |                 |           |            |              |
+-------------+-----------------+-----------+------------+--------------+
| TOTAL CO2,  |        34. (H)  | mmol/l    |            |              |
| PLASMA      |                 |           |            |              |
| (LAB)       |                 |           |            |              |
+-------------+-----------------+-----------+------------+--------------+
| BUN, PLASMA |         8.      | mg/dL     |            |              |
|  (LAB)      |                 |           |            |              |
+-------------+-----------------+-----------+------------+--------------+
| CREATININE  |         0.7     | mg/dL     |            |              |
 
| PLASMA      |                 |           |            |              |
| (LAB)       |                 |           |            |              |
+-------------+-----------------+-----------+------------+--------------+
| GLUCOSE,    |       101.      | mg/dL     |            |              |
| PLASMA      |                 |           |            |              |
| (LAB)       |                 |           |            |              |
+-------------+-----------------+-----------+------------+--------------+
| CALCIUM,    |         7.7 (L) | mg/dL     |            |              |
| PLASMA      |                 |           |            |              |
| (LAB)       |                 |           |            |              |
+-------------+-----------------+-----------+------------+--------------+
| MAGNESIUM,P |         1.5 (L) | mg/dL     |            |              |
| LASMA       |                 |           |            |              |
+-------------+-----------------+-----------+------------+--------------+
| PHOSPHORUS, |         1.3 (L) | mg/dL     |            |              |
|  PLASMA     |                 |           |            |              |
| (LAB)       |                 |           |            |              |
+-------------+-----------------+-----------+------------+--------------+
| URIC ACID,  |         4.7     | mg/dL     |            |              |
| PLASMA      |                 |           |            |              |
| (LAB)       |                 |           |            |              |
+-------------+-----------------+-----------+------------+--------------+
| AST(SGOT)   |        18.      | U/L       |            |              |
+-------------+-----------------+-----------+------------+--------------+
| ALT (SGPT)  |        19.      | U/L       |            |              |
+-------------+-----------------+-----------+------------+--------------+
| ALK PHOS    |        43.      | U/L       |            |              |
+-------------+-----------------+-----------+------------+--------------+
| LD TOTAL,   |       186.      | U/L       |            |              |
| PLASMA      |                 |           |            |              |
+-------------+-----------------+-----------+------------+--------------+
| BILIRUBIN   |         0.2     | mg/dL     |            |              |
| DIRECT      |                 |           |            |              |
+-------------+-----------------+-----------+------------+--------------+
| BILIRUBIN   |         0.9     | mg/dL     |            |              |
| TOTAL       |                 |           |            |              |
+-------------+-----------------+-----------+------------+--------------+
| TOTAL       |         4.4 (L) | GM/DL     |            |              |
| PROTEIN,    |                 |           |            |              |
| PLASMA      |                 |           |            |              |
| (LAB)       |                 |           |            |              |
+-------------+-----------------+-----------+------------+--------------+
| ALBUMIN,    |         2.5 (L) | GM/DL     |            |              |
| PLASMA      |                 |           |            |              |
| (LAB)       |                 |           |            |              |
+-------------+-----------------+-----------+------------+--------------+
 
 
 
+----------+
| Specimen |
+----------+
|          |
+----------+
 
 
 
+----------------------+------------------------+--------------------+--------------+
| Performing           | Address                | City/State/Zipcode | Phone Number |
| Organization         |                        |                    |              |
 
+----------------------+------------------------+--------------------+--------------+
|   [facility] |   [address]  | [place] [address] |              |
|  PATHOLOGY           | [address]                |                    |              |
+----------------------+------------------------+--------------------+--------------+
 CBC TESTS 2 (1996  7:30 AM PST)
 
+-------------+------------------+-----------+------------+--------------+
| Component   | Value            | Ref Range | Performed  | Pathologist  |
|             |                  |           | At         | Signature    |
+-------------+------------------+-----------+------------+--------------+
| WHITE CELL  |         7.6      | K/CU MM   |            |              |
| COUNT       |                  |           |            |              |
+-------------+------------------+-----------+------------+--------------+
| RED CELL    |         2.75 (L) | M/CU MM   |            |              |
| COUNT       |                  |           |            |              |
+-------------+------------------+-----------+------------+--------------+
| HEMOGLOBIN  |         8.8 (L)  | GM/DL     |            |              |
+-------------+------------------+-----------+------------+--------------+
| HEMATOCRIT  |        25.4 (L)  | %         |            |              |
+-------------+------------------+-----------+------------+--------------+
| MCV         |        92.1      | FL        |            |              |
+-------------+------------------+-----------+------------+--------------+
| MCH         |        32.       | PG        |            |              |
+-------------+------------------+-----------+------------+--------------+
| MCHC        |        34.7 (H)  | GM/DL     |            |              |
+-------------+------------------+-----------+------------+--------------+
| RDW         |        13.5      | %         |            |              |
+-------------+------------------+-----------+------------+--------------+
| PLATELET    |       101. (L)   | K/CU MM   |            |              |
| COUNT       |                  |           |            |              |
+-------------+------------------+-----------+------------+--------------+
| MPV         |         7.8      | FL        |            |              |
+-------------+------------------+-----------+------------+--------------+
 
 
 
+----------+
| Specimen |
+----------+
|          |
+----------+
 
 
 
+----------------------+------------------------+--------------------+--------------+
| Performing           | Address                | City/State/Zipcode | Phone Number |
| Organization         |                        |                    |              |
+----------------------+------------------------+--------------------+--------------+
|   [facility] |   [address]  | [place] [address] |              |
|  PATHOLOGY           | [address]                |                    |              |
+----------------------+------------------------+--------------------+--------------+
 CBC TESTS 2 (1996  1:50 PM PST)
 
+-------------+-----------------+-----------+------------+--------------+
| Component   | Value           | Ref Range | Performed  | Pathologist  |
|             |                 |           | At         | Signature    |
+-------------+-----------------+-----------+------------+--------------+
| WHITE CELL  |         9.6     | K/CU MM   |            |              |
| COUNT       |                 |           |            |              |
+-------------+-----------------+-----------+------------+--------------+
 
| RED CELL    |         4.      | M/CU MM   |            |              |
| COUNT       |                 |           |            |              |
+-------------+-----------------+-----------+------------+--------------+
| HEMOGLOBIN  |        12.6     | GM/DL     |            |              |
+-------------+-----------------+-----------+------------+--------------+
| HEMATOCRIT  |        35.4 (L) | %         |            |              |
+-------------+-----------------+-----------+------------+--------------+
| MCV         |        88.6     | FL        |            |              |
+-------------+-----------------+-----------+------------+--------------+
| MCH         |        31.6     | PG        |            |              |
+-------------+-----------------+-----------+------------+--------------+
| MCHC        |        35.7 (H) | GM/DL     |            |              |
+-------------+-----------------+-----------+------------+--------------+
| RDW         |        13.      | %         |            |              |
+-------------+-----------------+-----------+------------+--------------+
| PLATELET    |       141. (L)  | K/CU MM   |            |              |
| COUNT       |                 |           |            |              |
+-------------+-----------------+-----------+------------+--------------+
| MPV         |         7.6     | FL        |            |              |
+-------------+-----------------+-----------+------------+--------------+
 
 
 
+----------+
| Specimen |
+----------+
|          |
+----------+
 
 
 
+----------------------+------------------------+--------------------+--------------+
| Performing           | Address                | City/State/Zipcode | Phone Number |
| Organization         |                        |                    |              |
+----------------------+------------------------+--------------------+--------------+
|   [facility] |   [address]  | [place] [address] |              |
|  PATHOLOGY           | [address]                |                    |              |
+----------------------+------------------------+--------------------+--------------+
 CBC TESTS 2 (1996  5:30 AM PST)
 
+-------------+-----------------+-----------+------------+--------------+
| Component   | Value           | Ref Range | Performed  | Pathologist  |
|             |                 |           | At         | Signature    |
+-------------+-----------------+-----------+------------+--------------+
| WHITE CELL  |        11.4 (H) | K/CU MM   |            |              |
| COUNT       |                 |           |            |              |
+-------------+-----------------+-----------+------------+--------------+
| RED CELL    |         4.63    | M/CU MM   |            |              |
| COUNT       |                 |           |            |              |
+-------------+-----------------+-----------+------------+--------------+
| HEMOGLOBIN  |        14.3     | GM/DL     |            |              |
+-------------+-----------------+-----------+------------+--------------+
| HEMATOCRIT  |        41.8     | %         |            |              |
+-------------+-----------------+-----------+------------+--------------+
| MCV         |        90.2     | FL        |            |              |
+-------------+-----------------+-----------+------------+--------------+
| MCH         |        30.8     | PG        |            |              |
+-------------+-----------------+-----------+------------+--------------+
| MCHC        |        34.2 (H) | GM/DL     |            |              |
+-------------+-----------------+-----------+------------+--------------+
 
| RDW         |        12.5     | %         |            |              |
+-------------+-----------------+-----------+------------+--------------+
| PLATELET    |       151.      | K/CU MM   |            |              |
| COUNT       |                 |           |            |              |
+-------------+-----------------+-----------+------------+--------------+
| MPV         |         7.4     | FL        |            |              |
+-------------+-----------------+-----------+------------+--------------+
| NEUTROPHIL  |        87. (H)  | %         |            |              |
| %           |                 |           |            |              |
+-------------+-----------------+-----------+------------+--------------+
| LYMPHOCYTE  |         7. (L)  | %         |            |              |
| %           |                 |           |            |              |
+-------------+-----------------+-----------+------------+--------------+
| MONOCYTE %  |         6.      | %         |            |              |
+-------------+-----------------+-----------+------------+--------------+
| EOS %       |         0.      | %         |            |              |
+-------------+-----------------+-----------+------------+--------------+
| BASO %      |         0.      | %         |            |              |
+-------------+-----------------+-----------+------------+--------------+
| NEUTROPHIL  |         9.9 (H) | K/CU MM   |            |              |
| #           |                 |           |            |              |
+-------------+-----------------+-----------+------------+--------------+
| LYMPHOCYTE  |         0.8 (L) | K/CU MM   |            |              |
| #           |                 |           |            |              |
+-------------+-----------------+-----------+------------+--------------+
| MONOCYTE #  |         0.7 (H) | K/CU MM   |            |              |
+-------------+-----------------+-----------+------------+--------------+
| EOS #       |         0.      | K/CU MM   |            |              |
+-------------+-----------------+-----------+------------+--------------+
| BASO #      |         0.      | K/CU MM   |            |              |
+-------------+-----------------+-----------+------------+--------------+
 
 
 
+----------+
| Specimen |
+----------+
|          |
+----------+
 
 
 
+----------------------+------------------------+--------------------+--------------+
| Performing           | Address                | City/State/Zipcode | Phone Number |
| Organization         |                        |                    |              |
+----------------------+------------------------+--------------------+--------------+
|   [facility] |   [address]  | [place] [address] |              |
|  PATHOLOGY           | [address]                |                    |              |
+----------------------+------------------------+--------------------+--------------+
 BLOOD GASES, ARTERIAL - LAB (1996  5:30 AM PST)
 
+-------------+------------------+------------+------------+--------------+
| Component   | Value            | Ref Range  | Performed  | Pathologist  |
|             |                  |            | At         | Signature    |
+-------------+------------------+------------+------------+--------------+
| PAT TEMP    |        37.3      | DEGREES C. |            |              |
| ARTERIAL    |                  |            |            |              |
+-------------+------------------+------------+------------+--------------+
| FIO2        |         6. (L)   | DEGREES C. |            |              |
| ARTERIAL    |                  |            |            |              |
 
+-------------+------------------+------------+------------+--------------+
| PH ARTERIAL |         7.35 (L) | DEGREES C. |            |              |
+-------------+------------------+------------+------------+--------------+
| PCO2        |        45. (H)   | MM HG      |            |              |
| ARTERIAL    |                  |            |            |              |
+-------------+------------------+------------+------------+--------------+
| PO2         |       103.       | MM HG      |            |              |
| ARTERIAL    |                  |            |            |              |
+-------------+------------------+------------+------------+--------------+
| BASE EXCESS |        -0.6      | MM HG      |            |              |
|  ARTERIAL   |                  |            |            |              |
+-------------+------------------+------------+------------+--------------+
| HCO3        |        25.       | mmol/l     |            |              |
| ARTERIAL    |                  |            |            |              |
+-------------+------------------+------------+------------+--------------+
| TOTAL CO2   |        26.       | mmol/l     |            |              |
| ARTERIAL    |                  |            |            |              |
+-------------+------------------+------------+------------+--------------+
| CALC %O2    |        97.4      | % O2 Sat   |            |              |
| SAT ARTER   |                  |            |            |              |
+-------------+------------------+------------+------------+--------------+
 
 
 
+----------+
| Specimen |
+----------+
|          |
+----------+
 
 
 
+----------------------+------------------------+--------------------+--------------+
| Performing           | Address                | City/State/Zipcode | Phone Number |
| Organization         |                        |                    |              |
+----------------------+------------------------+--------------------+--------------+
|   [facility] |   [address]  | [place] [address] |              |
|  PATHOLOGY           | [address]                |                    |              |
+----------------------+------------------------+--------------------+--------------+
 CHEMISTRY TESTS 2 (1996  5:30 AM PST)
 
+-------------+----------------+-----------+------------+--------------+
| Component   | Value          | Ref Range | Performed  | Pathologist  |
|             |                |           | At         | Signature    |
+-------------+----------------+-----------+------------+--------------+
| CHOLESTEROL |       133. (L) | mg/dL     |            |              |
|   (LAB)     |                |           |            |              |
+-------------+----------------+-----------+------------+--------------+
 
 
 
+----------+
| Specimen |
+----------+
|          |
+----------+
 
 
 
+----------------------+------------------------+--------------------+--------------+
 
| Performing           | Address                | City/State/Zipcode | Phone Number |
| Organization         |                        |                    |              |
+----------------------+------------------------+--------------------+--------------+
|   [facility] |   [address]  | [place] [address] |              |
|  PATHOLOGY           | [address]                |                    |              |
+----------------------+------------------------+--------------------+--------------+
 CHEMISTRY TESTS 4 (1996  5:30 AM PST)
 
+-------------+-----------------+-----------+------------+--------------+
| Component   | Value           | Ref Range | Performed  | Pathologist  |
|             |                 |           | At         | Signature    |
+-------------+-----------------+-----------+------------+--------------+
| SODIUM,     |       143.      | mmol/l    |            |              |
| PLASMA      |                 |           |            |              |
| (LAB)       |                 |           |            |              |
+-------------+-----------------+-----------+------------+--------------+
| POTASSIUM,  |         3.8     | mmol/l    |            |              |
| PLASMA      |                 |           |            |              |
| (LAB)       |                 |           |            |              |
+-------------+-----------------+-----------+------------+--------------+
| CHLORIDE,   |       111. (H)  | mmol/l    |            |              |
| PLASMA      |                 |           |            |              |
| (LAB)       |                 |           |            |              |
+-------------+-----------------+-----------+------------+--------------+
| TOTAL CO2,  |        23.      | mmol/l    |            |              |
| PLASMA      |                 |           |            |              |
| (LAB)       |                 |           |            |              |
+-------------+-----------------+-----------+------------+--------------+
| BUN, PLASMA |        16.      | mg/dL     |            |              |
|  (LAB)      |                 |           |            |              |
+-------------+-----------------+-----------+------------+--------------+
| CREATININE  |         0.9     | mg/dL     |            |              |
| PLASMA      |                 |           |            |              |
| (LAB)       |                 |           |            |              |
+-------------+-----------------+-----------+------------+--------------+
| GLUCOSE,    |       157. (H)  | mg/dL     |            |              |
| PLASMA      |                 |           |            |              |
| (LAB)       |                 |           |            |              |
+-------------+-----------------+-----------+------------+--------------+
| CALCIUM,    |         7.8 (L) | mg/dL     |            |              |
| PLASMA      |                 |           |            |              |
| (LAB)       |                 |           |            |              |
+-------------+-----------------+-----------+------------+--------------+
| MAGNESIUM,P |         1.3 (L) | mg/dL     |            |              |
| LASMA       |                 |           |            |              |
+-------------+-----------------+-----------+------------+--------------+
| PHOSPHORUS, |         3.1     | mg/dL     |            |              |
|  PLASMA     |                 |           |            |              |
| (LAB)       |                 |           |            |              |
+-------------+-----------------+-----------+------------+--------------+
| URIC ACID,  |         5.5     | mg/dL     |            |              |
| PLASMA      |                 |           |            |              |
| (LAB)       |                 |           |            |              |
+-------------+-----------------+-----------+------------+--------------+
| AST(SGOT)   |        30.      | U/L       |            |              |
+-------------+-----------------+-----------+------------+--------------+
| ALT (SGPT)  |        23.      | U/L       |            |              |
+-------------+-----------------+-----------+------------+--------------+
| ALK PHOS    |        46.      | U/L       |            |              |
+-------------+-----------------+-----------+------------+--------------+
 
| LD TOTAL,   |       476. (H)  | U/L       |            |              |
| PLASMA      |                 |           |            |              |
+-------------+-----------------+-----------+------------+--------------+
| BILIRUBIN   |         0.3     | mg/dL     |            |              |
| DIRECT      |                 |           |            |              |
+-------------+-----------------+-----------+------------+--------------+
| BILIRUBIN   |         1.5 (H) | mg/dL     |            |              |
| TOTAL       |                 |           |            |              |
+-------------+-----------------+-----------+------------+--------------+
| TOTAL       |         5.2 (L) | GM/DL     |            |              |
| PROTEIN,    |                 |           |            |              |
| PLASMA      |                 |           |            |              |
| (LAB)       |                 |           |            |              |
+-------------+-----------------+-----------+------------+--------------+
| ALBUMIN,    |         3. (L)  | GM/DL     |            |              |
| PLASMA      |                 |           |            |              |
| (LAB)       |                 |           |            |              |
+-------------+-----------------+-----------+------------+--------------+
 
 
 
+----------+
| Specimen |
+----------+
|          |
+----------+
 
 
 
+----------------------+------------------------+--------------------+--------------+
| Performing           | Address                | City/State/Zipcode | Phone Number |
| Organization         |                        |                    |              |
+----------------------+------------------------+--------------------+--------------+
|   [facility] |   [address]  | [place] [address] |              |
|  PATHOLOGY           | [address]                |                    |              |
+----------------------+------------------------+--------------------+--------------+
 TRANSFUSION MEDICINE TESTS (1996 11:59 PM PST)
 
+-----------+----------+-----------+------------+--------------+
| Component | Value    | Ref Range | Performed  | Pathologist  |
|           |          |           | At         | Signature    |
+-----------+----------+-----------+------------+--------------+
| ABO GROUP | A        |           |            |              |
+-----------+----------+-----------+------------+--------------+
| RH TYPE   | POS      |           |            |              |
+-----------+----------+-----------+------------+--------------+
| ANTIBODY  | NEGATIVE |           |            |              |
| SCREEN    |          |           |            |              |
+-----------+----------+-----------+------------+--------------+
 
 
 
+----------+
| Specimen |
+----------+
|          |
+----------+
 
 
 
 
+----------------------+------------------------+--------------------+--------------+
| Performing           | Address                | City/State/Zipcode | Phone Number |
| Organization         |                        |                    |              |
+----------------------+------------------------+--------------------+--------------+
|   [facility] |   [address]  | [place] [address] |              |
|  PATHOLOGY           | [address]                |                    |              |
+----------------------+------------------------+--------------------+--------------+
 CHEMISTRY TESTS 4 (1996 10:00 PM PST)
 
+-------------+-------------+-----------+------------+--------------+
| Component   | Value       | Ref Range | Performed  | Pathologist  |
|             |             |           | At         | Signature    |
+-------------+-------------+-----------+------------+--------------+
| SODIUM,     |       137.  | mmol/l    |            |              |
| PLASMA      |             |           |            |              |
| (LAB)       |             |           |            |              |
+-------------+-------------+-----------+------------+--------------+
| POTASSIUM,  |         3.8 | mmol/l    |            |              |
| PLASMA      |             |           |            |              |
| (LAB)       |             |           |            |              |
+-------------+-------------+-----------+------------+--------------+
 
 
 
+----------+
| Specimen |
+----------+
|          |
+----------+
 
 
 
+----------------------+------------------------+--------------------+--------------+
| Performing           | Address                | City/State/Zipcode | Phone Number |
| Organization         |                        |                    |              |
+----------------------+------------------------+--------------------+--------------+
|   [facility] |   [address]  | [place] [address] |              |
|  PATHOLOGY           | [address]                |                    |              |
+----------------------+------------------------+--------------------+--------------+
 CBC TESTS 2 (1996 10:00 PM PST)
 
+-------------+------------------+-----------+------------+--------------+
| Component   | Value            | Ref Range | Performed  | Pathologist  |
|             |                  |           | At         | Signature    |
+-------------+------------------+-----------+------------+--------------+
| WHITE CELL  |        14.6 (H)  | K/CU MM   |            |              |
| COUNT       |                  |           |            |              |
+-------------+------------------+-----------+------------+--------------+
| RED CELL    |         2.75 (L) | M/CU MM   |            |              |
| COUNT       |                  |           |            |              |
+-------------+------------------+-----------+------------+--------------+
| HEMOGLOBIN  |         8.4 (L)  | GM/DL     |            |              |
+-------------+------------------+-----------+------------+--------------+
| HEMATOCRIT  |        24.3 (L)  | %         |            |              |
+-------------+------------------+-----------+------------+--------------+
| MCV         |        88.1      | FL        |            |              |
+-------------+------------------+-----------+------------+--------------+
| MCH         |        30.5      | PG        |            |              |
+-------------+------------------+-----------+------------+--------------+
| MCHC        |        34.5 (H)  | GM/DL     |            |              |
 
+-------------+------------------+-----------+------------+--------------+
| RDW         |        12.2      | %         |            |              |
+-------------+------------------+-----------+------------+--------------+
| PLATELET    |       267.       | K/CU MM   |            |              |
| COUNT       |                  |           |            |              |
+-------------+------------------+-----------+------------+--------------+
| [facility]         |         7.7      | [place]        |            |              |
+-------------+------------------+-----------+------------+--------------+
 
 
 
+----------+
| Specimen |
+----------+
|          |
+----------+
 
 
 
+----------------------+------------------------+--------------------+--------------+
| Performing           | Address                | City/State/Zipcode | Phone Number |
| Organization         |                        |                    |              |
+----------------------+------------------------+--------------------+--------------+
|   [facility] |   [address]  | [place] [address] |              |
|  PATHOLOGY           | [address]                |                    |              |
+----------------------+------------------------+--------------------+--------------+
 CHEMISTRY TESTS 4 (1996  2:15 PM PST)
 
+-------------+----------------+-----------+------------+--------------+
| Component   | Value          | Ref Range | Performed  | Pathologist  |
|             |                |           | At         | Signature    |
+-------------+----------------+-----------+------------+--------------+
| SODIUM,     |       140.     | mmol/l    |            |              |
| PLASMA      |                |           |            |              |
| (LAB)       |                |           |            |              |
+-------------+----------------+-----------+------------+--------------+
| POTASSIUM,  |         4.     | mmol/l    |            |              |
| PLASMA      |                |           |            |              |
| (LAB)       |                |           |            |              |
+-------------+----------------+-----------+------------+--------------+
| CHLORIDE,   |       102.     | mmol/l    |            |              |
| PLASMA      |                |           |            |              |
| (LAB)       |                |           |            |              |
+-------------+----------------+-----------+------------+--------------+
| TOTAL CO2,  |        31. (H) | mmol/l    |            |              |
| PLASMA      |                |           |            |              |
| (LAB)       |                |           |            |              |
+-------------+----------------+-----------+------------+--------------+
| BUN, PLASMA |        22.     | mg/dL     |            |              |
|  (LAB)      |                |           |            |              |
+-------------+----------------+-----------+------------+--------------+
| CREATININE  |         0.9    | mg/dL     |            |              |
| PLASMA      |                |           |            |              |
| (LAB)       |                |           |            |              |
+-------------+----------------+-----------+------------+--------------+
| GLUCOSE,    |        95.     | mg/dL     |            |              |
| PLASMA      |                |           |            |              |
| (LAB)       |                |           |            |              |
+-------------+----------------+-----------+------------+--------------+
 
 
 
 
+----------+
| Specimen |
+----------+
|          |
+----------+
 
 
 
+----------------------+------------------------+--------------------+--------------+
| Performing           | Address                | City/State/Zipcode | Phone Number |
| Organization         |                        |                    |              |
+----------------------+------------------------+--------------------+--------------+
|   [facility] |   [address]  | [place] [address] |              |
|  PATHOLOGY           | [address]                |                    |              |
+----------------------+------------------------+--------------------+--------------+
 CBC TESTS 2 (1996  2:15 PM PST)
 
+-------------+----------------+-----------+------------+--------------+
| Component   | Value          | Ref Range | Performed  | Pathologist  |
|             |                |           | At         | Signature    |
+-------------+----------------+-----------+------------+--------------+
| WHITE CELL  |         5.7    | K/CU MM   |            |              |
| COUNT       |                |           |            |              |
+-------------+----------------+-----------+------------+--------------+
| RED CELL    |         4.54   | M/CU MM   |            |              |
| COUNT       |                |           |            |              |
+-------------+----------------+-----------+------------+--------------+
| HEMOGLOBIN  |        13.8    | GM/DL     |            |              |
+-------------+----------------+-----------+------------+--------------+
| HEMATOCRIT  |        40.6    | %         |            |              |
+-------------+----------------+-----------+------------+--------------+
| M[facility]         |        89.5    | FL        |            |              |
+-------------+----------------+-----------+------------+--------------+
| [facility]         |        30.4    | PG        |            |              |
+-------------+----------------+-----------+------------+--------------+
| M[facility]        |        34. (H) | GM/DL     |            |              |
+-------------+----------------+-----------+------------+--------------+
| RDW         |        12.3    | %         |            |              |
+-------------+----------------+-----------+------------+--------------+
| PLATELET    |       306.     | K/CU MM   |            |              |
| COUNT       |                |           |            |              |
+-------------+----------------+-----------+------------+--------------+
| [facility]         |         8.     | [place]        |            |              |
+-------------+----------------+-----------+------------+--------------+
 
 
 
+----------+
| Specimen |
+----------+
|          |
+----------+
 
 
 
+----------------------+------------------------+--------------------+--------------+
| Performing           | Address                | City/State/Zipcode | Phone Number |
| Organization         |                        |                    |              |
 
+----------------------+------------------------+--------------------+--------------+
|   [facility] |   [address]  | [place] [address] |              |
|  PATHOLOGY           | [address]                |                    |              |
+----------------------+------------------------+--------------------+--------------+
 documented in this encounter
 
 Visit Diagnoses
 Not on filedocumented in this encounter

## 2019-12-04 NOTE — XMS
Encounter Summary
  Created on: 2019
 
 Margaret Ferreira
 External Reference #: [id number]
 : 30
 Sex: Female
 
 Demographics
 
 
+-----------------------+----------------------+
| Address               | [address]        |
|                       | [place]  [address] |
+-----------------------+----------------------+
| Home Phone            | [phone]      |
+-----------------------+----------------------+
| Preferred Language    | Unknown              |
+-----------------------+----------------------+
| Marital Status        |               |
+-----------------------+----------------------+
| Jewish Affiliation | Unknown              |
+-----------------------+----------------------+
| Race                  | Unknown              |
+-----------------------+----------------------+
| Ethnic Group          | Unknown              |
+-----------------------+----------------------+
 
 
 Author
 
 
+--------------+--------------------------------------------+
| Author       | [facility]  |
|              | and [place]                                |
+--------------+--------------------------------------------+
| Organization | [facility]  |
|              | and [place]                                |
+--------------+--------------------------------------------+
| Address      | Unknown                                    |
+--------------+--------------------------------------------+
| Phone        | Unavailable                                |
+--------------+--------------------------------------------+
 
 
 
 Support
 
 
+--------------+--------------+---------+-----------------+
| Name         | Relationship | Address | Phone           |
+--------------+--------------+---------+-----------------+
| Lawson Hanna | ECON         | Unknown | [phone] |
+--------------+--------------+---------+-----------------+
 
 
 
 Care Team Providers
 
 
 
+-----------------------------+------+-----------------+
| Care Team Member Name       | Role | Phone           |
+-----------------------------+------+-----------------+
| Bessy Paulino | PCP  | [phone] |
|  PA-C                       |      |                 |
+-----------------------------+------+-----------------+
 
 
 
 Reason for Referral
 Diagnostic/Screening (Routine)
 
+--------+--------+-----------+--------------+--------------+---------------+
| Status | Reason | Specialty | Diagnoses /  | Referred By  | Referred To   |
|        |        |           | Procedures   | Contact      | Contact       |
+--------+--------+-----------+--------------+--------------+---------------+
| Closed |        | Radiology |   Diagnoses  |   [facility]     |   Jason Mri     |
|        |        |           |  Gait        | Joel SCALES MD | [address]  |
|        |        |           | abnormality  |   [address]      |  [place], |
|        |        |           |  Postural    | [address]    |  [place]           |
|        |        |           | kyphosis of  | [place], | [address]    |
|        |        |           | thoracic     |  [place] [address]    | Phone:        |
|        |        |           | region       | Phone:       | [phone]  |
|        |        |           | Weakness of  | [phone] |  Fax:         |
|        |        |           | both lower   |   Fax:       | [phone]  |
|        |        |           | extremities  | [phone] |               |
|        |        |           |  Chronic     |              |               |
|        |        |           | bilateral    |              |               |
|        |        |           | low back     |              |               |
|        |        |           | pain without |              |               |
|        |        |           |  sciatica    |              |               |
|        |        |           | Urinary      |              |               |
|        |        |           | incontinence |              |               |
|        |        |           | ,            |              |               |
|        |        |           | unspecified  |              |               |
|        |        |           | type         |              |               |
|        |        |           | Procedures   |              |               |
|        |        |           | MRI Lumbar   |              |               |
|        |        |           | Spine wo     |              |               |
|        |        |           | Contrast     |              |               |
+--------+--------+-----------+--------------+--------------+---------------+
 
 
 Diagnostic/Screening (Routine)
 
+--------+--------+-----------+--------------+--------------+---------------+
| Status | Reason | Specialty | Diagnoses /  | Referred By  | Referred To   |
|        |        |           | Procedures   | Contact      | Contact       |
+--------+--------+-----------+--------------+--------------+---------------+
| Closed |        | Radiology |   Diagnoses  |   [place],     |   [address]  [address] |
|        |        |           |  Gait        | Joel E A, MD |  [address]     |
|        |        |           | abnormality  |   [address]      | [place],  |
|        |        |           |  Postural    | [address]    | [place] [address] |
|        |        |           | kyphosis of  | [place], |   Phone:      |
|        |        |           | thoracic     |  [place] [address]    | [phone]  |
|        |        |           | region       | Phone:       |  Fax:         |
|        |        |           | Weakness of  | [phone] | [phone]  |
|        |        |           | both lower   |   Fax:       |               |
 
|        |        |           | extremities  | [phone] |               |
|        |        |           |  Chronic     |              |               |
|        |        |           | bilateral    |              |               |
|        |        |           | low back     |              |               |
|        |        |           | pain without |              |               |
|        |        |           |  sciatica    |              |               |
|        |        |           | Urinary      |              |               |
|        |        |           | incontinence |              |               |
|        |        |           | ,            |              |               |
|        |        |           | unspecified  |              |               |
|        |        |           | type  NPH    |              |               |
|        |        |           | (normal      |              |               |
|        |        |           | pressure     |              |               |
|        |        |           | hydrocephalu |              |               |
|        |        |           | s) (HCC)     |              |               |
|        |        |           | Procedures   |              |               |
|        |        |           | CT Head wo   |              |               |
|        |        |           | Contrast     |              |               |
+--------+--------+-----------+--------------+--------------+---------------+
 
 
 
 
 Reason for Visit
 
 
+--------------+----------+
| Reason       | Comments |
+--------------+----------+
| Gait Problem |          |
+--------------+----------+
| Back Pain    |          |
+--------------+----------+
 Evaluate & Treat (Routine)
 
+--------+--------+---------------+--------------+--------------+---------------+
| Status | Reason | Specialty     | Diagnoses /  | Referred By  | Referred To   |
|        |        |               | Procedures   | Contact      | Contact       |
+--------+--------+---------------+--------------+--------------+---------------+
| Closed |        | Physical      |   Diagnoses  |   [place],   |   Joel Cottrell |
|        |        | Medicine and  |  Abnormality | [place],   |  MARQUITA SCALES MD  [address] |
|        |        | Rehabilitatio |  of gait and | DO  [address]  |  [address]  |
|        |        | n             |  mobility    | [address]  [place]       |  [place], |
|        |        |               |              | [place]   |  [place] [address]     |
|        |        |               |              | [address]   | Phone:        |
|        |        |               |              | Phone:       | [phone]  |
|        |        |               |              | [phone] |  Fax:         |
|        |        |               |              |   Fax:       | [phone]  |
|        |        |               |              | [phone] |               |
+--------+--------+---------------+--------------+--------------+---------------+
 
 
 
 
 Encounter Details
 
 
+--------+---------+----------------------+----------------------+----------------------+
| Date   | Type    | Department           | Care Team            | Description          |
+--------+---------+----------------------+----------------------+----------------------+
 
| 11/15/ | Office  |   [facility]          |   Joel Cottrell,   | Gait abnormality     |
| 2018   | Visit   | PHYSIATRY  [address]     | MD  [address]  | (Primary Dx);        |
|        |         | [address]  [place], |  [place], [place]     | Postural kyphosis of |
|        |         |  [place] [address]       | [address]  [phone]  |  thoracic region;    |
|        |         | [phone]         |  [phone] (Fax)  | Weakness of both     |
|        |         |                      |                      | lower extremities;   |
|        |         |                      |                      | Chronic bilateral    |
|        |         |                      |                      | low back pain        |
|        |         |                      |                      | without sciatica;    |
|        |         |                      |                      | Urinary              |
|        |         |                      |                      | incontinence,        |
|        |         |                      |                      | unspecified type;    |
|        |         |                      |                      | Hyperreflexia;       |
|        |         |                      |                      | Impaired mobility    |
|        |         |                      |                      | and activities of    |
|        |         |                      |                      | daily living; NPH    |
|        |         |                      |                      | (normal pressure     |
|        |         |                      |                      | hydrocephalus)       |
+--------+---------+----------------------+----------------------+----------------------+
 
 
 
 Social History
 
 
+-------------------+-------+-----------+--------+------+
| Tobacco Use       | Types | Packs/Day | Years  | Date |
|                   |       |           | Used   |      |
+-------------------+-------+-----------+--------+------+
| Passive Smoke     |       |           |        |      |
| Exposure - Never  |       |           |        |      |
| Smoker            |       |           |        |      |
+-------------------+-------+-----------+--------+------+
 
 
 
+---------------------+---+---+---+
| Smokeless Tobacco:  |   |   |   |
| Never Used          |   |   |   |
+---------------------+---+---+---+
 
 
 
+-------------+----------------------+---------+----------+
| Alcohol Use | Drinks/Week          | oz/Week | Comments |
+-------------+----------------------+---------+----------+
| No          |   0 Standard drinks  | 0.0     |          |
|             | or equivalent        |         |          |
+-------------+----------------------+---------+----------+
 
 
 
+------------------+---------------+
| Sex Assigned at  | Date Recorded |
| Birth            |               |
+------------------+---------------+
| Not on file      |               |
+------------------+---------------+
 
 
 
 
+----------------+-------------+-------------+
| Job Start Date | Occupation  | Industry    |
+----------------+-------------+-------------+
| Not on file    | Not on file | Not on file |
+----------------+-------------+-------------+
 
 
 
+----------------+--------------+------------+
| Travel History | Travel Start | Travel End |
+----------------+--------------+------------+
 
 
 
+-------------------------------------+
| No recent travel history available. |
+-------------------------------------+
 documented as of this encounter
 
 Last Filed Vital Signs
 
 
+-------------------+------------------+----------------------+----------+
| Vital Sign        | Reading          | Time Taken           | Comments |
+-------------------+------------------+----------------------+----------+
| Blood Pressure    | 115/64           | 11/15/2018 11:06 AM  |          |
|                   |                  | PST                  |          |
+-------------------+------------------+----------------------+----------+
| Pulse             | 77               | 11/15/2018 11:06 AM  |          |
|                   |                  | PST                  |          |
+-------------------+------------------+----------------------+----------+
| Temperature       | -                | -                    |          |
+-------------------+------------------+----------------------+----------+
| Respiratory Rate  | -                | -                    |          |
+-------------------+------------------+----------------------+----------+
| Oxygen Saturation | -                | -                    |          |
+-------------------+------------------+----------------------+----------+
| Inhaled Oxygen    | -                | -                    |          |
| Concentration     |                  |                      |          |
+-------------------+------------------+----------------------+----------+
| Weight            | 61.2 kg (135 lb) | 11/15/2018 11:06 AM  |          |
|                   |                  | PST                  |          |
+-------------------+------------------+----------------------+----------+
| Height            | 162.6 cm (5' 4") | 11/15/2018 11:06 AM  |          |
|                   |                  | PST                  |          |
+-------------------+------------------+----------------------+----------+
| Body Mass Index   | 23.17            | 11/15/2018 11:06 AM  |          |
|                   |                  | PST                  |          |
+-------------------+------------------+----------------------+----------+
 documented in this encounter
 
 Patient Instructions
 Patient Instructions Ashleigh Abad, Medical Assistant - 11/15/2018 10:40 AM PSTX-rays
 have been requested.  Please go to the x-ray department after your appointment to complete 
these x-rays.  The results of your x-rays will be reviewed at your next appointment.  If you
r x-rays demonstrate any emergent results, the clinic will contact you.
 
 A MRI has been requested.  Please complete the requested imaging.  Within one week, you mayra
uld receive a call to schedule your MRI.  If you have not heard from anyone within one week,
 
 please call the clinic.  The results of your MRI will be reviewed at your next appointment.
  If your MRI demonstrates any emergent results, the clinic will contact you.
 
 A CT scan has been requested.  Please complete the requested imaging.  Within one week, you
 should receive a call to schedule your CT.  If you have not heard from anyone within one we
ek, please call the clinic.  The results of your CT scan will be reviewed at your next appoi
ntment.  If your CT scan demonstrates any emergent results, the clinic will contact you.
 
 Electronically signed by Ashleigh Abad, Medical Assistant at 11/15/2018 11:51 AM PST
 documented in this encounter
 
 Progress Notes
 Joel Cottrell MD - 11/15/2018 10:40 AM PSTFormatting of this note might be different fro
m the original.
 Joel Cottrell MD
 [address], [address]
 [place] [address] [phone]
 FAX: [phone]
 
 PHYSICAL MEDICINE AND REHABILITATION H&P
 
 CHIEF COMPLAINT: 
 Chief Complaint 
 Patient presents with 
   Gait Problem 
   Back Pain 
 
 HISTORY OF PRESENT ILLNESS:  Margaret Ferreira s a 88 y.o. female being seen today at 
e request of Bessy John PA-C for the complaint of back pain and gait abnomality. 
 She  reports that the pain started without any inciting event  The symptoms have been gradu
ally worsening.  
 
 Margaret Ferreira reports that pain interferes with walking. She reports that she can no
t walk for more than 10 minutes. Margaret Ferreira reports that she ambulates with walker
 at home. She uses wheel chair when outside of the home. 
 
 Margaret Ferreira reports balance impairment. Margaret Ferreira denies memory changes
. 
 
 Margaret Ferreira rates the pain as mild.  The symptoms are intermittent.  Margaret Ferreira describes the pain as sharp.  
 
 Margaret Ferreira denies lower extremity pain. Margaret Ferreira denies spasticity in
 the bilateral lower extremities.  The patient  does not report numbness in the bilateral lo
wer extremities.  She  does report weakness of the bilateral lower extremities. 
 
 Margaret Ferreira does not report any change in bowel or bladder function or saddle anes
thesia recently.  
 
 Her symptoms improve with sitting.
 
 Her symptoms worsen with walking and laying down. 
 
 Margaret Ferreira  has tried PT and NSAIDS.   Margaret Ferreira most recent course of
 aquatic therapy was completed this summer with no improvement in pain. 
 
 PAST MEDICAL HISTORY:
 Past Medical History: 
 Diagnosis Date 
 
   Aortic valve stenosis  
  trivial 
   Back problem  
  reported prior back trouble 
   Caffeine use  
   Chronic pain  
   COPD (chronic obstructive pulmonary disease) (HCC)  
   Essential hypertension  
   Gait disturbance  
   GERD (gastroesophageal reflux disease)  
   Hearing loss  
   Hiatal hernia  
   History of scarlet fever  
   Hypothyroidism  
   Macular degeneration  
  bilateral now 
   Osteoarthritis  
  localized vertebral 
   Osteoarthritis of both knees  
   Osteoporosis  
   Peptic ulcer disease  
   Plantar wart  
   Pneumonia  
   Pulmonary nodule  
   RLS (restless legs syndrome)  
   Scarlet fever  
   Scoliosis  
   Systemic hypertension  
   Unspecified abnormalities of gait and mobility  
   Urine incontinence  
   UTI (urinary tract infection)  
 
 PAST SURGICAL HISTORY:
 Past Surgical History: 
 Procedure Laterality Date 
   ABSCESS DRAINAGE ON CALF   
   BLADDER SUSPENSION  1996 
   CHOLECYSTECTOMY  1996 
   PEPPER AND BSO  1996 
   TOTAL HIP ARTHROPLASTY Left  
   UPPER GASTROINTESTINAL ENDOSCOPY   
 
 CURRENT MEDICATIONS: 
 Current Outpatient Prescriptions 
 Medication Sig Dispense Refill 
   albuterol (PROAIR HFA) 90 mcg/puff inhaler Inhale 2 puffs into the lungs every 6 hours 
as needed for Wheezing or Shortness of Breath. 1 Inhaler 5 
   Calcium Carbonate (CALCIUM 600 PO) Take 600 mg by mouth Daily.   
   Cholecalciferol (VITAMIN D-3) 2000 units CAPS Take  by mouth Daily.   
   diclofenac (VOLTAREN) 50 mg EC tablet Take 50 mg by mouth 2 times daily.   
   Docosahexaenoic Acid (DHA OMEGA 3) 100 MG CAPS Take  by mouth.   
   DULoxetine (CYMBALTA) 60 mg DR capsule Take 60 mg by mouth Daily.   
   gabapentin (NEURONTIN) 300 mg capsule Take 300 mg by mouth 3 times daily.   
   GLUCOSAMINE-CHONDROITIN PO Take  by mouth.   
   HYDROcodone-acetaminophen (NORCO) 5-325 mg per tablet Take 1 tablet by mouth every 6 ho
urs as needed for Pain.   
   levothyroxine (SYNTHROID) 150 mcg tablet Take 150 mcg by mouth every morning.   
   losartan (COZAAR) 100 MG tablet Take 100 mg by mouth Daily.   
   Misc Natural Products (GLUCOS-CHONDROIT-MSM COMPLEX) TABS Take  by mouth Daily.   
   Multiple Vitamins-Minerals (MULTIVITAMIN PO) Take  by mouth Daily.   
 
   NITROFURANTOIN MACROCRYSTAL PO Take  by mouth Daily.   
   omeprazole (PRILOSEC) 20 mg capsule Take 20 mg by mouth every morning (before breakfast
).   
   pramipexole (MIRAPEX) 0.25 mg tablet TAKE ONE TABLET BY MOUTH TWICE A  tablet 0 
   Spacer/Aero Chamber Mouthpiece MISC Use with inhaler as directed. 1 each 1 
   traMADol (ULTRAM) 50 mg tablet Take 100 mg by mouth 4 times daily.   
   umeclidinium-vilanterol (ANORO ELLIPTA) 62.5-25 mcg/puff inhaler Inhale 1 puff into the
 lungs.   
 
 No current facility-administered medications for this visit.  
 
 ALLERGIES: 
 Allergies 
 Allergen Reactions 
   Naproxen Other (See Comments) 
   Reaction not specified in outside medical records
  
   Lisinopril Other (See Comments) 
   Cough 
 
 SOCIAL HISTORY:
 The patient  reports that she is a non-smoker but has been exposed to tobacco smoke. She ha
s never used smokeless tobacco. She reports that she does not drink alcohol or use drugs.
 
 FAMILY HISTORY:
 Family History 
 Problem Relation Age of Onset 
   COPD Father  
   Asthma Father  
   Allergies Father  
   Hypertension Father  
   Tobacco Use Father  
   Asthma Sister  
   Hypertension Mother  
   Stroke Mother  
 
 REVIEW OF SYSTEMS: ROS
 
 GENERALLY:   No fever,+ night sweats, no anemia, no fatigue, no recent profound weight russell
ges.  
 EYES:  No eye problems, no use of corrective lenses, no eye injury, no double vision, no bl
indness.
 EARS, NOSE, AND THROAT:  No changes in taste or smell, + hearing difficulty, no ringing in 
the ears, no ear drainage, no dizziness, no voice changes, + difficulty swallowing, no signi
ficant snoring, no sleep apnea, no sinus problems, + major dental work.
 NEUROLOGICALLY:The patient has no numbness/pain of arms, no numbness/pain of legs, no awake
 with numbness/pain, +weakness, no muscle aching, + coordination difficulty,+ change in walk
, no head injury, no neck injury, no back injury, + pain in neck, + pain in back, no stroke,
 no fainting spells, no loss of consciousness, no tremor/shaking, no seizures, no headaches,
 no migraine, no memory loss, no speech difficulty, no confusion and no numbness of face.
 PSYCHIATRIC:  No depression, no sleep disorders, no anxiety, no bipolar disorder, no psycho
tic episodes.
 CARDIOVASCULAR:  No heart attacks, no heart murmur, no heart fluttering, no chest pain, no 
ankle swelling.  
 LUNG DISEASE:  No shortness of breath, no cough, no tuberculosis, no bloody cough,  no asth
ma, +emphysema/COPD.
 GASTROINTESTINAL:  No bowel disease, no nausea or vomiting, no rectal bleeding, no constipa
tion, no stool incontinence, no liver disease, no gallbladder disease, no abdominal pain, no
 ulcers.
 KIDNEY DISEASE:  No urinary frequency, no painful or difficult urination, + incontinence.
 
 ENDOCRINE:  No diabetes, + thyroid disease, + osteopenia or osteoporosis, no breast drainag
e.  
 SKIN:  No breast lumps, no skin changes, no rashes, no itches.
 HEMATOLOGIC/LYMPHATIC:  No enlarged lymph nodes, no easy or unusual bleeding, no personal h
istory of cancer. 
 RHEUMATOLOGIC:  + joint arthritis, no rheumatoid arthritis.
 
 PHYSICAL EXAMINATION:
 Blood pressure 115/64, pulse 77, height 1.626 m (5' 4"), weight 61.2 kg (135 lb), not curre
ntly breastfeeding. Body mass index is 23.17 kg/m.
 
 GENERAL: She does not appear uncomfortable when seated.   
 HEENT:
 HEAD/FACE:
 EYES:
  
 Normocephalic and atraumatic.  There are no areas of recent trauma.  
 Normal sclerae without icterus.   
 SKIN There are not scars in the lumbar region.   
 CHEST: The patient is in no acute respiratory distress with unlabored respirations.   
 HEART: There is not lower extremity edema.   
 ABDOMEN: The patient is not overweight.   
 NEUROLOGIC: The patient is awake, alert, and oriented to time, place, person.  
 She follows simple and complex commands.
 Her speech is fluent. She comprehends speech well.  
 She has no apparent deficits with short or long term memory.
 She has appropriate fund of knowledge
 
 Cranial nerves appear grossly intact.
 
 MOTOR EXAM:
 
 (5 IS NORMAL)
 
 * Indicates pain limited 
 MUSCLE/   MOVEMENT: 
 RIGHT  
 LEFT 
 Hip Flexion 4 3 
 Hip Extension 5 5 
 Knee Flexion 4 4 
 Knee Extension 5 4 
 Extensor Hallicus Longus 5 5 
 Ankle Dorsiflexion 4 5 
 Plantarflexion 5 5
  
  
 
 REFLEX: 
 RIGHT 
 LEFT 
 PATELLAR 4+ 4+ 
 ACHILLES 4+ 4+ 
  
 MUSCULOSKELETAL Crepitis within left knee
 
 Atrophy of intrinsic muscles of the hands
 
 Uribe's test negative bilaterally 
 
 
 Mild ataxia with finger to nose test bilaterally 
 
  
 
 RADIOGRAPHIC REVIEW:
 No new imaging is available for review. 
 
 IMPRESSION:
 1. Gait abnormality  
 2. Postural kyphosis of thoracic region  
 3. Weakness of both lower extremities  
 4. Chronic bilateral low back pain without sciatica  
 5. Urinary incontinence, unspecified type  
 6. Hyperreflexia  
 7. Impaired mobility and activities of daily living  
 
 PLAN:
 1. Margaret Ferreira presents to clinic today for the treatment of gait abnormality and 
back pain. Differential diagnosis includes but is not limited to: normal pressure hydrocepha
kristal, parkinson's disease, cervical spinal stenosis, thoracic spinal stenosis, lumbar spinal 
stenosis and lumbar radiculopathy. There is gait abnormality and significant lower extremity
 weakness. There is a history of urinary incontinence.   Today we reviewed treatment options
 for low back pain include physical therapy, neuropathic pain medication, lumbar steroid inj
ections and last resort surgery. Treatment for hydrocephalus include surgical treatment.  
 
 2. Margaret Ferreira  has completed physical therapy in the past with no improvement in 
pain. Margaret Ferreira would significantly benefit from subacute therapy in hopes of reg
aining strength to maintain functions such as walking and standing. Upon exam there is signi
ficant weakness in bilateral lower extremities.  Today we emphasized the importance of maint
aining strength through daily exercise. Query significant lumbar spondylosis contributing to
 lower extremity weakness. 
 
 Physical therapy was offered today for the treatment of back pain, gait abnormality and low
er extremity weakness. Margaret Ferreira declines formal physical therapy. Margaret MONCADA
roger reports that she would like to work on at home exercises and strengthening before con
sidering repeat physical therapy. 
 
 3. Neuropathic pain medicaitons were discussed today. No medicaiton changes were made today
. Margaret Ferreira is currently taking Tramadol, Cymbalta, Gabapentin and Hydrocodone. ANG Ferreira has a significant fall risk due to gait instability and lower extremity 
weakness. Medications such as Gabapentin, Hydrocodone and Tramadol may contribute to fall ri
sk. 
 
 4. Surgical intervention was discussed today with Margaret Ferreira. We discussed that s
urgical treatment is usually considered last resort. We discussed that surgical treatment 
is recommended if weakness is affecting function. We discussed that surgical treatment is 
recommended if there are changes to bowel/bladder function. We discussed that surgical oscar
atment may be considered if there is progressive weakness. We discussed that surgical stephany
tment may be helpful for radicular symptoms, but back pain may persist after surgical treatm
ent. Surgery may not be a beneficial consideration due to poor health and age. 
 
 5. Based on physical exam there is significant lower extremity weakness and  lower extremit
y hyperreflexia.  There is history of urinary incontinence. Margaret Ferreira is persisti
ng for more than 3 months. Query underlying lumbar spinal stenosis or neural foraminal narro
wing contributing to lower extremity weakness. Margaret Ferreira will have lumbar xray to
 evaluate for lumbar spondylosis or instability that may be contributing to lower extremity 
weakness. 
 
 In addition Margaret Ferreira will have lumbar MRI to evaluate for lumbar spinal stenosi
s or neural foraminal narrowing that may be contributing to symptoms. There is history of ur
 
inary incontinence. Imaging may help from a diagnostic stand point to evaluate origin of low
er extremity weakness. Margaret Ferreira is unable to maintain strength to stand without 
assistance. 
 
 6. Margaret Ferreira will have head CT to evaluate for hydrocephalus that may be contrib
uting to gait abnormality. Margaret Ferreira has history of urinary incontinence. She den
ies changes in memory. Today we reviewed pathology of hydrocephaly and its contributing fact
ors to gait, bladder control and memory. 
 
 7. Margaret Ferreira should return to clinic as scheduled to review imaging. Future cons
iderations include subacute physical therapy, assisted walking and ambulation evaluation and
 lumbar steroid injections. May consider neurosurgery consult pending head CT. 
 
 ----------------------
 
 Margaret Ferreira has a complex presentation.  She has profound lower extremity weakness
.  She is loosing her ability to stand and walk.  She does limited walking and standing at t
his time.  She presents to the clinic in a wheel chair.  She has incontinence.  She has hype
rreflexia.  She has gait instability.  She may have upper motor neuron pathology; such as ce
rvical or thoracic spinal stenosis.  She may have NPH, but she maintains intact memory and c
ognition.  She has profound weakness in the lower extremities.  She has severe deformity of 
back posture.  She likely has superimposed lumbar spinal stenosis.  She has tried and failed
 conservative measures.  I would like her to return to PT with the goal of maintaining her a
bility to walk.  We discussed the importance of daily activity.  We discussed the goals of P
M&R is to maintain her function, if possible and also determine etiology of her symptoms and
 if it is treatable.  She may have a surgical condition affecting her spine.  Unfortunately 
because of her multiple medical co-morbidities, she may not be a surgical candidate.  She lerma
s severe arthritis in both knees.  She reports that she was not offered knee replacement bec
ause of her overall health problems.
 
 Once we have determined origin of symptoms we will further discuss treatment options beyond
 continued PT.  We may consider neurology consult if origin of symptoms cannot be establishe
d with brain CT and lumbar MRI.  I suspect she has both lumbar spinal stenosis and intracran
ial pathology.
 
 Thank you for allowing me to be involved in the care of your patient.  If you have any ques
tions regarding the care of your patient please don't hesitate to call.
 
 Approximately 60 minutes was spent face to face with Margaret Ferreira, over half of [facility]
ch was spent formulating and discussing their medical treatment plan.
 
 I, Joel Cottrell MD personally performed the services described in this documentation, 
as scribed by in my presence, JASPAL Mason and are both accurate and complete.
 
 Joel Cottrell MD - 11/15/2018
 
 Electronically signed by Joel Cottrell MD at 11/15/2018  1:47 PM PSTdocumented in this en
counter
 
 Plan of Treatment
 
 
+----------------------+---------+--------+----------------------+----------------------+
| Name                 | Type    | Priori | Associated Diagnoses | Order Schedule       |
|                      |         | ty     |                      |                      |
+----------------------+---------+--------+----------------------+----------------------+
| CT Head wo Contrast  | Imaging | Routin |   Gait abnormality   | Expected:            |
|                      |         | e      | Postural kyphosis of | 11/15/2018, Expires: |
|                      |         |        |  thoracic region     |  11/15/2019          |
|                      |         |        | Weakness of both     |                      |
 
|                      |         |        | lower extremities    |                      |
|                      |         |        | Chronic bilateral    |                      |
|                      |         |        | low back pain        |                      |
|                      |         |        | without sciatica     |                      |
|                      |         |        | Urinary              |                      |
|                      |         |        | incontinence,        |                      |
|                      |         |        | unspecified type     |                      |
|                      |         |        | NPH (normal pressure |                      |
|                      |         |        |  hydrocephalus)      |                      |
+----------------------+---------+--------+----------------------+----------------------+
| XR Lumbar Spine 4 +  | Imaging | Routin |   Gait abnormality   | Expected:            |
| Vw                   |         | e      | Postural kyphosis of | 11/15/2018, Expires: |
|                      |         |        |  thoracic region     |  11/15/2019          |
|                      |         |        | Weakness of both     |                      |
|                      |         |        | lower extremities    |                      |
|                      |         |        | Chronic bilateral    |                      |
|                      |         |        | low back pain        |                      |
|                      |         |        | without sciatica     |                      |
|                      |         |        | Urinary              |                      |
|                      |         |        | incontinence,        |                      |
|                      |         |        | unspecified type     |                      |
+----------------------+---------+--------+----------------------+----------------------+
| MRI Lumbar Spine wo  | Imaging | Routin |   Gait abnormality   | Expected:            |
| Contrast             |         | e      | Postural kyphosis of | 11/15/2018, Expires: |
|                      |         |        |  thoracic region     |  11/15/2019          |
|                      |         |        | Weakness of both     |                      |
|                      |         |        | lower extremities    |                      |
|                      |         |        | Chronic bilateral    |                      |
|                      |         |        | low back pain        |                      |
|                      |         |        | without sciatica     |                      |
|                      |         |        | Urinary              |                      |
|                      |         |        | incontinence,        |                      |
|                      |         |        | unspecified type     |                      |
+----------------------+---------+--------+----------------------+----------------------+
 documented as of this encounter
 
 Visit Diagnoses
 
 
+----------------------------------------------------------------------------------------+
| Diagnosis                                                                              |
+----------------------------------------------------------------------------------------+
|   Gait abnormality - Primary  Abnormality of gait                                      |
+----------------------------------------------------------------------------------------+
|   Postural kyphosis of thoracic region  Kyphosis (acquired) (postural)                 |
+----------------------------------------------------------------------------------------+
|   Weakness of both lower extremities                                                   |
+----------------------------------------------------------------------------------------+
|   Chronic bilateral low back pain without sciatica                                     |
+----------------------------------------------------------------------------------------+
|   Urinary incontinence, unspecified type                                               |
+----------------------------------------------------------------------------------------+
|   Hyperreflexia  Abnormal reflex                                                       |
+----------------------------------------------------------------------------------------+
|   Impaired mobility and activities of daily living  Mechanical problems with limbs     |
+----------------------------------------------------------------------------------------+
|   NPH (normal pressure hydrocephalus) (HCC)  Idiopathic normal pressure hydrocephalus  |
| (INPH)                                                                                 |
+----------------------------------------------------------------------------------------+
 documented in this encounter

## 2019-12-04 NOTE — XMS
Encounter Summary
  Created on: 2019
 
 Margaret Ferreira
 External Reference #: [id number]
 : 30
 Sex: Female
 
 Demographics
 
 
+-----------------------+------------------------+
| Address               | [address]      |
|                       | [place]  [address]   |
+-----------------------+------------------------+
| Home Phone            | [phone]        |
+-----------------------+------------------------+
| Preferred Language    | Unknown                |
+-----------------------+------------------------+
| Marital Status        |                 |
+-----------------------+------------------------+
| Episcopal Affiliation | MET                    |
+-----------------------+------------------------+
| Race                  | White                  |
+-----------------------+------------------------+
| Ethnic Group          | Not  or  |
+-----------------------+------------------------+
 
 
 Author
 
 
+--------------+------------------------------+
| Author       | [facility] |
+--------------+------------------------------+
| Organization | [facility] |
+--------------+------------------------------+
| Address      | Unknown                      |
+--------------+------------------------------+
| Phone        | Unavailable                  |
+--------------+------------------------------+
 
 
 
 Support
 
 
+--------------+--------------+---------------------+-----------------+
| Name         | Relationship | Address             | Phone           |
+--------------+--------------+---------------------+-----------------+
| Lawson Ferreira | ECON         | [address]          | [phone] |
|              |              | [place], [place]   |                 |
|              |              | [address]               |                 |
+--------------+--------------+---------------------+-----------------+
 
 
 
 Care Team Providers
 
 
 
+-----------------------+------+-------------+
| Care Team Member Name | Role | Phone       |
+-----------------------+------+-------------+
 PCP  | Unavailable |
+-----------------------+------+-------------+
 
 
 
 Encounter Details
 
 
+--------+-------------+----------------------+---------------------+---------------+
| Date   | Type        | Department           | Care Team           | Description   |
+--------+-------------+----------------------+---------------------+---------------+
| / | Office      |   General Internal   |   Note, Outpatient  | Progress Note |
|    | Visit-Trans | Medicine  [address]    | Clinic              |               |
|        | [address]      | [address]  |                     |               |
|        |             |  Mailcode: [address]      |                     |               |
|        |             | Outpatient Clinic    |                     |               |
|        |             | [address]       |                     |               |
|        |             | [place], [place]         |                     |               |
|        |             | [address]           |                     |               |
|        |             | [phone]         |                     |               |
+--------+-------------+----------------------+---------------------+---------------+
 
 
 
 Social History
 
 
+----------------+-------+-----------+--------+------+
| Tobacco Use    | Types | Packs/Day | Years  | Date |
|                |       |           | Used   |      |
+----------------+-------+-----------+--------+------+
| Never Assessed |       |           |        |      |
+----------------+-------+-----------+--------+------+
 
 
 
+------------------+---------------+
| Sex Assigned at  | Date Recorded |
| Birth            |               |
+------------------+---------------+
| Not on file      |               |
+------------------+---------------+
 
 
 
+----------------+-------------+-------------+
| Job Start Date | Occupation  | Industry    |
+----------------+-------------+-------------+
| Not on file    | Not on file | Not on file |
+----------------+-------------+-------------+
 
 
 
+----------------+--------------+------------+
| Travel History | Travel Start | Travel End |
 
+----------------+--------------+------------+
 
 
 
+-------------------------------------+
| No recent travel history available. |
+-------------------------------------+
 documented as of this encounter
 
 Progress Notes
 Interface, Transcription In - 2007  7:06 AM PST CLINIC DATE: 96
  
  [facility]
  
  SUBJECTIVE: [facility]
  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
  hemorrhage requiring laparotomy and control of the bleeding. She had a slow
  post-operative course, but has now made steady progress and reports that she
  is feeling much better although "she still has some way to go". Her most
  troublesome symptom at present is of urge incontinence, particularly on
  waking during the night or on getting out of bed in the morning. She
  continues to have pain with her arthritis which is being managed in
  [place] by Dr. Avendaño. She has had limited activity and feels tired, but
  is much more confident about her progress than when last seen. She will be
  traveling to [place] next week and will be staying there for one month
  and requested that a report be sent to Dr. Douglas in [place], [place].
  
  PHYSICAL EXAMINATION: Examination shows the vaginal vault to be well
  supported and the vaginal canal of adequate length and capacity. There is
  some descensus of the anterior vaginal wall which is not aggravated by
  straining. No stress incontinence is demonstrated. Bimanual examination
  does not demonstrate any pelvic masses and no local areas of tenderness.
  
  IMPRESSION: Ms. Ferreira now appears to be making satisfactory progress.
  
  PLAN: A prescription for Levsinex was provided for use at night time and
  she will be seen again on her return from [place] in one month's time.
  
  
  
  BRITTANY Perez M.D.
  Professor and ,
  Obstetrics and Gynecology
  
  EPK/zoie
  D: 96
  T: 96
   Electronically signed by Interface, Transcription In at 2007  7:06 AM PSTdocumented
 in this encounter
 
 Plan of Treatment
 Not on filedocumented as of this encounter
 
 Visit Diagnoses
 Not on filedocumented in this encounter

## 2019-12-04 NOTE — XMS
Encounter Summary
  Created on: 2019
 
 Margaret Ferreira
 External Reference #: [id number]
 : 30
 Sex: Female
 
 Demographics
 
 
+-----------------------+----------------------+
| Address               | [address]        |
|                       | [place]  [address] |
+-----------------------+----------------------+
| Home Phone            | [phone]      |
+-----------------------+----------------------+
| Preferred Language    | Unknown              |
+-----------------------+----------------------+
| Marital Status        |               |
+-----------------------+----------------------+
| Orthodoxy Affiliation | Unknown              |
+-----------------------+----------------------+
| Race                  | Unknown              |
+-----------------------+----------------------+
| Ethnic Group          | Unknown              |
+-----------------------+----------------------+
 
 
 Author
 
 
+--------------+--------------------------------------------+
| Author       | [facility]  |
|              | and [place]                                |
+--------------+--------------------------------------------+
| Organization | [facility]  |
|              | and [place]                                |
+--------------+--------------------------------------------+
| Address      | Unknown                                    |
+--------------+--------------------------------------------+
| Phone        | Unavailable                                |
+--------------+--------------------------------------------+
 
 
 
 Support
 
 
+--------------+--------------+---------+-----------------+
| Name         | Relationship | Address | Phone           |
+--------------+--------------+---------+-----------------+
| Lawson Hanna | ECON         | Unknown | [phone] |
+--------------+--------------+---------+-----------------+
 
 
 
 Care Team Providers
 
 
 
+-----------------------+------+-----------------+
| Care Team Member Name | Role | Phone           |
+-----------------------+------+-----------------+
| Calixto Chou MD | PCP  | [phone] |
+-----------------------+------+-----------------+
 
 
 
 Encounter Details
 
 
+--------+----------+----------------------+----------------------+-------------+
| Date   | Type     | Department           | Care Team            | Description |
+--------+----------+----------------------+----------------------+-------------+
| 07/10/ | Abstract |   [facility]          |   Joel Cottrell,   |             |
|    |          | PHYSIATRY  [address]     | MD  [address]  |             |
|        |          | [place]  [place], |  [place]     |             |
|        |          |  [place] [address]       | [id number] [phone]  |             |
|        |          | [phone]         |  [phone] (Fax)  |             |
+--------+----------+----------------------+----------------------+-------------+
 
 
 
 Social History
 
 
+--------------+-------+-----------+--------+------+
| Tobacco Use  | Types | Packs/Day | Years  | Date |
|              |       |           | Used   |      |
+--------------+-------+-----------+--------+------+
| Never Smoker |       |           |        |      |
+--------------+-------+-----------+--------+------+
 
 
 
+---------------------+---+---+---+
| Smokeless Tobacco:  |   |   |   |
| Never Used          |   |   |   |
+---------------------+---+---+---+
 
 
 
+-------------------------------------------------------------------+
| Comments: her parents both smoked, also worked at [employer] |
+-------------------------------------------------------------------+
 
 
 
+-------------+----------------------+---------+----------+
| Alcohol Use | Drinks/Week          | oz/Week | Comments |
+-------------+----------------------+---------+----------+
| No          |   0 Standard drinks  | 0.0     |          |
|             | or equivalent        |         |          |
+-------------+----------------------+---------+----------+
 
 
 
+------------------+---------------+
 
| Sex Assigned at  | Date Recorded |
| Birth            |               |
+------------------+---------------+
| Not on file      |               |
+------------------+---------------+
 
 
 
+----------------+-------------+-------------+
| Job Start Date | Occupation  | Industry    |
+----------------+-------------+-------------+
| Not on file    | Not on file | Not on file |
+----------------+-------------+-------------+
 
 
 
+----------------+--------------+------------+
| Travel History | Travel Start | Travel End |
+----------------+--------------+------------+
 
 
 
+-------------------------------------+
| No recent travel history available. |
+-------------------------------------+
 documented as of this encounter
 
 Plan of Treatment
 Not on filedocumented as of this encounter
 
 Visit Diagnoses
 Not on filedocumented in this encounter

## 2019-12-04 NOTE — XMS
Encounter Summary
  Created on: 2019
 
 Margaret Ferreira
 External Reference #: [id number]
 : 30
 Sex: Female
 
 Demographics
 
 
+-----------------------+----------------------+
| Address               | [address]        |
|                       | [place]  [address] |
+-----------------------+----------------------+
| Home Phone            | [phone]      |
+-----------------------+----------------------+
| Preferred Language    | Unknown              |
+-----------------------+----------------------+
| Marital Status        |               |
+-----------------------+----------------------+
| Jew Affiliation | Unknown              |
+-----------------------+----------------------+
| Race                  | Unknown              |
+-----------------------+----------------------+
| Ethnic Group          | Unknown              |
+-----------------------+----------------------+
 
 
 Author
 
 
+--------------+--------------------------------------------+
| Author       | [facility]  |
|              | and [place]                                |
+--------------+--------------------------------------------+
| Organization | [facility]  |
|              | and [place]                                |
+--------------+--------------------------------------------+
| Address      | Unknown                                    |
+--------------+--------------------------------------------+
| Phone        | Unavailable                                |
+--------------+--------------------------------------------+
 
 
 
 Support
 
 
+--------------+--------------+---------+-----------------+
| Name         | Relationship | Address | Phone           |
+--------------+--------------+---------+-----------------+
| Lawson Hanna | ECON         | Unknown | [phone] |
+--------------+--------------+---------+-----------------+
 
 
 
 Care Team Providers
 
 
 
+-----------------------+------+-----------------+
| Care Team Member Name | Role | Phone           |
+-----------------------+------+-----------------+
| Calixto Chou MD | PCP  | [phone] |
+-----------------------+------+-----------------+
 
 
 
 Reason for Visit
 
 
+-------------------+----------+
| Reason            | Comments |
+-------------------+----------+
| Medication Refill |          |
+-------------------+----------+
 
 
 
 Encounter Details
 
 
+--------+--------+----------------------+----------------+-------------------+
| Date   | Type   | Department           | Care Team      | Description       |
+--------+--------+----------------------+----------------+-------------------+
| / | Refill |   [facility]          |   [place],  | Medication Refill |
| 2016   |        | PULMONARY  [address]     | Mary PADRON MD  |                   |
|        |        | [place]  [place], |                |                   |
|        |        |  [place] [address]       |                |                   |
|        |        | [phone]         |                |                   |
+--------+--------+----------------------+----------------+-------------------+
 
 
 
 Social History
 
 
+--------------+-------+-----------+--------+------+
| Tobacco Use  | Types | Packs/Day | Years  | Date |
|              |       |           | Used   |      |
+--------------+-------+-----------+--------+------+
| Never Smoker |       |           |        |      |
+--------------+-------+-----------+--------+------+
 
 
 
+---------------------+---+---+---+
| Smokeless Tobacco:  |   |   |   |
| Never Used          |   |   |   |
+---------------------+---+---+---+
 
 
 
+-------------------------------------------------------------------+
| Comments: her parents both smoked, also worked at a [employer] |
+-------------------------------------------------------------------+
 
 
 
 
+-------------+----------------------+---------+----------+
| Alcohol Use | Drinks/Week          | oz/Week | Comments |
+-------------+----------------------+---------+----------+
| No          |   0 Standard drinks  | 0.0     |          |
|             | or equivalent        |         |          |
+-------------+----------------------+---------+----------+
 
 
 
+------------------+---------------+
| Sex Assigned at  | Date Recorded |
| Birth            |               |
+------------------+---------------+
| Not on file      |               |
+------------------+---------------+
 
 
 
+----------------+-------------+-------------+
| Job Start Date | Occupation  | Industry    |
+----------------+-------------+-------------+
| Not on file    | Not on file | Not on file |
+----------------+-------------+-------------+
 
 
 
+----------------+--------------+------------+
| Travel History | Travel Start | Travel End |
+----------------+--------------+------------+
 
 
 
+-------------------------------------+
| No recent travel history available. |
+-------------------------------------+
 documented as of this encounter
 
 Plan of Treatment
 Not on filedocumented as of this encounter
 
 Visit Diagnoses
 Not on filedocumented in this encounter

## 2019-12-04 NOTE — XMS
Encounter Summary
  Created on: 2019
 
 Margaret Ferreira
 External Reference #: [id number]
 : 30
 Sex: Female
 
 Demographics
 
 
+-----------------------+----------------------+
| Address               | [address]        |
|                       | [place]  [address] |
+-----------------------+----------------------+
| Home Phone            | [phone]      |
+-----------------------+----------------------+
| Preferred Language    | Unknown              |
+-----------------------+----------------------+
| Marital Status        |               |
+-----------------------+----------------------+
| Sikhism Affiliation | Unknown              |
+-----------------------+----------------------+
| Race                  | Unknown              |
+-----------------------+----------------------+
| Ethnic Group          | Unknown              |
+-----------------------+----------------------+
 
 
 Author
 
 
+--------------+--------------------------------------------+
| Author       | [facility]  |
|              | and [place]                                |
+--------------+--------------------------------------------+
| Organization | [facility]  |
|              | and [place]                                |
+--------------+--------------------------------------------+
| Address      | Unknown                                    |
+--------------+--------------------------------------------+
| Phone        | Unavailable                                |
+--------------+--------------------------------------------+
 
 
 
 Support
 
 
+--------------+--------------+---------+-----------------+
| Name         | Relationship | Address | Phone           |
+--------------+--------------+---------+-----------------+
| Lawson Hanan | ECON         | Unknown | [phone] |
+--------------+--------------+---------+-----------------+
 
 
 
 Care Team Providers
 
 
 
+-----------------------+------+-----------------+
| Care Team Member Name | Role | Phone           |
+-----------------------+------+-----------------+
| Calixto Chou MD | PCP  | [phone] |
+-----------------------+------+-----------------+
 
 
 
 Encounter Details
 
 
+--------+----------+----------------------+----------------------+-------------+
| Date   | Type     | Department           | Care Team            | Description |
+--------+----------+----------------------+----------------------+-------------+
| 07/10/ | Abstract |   [facility] [place]          |   Joel Cottrell,   |             |
|    |          | PHYSIATRY  [address]     | MD  [address]  |             |
|        |          | [place]  [place], |  [place]     |             |
|        |          |  [place] [address]       | [id number] [phone]  |             |
|        |          | [phone]         |  [phone] (Fax)  |             |
+--------+----------+----------------------+----------------------+-------------+
 
 
 
 Social History
 
 
+--------------+-------+-----------+--------+------+
| Tobacco Use  | Types | Packs/Day | Years  | Date |
|              |       |           | Used   |      |
+--------------+-------+-----------+--------+------+
| Never Smoker |       |           |        |      |
+--------------+-------+-----------+--------+------+
 
 
 
+---------------------+---+---+---+
| Smokeless Tobacco:  |   |   |   |
| Never Used          |   |   |   |
+---------------------+---+---+---+
 
 
 
+-------------------------------------------------------------------+
| Comments: her parents both smoked, also worked at [employer] |
+-------------------------------------------------------------------+
 
 
 
+-------------+----------------------+---------+----------+
| Alcohol Use | Drinks/Week          | oz/Week | Comments |
+-------------+----------------------+---------+----------+
| No          |   0 Standard drinks  | 0.0     |          |
|             | or equivalent        |         |          |
+-------------+----------------------+---------+----------+
 
 
 
+------------------+---------------+
 
| Sex Assigned at  | Date Recorded |
| Birth            |               |
+------------------+---------------+
| Not on file      |               |
+------------------+---------------+
 
 
 
+----------------+-------------+-------------+
| Job Start Date | Occupation  | Industry    |
+----------------+-------------+-------------+
| Not on file    | Not on file | Not on file |
+----------------+-------------+-------------+
 
 
 
+----------------+--------------+------------+
| Travel History | Travel Start | Travel End |
+----------------+--------------+------------+
 
 
 
+-------------------------------------+
| No recent travel history available. |
+-------------------------------------+
 documented as of this encounter
 
 Plan of Treatment
 Not on filedocumented as of this encounter
 
 Visit Diagnoses
 Not on filedocumented in this encounter

## 2019-12-04 NOTE — XMS
Encounter Summary
  Created on: 2019
 
 Margaret Ferreira
 External Reference #: [id number]
 : 30
 Sex: Female
 
 Demographics
 
 
+-----------------------+------------------------+
| Address               | [address]      |
|                       | [place]  [address]   |
+-----------------------+------------------------+
| Home Phone            | [phone]        |
+-----------------------+------------------------+
| Preferred Language    | Unknown                |
+-----------------------+------------------------+
| Marital Status        |                 |
+-----------------------+------------------------+
| Caodaism Affiliation | MET                    |
+-----------------------+------------------------+
| Race                  | White                  |
+-----------------------+------------------------+
| Ethnic Group          | Not  or  |
+-----------------------+------------------------+
 
 
 Author
 
 
+--------------+------------------------------+
| Author       | [facility] |
+--------------+------------------------------+
| Organization | [facility] |
+--------------+------------------------------+
| Address      | Unknown                      |
+--------------+------------------------------+
| Phone        | Unavailable                  |
+--------------+------------------------------+
 
 
 
 Support
 
 
+--------------+--------------+---------------------+-----------------+
| Name         | Relationship | Address             | Phone           |
+--------------+--------------+---------------------+-----------------+
| Lawson Ferreira | ECON         | [address]          | [phone] |
|              |              | [place], [place]   |                 |
|              |              | [address]               |                 |
+--------------+--------------+---------------------+-----------------+
 
 
 
 Care Team Providers
 
 
 
+-----------------------+------+-------------+
| Care Team Member Name | Role | Phone       |
+-----------------------+------+-------------+
 PCP  | Unavailable |
+-----------------------+------+-------------+
 
 
 
 Encounter Details
 
 
+--------+-------------+----------------------+--------------------+-------------+
| Date   | Type        | Department           | Care Team          | Description |
+--------+-------------+----------------------+--------------------+-------------+
| / | Transcribed |   [facility] at  |   Dictation, Other | Transcribed |
|    |             | [facility]  [address]     |                    |             |
|        |             | [address]      |                    |             |
|        |             | Mailcode: OP34  [address]  |                    |             |
|        |             | [address]         |                    |             |
|        |             | [place]  [place],  |                    |             |
|        |             | [place] [address]        |                    |             |
|        |             | [phone]         |                    |             |
+--------+-------------+----------------------+--------------------+-------------+
 
 
 
 Social History
 
 
+----------------+-------+-----------+--------+------+
| Tobacco Use    | Types | Packs/Day | Years  | Date |
|                |       |           | Used   |      |
+----------------+-------+-----------+--------+------+
| Never Assessed |       |           |        |      |
+----------------+-------+-----------+--------+------+
 
 
 
+------------------+---------------+
| Sex Assigned at  | Date Recorded |
| Birth            |               |
+------------------+---------------+
| Not on file      |               |
+------------------+---------------+
 
 
 
+----------------+-------------+-------------+
| Job Start Date | Occupation  | Industry    |
+----------------+-------------+-------------+
| Not on file    | Not on file | Not on file |
+----------------+-------------+-------------+
 
 
 
+----------------+--------------+------------+
| Travel History | Travel Start | Travel End |
+----------------+--------------+------------+
 
 
 
 
+-------------------------------------+
| No recent travel history available. |
+-------------------------------------+
 documented as of this encounter
 
 Progress Notes
 Interface, Transcription In - 2007  5:06 AM PDT  [address] [address] [phone] or [phone]
   Department of Obstetrics and Gynecology, [address]
   [facility]
   Telephone: [phone] Fax: [phone]
  
  
  1996
  
  
  BHUMIKA BOBO MD
  [address] [place] [address]
  
  
  RE: MARGARET FERREIRA
  MR#: [id number]
  
  Dear Dr. Bobo:
  
  I am enclosing some operative reports of your patient, Margaret Ferreira. We
  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
  the surgery was complicated by postoperative hemorrhage necessitating a
  laparotomy. She had a somewhat stormy immediate postoperative course but has
  subsequently done quite well. She now feels more confident about her
  progress. She has previously been under the care of Dr. Frazier, in [place],
  for her hypertension and arthritis, and I have continued to see her
  postoperatively, where her main residual concern has been of urge
  incontinence that troubles her, particularly at nighttime. On today's visit,
  I gave her some Levsinex for use at night to see whether we could suppress
  some of the bladder irritability. She is going to be staying in
  [place] for the next month and was anxious that you should be fully
  informed of the recent medical events in case she needs to consult with you.
  
  Thank you for being available. If you have any questions, I could be reached
  at [phone].
  
  With thanks,
  
  Yours sincerely,
  
  
  
  BRITTANY Perez M.D.
  Professor and ,
  Obstetrics and Gynecology
  
  EPK:ks
 
  D: 96
  T: 96
  C: 96 rh
  
  enclosures (2)
  operative report
  discharge summary
   Electronically signed by Interface, Transcription In at 2007  5:06 AM PDTdocumented
 in this encounter
 
 Plan of Treatment
 Not on filedocumented as of this encounter
 
 Visit Diagnoses
 Not on filedocumented in this encounter

## 2019-12-04 NOTE — XMS
Encounter Summary
  Created on: 2019
 
 Margaret Ferreira
 External Reference #: [id number]
 : 30
 Sex: Female
 
 Demographics
 
 
+-----------------------+----------------------+
| Address               | [address]        |
|                       | [place]  [address] |
+-----------------------+----------------------+
| Home Phone            | [phone]      |
+-----------------------+----------------------+
| Preferred Language    | Unknown              |
+-----------------------+----------------------+
| Marital Status        |               |
+-----------------------+----------------------+
| Christian Affiliation | Unknown              |
+-----------------------+----------------------+
| Race                  | Unknown              |
+-----------------------+----------------------+
| Ethnic Group          | Unknown              |
+-----------------------+----------------------+
 
 
 Author
 
 
+--------------+--------------------------------------------+
| Author       | [facility]  |
|              | and [place]                                |
+--------------+--------------------------------------------+
| Organization | [facility]  |
|              | and [place]                                |
+--------------+--------------------------------------------+
| Address      | Unknown                                    |
+--------------+--------------------------------------------+
| Phone        | Unavailable                                |
+--------------+--------------------------------------------+
 
 
 
 Support
 
 
+--------------+--------------+---------+-----------------+
| Name         | Relationship | Address | Phone           |
+--------------+--------------+---------+-----------------+
| Lawson Hanna | ECON         | Unknown | [phone] |
+--------------+--------------+---------+-----------------+
 
 
 
 Care Team Providers
 
 
 
+-----------------------+------+-----------------+
| Care Team Member Name | Role | Phone           |
+-----------------------+------+-----------------+
| Calixto Chou MD | PCP  | [phone] |
+-----------------------+------+-----------------+
 
 
 
 Reason for Visit
 
 
+--------+----------+
| Reason | Comments |
+--------+----------+
| Other  | PT       |
+--------+----------+
 
 
 
 Encounter Details
 
 
+--------+-----------+----------------------+----------------+-------------+
| Date   | Type      | Department           | Care Team      | Description |
+--------+-----------+----------------------+----------------+-------------+
| / | Telephone |   [facility]          |   [place],  | Other (PT)  |
|    |           | PULMONARY  [address]     | Mary PADRON MD  |             |
|        |           | [place]  [place], |                |             |
|        |           |  [place] [address]       |                |             |
|        |           | [phone]         |                |             |
+--------+-----------+----------------------+----------------+-------------+
 
 
 
 Social History
 
 
+--------------+-------+-----------+--------+------+
| Tobacco Use  | Types | Packs/Day | Years  | Date |
|              |       |           | Used   |      |
+--------------+-------+-----------+--------+------+
| Never Smoker |       |           |        |      |
+--------------+-------+-----------+--------+------+
 
 
 
+---------------------+---+---+---+
| Smokeless Tobacco:  |   |   |   |
| Never Used          |   |   |   |
+---------------------+---+---+---+
 
 
 
+-------------------------------------------------------------------+
| Comments: her parents both smoked, also worked at [employer] |
+-------------------------------------------------------------------+
 
 
 
 
+-------------+----------------------+---------+----------+
| Alcohol Use | Drinks/Week          | oz/Week | Comments |
+-------------+----------------------+---------+----------+
| No          |   0 Standard drinks  | 0.0     |          |
|             | or equivalent        |         |          |
+-------------+----------------------+---------+----------+
 
 
 
+------------------+---------------+
| Sex Assigned at  | Date Recorded |
| Birth            |               |
+------------------+---------------+
| Not on file      |               |
+------------------+---------------+
 
 
 
+----------------+-------------+-------------+
| Job Start Date | Occupation  | Industry    |
+----------------+-------------+-------------+
| Not on file    | Not on file | Not on file |
+----------------+-------------+-------------+
 
 
 
+----------------+--------------+------------+
| Travel History | Travel Start | Travel End |
+----------------+--------------+------------+
 
 
 
+-------------------------------------+
| No recent travel history available. |
+-------------------------------------+
 documented as of this encounter
 
 Plan of Treatment
 Not on filedocumented as of this encounter
 
 Visit Diagnoses
 Not on filedocumented in this encounter

## 2019-12-04 NOTE — XMS
Encounter Summary
  Created on: 2019
 
 Margaret Ferreira
 External Reference #: [id number]
 : 30
 Sex: Female
 
 Demographics
 
 
+-----------------------+----------------------+
| Address               | [address]        |
|                       | [place]  [address] |
+-----------------------+----------------------+
| Home Phone            | [phone]      |
+-----------------------+----------------------+
| Preferred Language    | Unknown              |
+-----------------------+----------------------+
| Marital Status        |               |
+-----------------------+----------------------+
| Zoroastrian Affiliation | Unknown              |
+-----------------------+----------------------+
| Race                  | Unknown              |
+-----------------------+----------------------+
| Ethnic Group          | Unknown              |
+-----------------------+----------------------+
 
 
 Author
 
 
+--------------+--------------------------------------------+
| Author       | [facility]  |
|              | and [place]                                |
+--------------+--------------------------------------------+
| Organization | [facility]  |
|              | and [place]                                |
+--------------+--------------------------------------------+
| Address      | Unknown                                    |
+--------------+--------------------------------------------+
| Phone        | Unavailable                                |
+--------------+--------------------------------------------+
 
 
 
 Support
 
 
+--------------+--------------+---------+-----------------+
| Name         | Relationship | Address | Phone           |
+--------------+--------------+---------+-----------------+
| Lawson Hanna | ECON         | Unknown | [phone] |
+--------------+--------------+---------+-----------------+
 
 
 
 Care Team Providers
 
 
 
+-----------------------+------+-----------------+
| Care Team Member Name | Role | Phone           |
+-----------------------+------+-----------------+
| Calixto Chou MD | PCP  | [phone] |
+-----------------------+------+-----------------+
 
 
 
 Reason for Visit
 
 
+-------------------+----------+
| Reason            | Comments |
+-------------------+----------+
| Medication Refill |          |
+-------------------+----------+
 
 
 
 Encounter Details
 
 
+--------+--------+----------------------+----------------+-------------------+
| Date   | Type   | Department           | Care Team      | Description       |
+--------+--------+----------------------+----------------+-------------------+
| / | Refill |   [facility]          |   [place],  | Medication Refill |
| 2016   |        | PULMONARY  [address]     | Mary PADRON MD  |                   |
|        |        | [place]  [place], |                |                   |
|        |        |  [place] [address]       |                |                   |
|        |        | [phone]         |                |                   |
+--------+--------+----------------------+----------------+-------------------+
 
 
 
 Social History
 
 
+--------------+-------+-----------+--------+------+
| Tobacco Use  | Types | Packs/Day | Years  | Date |
|              |       |           | Used   |      |
+--------------+-------+-----------+--------+------+
| Never Smoker |       |           |        |      |
+--------------+-------+-----------+--------+------+
 
 
 
+---------------------+---+---+---+
| Smokeless Tobacco:  |   |   |   |
| Never Used          |   |   |   |
+---------------------+---+---+---+
 
 
 
+-------------------------------------------------------------------+
| Comments: her parents both smoked, also worked at a [employer] |
+-------------------------------------------------------------------+
 
 
 
 
+-------------+----------------------+---------+----------+
| Alcohol Use | Drinks/Week          | oz/Week | Comments |
+-------------+----------------------+---------+----------+
| No          |   0 Standard drinks  | 0.0     |          |
|             | or equivalent        |         |          |
+-------------+----------------------+---------+----------+
 
 
 
+------------------+---------------+
| Sex Assigned at  | Date Recorded |
| Birth            |               |
+------------------+---------------+
| Not on file      |               |
+------------------+---------------+
 
 
 
+----------------+-------------+-------------+
| Job Start Date | Occupation  | Industry    |
+----------------+-------------+-------------+
| Not on file    | Not on file | Not on file |
+----------------+-------------+-------------+
 
 
 
+----------------+--------------+------------+
| Travel History | Travel Start | Travel End |
+----------------+--------------+------------+
 
 
 
+-------------------------------------+
| No recent travel history available. |
+-------------------------------------+
 documented as of this encounter
 
 Plan of Treatment
 Not on filedocumented as of this encounter
 
 Visit Diagnoses
 Not on filedocumented in this encounter

## 2019-12-04 NOTE — XMS
Encounter Summary
  Created on: 2019
 
 Margaret Ferreira
 External Reference #: [id number]
 : 30
 Sex: Female
 
 Demographics
 
 
+-----------------------+------------------------+
| Address               | [address]      |
|                       | [place]  [address]   |
+-----------------------+------------------------+
| Home Phone            | [phone]        |
+-----------------------+------------------------+
| Preferred Language    | Unknown                |
+-----------------------+------------------------+
| Marital Status        |                 |
+-----------------------+------------------------+
| Anabaptism Affiliation | MET                    |
+-----------------------+------------------------+
| Race                  | White                  |
+-----------------------+------------------------+
| Ethnic Group          | Not  or  |
+-----------------------+------------------------+
 
 
 Author
 
 
+--------------+------------------------------+
| Author       | [facility] |
+--------------+------------------------------+
| Organization | [facility] |
+--------------+------------------------------+
| Address      | Unknown                      |
+--------------+------------------------------+
| Phone        | Unavailable                  |
+--------------+------------------------------+
 
 
 
 Support
 
 
+--------------+--------------+---------------------+-----------------+
| Name         | Relationship | Address             | Phone           |
+--------------+--------------+---------------------+-----------------+
| Lawson Ferreira | ECON         | [address]          | [phone] |
|              |              | [place], [place]   |                 |
|              |              | [address]               |                 |
+--------------+--------------+---------------------+-----------------+
 
 
 
 Care Team Providers
 
 
 
+-----------------------+------+-------------+
| Care Team Member Name | Role | Phone       |
+-----------------------+------+-------------+
 PCP  | Unavailable |
+-----------------------+------+-------------+
 
 
 
 Encounter Details
 
 
+--------+-------------+----------------------+--------------------+-------------+
| Date   | Type        | Department           | Care Team          | Description |
+--------+-------------+----------------------+--------------------+-------------+
| / | Transcribed |   [facility] at  |   Dictation, Other | Transcribed |
|    |             | [facility]  [address]     |                    |             |
|        |             | [address]      |                    |             |
|        |             | Mailcode: OP[address]  [address]  |                    |             |
|        |             | [address]         |                    |             |
|        |             | [place]  [place],  |                    |             |
|        |             | [place] [address]        |                    |             |
|        |             | [phone]         |                    |             |
+--------+-------------+----------------------+--------------------+-------------+
 
 
 
 Social History
 
 
+----------------+-------+-----------+--------+------+
| Tobacco Use    | Types | Packs/Day | Years  | Date |
|                |       |           | Used   |      |
+----------------+-------+-----------+--------+------+
| Never Assessed |       |           |        |      |
+----------------+-------+-----------+--------+------+
 
 
 
+------------------+---------------+
| Sex Assigned at  | Date Recorded |
| Birth            |               |
+------------------+---------------+
| Not on file      |               |
+------------------+---------------+
 
 
 
+----------------+-------------+-------------+
| Job Start Date | Occupation  | Industry    |
+----------------+-------------+-------------+
| Not on file    | Not on file | Not on file |
+----------------+-------------+-------------+
 
 
 
+----------------+--------------+------------+
| Travel History | Travel Start | Travel End |
+----------------+--------------+------------+
 
 
 
 
+-------------------------------------+
| No recent travel history available. |
+-------------------------------------+
 documented as of this encounter
 
 Progress Notes
 Interface, Transcription In - 2007  6:36 AM PDT  [address] [address] [phone] or [phone]
   Department of Obstetrics and Gynecology, [address]
   [facility]
   Phone: [phone] Fax: [phone]
  
  1996
  
  
  
  NICK COLE MD
  [address] [facility]
  [place] [address]
  
  
  RE:Margaret Ferreira
  MR#:[id number]
  
  Dear Doctor Cole:
  
  I saw Margaret Ferreira in the office today for a postoperative visit. Ms. Ferreira's surgery was unfortunately complicated by a massive intraperitoneal
  hemorrhage that occurred approximately twelve hours after surgery was
  completed. It was necessary to take her back to the Operating Room, and
  perform a laparotomy to control the bleeding. No specific bleeding points
  were identified but a large hemoperitoneum and retroplacental clot was
  evacuated. Following this she made a steady recovery and was discharged
  home six days postoperatively. The surgery reports of the primary surgery
  and emergency surgery are accompanying this letter.
  
  She has been staying with her daughter since the surgery and is making slow
  but satisfactory progress. She had some transitory urge incontinence
  postoperatively but this seems to be improving. She was also troubled by
  abdominal discomfort and constipation. Her bowel function is now normal but
  she still has some residual abdominal discomfort. The abdominal wound has
  healed well and vaginal support seems quite adequate, with no evidence of
  infection in the vaginal incisions. She is going to stay in the [place]
  area with her daughter for one more week, before returning to [place].
  
  I much appreciate the original referral of this patient. I am sorry that
  her posoperative course was stormy and complicated but she seems to be doing
  well now and I hope that her further progress will be uncomplicated.
  
  With thanks and best wishes,
  
  
  
  BRITTANY Perez M.D.
 
  Professor and ,
  Obstetrics and Gynecology
  
  EPK/eduardo
  D: 96 T: 96 11:12 P
   Electronically signed by Interface, Transcription In at 2007  6:36 AM PDTdocumented
 in this encounter
 
 Plan of Treatment
 Not on filedocumented as of this encounter
 
 Visit Diagnoses
 Not on filedocumented in this encounter

## 2019-12-04 NOTE — XMS
Encounter Summary
  Created on: 2019
 
 Margaret Ferreira
 External Reference #: [id number]
 : 30
 Sex: Female
 
 Demographics
 
 
+-----------------------+----------------------+
| Address               | [address]        |
|                       | [place]  [address] |
+-----------------------+----------------------+
| Home Phone            | [phone]      |
+-----------------------+----------------------+
| Preferred Language    | Unknown              |
+-----------------------+----------------------+
| Marital Status        |               |
+-----------------------+----------------------+
| Rastafari Affiliation | Unknown              |
+-----------------------+----------------------+
| Race                  | Unknown              |
+-----------------------+----------------------+
| Ethnic Group          | Unknown              |
+-----------------------+----------------------+
 
 
 Author
 
 
+--------------+--------------------------------------------+
| Author       | [facility]  |
|              | and [place]                                |
+--------------+--------------------------------------------+
| Organization | [facility]  |
|              | and [place]                                |
+--------------+--------------------------------------------+
| Address      | Unknown                                    |
+--------------+--------------------------------------------+
| Phone        | Unavailable                                |
+--------------+--------------------------------------------+
 
 
 
 Support
 
 
+--------------+--------------+---------+-----------------+
| Name         | Relationship | Address | Phone           |
+--------------+--------------+---------+-----------------+
| Lawson Hanna | ECON         | Unknown | [phone] |
+--------------+--------------+---------+-----------------+
 
 
 
 Care Team Providers
 
 
 
+-----------------------+------+-----------------+
| Care Team Member Name | Role | Phone           |
+-----------------------+------+-----------------+
| Calixto Chou MD | PCP  | [phone] |
+-----------------------+------+-----------------+
 
 
 
 Reason for Referral
 Evaluate & Treat (Routine)
 
+--------+--------------+-----------+--------------+--------------+---------------+
| Status | Reason       | Specialty | Diagnoses /  | Referred By  | Referred To   |
|        |              |           | Procedures   | Contact      | Contact       |
+--------+--------------+-----------+--------------+--------------+---------------+
| Closed |   Specialty  | Physical  |   Diagnoses  |              |   [facility]       |
|        | Services     | Therapy   |  Leg         | [place],  | [facility]       |
|        | Required     |           | weakness,    | Mary PADRON,   | HOSPITAL      |
|        |              |           | bilateral    | MD  [address]    | [address] |
|        |              |           | Chronic      | [address]    |  [place]          |
|        |              |           | obstructive  | [place], | [place], [place] |
|        |              |           | pulmonary    |  [place] [address]    |  [address]   |
|        |              |           | disease,     |              | Phone:        |
|        |              |           | unspecified  |              | [phone]  |
|        |              |           | COPD type    |              |  Fax:         |
|        |              |           | ([facility])        |              | [phone]  |
|        |              |           | Procedures   |              |               |
|        |              |           | NM PHYS      |              |               |
|        |              |           | THERAPY      |              |               |
|        |              |           | EVALUATION   |              |               |
|        |              |           | NM           |              |               |
|        |              |           | THERAPEUTIC  |              |               |
|        |              |           | EXERCISES    |              |               |
+--------+--------------+-----------+--------------+--------------+---------------+
 
 
 
 
 Reason for Visit
 
 
+--------+--------------------+
| Reason | Comments           |
+--------+--------------------+
| COPD   | 3 month follow up  |
+--------+--------------------+
 
 
 
 Encounter Details
 
 
+--------+---------+----------------------+----------------+----------------------+
| Date   | Type    | Department           | Care Team      | Description          |
+--------+---------+----------------------+----------------+----------------------+
| / | Office  |   [facility] [place]          |   [place],  | Chronic obstructive  |
| 2016   | Visit   | PULMONARY  [address]     | Mary PADRON MD  | pulmonary disease,   |
|        |         | [place], |                | unspecified COPD     |
 
|        |         |  [place] [address]       |                | type (HCC);          |
|        |         | [phone]         |                | Gastroesophageal     |
|        |         |                      |                | reflux disease,      |
|        |         |                      |                | esophagitis presence |
|        |         |                      |                |  not specified; Leg  |
|        |         |                      |                | weakness, bilateral  |
+--------+---------+----------------------+----------------+----------------------+
 
 
 
 Social History
 
 
+--------------+-------+-----------+--------+------+
| Tobacco Use  | Types | Packs/Day | Years  | Date |
|              |       |           | Used   |      |
+--------------+-------+-----------+--------+------+
| Never Smoker |       |           |        |      |
+--------------+-------+-----------+--------+------+
 
 
 
+---------------------+---+---+---+
| Smokeless Tobacco:  |   |   |   |
| Never Used          |   |   |   |
+---------------------+---+---+---+
 
 
 
+-------------------------------------------------------------------+
| Tobacco Cessation: Counseling Given: No                           |
| Comments: her parents both smoked, also worked at a [employer] |
+-------------------------------------------------------------------+
 
 
 
+-------------+----------------------+---------+----------+
| Alcohol Use | Drinks/Week          | oz/Week | Comments |
+-------------+----------------------+---------+----------+
| No          |   0 Standard drinks  | 0.0     |          |
|             | or equivalent        |         |          |
+-------------+----------------------+---------+----------+
 
 
 
+------------------+---------------+
| Sex Assigned at  | Date Recorded |
| Birth            |               |
+------------------+---------------+
| Not on file      |               |
+------------------+---------------+
 
 
 
+----------------+-------------+-------------+
| Job Start Date | Occupation  | Industry    |
+----------------+-------------+-------------+
| Not on file    | Not on file | Not on file |
+----------------+-------------+-------------+
 
 
 
 
+----------------+--------------+------------+
| Travel History | Travel Start | Travel End |
+----------------+--------------+------------+
 
 
 
+-------------------------------------+
| No recent travel history available. |
+-------------------------------------+
 documented as of this encounter
 
 Last Filed Vital Signs
 
 
+-------------------+------------------+----------------------+----------+
| Vital Sign        | Reading          | Time Taken           | Comments |
+-------------------+------------------+----------------------+----------+
| Blood Pressure    | 110/64           | 2016 11:32 AM  |          |
|                   |                  | PDT                  |          |
+-------------------+------------------+----------------------+----------+
| Pulse             | 78               | 2016 11:32 AM  |          |
|                   |                  | PDT                  |          |
+-------------------+------------------+----------------------+----------+
| Temperature       | -                | -                    |          |
+-------------------+------------------+----------------------+----------+
| Respiratory Rate  | -                | -                    |          |
+-------------------+------------------+----------------------+----------+
| Oxygen Saturation | 97%              | 2016 11:32 AM  |          |
|                   |                  | PDT                  |          |
+-------------------+------------------+----------------------+----------+
| Inhaled Oxygen    | -                | -                    |          |
| Concentration     |                  |                      |          |
+-------------------+------------------+----------------------+----------+
| Weight            | 61.2 kg (135 lb) | 2016 11:32 AM  |          |
|                   |                  | PDT                  |          |
+-------------------+------------------+----------------------+----------+
| Height            | 162.6 cm (5' 4") | 2016 11:32 AM  |          |
|                   |                  | PDT                  |          |
+-------------------+------------------+----------------------+----------+
| Body Mass Index   | 23.17            | 2016 11:32 AM  |          |
|                   |                  | PDT                  |          |
+-------------------+------------------+----------------------+----------+
 documented in this encounter
 
 Patient Instructions
 Patient Instructions Mary Astudillo MD - 2016 12:29 PM PDTI am ordering the w
arm water therapy for at the [facility]. 
 
 I would ask the therapist if they can do a wheelchair evaluation through [facility] and 
have them let us know, or call us so we can order one there or here. 
 
 Stay on the Spiriva once daily. I reordered the albuterol (ProAir) to use as needed as well
. 
 
 You might see Dr. Chou about the hand/wrist and see if some other brace might allow you t
o more easily get in and out of the chair. Electronically signed by Mary Astudillo MD
 at 2016 12:32 PM PDT
 documented in this encounter
 
 
 Progress Notes
 Mary Astudillo MD - 2016 11:36 AM PDTFormatting of this note might be differe
nt from the original.
 
 
 Pulmonary Follow Up
 
 HPI 
  
 Margaret Ferreira is a 85 y.o. female patient of Calixto Chou MD here today for foll
ow up of COPD. 
 
 At their last visit, we had ordered Incruse in place of the Spiriva, but this was denied by
 the insurance. Since their last visit she feels like she has been doing overall pretty stab
le. She has not had any acute illnesses.  
 
 She notes that she has not woken up at night with coughing in several months. She does coug
h some when she first wakes up and is bringing up greyish in color. She is not coughing duri
ng the daytime. 
 
 She is currently on a regimen of Spiriva one capsule inhaled daily.  She is not using the r
escue inhaler at all, but thinks she should be using it about 2-3 times a week.  She returns
 today for routine follow up. 
 
 She has been out of breath a couple of times, for example when she is getting ready for bed
, or during the daytime. She would like to get a new rescue inhaler as her current ProAir in
[facility] is quite old. She notes if she sits and rests, her breathing gets better. She has to w
alk with a walker all the time, and is not doing as much walking as she should, mostly becau
se of her legs. She has also had a lot of family stressors, losing a grandchild, and her hus
band having heart surgery recently. She does think physical therapy would help, and would li
ke to do this once her  is more stable. 
 
 She has been evaluated for nocturnal oxygen and does not need to use it.
 
 She sleeps with her head elevated 6 inches at night and is on the omeprazole daily. She is 
not noticing the heartburn right now. 
 
 Past Medical History
 Past Medical History 
 Diagnosis Date 
   COPD (chronic obstructive pulmonary disease) (HCC)  
   Peptic ulcer disease  
   GERD (gastroesophageal reflux disease)  
   Hiatal hernia  
   Hypertension  
   Hypothyroidism  
   Urine incontinence  
   Aortic valve stenosis  
   trivial 
   Hearing loss  
   Osteoarthritis  
   Osteoporosis  
   Plantar wart  
   RLS (restless legs syndrome)  
   Scoliosis  
   Pneumonia  
   Pulmonary nodule  
   Macular degeneration  
   bilateral now 
 
  
  
 Past Surgical History
 Past Surgical History 
 Procedure Laterality Date 
   Jj and bso  1996 
   Cholecystectomy  1996 
   Total hip arthroplasty Left 1989 
   Abscess drainage on calf  2003 
   Bladder suspension   
   Upper gastrointestinal endoscopy  1996 
  
 
 Social History: 
 History 
 
 Social History 
   Marital Status:  
   Spouse Name: N/A 
   Number of Children: N/A 
   Years of Education: N/A 
 
 Occupational History 
     
 
 Social History Main Topics 
   Smoking status: Never Smoker  
   Smokeless tobacco: Never Used 
    Comment: her parents both smoked, also worked at a [employer] 
   Alcohol Use: No 
   Drug Use: No 
   Sexual Activity: Not on file 
 
 Other Topics Concern 
   None 
 
 Social History Narrative 
  Lives: in [place]  
  With: her  
  Grew up: in [place]  
  Has previously lived in: [place], [place] 
   
  Exposure to toxic chemicals: has been exposed to halon (1301) before, they had to evacuate
 the computer room at the time 
  Exposure to asbestos: no 
  Exposure to tuberculosis: no  
  Has had a PPD or Quantiferon before: no 
   
  Has pets at home: no  
  Has ever owned birds: no  
  Other animal exposures: has had a dog and cat before 
   
  Hobbies: playing cards 
    
   
   
 
 Allergies:
 Allergies 
 Allergen Reactions 
 
   Lisinopril  
   Cough 
   
 
 Medications:
 Outpatient Encounter Prescriptions as of 3/22/2016 
 Medication Sig Dispense Refill 
   albuterol (PROAIR HFA) 90 mcg/puff inhaler Inhale 2 puffs into the lungs every 6 hours 
as needed for Wheezing or Shortness of Breath. 1 Inhaler 5 
   benzonatate (TESSALON PERLES) 100 mg capsule Take 100 mg by mouth 3 times daily as need
ed.   
   Calcium Carbonate (CALCIUM 600 PO) Take  by mouth Daily.   
   Cholecalciferol (VITAMIN D-3) 2000 units CAPS Take  by mouth Daily.   
   Docosahexaenoic Acid (DHA OMEGA 3) 100 MG CAPS Take  by mouth.   
   gabapentin (NEURONTIN) 300 mg capsule Take 300 mg by mouth 2 times daily.   
   levothyroxine (SYNTHROID, LEVOTHROID) 150 mcg tablet Take 150 mcg by mouth every mornin
g (before breakfast).   
   Multiple Vitamins-Minerals (MULTIVITAMIN PO) Take  by mouth Daily.   
   [DISCONTINUED] olmesartan-hydrochlorothiazide (BENICAR HCT) 40-25 MG per tablet Take 0.
5 tablets by mouth Daily.   
   omeprazole (PRILOSEC) 20 mg capsule Take 20 mg by mouth every morning (before breakfast
).   
   pramipexole (MIRAPEX) 0.25 mg tablet Take 0.25 mg by mouth nightly.   
   pseudoePHEDrine (SUDAFED) 30 mg tablet Take 30 mg by mouth every 4 hours as needed.   
   Spacer/Aero Chamber Mouthpiece MISC Use with inhaler as directed. 1 each 1 
   tiotropium (SPIRIVA) 18 mcg inhalation capsule Inhale 1 capsule into the lungs Daily. 3
0 capsule 11 
   traMADol (ULTRAM) 50 mg tablet Take 50 mg by mouth every 6 hours as needed.   
 
 No facility-administered encounter medications on file as of 3/22/2016. 
 
 Review of Systems:
 General: 
 []Weight loss/gain (over 10 lbs) []Fever/chills/sweats  []Night sweats
 EENT: 
 []Hearing loss      []Vision loss/change []Sinus congestion/nasal drainage  []Nosebleeds  [
x]Hoarseness - "by the end of the day once in awhile"
 Cardiac: 
 []Chest pain []Palpitations/heart racing  []Swelling of legs/ankles  []Waking up at night s
hort of breath []Difficulty sleeping flat
 Gastrointestinal: 
 []Nausea/vomiting []Difficulty swallowing  [x]Heartburn/acid reflux - on Omeprazole []Loss 
of appetite []Abdominal pain 
 Urologic: 
 []Blood in urine []Frequent urination at night []Burning/painful urination [x]Difficulty wi
th urination - incontinence
 
 Objective 
 
 /64 mmHg | Pulse 78 | Ht 1.626 m (5' 4") | Wt 61.236 kg (135 lb) | BMI 23.16 kg/m2 | 
SpO2 97% | Breastfeeding? No RA
 
 General Appearance:  Alert, cooperative, no distress, appears stated age, in a wheelchair, 
accompanied by her  
 Head:  Normocephalic, without obvious abnormality, atraumatic 
 Eyes:  PERRL, conjunctiva clear, no scleral icterus, EOM's intact 
 Ears:  Normal TM's, external auditory canals, normal acuity 
 Nose: Nares normal, septum midline, mucosa normal 
 Mouth: No oral lesions or exudate 
 Neck: Supple, symmetrical, no adenopathy 
 
 Lungs:   No accessory muscle use, breath sounds are diminished bilaterally with prolongatio
n of the expiratory phase, no wheezes, crackles or rhonchi 
 Chest Wall:  No deformity 
 Heart:  Regular rate and rhythm, no murmur, rub or gallop 
 Abdomen:   Soft, non-tender, non-distended 
 Extremities:  No cyanosis, clubbing, 1+ bilateral lower extremity edema 
 Pulses: Radial pulses 2+ and symmetric 
 Skin: Warm and dry 
 Lymph nodes: Cervical and supraclavicular nodes normal 
 
 Data:
 Immunization History 
 Administered Date(s) Administered 
   INFLUENZA, HIGH DOSE SEASONAL (ADULT) 10/21/2015 
   INFLUENZA, TRIVALENT PRESERVATIVE FREE (PED/ADOL/ADULT) 2014 
   PNEUMOCOCCAL CONJUGATE 13-VALENT (PCV13) 10/12/2015 
   PNEUMOCOCCAL POLYSACCHARIDE 23-VALENT (PPSV23) 2012 
 
 Assessment  
 
   ICD-10-CM ICD-9-CM  
 1. Chronic obstructive pulmonary disease, unspecified COPD type (HCC) J44.9 496 Symptoms elin hernández well controlled on Spiriva alone, with good control of cough and dyspnea (though not very 
active due to leg issues). 
 We will try to get her in to PT to increase her activity. 
 Ambulatory referral to Physical Therapy 
 2. Gastroesophageal reflux disease, esophagitis presence not specified K21.9 530.81 This se
ems to be controlled with her bed elevated, and on medication.  
 3. Leg weakness, bilateral M62.81 729.89 Notes this is a big factor in her walking more. Trevor hernández is interested in doing PT again as she lost a lot of ground with being less active surroun
ding recent events. I referred her to warm water therapy at the [facility], I also suggested a whee
lchair evaluation so she can get in and out the chair more easily without a hand injury (rec
ently sustained). They will inquire if this can be done there, and call. 
 Ambulatory referral to Physical Therapy 
 
 Plan 
 1.Continue Spiriva once daily. 
 2.Referred for warm water therapy at the [facility]. 
 3.Ask if wheelchair evaluation can be done at the [facility], and if not we can order here. 
 4. Continue on omeprazole with lifestyle measures to control reflux as well. 
 5. I asked her to ask Dr. Chou if a brace for her hand/wrist would help the pain so she c
an get out of her wheelchair more easily. 
 
 She was advised to call if new pulmonary symptoms were to develop.
 
 Return to clinic as needed. 
 
 CC: Calixto Chou MD
 
 Portions of this report were transcribed using voice recognition software.  Every effort wa
s made to ensure accuracy; however, inadvertent computerized transcription errors may be pre
sent.
 
 Electronically signed by: Mary Astudillo MD  Electronically signed by Mary Astudillo MD at 2016  1:19 PM PDTdocumented in this encounter
 
 Plan of Treatment
 
 
+----------------------+-------------+--------+----------------------+---------------------+
 
| Name                 | Type        | Priori | Associated Diagnoses | Order Schedule      |
|                      |             | ty     |                      |                     |
+----------------------+-------------+--------+----------------------+---------------------+
| Ambulatory referral  | Outpatient  | Routin |   Leg weakness,      | Ordered: 2016 |
| to Physical Therapy  | Referral    | e      | bilateral  Chronic   |                     |
|                      |             |        | obstructive          |                     |
|                      |             |        | pulmonary disease,   |                     |
|                      |             |        | unspecified COPD     |                     |
|                      |             |        | type (HCC)           |                     |
+----------------------+-------------+--------+----------------------+---------------------+
 documented as of this encounter
 
 Visit Diagnoses
 
 
+------------------------------------------------------------------------------+
| Diagnosis                                                                    |
+------------------------------------------------------------------------------+
|   Chronic obstructive pulmonary disease, unspecified COPD type (HCC)         |
+------------------------------------------------------------------------------+
|   Gastroesophageal reflux disease, esophagitis presence not specified        |
+------------------------------------------------------------------------------+
|   Leg weakness, bilateral  Other musculoskeletal symptoms referable to limbs |
+------------------------------------------------------------------------------+
 documented in this encounter

## 2019-12-04 NOTE — NUR
SHIFT REPORT RECEIVED FROM OWEN ADAMS AT BEDSIDE. PT AWAKE AND RESTING
IN BED. HEMATOMA TO RIGHT FLANK AND LEFT SHIN NOTED AND ALREADY OUTLINED.
SOMEWHAT FIRM TO THE TOUCH.PT
DENIES PAIN AT THIS TIME, WILL MONITOR. FAMILY IN ROOM. ACE WRAP TO RIGHT
FLANK REPOSITIONED IN PLACE WITH HELP FROM BRYAN DRAKE. IV FLUIDS INFUSING AT
85MLS/HR. SITE WNL. NO FURTHER NEEDS, CALL LIGHT IN REACH.

## 2019-12-04 NOTE — XMS
Encounter Summary
  Created on: 2019
 
 Margaret Ferreira
 External Reference #: [id number]
 : 30
 Sex: Female
 
 Demographics
 
 
+-----------------------+----------------------+
| Address               | [address]        |
|                       | [place]  [address] |
+-----------------------+----------------------+
| Home Phone            | [phone]      |
+-----------------------+----------------------+
| Preferred Language    | Unknown              |
+-----------------------+----------------------+
| Marital Status        |               |
+-----------------------+----------------------+
| Mormonism Affiliation | Unknown              |
+-----------------------+----------------------+
| Race                  | Unknown              |
+-----------------------+----------------------+
| Ethnic Group          | Unknown              |
+-----------------------+----------------------+
 
 
 Author
 
 
+--------------+--------------------------------------------+
| Author       | [facility]  |
|              | and [place]                                |
+--------------+--------------------------------------------+
| Organization | [facility]  |
|              | and [place]                                |
+--------------+--------------------------------------------+
| Address      | Unknown                                    |
+--------------+--------------------------------------------+
| Phone        | Unavailable                                |
+--------------+--------------------------------------------+
 
 
 
 Support
 
 
+--------------+--------------+---------+-----------------+
| Name         | Relationship | Address | Phone           |
+--------------+--------------+---------+-----------------+
| Lawson Hanna | ECON         | Unknown | [phone] |
+--------------+--------------+---------+-----------------+
 
 
 
 Care Team Providers
 
 
 
+-----------------------------+------+-----------------+
| Care Team Member Name       | Role | Phone           |
+-----------------------------+------+-----------------+
| Bessy Paulino | PCP  | [phone] |
|  PADONNY                       |      |                 |
+-----------------------------+------+-----------------+
 
 
 
 Reason for Visit
 
 
+-------------------+----------+
| Reason            | Comments |
+-------------------+----------+
| Medication Refill |          |
+-------------------+----------+
 
 
 
 Encounter Details
 
 
+--------+--------+----------------------+---------------------+-------------------+
| Date   | Type   | Department           | Care Team           | Description       |
+--------+--------+----------------------+---------------------+-------------------+
| / | Refill |   [facility]       |   Kvng Ontiveros  | Medication Refill |
|    |        | [facility]    | E, DO  [address]   |                   |
|        |        | [facility]  [address]  | [place], [place]       |                   |
|        |        | [address]  [place],   | [address]          |                   |
|        |        | [place] [address]        | [phone]        |                   |
|        |        | [phone]         | [phone] (Fax)  |                   |
+--------+--------+----------------------+---------------------+-------------------+
 
 
 
 Social History
 
 
+-------------------+-------+-----------+--------+------+
| Tobacco Use       | Types | Packs/Day | Years  | Date |
|                   |       |           | Used   |      |
+-------------------+-------+-----------+--------+------+
| Passive Smoke     |       |           |        |      |
| Exposure - Never  |       |           |        |      |
| Smoker            |       |           |        |      |
+-------------------+-------+-----------+--------+------+
 
 
 
+---------------------+---+---+---+
| Smokeless Tobacco:  |   |   |   |
| Never Used          |   |   |   |
+---------------------+---+---+---+
 
 
 
+-------------+----------------------+---------+----------+
 
| Alcohol Use | Drinks/Week          | oz/Week | Comments |
+-------------+----------------------+---------+----------+
| No          |   0 Standard drinks  | 0.0     |          |
|             | or equivalent        |         |          |
+-------------+----------------------+---------+----------+
 
 
 
+------------------+---------------+
| Sex Assigned at  | Date Recorded |
| Birth            |               |
+------------------+---------------+
| Not on file      |               |
+------------------+---------------+
 
 
 
+----------------+-------------+-------------+
| Job Start Date | Occupation  | Industry    |
+----------------+-------------+-------------+
| Not on file    | Not on file | Not on file |
+----------------+-------------+-------------+
 
 
 
+----------------+--------------+------------+
| Travel History | Travel Start | Travel End |
+----------------+--------------+------------+
 
 
 
+-------------------------------------+
| No recent travel history available. |
+-------------------------------------+
 documented as of this encounter
 
 Plan of Treatment
 Not on filedocumented as of this encounter
 
 Visit Diagnoses
 Not on filedocumented in this encounter

## 2019-12-04 NOTE — XMS
Encounter Summary
  Created on: 2019
 
 Margaret Ferreira
 External Reference #: [id number]
 : 30
 Sex: Female
 
 Demographics
 
 
+-----------------------+----------------------+
| Address               | [address]        |
|                       | [place]  [address] |
+-----------------------+----------------------+
| Home Phone            | [phone]      |
+-----------------------+----------------------+
| Preferred Language    | Unknown              |
+-----------------------+----------------------+
| Marital Status        |               |
+-----------------------+----------------------+
| Catholic Affiliation | Unknown              |
+-----------------------+----------------------+
| Race                  | Unknown              |
+-----------------------+----------------------+
| Ethnic Group          | Unknown              |
+-----------------------+----------------------+
 
 
 Author
 
 
+--------------+--------------------------------------------+
| Author       | [facility]  |
|              | and [place]                                |
+--------------+--------------------------------------------+
| Organization | [facility]  |
|              | and [place]                                |
+--------------+--------------------------------------------+
| Address      | Unknown                                    |
+--------------+--------------------------------------------+
| Phone        | Unavailable                                |
+--------------+--------------------------------------------+
 
 
 
 Support
 
 
+--------------+--------------+---------+-----------------+
| Name         | Relationship | Address | Phone           |
+--------------+--------------+---------+-----------------+
| Lawson Hanna | ECON         | Unknown | [phone] |
+--------------+--------------+---------+-----------------+
 
 
 
 Care Team Providers
 
 
 
+-----------------------+------+-----------------+
| Care Team Member Name | Role | Phone           |
+-----------------------+------+-----------------+
| Calixto Chou MD | PCP  | [phone] |
+-----------------------+------+-----------------+
 
 
 
 Reason for Visit
 
 
+--------+----------+
| Reason | Comments |
+--------+----------+
| COPD   |          |
+--------+----------+
 
 
 
 Encounter Details
 
 
+--------+---------+----------------------+----------------+---------------------+
| Date   | Type    | Department           | Care Team      | Description         |
+--------+---------+----------------------+----------------+---------------------+
| / | Office  |   [facility]          |   Wilda,  | COPD (chronic       |
| 2015   | Visit   | PULMONARY  [address]     | Mary PADRON MD  | obstructive         |
|        |         | [place]  [place], |                | pulmonary disease)  |
|        |         |  [place] [address]       |                | ([facility]); Need for     |
|        |         | [phone]         |                | vaccination with    |
|        |         |                      |                | 13-polyvalent       |
|        |         |                      |                | pneumococcal        |
|        |         |                      |                | conjugate vaccine   |
+--------+---------+----------------------+----------------+---------------------+
 
 
 
 Social History
 
 
+--------------+-------+-----------+--------+------+
| Tobacco Use  | Types | Packs/Day | Years  | Date |
|              |       |           | Used   |      |
+--------------+-------+-----------+--------+------+
| Never Smoker |       |           |        |      |
+--------------+-------+-----------+--------+------+
 
 
 
+-------------------------------------------------------------------+
| Comments: her parents both smoked, also worked at [employer] |
+-------------------------------------------------------------------+
 
 
 
+-------------+-------------+---------+----------+
| Alcohol Use | Drinks/Week | oz/Week | Comments |
+-------------+-------------+---------+----------+
 
| No          |             |         |          |
+-------------+-------------+---------+----------+
 
 
 
+------------------+---------------+
| Sex Assigned at  | Date Recorded |
| Birth            |               |
+------------------+---------------+
| Not on file      |               |
+------------------+---------------+
 
 
 
+----------------+-------------+-------------+
| Job Start Date | Occupation  | Industry    |
+----------------+-------------+-------------+
| Not on file    | Not on file | Not on file |
+----------------+-------------+-------------+
 
 
 
+----------------+--------------+------------+
| Travel History | Travel Start | Travel End |
+----------------+--------------+------------+
 
 
 
+-------------------------------------+
| No recent travel history available. |
+-------------------------------------+
 documented as of this encounter
 
 Last Filed Vital Signs
 
 
+-------------------+----------------------+----------------------+----------+
| Vital Sign        | Reading              | Time Taken           | Comments |
+-------------------+----------------------+----------------------+----------+
| Blood Pressure    | 132/78               | 2015 12:44 PM  |          |
|                   |                      | PDT                  |          |
+-------------------+----------------------+----------------------+----------+
| Pulse             | 107                  | 2015 12:44 PM  |          |
|                   |                      | PDT                  |          |
+-------------------+----------------------+----------------------+----------+
| Temperature       | -                    | -                    |          |
+-------------------+----------------------+----------------------+----------+
| Respiratory Rate  | -                    | -                    |          |
+-------------------+----------------------+----------------------+----------+
| Oxygen Saturation | 96%                  | 2015 12:44 PM  |          |
|                   |                      | PDT                  |          |
+-------------------+----------------------+----------------------+----------+
| Inhaled Oxygen    | -                    | -                    |          |
| Concentration     |                      |                      |          |
+-------------------+----------------------+----------------------+----------+
| Weight            | 62.2 kg (137 lb 3.2  | 2015 12:44 PM  |          |
|                   | oz)                  | PDT                  |          |
+-------------------+----------------------+----------------------+----------+
| Height            | 157.5 cm (5' 2")     | 2015 12:44 PM  |          |
|                   |                      | PDT                  |          |
 
+-------------------+----------------------+----------------------+----------+
| Body Mass Index   | 25.09                | 2015 12:44 PM  |          |
|                   |                      | PDT                  |          |
+-------------------+----------------------+----------------------+----------+
 documented in this encounter
 
 Patient Instructions
 Patient Instructions Mary Astudillo MD - 2015  1:11 PM PDTCall the insurance 
and find out if Incruse would be less expensive than the Spiriva. You can also ask if the Sp
iriva Respimat is covered as it may be easier to use with your hand arthritis. 
 
 Go ahead and stop the Qvar. If you have more breathing problems off of this, let me know. 
 
 I would recommend that you get a Prevnar 13 vaccine. You can get this at Dr. Chou's offic
e or at a local pharmacy. Electronically signed by Mary Astudillo MD at 2015  1
:35 PM PDT
 documented in this encounter
 
 Progress Notes
 Mary Astudillo MD - 2015  1:04 PM PDTFormatting of this note might be differe
nt from the original.
 
 Pulmonary Follow Up
 
 HPI 
  
 Margaret Ferreira is a 84 y.o. female patient of Calixto Chou here today for follow
 up of COPD. 
 
 At their last visit, we had started her on Spiriva, checked a ambulating oximetry, and an o
vernight oximetry. Since their last visit she feels like she has been doing much better. She
 had a cold for a couple of weeks and had to take antibiotics and cold medicine at the end o
. 
 
 She is currently on a regimen of Spiriva one capsule inhaled daily and Qvar twice daily.  S
he does feel like this medication regimen is working for them. She has not used the ProAir a
t all since last seen. She came close to using it twice when she was cooking [place]son. She retu
rns today for routine follow up. 
 
 Currently she is able to walk several hundred feet at her own pace on level ground. She is 
not exercising regularly. She had been exercising up until about a month ago when she got si
ck. 
 
 Other than when she has been sick, since being on the Spiriva, she has not been sick.  She 
has not had hemoptysis.
 
 She has been evaluated for nocturnal oxygen and does not need to use it. She has been evalu
ated for daytime oxygen and does not need to use it. 
 
 She did have a shot in her eye yesterday in [facility]. 
 
 Past Medical History
 Past Medical History 
 Diagnosis Date 
   COPD (chronic obstructive pulmonary disease) (HCC)  
   Peptic ulcer disease  
   GERD (gastroesophageal reflux disease)  
   Hiatal hernia  
   Hypertension  
   Hypothyroidism  
 
   Urine incontinence  
   Aortic valve stenosis  
   trivial 
   Hearing loss  
   Osteoarthritis  
   Osteoporosis  
   Plantar wart  
   RLS (restless legs syndrome)  
   Scoliosis  
   Pneumonia  
   Pulmonary nodule  
   Macular degeneration  
   bilateral now 
  
  
 Past Surgical History
 Past Surgical History 
 Procedure Laterality Date 
   Jj and bso   
   Cholecystectomy   
   Total hip arthroplasty Left  
   Abscess drainage on calf   
   Bladder suspension   
   Upper gastrointestinal endoscopy   
  
 
 Social History: 
 History 
 
 Social History 
   Marital Status:  
   Spouse Name: N/A 
   Number of Children: N/A 
   Years of Education: N/A 
 
 Occupational History 
     
 
 Social History Main Topics 
   Smoking status: Never Smoker  
   Smokeless tobacco: None 
    Comment: her parents both smoked, also worked at a [employer] 
   Alcohol Use: No 
   Drug Use: No 
   Sexual Activity: None 
 
 Other Topics Concern 
   None 
 
 Social History Narrative 
  Lives: in [place]  
  With: her  
  Grew up: in [place]  
  Has previously lived in: [place], [place] 
   
  Exposure to toxic chemicals: has been exposed to halon (1301) before, they had to evacuate
 the computer room at the time 
  Exposure to asbestos: no 
  Exposure to tuberculosis: no  
  Has had a PPD or Quantiferon before: no 
 
   
  Has pets at home: no  
  Has ever owned birds: no  
  Other animal exposures: has had a dog and cat before 
   
  Hobbies: playing cards 
    
   
   
 
 Allergies:
 Allergies 
 Allergen Reactions 
   Lisinopril  
   Cough 
   
 
 Medications:
 Outpatient Encounter Prescriptions as of 3/18/2015 
 Medication Sig Dispense Refill 
   [DISCONTINUED] aclidinium (TUDORZA PRESSAIR) 400 mcg/puff inhaler Inhale 1 puff into th
e lungs 2 times daily.  1 Inhaler  12 
   albuterol (PROAIR HFA) 90 mcg/puff inhaler Inhale 2 puffs into the lungs every 6 hours 
as needed for Wheezing or Shortness of Breath.  1 Inhaler  5 
   beclomethasone (QVAR) 40 mcg/puff inhaler Inhale 2 puffs into the lungs 2 times daily. 
    
   benzonatate (TESSALON PERLES) 100 mg capsule Take 100 mg by mouth 3 times daily as need
ed.     
   Calcium Carbonate (CALCIUM 600 PO) Take  by mouth Daily.     
   Cholecalciferol (VITAMIN D-3) 2000 units CAPS Take  by mouth Daily.     
   diclofenac (VOLTAREN-XR) 100 mg ER tablet Take 100 mg by mouth daily (with breakfast). 
    
   Diclofenac Potassium 50 MG PACK Take  by mouth.     
   Docosahexaenoic Acid (DHA OMEGA 3) 100 MG CAPS Take  by mouth.     
   levothyroxine (SYNTHROID, LEVOTHROID) 150 mcg tablet Take 150 mcg by mouth every mornin
g (before breakfast).     
   Multiple Vitamins-Minerals (MULTIVITAMIN PO) Take  by mouth Daily.     
   olmesartan-hydrochlorothiazide (BENICAR HCT) 40-25 MG per tablet Take 0.5 tablets by mo
uth Daily.     
   omeprazole (PRILOSEC) 20 mg capsule Take 20 mg by mouth every morning (before breakfast
).     
   pramipexole (MIRAPEX) 0.25 mg tablet Take 0.25 mg by mouth 3 times daily.     
   pseudoePHEDrine (SUDAFED) 30 mg tablet Take 30 mg by mouth every 4 hours as needed.    
 
   Spacer/Aero Chamber Mouthpiece MISC Use with inhaler as directed.  1 each  1 
   tiotropium (SPIRIVA HANDIHALER) 18 mcg inhalation capsule Inhale 1 capsule into the biju
gs Daily.  30 capsule  11 
   traMADol (ULTRAM) 50 mg tablet Take 50 mg by mouth every 6 hours as needed.     
 
 No facility-administered encounter medications on file as of 3/18/2015. 
 
 Review of Systems:
 General: 
 []Weight loss/gain (over 10 lbs) []Fever/chills/sweats  []Night sweats
 EENT: 
 [x]Hearing loss - has hearing aides    [x]Vision loss/change- slight []Sinus congestion/rayshawn
al drainage []Nosebleeds  [x]Hoarseness- improved
 Cardiac: 
 [x]Chest pain- mild when she had some shortness of breath []Palpitations/heart racing  []Sw
elling of legs/ankles  []Waking up at night short of breath 
 
 Gastrointestinal: 
 []Nausea/vomiting []Difficulty swallowing []Heartburn/acid reflux []Loss of appetite []Abdo
chriss pain 
 Urologic: 
 []Blood in urine []Frequent urination at night []Burning/painful urination []Difficulty wit
h urination
 
 Objective 
 
 /78  | Pulse 107  | Ht 1.575 m (5' 2")  | Wt 62.234 kg (137 lb 3.2 oz)  | BMI 25.09 k
g/m2    | SpO2 96%  RA
 
 General Appearance:  Alert, cooperative, no distress, appears stated age 
 Head:  Normocephalic, without obvious abnormality, atraumatic 
 Eyes:  PERRL, conjunctiva clear, no scleral icterus, EOM's intact 
 Ears:  Wearing hearing aides, fairly normal acuity 
 Nose: Nares normal, septum midline, mucosa normal 
 Mouth: No oral lesions or exudate 
 Neck: Supple, symmetrical, no adenopathy 
 Lungs:   No accessory muscle use, breath sounds are slightly diminished bilaterally, no whe
ezes, crackles or rhonchi 
 Chest Wall:  No deformity 
 Heart:  Regular rate and rhythm, occasional premature beat, no murmur, rub or gallop 
 Abdomen:   Soft, non-tender, non-distended 
 Extremities:  No cyanosis, clubbing, trace ankle edema bilaterally 
 Pulses: Radial pulses 2+ and symmetric 
 Skin: Warm and dry 
 Lymph nodes: Cervical and supraclavicular nodes normal 
 
 Data:
 Overnight oximetry was done on 2015 on room air and was reviewed and interprete
d in clinic today. It shows she spent 87 minutes with a saturation less than 88 %. Most of t
his time is noted to be artifactual.
 
 Ambulating oximetry was done on 2015 and was reviewed and interpreted in clinic
 today. It shows she desaturated to 92% on exertion.
 
 Immunization History 
 Administered Date(s) Administered 
   PNEUMOCOCCAL POLYSACCHARIDE 23-VALENT (PPSV23) 2012 
   TRIVALENT INFLUENZA, PRESERATIVE FREE (PED/ADOL/ADULT) 2014 
 
 Assessment  
 
 1. COPD (chronic obstructive pulmonary disease) (HCC) - Doing well on Spiriva, though her c
o pay is high. I suggested we stop the Qvar as she did not get much benefit from it. In bryant
tion, I asked her to see if the Spiriva Respimat is covered as this may be easier for her to
 use or to see if Incruse is less expensive. 
 2. Need for vaccination with 13-polyvalent pneumococcal conjugate vaccine - Prevnar 13 give
n today in accordance with updated guidelines.  
 
 Plan 
 1.Continue Spiriva Handihaler for now, explore Spiriva Respimat and Incruse as alternatives
. 
 2. Prevnar 13 given today in accordance with updated guidelines. 
 3.Stop Qvar.
 4. Continue to use albuterol as needed. 
 
 She was advised to call if new pulmonary symptoms were to develop.
 
 
 Return to clinic in 6 months, or sooner with concerns.
 
 CC: Calixto Chou
 
 Portions of this report were transcribed using voice recognition software.  Every effort wa
s made to ensure accuracy; however, inadvertent computerized transcription errors may be pre
sent.
 
 Electronically signed by: Mary Astudillo MD  
 
 Electronically signed by Mary Astudillo MD at 2015  9:10 PM PDTdocumented in t
his encounter
 
 Plan of Treatment
 Not on filedocumented as of this encounter
 
 Visit Diagnoses
 
 
+----------------------------------------------------------------------------------------+
| Diagnosis                                                                              |
+----------------------------------------------------------------------------------------+
|   COPD (chronic obstructive pulmonary disease) (HCC)  Chronic airway obstruction, not  |
| elsewhere classified                                                                   |
+----------------------------------------------------------------------------------------+
|   Need for vaccination with 13-polyvalent pneumococcal conjugate vaccine               |
+----------------------------------------------------------------------------------------+
 documented in this encounter

## 2019-12-04 NOTE — XMS
Clinical Summary
  Created on: 2019
 
 Margaret Ferreira
 External Reference #: [id number]
 : 30
 Sex: Female
 
 Demographics
 
 
+-----------------------+----------------------+
| Address               | [address]        |
|                       | [place]  [address] |
+-----------------------+----------------------+
| Home Phone            | [phone]      |
+-----------------------+----------------------+
| Preferred Language    | Unknown              |
+-----------------------+----------------------+
| Marital Status        |               |
+-----------------------+----------------------+
| Denominational Affiliation | Unknown              |
+-----------------------+----------------------+
| Race                  | Unknown              |
+-----------------------+----------------------+
| Ethnic Group          | Unknown              |
+-----------------------+----------------------+
 
 
 Author
 
 
+--------------+------------------------------------------+
| Author       | [facility] [employer] (Historical as of  |
|              | 19)                                 |
+--------------+------------------------------------------+
| Organization | [facility] [employer] (Historical as of  |
|              | 19)                                 |
+--------------+------------------------------------------+
| Address      | Unknown                                  |
+--------------+------------------------------------------+
| Phone        | Unavailable                              |
+--------------+------------------------------------------+
 
 
 
 Support
 
 
+--------------+--------------+---------+-----------------+
| Name         | Relationship | Address | Phone           |
+--------------+--------------+---------+-----------------+
| Lawson Ferreira | [place]         | Unknown | [phone] |
+--------------+--------------+---------+-----------------+
 
 
 
 Care Team Providers
 
 
 
+--------------------------+------+-----------------+
| Care Team Member Name    | Role | Phone           |
+--------------------------+------+-----------------+
| Bessy Paulino PA-C | PP   | [phone] |
+--------------------------+------+-----------------+
 
 
 
 Allergies
 
 
+----------------+-----------+----------+----------+----------+
| Active Allergy | Reactions | Severity | Noted    | Comments |
|                |           |          | Date     |          |
+----------------+-----------+----------+----------+----------+
| Lisinopril     | Cough     | Low      | 20 |          |
|                |           |          | 17       |          |
+----------------+-----------+----------+----------+----------+
 
 
 
 Current Medications
 
 
+----------------------+----------------------+----------+---------+------+------+-------+
| Prescription         | Sig.                 | Disp.    | Refills | Star | End  | Statu |
|                      |                      |          |         | t    | Date | s     |
|                      |                      |          |         | Date |      |       |
+----------------------+----------------------+----------+---------+------+------+-------+
|   losartan (COZAAR)  |                      |          |         | 04/0 |      | Activ |
| 50 MG tablet         |                      |          |         | 3/20 |      | e     |
|                      |                      |          |         | 17   |      |       |
+----------------------+----------------------+----------+---------+------+------+-------+
|   traMADol (ULTRAM)  |                      |          |         | 04/1 |      | Activ |
| 50 MG tablet         |                      |          |         | 3/20 |      | e     |
|                      |                      |          |         | 17   |      |       |
+----------------------+----------------------+----------+---------+------+------+-------+
|   ibandronate sodium | Inject 3 mg into the |          |         |      |      | Activ |
|  (BONIVA) 3 MG/3ML   |  vein.               |          |         |      |      | e     |
| SOLN injection       |                      |          |         |      |      |       |
+----------------------+----------------------+----------+---------+------+------+-------+
|   Calcium Carbonate  | Take  by mouth.      |          |         |      |      | Activ |
| (CALCIUM 600 PO)     |                      |          |         |      |      | e     |
+----------------------+----------------------+----------+---------+------+------+-------+
|                      | Take  by mouth.      |          |         |      |      | Activ |
| GLUCOSAMINE-CHONDROI |                      |          |         |      |      | e     |
| TIN PO               |                      |          |         |      |      |       |
+----------------------+----------------------+----------+---------+------+------+-------+
|   Omega-3 Fatty      | Take  by mouth.      |          |         |      |      | Activ |
| Acids (OMEGA 3 PO)   |                      |          |         |      |      | e     |
+----------------------+----------------------+----------+---------+------+------+-------+
|   DIPHENHYDRAMINE    | Take  by mouth.      |          |         |      |      | Activ |
| HCL, SLEEP, PO       |                      |          |         |      |      | e     |
+----------------------+----------------------+----------+---------+------+------+-------+
|   gabapentin         | Take 300 mg by mouth |          |         |      |      | Activ |
| (NEURONTIN) 300 MG   |  4 (four) times      |          |         |      |      | e     |
| capsule              | daily.               |          |         |      |      |       |
+----------------------+----------------------+----------+---------+------+------+-------+
 
|   nitrofurantoin     | Take 100 mg by mouth |          |         |      |      | Activ |
| (MACRODANTIN) 100 MG |  daily.              |          |         |      |      | e     |
|  capsule             |                      |          |         |      |      |       |
+----------------------+----------------------+----------+---------+------+------+-------+
|   Multiple           | Take  by mouth.      |          |         |      |      | Activ |
| Vitamins-Minerals    |                      |          |         |      |      | e     |
| (MULTIPLE            |                      |          |         |      |      |       |
| VITAMINS/WOMENS PO)  |                      |          |         |      |      |       |
+----------------------+----------------------+----------+---------+------+------+-------+
|   pramipexole        | Take 0.25 mg by      |          |         |      |      | Activ |
| (MIRAPEX) 0.25 MG    | mouth 2 (two) times  |          |         |      |      | e     |
| tablet               | daily.               |          |         |      |      |       |
+----------------------+----------------------+----------+---------+------+------+-------+
|   levothyroxine      | Take 150 mcg by      |          |         |      |      | Activ |
| (SYNTHROID) 150 MCG  | mouth daily.         |          |         |      |      | e     |
| tablet               |                      |          |         |      |      |       |
+----------------------+----------------------+----------+---------+------+------+-------+
|   acetaminophen      | Take 325 mg by mouth |          |         |      |      | Activ |
| (TYLENOL) 325 MG     |  every 6 (six) hours |          |         |      |      | e     |
| tablet               |  as needed for Pain. |          |         |      |      |       |
+----------------------+----------------------+----------+---------+------+------+-------+
|   albuterol (PROAIR  | Inhale 2 puffs into  |          |         |      |      | Activ |
| HFA) 108 (90 BASE)   | the lungs every 4    |          |         |      |      | e     |
| MCG/ACT inhaler      | (four) hours as      |          |         |      |      |       |
|                      | needed for Wheezing. |          |         |      |      |       |
+----------------------+----------------------+----------+---------+------+------+-------+
|   azithromycin       | TAKE TWO TABLETS BY  |   6      | 0       | 10/0 |      | Activ |
| (ZITHROMAX) 250 MG   | MOUTH NOW then ONE   | tablet   |         | 4/20 |      | e     |
| tabletIndications:   | TABLET DAILY DAY 2-5 |          |         | 17   |      |       |
| Moderate COPD        |                      |          |         |      |      |       |
| (chronic obstructive |                      |          |         |      |      |       |
|  pulmonary disease)  |                      |          |         |      |      |       |
| ([facility])                |                      |          |         |      |      |       |
+----------------------+----------------------+----------+---------+------+------+-------+
|   predniSONE         | Take 2 tabs daily    |   11     | 0       | 10/0 |      | Activ |
| (DELTASONE) 20 MG    | for 3 days, then 1   | tablet   |         | 4/20 |      | e     |
| tabletIndications:   | tab daily for 5 days |          |         | 17   |      |       |
| Moderate COPD        |  then STOP           |          |         |      |      |       |
| (chronic obstructive |                      |          |         |      |      |       |
|  pulmonary disease)  |                      |          |         |      |      |       |
| ([facility])                |                      |          |         |      |      |       |
+----------------------+----------------------+----------+---------+------+------+-------+
|   albuterol (PROAIR  | Inhale 2 puffs into  |   1      | 5       | 02/2 | 02/2 | Activ |
| HFA) 108 (90 Base)   | the lungs every 4    | Inhaler  |         | 0/20 | 0/20 | e     |
| MCG/ACT inhaler      | (four) hours as      |          |         | 19   | 20   |       |
|                      | needed for Wheezing. |          |         |      |      |       |
+----------------------+----------------------+----------+---------+------+------+-------+
|   ANORO ELLIPTA      | Inhale 1 puff into   |   3 each | 3       | 07/ |      | Activ |
| 62.5-25 MCG/INH      | the lungs daily.     |          |         | 4/20 |      | e     |
| inhalerIndications:  |                      |          |         | 19   |      |       |
| Moderate COPD        |                      |          |         |      |      |       |
| (chronic obstructive |                      |          |         |      |      |       |
|  pulmonary disease)  |                      |          |         |      |      |       |
| ([facility])                |                      |          |         |      |      |       |
+----------------------+----------------------+----------+---------+------+------+-------+
 
 
 
 Active Problems
 
 
 
+-----------------------+------------+
| Problem               | Noted Date |
+-----------------------+------------+
| Aortic valve stenosis | 2018 |
+-----------------------+------------+
 
 
 
+--------------------------+
|   Overview:   Overview:  |
| trivial                  |
+--------------------------+
 
 
 
+-------------------------------------------------------------+------------+
| GERD (gastroesophageal reflux disease)                      | 2018 |
+-------------------------------------------------------------+------------+
| Hypertension                                                | 2018 |
+-------------------------------------------------------------+------------+
| Hypothyroidism                                              | 2018 |
+-------------------------------------------------------------+------------+
| Macular degeneration                                        | 2018 |
+-------------------------------------------------------------+------------+
| Osteoarthrosis                                              | 2018 |
+-------------------------------------------------------------+------------+
| RLS (restless legs syndrome)                                | 2018 |
+-------------------------------------------------------------+------------+
| Scoliosis                                                   | 2018 |
+-------------------------------------------------------------+------------+
| Urinary incontinence                                        | 2018 |
+-------------------------------------------------------------+------------+
| Hiatal hernia                                               | 10/04/2017 |
+-------------------------------------------------------------+------------+
| Moderate COPD (chronic obstructive pulmonary disease) (HCC) | 2017 |
+-------------------------------------------------------------+------------+
 
 
 
 Family History
 
 
+-----------------+----------+------+----------+
| Medical History | Relation | Name | Comments |
+-----------------+----------+------+----------+
| COPD            | Father   |      |          |
+-----------------+----------+------+----------+
| Stroke          | Mother   |      |          |
+-----------------+----------+------+----------+
 
 
 
+----------+------+----------+----------+
| Relation | Name | Status   | Comments |
+----------+------+----------+----------+
| Father   |      |  |          |
|          |      |   (Age   |          |
|          |      | 88)      |          |
+----------+------+----------+----------+
 
| Mother   |      |  |          |
|          |      |   (Age   |          |
|          |      | 90)      |          |
+----------+------+----------+----------+
 
 
 
 Social History
 
 
+--------------+-------+-----------+--------+------+
| Tobacco Use  | Types | Packs/Day | Years  | Date |
|              |       |           | Used   |      |
+--------------+-------+-----------+--------+------+
| Never Smoker |       |           |        |      |
+--------------+-------+-----------+--------+------+
 
 
 
+---------------------+---+---+---+
| Smokeless Tobacco:  |   |   |   |
| Never Used          |   |   |   |
+---------------------+---+---+---+
 
 
 
+-------------+-----------+---------+----------+
| Alcohol Use | Drinks/We | oz/Week | Comments |
|             | ek        |         |          |
+-------------+-----------+---------+----------+
| Yes         |           |         |          |
+-------------+-----------+---------+----------+
 
 
 
+------------------+---------------+
| Sex Assigned at  | Date Recorded |
| Birth            |               |
+------------------+---------------+
| Not on file      |               |
+------------------+---------------+
 
 
 
 Last Filed Vital Signs
 
 
+-------------------+---------------------+-------------------------+
| Vital Sign        | Reading             | Time Taken              |
+-------------------+---------------------+-------------------------+
| Blood Pressure    | 133/73              | 2018  9:57 AM PDT |
+-------------------+---------------------+-------------------------+
| Pulse             | 87                  | 2018  9:57 AM PDT |
+-------------------+---------------------+-------------------------+
| Temperature       | 36.8   C (98.2   F) | 2018  9:57 AM PDT |
+-------------------+---------------------+-------------------------+
| Respiratory Rate  | -                   | -                       |
+-------------------+---------------------+-------------------------+
| Oxygen Saturation | 98%                 | 2018  9:57 AM PDT |
+-------------------+---------------------+-------------------------+
 
| Inhaled Oxygen    | -                   | -                       |
| Concentration     |                     |                         |
+-------------------+---------------------+-------------------------+
| Weight            | 61.2 kg (135 lb)    | 2018  9:57 AM PDT |
+-------------------+---------------------+-------------------------+
| Height            | 160 cm (5' 3")      | 2018  9:57 AM PDT |
+-------------------+---------------------+-------------------------+
| Body Mass Index   | 23.91               | 2018  9:57 AM PDT |
+-------------------+---------------------+-------------------------+
 
 
 
 Plan of Treatment
 Not on file
 
 Results
 Not on filefrom Last 3 Months
 
 Insurance
 
 
+-------------------+--------+-------------+------+-------+----------------------+
| Payer             | Benefi | Subscriber  | Type | Phone | Address              |
|                   | t Plan | ID          |      |       |                      |
|                   |  /     |             |      |       |                      |
|                   | Group  |             |      |       |                      |
+-------------------+--------+-------------+------+-------+----------------------+
| MEDICARE          | MEDICA | [id number]  |      |       |   [address]        |
|                   | RE     |             |      |       | [place] [address] |
|                   | IP-OP  |             |      |       |                      |
+-------------------+--------+-------------+------+-------+----------------------+
| [employer] | [employer] | [id number] |      |       |                      |
|                   |        |             |      |       |                      |
|                   | HEALTH |             |      |       |                      |
|                   | CARE - |             |      |       |                      |
|                   |  [employer]  |             |      |       |                      |
+-------------------+--------+-------------+------+-------+----------------------+
 
 
 
+----------------+--------+-------------+--------+-------------+---------------------+
| Guarantor Name | [employer]oun | Relation to | Date   | Phone       | Billing Address     |
|                | t Type |  Patient    | of     |             |                     |
|                |        |             | Birth  |             |                     |
+----------------+--------+-------------+--------+-------------+---------------------+
| MARGARET FERREIRA  | Person | Self        | / |   Home:     |   [address]     |
|                | al/Fam |             | 1930   | [phone] | [place] [address] |
|                | [place]    |             |        | [address]        |                     |
+----------------+--------+-------------+--------+-------------+---------------------+

## 2019-12-04 NOTE — XMS
Encounter Summary
  Created on: 2019
 
 Margaret Ferreira
 External Reference #: [id number]
 : 30
 Sex: Female
 
 Demographics
 
 
+-----------------------+----------------------+
| Address               | [address]        |
|                       | [place]  [address] |
+-----------------------+----------------------+
| Home Phone            | [phone]      |
+-----------------------+----------------------+
| Preferred Language    | Unknown              |
+-----------------------+----------------------+
| Marital Status        |               |
+-----------------------+----------------------+
| Taoism Affiliation | Unknown              |
+-----------------------+----------------------+
| Race                  | Unknown              |
+-----------------------+----------------------+
| Ethnic Group          | Unknown              |
+-----------------------+----------------------+
 
 
 Author
 
 
+--------------+--------------------------------------------+
| Author       | [facility]  |
|              | and [place]                                |
+--------------+--------------------------------------------+
| Organization | [facility]  |
|              | and [place]                                |
+--------------+--------------------------------------------+
| Address      | Unknown                                    |
+--------------+--------------------------------------------+
| Phone        | Unavailable                                |
+--------------+--------------------------------------------+
 
 
 
 Support
 
 
+--------------+--------------+---------+-----------------+
| Name         | Relationship | Address | Phone           |
+--------------+--------------+---------+-----------------+
| Lawson Hanna | ECON         | Unknown | [phone] |
+--------------+--------------+---------+-----------------+
 
 
 
 Care Team Providers
 
 
 
+-----------------------------+------+-----------------+
| Care Team Member Name       | Role | Phone           |
+-----------------------------+------+-----------------+
| Bessy Paulino | PCP  | [phone] |
|  [place]                       |      |                 |
+-----------------------------+------+-----------------+
 
 
 
 Reason for Visit
 
 
+-------------------+----------+
| Reason            | Comments |
+-------------------+----------+
| Medication Refill |          |
+-------------------+----------+
 
 
 
 Encounter Details
 
 
+--------+--------+----------------------+---------------------+-------------------+
| Date   | Type   | Department           | Care Team           | Description       |
+--------+--------+----------------------+---------------------+-------------------+
| / | Refill |   [facility]       |   Kvng Ontiveros  | Medication Refill |
|    |        | [facility]    | E, DO  [address]   |                   |
|        |        | [facility]  [address]  | [place], [place]       |                   |
|        |        | [address]  [place],   | [address]          |                   |
|        |        | [place] [address]        | [phone]        |                   |
|        |        | [phone]         | [phone] (Fax)  |                   |
+--------+--------+----------------------+---------------------+-------------------+
 
 
 
 Social History
 
 
+--------------+-------+-----------+--------+------+
| Tobacco Use  | Types | Packs/Day | Years  | Date |
|              |       |           | Used   |      |
+--------------+-------+-----------+--------+------+
| Never Smoker |       |           |        |      |
+--------------+-------+-----------+--------+------+
 
 
 
+---------------------+---+---+---+
| Smokeless Tobacco:  |   |   |   |
| Never Used          |   |   |   |
+---------------------+---+---+---+
 
 
 
+-------------------------------------------------------------------+
| Comments: her parents both smoked, also worked at a [employer] |
+-------------------------------------------------------------------+
 
 
 
 
+-------------+----------------------+---------+----------+
| Alcohol Use | Drinks/Week          | oz/Week | Comments |
+-------------+----------------------+---------+----------+
| No          |   0 Standard drinks  | 0.0     |          |
|             | or equivalent        |         |          |
+-------------+----------------------+---------+----------+
 
 
 
+------------------+---------------+
| Sex Assigned at  | Date Recorded |
| Birth            |               |
+------------------+---------------+
| Not on file      |               |
+------------------+---------------+
 
 
 
+----------------+-------------+-------------+
| Job Start Date | Occupation  | Industry    |
+----------------+-------------+-------------+
| Not on file    | Not on file | Not on file |
+----------------+-------------+-------------+
 
 
 
+----------------+--------------+------------+
| Travel History | Travel Start | Travel End |
+----------------+--------------+------------+
 
 
 
+-------------------------------------+
| No recent travel history available. |
+-------------------------------------+
 documented as of this encounter
 
 Plan of Treatment
 Not on filedocumented as of this encounter
 
 Visit Diagnoses
 Not on filedocumented in this encounter

## 2019-12-04 NOTE — XMS
Encounter Summary
  Created on: 2019
 
 Margaret Ferreira
 External Reference #: [id number]
 : 30
 Sex: Female
 
 Demographics
 
 
+-----------------------+----------------------+
| Address               | [address]        |
|                       | [place]  [address] |
+-----------------------+----------------------+
| Home Phone            | [phone]      |
+-----------------------+----------------------+
| Preferred Language    | Unknown              |
+-----------------------+----------------------+
| Marital Status        |               |
+-----------------------+----------------------+
| Mormonism Affiliation | Unknown              |
+-----------------------+----------------------+
| Race                  | Unknown              |
+-----------------------+----------------------+
| Ethnic Group          | Unknown              |
+-----------------------+----------------------+
 
 
 Author
 
 
+--------------+--------------------------------------------+
| Author       | [facility]  |
|              | and [place]                                |
+--------------+--------------------------------------------+
| Organization | [facility] [place]  |
|              | and [place]                                |
+--------------+--------------------------------------------+
| Address      | Unknown                                    |
+--------------+--------------------------------------------+
| Phone        | Unavailable                                |
+--------------+--------------------------------------------+
 
 
 
 Support
 
 
+--------------+--------------+---------+-----------------+
| Name         | Relationship | Address | Phone           |
+--------------+--------------+---------+-----------------+
| Lawson Hanna | ECON         | Unknown | [phone] |
+--------------+--------------+---------+-----------------+
 
 
 
 Care Team Providers
 
 
 
+-----------------------+------+-----------------+
| Care Team Member Name | Role | Phone           |
+-----------------------+------+-----------------+
| Calixto Chou MD | PCP  | [phone] |
+-----------------------+------+-----------------+
 
 
 
 Reason for Visit
 
 
+--------+--------------------+
| Reason | Comments           |
+--------+--------------------+
| COPD   | 6 month follow up  |
+--------+--------------------+
 
 
 
 Encounter Details
 
 
+--------+---------+----------------------+----------------+----------------------+
| Date   | Type    | Department           | Care Team      | Description          |
+--------+---------+----------------------+----------------+----------------------+
| / | Office  |   [facility] [place]          |   [place],  | Chronic obstructive  |
| 2015   | Visit   | PULMONARY  [address]     | Mary PADRON MD  | pulmonary disease,   |
|        |         | [place], |                | unspecified COPD     |
|        |         |  [place] [address]       |                | type (HCC); Cough;   |
|        |         | [phone]         |                | Gastroesophageal     |
|        |         |                      |                | reflux disease,      |
|        |         |                      |                | esophagitis presence |
|        |         |                      |                |  not specified; Need |
|        |         |                      |                |  for vaccination     |
|        |         |                      |                | with 13-polyvalent   |
|        |         |                      |                | pneumococcal         |
|        |         |                      |                | conjugate vaccine    |
+--------+---------+----------------------+----------------+----------------------+
 
 
 
 Social History
 
 
+--------------+-------+-----------+--------+------+
| Tobacco Use  | Types | Packs/Day | Years  | Date |
|              |       |           | Used   |      |
+--------------+-------+-----------+--------+------+
| Never Smoker |       |           |        |      |
+--------------+-------+-----------+--------+------+
 
 
 
+---------------------+---+---+---+
| Smokeless Tobacco:  |   |   |   |
| Never Used          |   |   |   |
+---------------------+---+---+---+
 
 
 
 
+-------------------------------------------------------------------+
| Tobacco Cessation: Counseling Given: No                           |
| Comments: her parents both smoked, also worked at [employer] |
+-------------------------------------------------------------------+
 
 
 
+-------------+----------------------+---------+----------+
| Alcohol Use | Drinks/Week          | oz/Week | Comments |
+-------------+----------------------+---------+----------+
| No          |   0 Standard drinks  | 0.0     |          |
|             | or equivalent        |         |          |
+-------------+----------------------+---------+----------+
 
 
 
+------------------+---------------+
| Sex Assigned at  | Date Recorded |
| Birth            |               |
+------------------+---------------+
| Not on file      |               |
+------------------+---------------+
 
 
 
+----------------+-------------+-------------+
| Job Start Date | Occupation  | Industry    |
+----------------+-------------+-------------+
| Not on file    | Not on file | Not on file |
+----------------+-------------+-------------+
 
 
 
+----------------+--------------+------------+
| Travel History | Travel Start | Travel End |
+----------------+--------------+------------+
 
 
 
+-------------------------------------+
| No recent travel history available. |
+-------------------------------------+
 documented as of this encounter
 
 Last Filed Vital Signs
 
 
+-------------------+------------------+----------------------+----------+
| Vital Sign        | Reading          | Time Taken           | Comments |
+-------------------+------------------+----------------------+----------+
| Blood Pressure    | 110/64           | 2015 12:42 PM  |          |
|                   |                  | PST                  |          |
+-------------------+------------------+----------------------+----------+
| Pulse             | 76               | 2015 12:42 PM  |          |
|                   |                  | PST                  |          |
+-------------------+------------------+----------------------+----------+
| Temperature       | -                | -                    |          |
+-------------------+------------------+----------------------+----------+
 
| Respiratory Rate  | -                | -                    |          |
+-------------------+------------------+----------------------+----------+
| Oxygen Saturation | 95%              | 2015 12:42 PM  |          |
|                   |                  | PST                  |          |
+-------------------+------------------+----------------------+----------+
| Inhaled Oxygen    | -                | -                    |          |
| Concentration     |                  |                      |          |
+-------------------+------------------+----------------------+----------+
| Weight            | 63.5 kg (140 lb) | 2015 12:42 PM  |          |
|                   |                  | PST                  |          |
+-------------------+------------------+----------------------+----------+
| Height            | 162.6 cm (5' 4") | 2015 12:42 PM  |          |
|                   |                  | PST                  |          |
+-------------------+------------------+----------------------+----------+
| Body Mass Index   | 24.03            | 2015 12:42 PM  |          |
|                   |                  | PST                  |          |
+-------------------+------------------+----------------------+----------+
 documented in this encounter
 
 Patient Instructions
 Patient Instructions Mary Astudillo MD - 2015  1:52 PM PSTI would try to cut 
the coffee back to 1 cup in the morning. I would also skip the ice cream before bed time. If
 you want it, you should eat supper a little earlier, and then eat the ice cream 4 hours bef
ore bedtime. Try making your lunch the biggest meal of the day, then eat a small dinner garcia
y, then have the ice cream at 6:30pm. 
 
 Go ahead and stop the Qvar. If you have symptoms, use the ProAir (red one).
 
 When you run out of your current Spiriva, start on Incruse Ellipta 1 inhalation once daily.
 If this is not covered, let us know and we will try to find something else. 
 
 When you see Dr. Chou next, make sure you get the Prevnar.Electronically signed by Marty Astudillo MD at 2015  2:02 PM PST
 documented in this encounter
 
 Progress Notes
 Mary Astudillo MD - 2015 12:47 PM PSTFormatting of this note might be differe
nt from the original.
 
 
 Pulmonary Follow Up
 
 HPI 
  
 Margaret Ferreira is a 85 y.o. female patient of Calixto Chou MD here today for foll
ow up of COPD. 
 
 At their last visit, we had continued her on Spiriva. She trialed stopping this in July as 
she was having difficulty walking due to hip and knee issues, and she thought the Spiriva mi
ght be causing it. She went back on it as this did not seem to get better off of this. 
 
 She has had a few episodes of difficulty breathing in the evenings when it was cold out. Trevor hernández used her Qvar inhaler, and felt like it helped her. She had been busy during the daytime, 
and usually if she sits and rests, this will improve, but a couple of times, this did not, s
o she used the Qvar, and she did not feel like she was short of breath that night. She does 
also have ProAir in the car. 
 
 She is currently on a regimen of Spriva once daily. She has never tried using the ProAir as
 she was uncertain if she should use this or the Qvar for acute symptoms. She returns today 
for routine follow up. 
 
 
 Currently she is able to walk very little at her own pace on level ground. She is not exerc
ising regularly. She has limited her walking because of her hips and knees, which just don't
 work. She was doing physical therapy in the water, which is on hold right now for physical 
therapy. 
 
 She has been coughing in the night. She also coughs in the morning when she first gets up a
nd then it subsides. In the night when she coughs, she has one time had to get up and vomit 
up her stomach contents. She does have a lot of mucous production, which is cloudy/clear. Trevor hernández has not had hemoptysis. 
 
 She has been evaluated for nocturnal oxygen and does not need to use it.
 
 She does not have symptoms of heartburn or reflux. She takes omeprazole once daily for this
. She sleeps with the head of the bed elevated 6 inches. She eats dinner about 4 hours befor
e bedtime, and about 50% of the time eats ice cream before bedtime. She does drink 2-3 cups 
of coffee in the morning. 
 
 Past Medical History
 Past Medical History 
 Diagnosis Date 
   COPD (chronic obstructive pulmonary disease) (HCC)  
   Peptic ulcer disease  
   GERD (gastroesophageal reflux disease)  
   Hiatal hernia  
   Hypertension  
   Hypothyroidism  
   Urine incontinence  
   Aortic valve stenosis  
   trivial 
   Hearing loss  
   Osteoarthritis  
   Osteoporosis  
   Plantar wart  
   RLS (restless legs syndrome)  
   Scoliosis  
   Pneumonia  
   Pulmonary nodule  
   Macular degeneration  
   bilateral now 
   
 Past Surgical History
 Past Surgical History 
 Procedure Laterality Date 
   Jj and bso   
   Cholecystectomy   
   Total hip arthroplasty Left  
   Abscess drainage on calf   
   Bladder suspension   
   Upper gastrointestinal endoscopy   
  
 Social History: 
 History 
 
 Social History 
   Marital Status:  
   Spouse Name: N/A 
   Number of Children: N/A 
   Years of Education: N/A 
 
 
 Occupational History 
     
 
 Social History Main Topics 
   Smoking status: Never Smoker  
   Smokeless tobacco: Never Used 
    Comment: her parents both smoked, also worked at a [employer] 
   Alcohol Use: No 
   Drug Use: No 
   Sexual Activity: None 
 
 Other Topics Concern 
   None 
 
 Social History Narrative 
  Lives: in [place]  
  With: her  
  Grew up: in [place]  
  Has previously lived in: [place], [place] 
   
  Exposure to toxic chemicals: has been exposed to halon (1301) before, they had to evacuate
 the computer room at the time 
  Exposure to asbestos: no 
  Exposure to tuberculosis: no  
  Has had a PPD or Quantiferon before: no 
   
  Has pets at home: no  
  Has ever owned birds: no  
  Other animal exposures: has had a dog and cat before 
   
  Hobbies: playing cards 
    
   
   
 
 Allergies:
 Allergies 
 Allergen Reactions 
   Lisinopril  
   Cough 
   
 Medications:
 Outpatient Encounter Prescriptions as of 2015 
 Medication Sig Dispense Refill 
   albuterol (PROAIR HFA) 90 mcg/puff inhaler Inhale 2 puffs into the lungs every 6 hours 
as needed for Wheezing or Shortness of Breath. 1 Inhaler 5 
   benzonatate (TESSALON PERLES) 100 mg capsule Take 100 mg by mouth 3 times daily as need
ed.   
   Calcium Carbonate (CALCIUM 600 PO) Take  by mouth Daily.   
   Cholecalciferol (VITAMIN D-3) 2000 units CAPS Take  by mouth Daily.   
   [DISCONTINUED] diclofenac (VOLTAREN-XR) 100 mg ER tablet Take 100 mg by mouth daily (wi
th breakfast).   
   [DISCONTINUED] Diclofenac Potassium 50 MG PACK Take  by mouth.   
   Docosahexaenoic Acid (DHA OMEGA 3) 100 MG CAPS Take  by mouth.   
   gabapentin (NEURONTIN) 300 mg capsule Take 300 mg by mouth 2 times daily.   
   levothyroxine (SYNTHROID, LEVOTHROID) 150 mcg tablet Take 150 mcg by mouth every mornin
g (before breakfast).   
   Multiple Vitamins-Minerals (MULTIVITAMIN PO) Take  by mouth Daily.   
   olmesartan-hydrochlorothiazide (BENICAR HCT) 40-25 MG per tablet Take 0.5 tablets by mo
uth Daily.   
 
   omeprazole (PRILOSEC) 20 mg capsule Take 20 mg by mouth every morning (before breakfast
).   
   pramipexole (MIRAPEX) 0.25 mg tablet Take 0.25 mg by mouth 3 times daily.   
   pseudoePHEDrine (SUDAFED) 30 mg tablet Take 30 mg by mouth every 4 hours as needed.   
   Spacer/Aero Chamber Mouthpiece MISC Use with inhaler as directed. 1 each 1 
   tiotropium (SPIRIVA HANDIHALER) 18 mcg inhalation capsule Inhale 1 capsule into the biju
gs Daily. 30 capsule 11 
   traMADol (ULTRAM) 50 mg tablet Take 50 mg by mouth every 6 hours as needed.   
 
 No facility-administered encounter medications on file as of 2015. 
 
 Review of Systems:
 General: 
 []Weight loss/gain (over 10 lbs) []Fever/chills/sweats  []Night sweats
 EENT: 
 []Hearing loss      []Vision loss/change []Sinus congestion/nasal drainage []Nosebleeds  []
Hoarseness
 Cardiac: 
 []Chest pain []Palpitations/heart racing  [x]Swelling of legs/ankles  []Waking up at night 
short of breath []Difficulty sleeping flat
 Gastrointestinal: 
 []Nausea/vomiting []Difficulty swallowing  []Heartburn/acid reflux []Loss of appetite  []Ab
dominal pain 
 Urologic: 
 []Blood in urine []Frequent urination at night []Burning/painful urination []Difficulty wit
h urination
 
 Objective 
 
 /64 mmHg | Pulse 76 | Ht 1.626 m (5' 4") | Wt 63.504 kg (140 lb) | BMI 24.02 kg/m2 | 
SpO2 95% | Breastfeeding? No RA
 
 General Appearance:  Alert, cooperative, no distress, appears stated age 
 Head:  Normocephalic, without obvious abnormality, atraumatic 
 Eyes:  PERRL, conjunctiva clear, no scleral icterus, EOM's intact 
 Ears:  Normal TM's, external auditory canals, normal acuity 
 Nose: Nares normal, septum midline, mucosa normal 
 Mouth: No oral lesions or exudate 
 Neck: Supple, symmetrical, no adenopathy 
 Lungs:   No accessory muscle use, breath sounds are diminished bilaterally with prolongatio
n of the expiratory phase, no wheezes, crackles or rhonchi 
 Chest Wall:  No deformity 
 Heart:  Regular rate and rhythm, no murmur, rub or gallop 
 Abdomen:   Soft, non-tender, non-distended 
 Extremities:  No cyanosis, clubbing, 1+ bilateral lower extremity edema 
 Pulses: Radial pulses 2+ and symmetric 
 Skin: Warm and dry 
 Lymph nodes: Cervical and supraclavicular nodes normal 
 
 Data:
 Immunization History 
 Administered Date(s) Administered 
   INFLUENZA, HIGH DOSE SEASONAL (ADULT) 10/21/2015 
   INFLUENZA, TRIVALENT PRESERVATIVE FREE (PED/ADOL/ADULT) 2014 
   PNEUMOCOCCAL POLYSACCHARIDE 23-VALENT (PPSV23) 2012 
  
 Assessment  
 
   ICD-10-CM ICD-9-CM  
 1. Chronic obstructive pulmonary disease, unspecified COPD type (HCC) J44.9 496 This seems 
 
well controlled on Spiriva once daily in terms of dyspnea.  
 2. Cough R05 786.2 Has had cough occuring recently on and off, with some mucous production 
with no associated illness. Given that it is nocturnal and morning in occurrence, without pu
rulent sputum, dyspnea or fever, I would not say she is having an exacerbation. I do wonder 
if she is having worsening silent nocturnal reflux as a side effect of the Spiriva, which is
 a known possibility. 
 Though reluctant, I suggested trying switching this to Incruse in the same class, if covere
d by her insurance, to see if the cough improves as being a newer generation agent, it repor
tedly has less side effects like heartburn.  
 3. Gastroesophageal reflux disease, esophagitis presence not specified K21.9 530.81 On once
 daily omeprazole. She has had post tussive emesis, unclear if this was reflux related. She 
does drink a fair amount of coffee and often eats ice cream before bedtime, which we address
ed.  
 4. Need for vaccination with 13-polyvalent pneumococcal conjugate vaccine Z23 V03.82 Discus
sed today and she notes Dr. Chou had mentioned she needed this at her next appointment at 
his office, so we will defer to his next visit, so she can keep her vaccine records all in o
ne place.  
 
 Plan 
 1.Discussed anti reflux measures. 
 2.Change Spiriva to Incruse one inhalation once daily. 
 3.Stop the Qvar.
 4. Use the ProAir as needed. 
 5. Get Prevnar vaccine at next appointment with Dr. Chou. 
 
 She was advised to call if new pulmonary symptoms were to develop.
 
 Return to clinic in 3 months, or sooner with concerns.
 
 CC: Calixto Chou MD
 
 Portions of this report were transcribed using voice recognition software.  Every effort wa
s made to ensure accuracy; however, inadvertent computerized transcription errors may be pre
sent.
 
 Electronically signed by: Mary Astudillo MD  Electronically signed by Mary Astudillo MD at 2015  3:57 PM PSTdocumented in this encounter
 
 Plan of Treatment
 Not on filedocumented as of this encounter
 
 Visit Diagnoses
 
 
+--------------------------------------------------------------------------+
| Diagnosis                                                                |
+--------------------------------------------------------------------------+
|   Chronic obstructive pulmonary disease, unspecified COPD type (HCC)     |
+--------------------------------------------------------------------------+
|   Cough                                                                  |
+--------------------------------------------------------------------------+
|   Gastroesophageal reflux disease, esophagitis presence not specified    |
+--------------------------------------------------------------------------+
|   Need for vaccination with 13-polyvalent pneumococcal conjugate vaccine |
+--------------------------------------------------------------------------+
 documented in this encounter

## 2019-12-04 NOTE — XMS
Encounter Summary
  Created on: 2019
 
 Margaret Ferreira
 External Reference #: [id number]
 : 30
 Sex: Female
 
 Demographics
 
 
+-----------------------+------------------------+
| Address               | [address]      |
|                       | [place]  [address]   |
+-----------------------+------------------------+
| Home Phone            | [phone]        |
+-----------------------+------------------------+
| Preferred Language    | Unknown                |
+-----------------------+------------------------+
| Marital Status        |                 |
+-----------------------+------------------------+
| Spiritism Affiliation | MET                    |
+-----------------------+------------------------+
| Race                  | White                  |
+-----------------------+------------------------+
| Ethnic Group          | Not  or  |
+-----------------------+------------------------+
 
 
 Author
 
 
+--------------+------------------------------+
| Author       | [facility] |
+--------------+------------------------------+
| Organization | [facility] |
+--------------+------------------------------+
| Address      | Unknown                      |
+--------------+------------------------------+
| Phone        | Unavailable                  |
+--------------+------------------------------+
 
 
 
 Support
 
 
+--------------+--------------+---------------------+-----------------+
| Name         | Relationship | Address             | Phone           |
+--------------+--------------+---------------------+-----------------+
| Lawson Ferreira | ECON         | [address]          | [phone] |
|              |              | [place], [place]   |                 |
|              |              | [address]               |                 |
+--------------+--------------+---------------------+-----------------+
 
 
 
 Care Team Providers
 
 
 
+-----------------------+------+-------------+
| Care Team Member Name | Role | Phone       |
+-----------------------+------+-------------+
 PCP  | Unavailable |
+-----------------------+------+-------------+
 
 
 
 Encounter Details
 
 
+--------+-------------+----------------------+--------------------+-------------+
| Date   | Type        | Department           | Care Team          | Description |
+--------+-------------+----------------------+--------------------+-------------+
| / | Transcribed |   All[facility] Clinic at  |   Dictation, Other | Transcribed |
|    |             | [facility]  [address]     |                    |             |
|        |             | [address]      |                    |             |
|        |             | Mailcode: [address]  |                    |             |
|        |             | [address]         |                    |             |
|        |             | [place]  [place],  |                    |             |
|        |             | [place] [address]        |                    |             |
|        |             | [phone]         |                    |             |
+--------+-------------+----------------------+--------------------+-------------+
 
 
 
 Social History
 
 
+----------------+-------+-----------+--------+------+
| Tobacco Use    | Types | Packs/Day | Years  | Date |
|                |       |           | Used   |      |
+----------------+-------+-----------+--------+------+
| Never Assessed |       |           |        |      |
+----------------+-------+-----------+--------+------+
 
 
 
+------------------+---------------+
| Sex Assigned at  | Date Recorded |
| Birth            |               |
+------------------+---------------+
| Not on file      |               |
+------------------+---------------+
 
 
 
+----------------+-------------+-------------+
| Job Start Date | Occupation  | Industry    |
+----------------+-------------+-------------+
| Not on file    | Not on file | Not on file |
+----------------+-------------+-------------+
 
 
 
+----------------+--------------+------------+
| Travel History | Travel Start | Travel End |
+----------------+--------------+------------+
 
 
 
 
+-------------------------------------+
| No recent travel history available. |
+-------------------------------------+
 documented as of this encounter
 
 Progress Notes
 Interface, Transcription In - 2007  3:07 AM PDT  [address] [address] [phone] or [phone]
  
  1996
  
  
  GWEN RAMESH MD
   [address] [facility] [place] [address]
  
  
  RE:Margaret Ferreira
  MR#:[id number]
  
  Dear Dr. Ramesh:
  
  I saw your patient, Margaret Ferreira, again recently on her return from
  [place]. Unfortunately, she continues to be troubled by some urge
  type incontinence which interestingly does not occur at all during the
  daytime and only affects her when she wakes during the night and occurs when
  she moves out of bed to go to the bathroom. There is nothing in her history
  to suggest a stress type component.
  
  Examination shows that she does have some descensus of the anterior vaginal
  wall and urethra. The vaginal vault itself is very well supported.
  
  I do not feel at this stage that this type of incontinence could be well
  managed by any further surgical procedure. She continues to be concerned
  about some generalized abdominal discomfort, and she also is concerned about
  some edema of both legs that was more marked while she was active in
  [place]. I have prescribed some Levsinex at nighttime as a bladder
  "sedative" as she did notice some improvement from this when she was using
  it before. Additionally, I have encouraged her to go back on to Premarin
  which she previously discontinued because of breast tenderness. I think in
  view of her quite marked bladder symptoms that a resumption of Premarin
  therapy would be appropriate. She also has spent some time with the nurse
  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
  instability, and I hope that she was able to give her some help.
  
  I am anxious to stay in touch with Ms. Ferreira if this problem is not
  resolved and will be available at any time if you feel that we need to
  reevaluate the situation on her behalf. Thanks for your help and best
  wishes.
  
  Yours sincerely,
  
  
 
  
  BRITTANY Perez M.D.
  Professor and ,
  Obstetrics and Gynecology
  
  EPK:ash
  D: 96 T: 96 C:96 rh
   Electronically signed by Interface, Transcription In at 2007  3:07 AM PDTdocumented
 in this encounter
 
 Plan of Treatment
 Not on filedocumented as of this encounter
 
 Visit Diagnoses
 Not on filedocumented in this encounter

## 2019-12-04 NOTE — XMS
Encounter Summary
  Created on: 2019
 
 Margaret Ferreira
 External Reference #: [id number]
 : 30
 Sex: Female
 
 Demographics
 
 
+-----------------------+----------------------+
| Address               | [address]        |
|                       | [place]  [address] |
+-----------------------+----------------------+
| Home Phone            | [phone]      |
+-----------------------+----------------------+
| Preferred Language    | Unknown              |
+-----------------------+----------------------+
| Marital Status        |               |
+-----------------------+----------------------+
| Restoration Affiliation | Unknown              |
+-----------------------+----------------------+
| Race                  | Unknown              |
+-----------------------+----------------------+
| Ethnic Group          | Unknown              |
+-----------------------+----------------------+
 
 
 Author
 
 
+--------------+--------------------------------------------+
| Author       | [facility]  |
|              | and [place]                                |
+--------------+--------------------------------------------+
| Organization | [facility]  |
|              | and [place]                                |
+--------------+--------------------------------------------+
| Address      | Unknown                                    |
+--------------+--------------------------------------------+
| Phone        | Unavailable                                |
+--------------+--------------------------------------------+
 
 
 
 Support
 
 
+--------------+--------------+---------+-----------------+
| Name         | Relationship | Address | Phone           |
+--------------+--------------+---------+-----------------+
| Lawson Hanna | ECON         | Unknown | [phone] |
+--------------+--------------+---------+-----------------+
 
 
 
 Care Team Providers
 
 
 
+-----------------------+------+-----------------+
| Care Team Member Name | Role | Phone           |
+-----------------------+------+-----------------+
| Calixto Chou MD | PCP  | [phone] |
+-----------------------+------+-----------------+
 
 
 
 Reason for Visit
 
 
+---------+---------------------+
| Reason  | Comments            |
+---------+---------------------+
| Results | exertional oximetry |
+---------+---------------------+
 
 
 
 Encounter Details
 
 
+--------+-----------+----------------------+----------------+----------------------+
| Date   | Type      | Department           | Care Team      | Description          |
+--------+-----------+----------------------+----------------+----------------------+
| / | Telephone |   [facility]  [place]          |   [place],  | Results (exertional  |
|    |           | PULMONARY  [address]     | Mary PADRON MD  | oximetry)            |
|        |           | [place]  [place], |                |                      |
|        |           |  [place] [address]       |                |                      |
|        |           | [phone]         |                |                      |
+--------+-----------+----------------------+----------------+----------------------+
 
 
 
 Social History
 
 
+--------------+-------+-----------+--------+------+
| Tobacco Use  | Types | Packs/Day | Years  | Date |
|              |       |           | Used   |      |
+--------------+-------+-----------+--------+------+
| Never Smoker |       |           |        |      |
+--------------+-------+-----------+--------+------+
 
 
 
+-------------------------------------------------------------------+
| Comments: her parents both smoked, also worked at [employer] |
+-------------------------------------------------------------------+
 
 
 
+-------------+-------------+---------+----------+
| Alcohol Use | Drinks/Week | oz/Week | Comments |
+-------------+-------------+---------+----------+
| No          |             |         |          |
+-------------+-------------+---------+----------+
 
 
 
 
+------------------+---------------+
| Sex Assigned at  | Date Recorded |
| Birth            |               |
+------------------+---------------+
| Not on file      |               |
+------------------+---------------+
 
 
 
+----------------+-------------+-------------+
| Job Start Date | Occupation  | Industry    |
+----------------+-------------+-------------+
| Not on file    | Not on file | Not on file |
+----------------+-------------+-------------+
 
 
 
+----------------+--------------+------------+
| Travel History | Travel Start | Travel End |
+----------------+--------------+------------+
 
 
 
+-------------------------------------+
| No recent travel history available. |
+-------------------------------------+
 documented as of this encounter
 
 Plan of Treatment
 Not on filedocumented as of this encounter
 
 Visit Diagnoses
 Not on filedocumented in this encounter

## 2019-12-04 NOTE — XMS
Encounter Summary
  Created on: 2019
 
 Margaret Ferreira
 External Reference #: [id number]
 : 30
 Sex: Female
 
 Demographics
 
 
+-----------------------+------------------------+
| Address               | [address]      |
|                       | [place]  [address]   |
+-----------------------+------------------------+
| Home Phone            | [phone]        |
+-----------------------+------------------------+
| Preferred Language    | Unknown                |
+-----------------------+------------------------+
| Marital Status        |                 |
+-----------------------+------------------------+
| [employer] Affiliation | MET                    |
+-----------------------+------------------------+
| Race                  | White                  |
+-----------------------+------------------------+
| Ethnic Group          | Not  or  |
+-----------------------+------------------------+
 
 
 Author
 
 
+--------------+------------------------------+
| Author       | [facility] |
+--------------+------------------------------+
| Organization | [facility] |
+--------------+------------------------------+
| Address      | Unknown                      |
+--------------+------------------------------+
| Phone        | Unavailable                  |
+--------------+------------------------------+
 
 
 
 Support
 
 
+--------------+--------------+---------------------+-----------------+
| Name         | Relationship | Address             | Phone           |
+--------------+--------------+---------------------+-----------------+
| Lawson Ferreira | ECON         | [address]          | [phone] |
|              |              | [place], [place]   |                 |
|              |              | [address]               |                 |
+--------------+--------------+---------------------+-----------------+
 
 
 
 Care Team Providers
 
 
 
+-----------------------+------+-------------+
| Care Team Member Name | Role | Phone       |
+-----------------------+------+-------------+
 PCP  | Unavailable |
+-----------------------+------+-------------+
 
 
 
 Encounter Details
 
 
+--------+-------------+----------------------+---------------------+---------------+
| Date   | Type        | Department           | Care Team           | Description   |
+--------+-------------+----------------------+---------------------+---------------+
| / | Office      |   General Internal   |   Note, Outpatient  | Progress Note |
|    | Visit-Trans | Medicine  [address]    | Clinic              |               |
|        | [address]      | [address]  |                     |               |
|        |             |  Mailcode: [address]      |                     |               |
|        |             | Outpatient Clinic    |                     |               |
|        |             | [address]       |                     |               |
|        |             | [place], [place]         |                     |               |
|        |             | [address]           |                     |               |
|        |             | [phone]         |                     |               |
+--------+-------------+----------------------+---------------------+---------------+
 
 
 
 Social History
 
 
+----------------+-------+-----------+--------+------+
| Tobacco Use    | Types | Packs/Day | Years  | Date |
|                |       |           | Used   |      |
+----------------+-------+-----------+--------+------+
| Never Assessed |       |           |        |      |
+----------------+-------+-----------+--------+------+
 
 
 
+------------------+---------------+
| Sex Assigned at  | Date Recorded |
| Birth            |               |
+------------------+---------------+
| Not on file      |               |
+------------------+---------------+
 
 
 
+----------------+-------------+-------------+
| Job Start Date | Occupation  | Industry    |
+----------------+-------------+-------------+
| Not on file    | Not on file | Not on file |
+----------------+-------------+-------------+
 
 
 
+----------------+--------------+------------+
| Travel History | Travel Start | Travel End |
 
+----------------+--------------+------------+
 
 
 
+-------------------------------------+
| No recent travel history available. |
+-------------------------------------+
 documented as of this encounter
 
 Progress Notes
 Interface, Transcription In - 2007  3:09 AM PDT CLINIC DATE: 96
  
  [facility] CLINIC:
  
  Ms. Ferreira returns to the [facility] seven months after surgery
  which included anterior colporrhaphy, vaginal hysterectomy, sacral spinous
  fixation and subsequent laparotomy for hemoperitoneum. She had a slow
  recovery from her surgery and particularly with regard to abdominal
  discomfort and bladder symptoms of bladder irritability. She now returns
  reporting general improvement. Her bladder function has essentially
  returned to normal although she occasionally has episodes of urgency at
  night time. She continues to have some upper abdominal discomfort which is
  somewhat nonspecific, but seems to be aggravated by certain food stuffs.
  Her energy level is slowly returning to normal. Bowel function has been
  entirely normal.
  
  EXAMINATION: Examination shows the vaginal vault to be well supported,
  there is some laxity of the anterior vaginal wall, but this does not
  represent a significant defect and the posterior wall is well supported.
  
  IMPRESSION: Ms. Ferreira continues to make satisfactory but slow recovery
  from her surgery.
  
  PLAN: She will be seeing her internist, Dr. Chou in [place] for ongoing
  medical care and she was encouraged to increase her level of activity and
  particularly to do pelvic floor exercises. She returns to [place] on a
  regular basis as her daughter lives in this area and she will return for
  further check one year from now.
  
  
  
  BRITTANY Perez M.D.
  Professor and ,
  Obstetrics and Gynecology
  
  EPK/sct
  D: 96
  T: 96
   Electronically signed by Interface, Transcription In at 2007  3:09 AM PDTdocumented
 in this encounter
 
 Plan of Treatment
 Not on filedocumented as of this encounter
 
 Visit Diagnoses
 Not on filedocumented in this encounter

## 2019-12-04 NOTE — XMS
Encounter Summary
  Created on: 2019
 
 Margaret Ferreira
 External Reference #: [id number]
 : 30
 Sex: Female
 
 Demographics
 
 
+-----------------------+------------------------+
| Address               | [address]      |
|                       | [place]  [address]   |
+-----------------------+------------------------+
| Home Phone            | [phone]        |
+-----------------------+------------------------+
| Preferred Language    | Unknown                |
+-----------------------+------------------------+
| Marital Status        |                 |
+-----------------------+------------------------+
| Episcopalian Affiliation | MET                    |
+-----------------------+------------------------+
| Race                  | White                  |
+-----------------------+------------------------+
| Ethnic Group          | Not  or  |
+-----------------------+------------------------+
 
 
 Author
 
 
+--------------+------------------------------+
| Author       | [facility] |
+--------------+------------------------------+
| Organization | [facility] |
+--------------+------------------------------+
| Address      | Unknown                      |
+--------------+------------------------------+
| Phone        | Unavailable                  |
+--------------+------------------------------+
 
 
 
 Support
 
 
+--------------+--------------+---------------------+-----------------+
| Name         | Relationship | Address             | Phone           |
+--------------+--------------+---------------------+-----------------+
| Lawson Ferreira | ECON         | [address]          | [phone] |
|              |              | [place], [place]   |                 |
|              |              | [address]               |                 |
+--------------+--------------+---------------------+-----------------+
 
 
 
 Care Team Providers
 
 
 
+-----------------------+------+-------------+
| Care Team Member Name | Role | Phone       |
+-----------------------+------+-------------+
 PCP  | Unavailable |
+-----------------------+------+-------------+
 
 
 
 Encounter Details
 
 
+--------+-------------+----------------------+--------------------+-------------+
| Date   | Type        | Department           | Care Team          | Description |
+--------+-------------+----------------------+--------------------+-------------+
| / | Transcribed |   [facility] Clinic at  |   Dictation, Other | Transcribed |
|    |             | [facility]  [address]     |                    |             |
|        |             | [address]      |                    |             |
|        |             | Mailcode: [address]  [address]  |                    |             |
|        |             | [address]         |                    |             |
|        |             | [place]  [place],  |                    |             |
|        |             | [place] [address]        |                    |             |
|        |             | [phone]         |                    |             |
+--------+-------------+----------------------+--------------------+-------------+
 
 
 
 Social History
 
 
+----------------+-------+-----------+--------+------+
| Tobacco Use    | Types | Packs/Day | Years  | Date |
|                |       |           | Used   |      |
+----------------+-------+-----------+--------+------+
| Never Assessed |       |           |        |      |
+----------------+-------+-----------+--------+------+
 
 
 
+------------------+---------------+
| Sex Assigned at  | Date Recorded |
| Birth            |               |
+------------------+---------------+
| Not on file      |               |
+------------------+---------------+
 
 
 
+----------------+-------------+-------------+
| Job Start Date | Occupation  | Industry    |
+----------------+-------------+-------------+
| Not on file    | Not on file | Not on file |
+----------------+-------------+-------------+
 
 
 
+----------------+--------------+------------+
| Travel History | Travel Start | Travel End |
+----------------+--------------+------------+
 
 
 
 
+-------------------------------------+
| No recent travel history available. |
+-------------------------------------+
 documented as of this encounter
 
 Progress Notes
 Interface, Transcription In - 2007  5:06 AM PDT  [address] [address] [phone] or [phone]
   Department of Obstetrics and Gynecology, [address]
   [facility]
   Telephone: [phone] Fax: [phone]
  
  
  1996
  
  
  BHUMIKA BOBO MD
  [address] [place] [address]
  
  
  RE: MARGARET FERREIRA
  MR#: [id number]
  
  Dear Dr. Bobo:
  
  I am enclosing some operative reports of your patient, Margaret Ferreira. We
  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
  the surgery was complicated by postoperative hemorrhage necessitating a
  laparotomy. She had a somewhat stormy immediate postoperative course but has
  subsequently done quite well. She now feels more confident about her
  progress. She has previously been under the care of Dr. Frazier, in [place],
  for her hypertension and arthritis, and I have continued to see her
  postoperatively, where her main residual concern has been of urge
  incontinence that troubles her, particularly at nighttime. On today's visit,
  I gave her some Levsinex for use at night to see whether we could suppress
  some of the bladder irritability. She is going to be staying in
  [place] for the next month and was anxious that you should be fully
  informed of the recent medical events in case she needs to consult with you.
  
  Thank you for being available. If you have any questions, I could be reached
  at [phone].
  
  With thanks,
  
  Yours sincerely,
  
  
  
  BRITTANY Perez M.D.
  Professor and ,
  Obstetrics and Gynecology
  
  EPK:ks
 
  D: 96
  T: 96
  C: 96 rh
  
  enclosures (2)
  operative report
  discharge summary
   Electronically signed by Interface, Transcription In at 2007  5:06 AM PDTdocumented
 in this encounter
 
 Plan of Treatment
 Not on filedocumented as of this encounter
 
 Visit Diagnoses
 Not on filedocumented in this encounter

## 2019-12-04 NOTE — XMS
Encounter Summary
  Created on: 2019
 
 Margaret Ferreira
 External Reference #: [id number]
 : 30
 Sex: Female
 
 Demographics
 
 
+-----------------------+------------------------+
| Address               | [address]      |
|                       | [place]  [address]   |
+-----------------------+------------------------+
| Home Phone            | [phone]        |
+-----------------------+------------------------+
| Preferred Language    | Unknown                |
+-----------------------+------------------------+
| Marital Status        |                 |
+-----------------------+------------------------+
| Taoist Affiliation | MET                    |
+-----------------------+------------------------+
| Race                  | White                  |
+-----------------------+------------------------+
| Ethnic Group          | Not  or  |
+-----------------------+------------------------+
 
 
 Author
 
 
+--------------+------------------------------+
| Author       | [facility] |
+--------------+------------------------------+
| Organization | [facility] |
+--------------+------------------------------+
| Address      | Unknown                      |
+--------------+------------------------------+
| Phone        | Unavailable                  |
+--------------+------------------------------+
 
 
 
 Support
 
 
+--------------+--------------+---------------------+-----------------+
| Name         | Relationship | Address             | Phone           |
+--------------+--------------+---------------------+-----------------+
| Lawson Ferreira | ECON         | [address]          | [phone] |
|              |              | [place], [place]   |                 |
|              |              | [address]               |                 |
+--------------+--------------+---------------------+-----------------+
 
 
 
 Care Team Providers
 
 
 
+-----------------------+------+-------------+
| Care Team Member Name | Role | Phone       |
+-----------------------+------+-------------+
 PCP  | Unavailable |
+-----------------------+------+-------------+
 
 
 
 Encounter Details
 
 
+--------+----------+----------------------+----------------------+-------------+
| Date   | Type     | Department           | Care Team            | Description |
+--------+----------+----------------------+----------------------+-------------+
| / | Results  |   [facility] |   Félix Perez, |             |
|    | Only     |  [facility]  |  MD  [address]     |             |
|        |          |  [address] | [address]      |             |
|        |          |  [address]  Mailcode:  | [place], [place]         |             |
|        |          | [id number]   | [address]           |             |
|        |          | [address]         |                      |             |
|        |          | [place], [place]         |                      |             |
|        |          | [address]           |                      |             |
|        |          | [phone]         |                      |             |
+--------+----------+----------------------+----------------------+-------------+
 
 
 
 Social History
 
 
+----------------+-------+-----------+--------+------+
| Tobacco Use    | Types | Packs/Day | Years  | Date |
|                |       |           | Used   |      |
+----------------+-------+-----------+--------+------+
| Never Assessed |       |           |        |      |
+----------------+-------+-----------+--------+------+
 
 
 
+------------------+---------------+
| Sex Assigned at  | Date Recorded |
| Birth            |               |
+------------------+---------------+
| Not on file      |               |
+------------------+---------------+
 
 
 
+----------------+-------------+-------------+
| Job Start Date | Occupation  | Industry    |
+----------------+-------------+-------------+
| Not on file    | Not on file | Not on file |
+----------------+-------------+-------------+
 
 
 
+----------------+--------------+------------+
| Travel History | Travel Start | Travel End |
 
+----------------+--------------+------------+
 
 
 
+-------------------------------------+
| No recent travel history available. |
+-------------------------------------+
 documented as of this encounter
 
 Plan of Treatment
 Not on filedocumented as of this encounter
 
 Procedures
 
 
+--------------------+--------+-------------+----------------------+----------------------+
| Procedure Name     | Priori | Date/Time   | Associated Diagnosis | Comments             |
|                    | ty     |             |                      |                      |
+--------------------+--------+-------------+----------------------+----------------------+
| X-RAY ABDOMEN 2    | Routin | 1996  |                      |   Results for this   |
| VIEWS              | e      |  2:38 PM    |                      | procedure are in the |
|                    |        | PST         |                      |  results section.    |
+--------------------+--------+-------------+----------------------+----------------------+
| X-RAY CHEST 2 VIEW | Routin | 1996  |                      |   Results for this   |
|                    | e      |  1:40 PM    |                      | procedure are in the |
|                    |        | PST         |                      |  results section.    |
+--------------------+--------+-------------+----------------------+----------------------+
 documented in this encounter
 
 Results
 ABDOMEN 2 VIEWS (1996  2:38 PM PST)
 
+-------------+--------------------------+-----------+------------+--------------+
| Component   | Value                    | Ref Range | Performed  | Pathologist  |
|             |                          |           | At         | Signature    |
+-------------+--------------------------+-----------+------------+--------------+
| ABDOMEN, 2  | Radiologist 1: Kezia LESTER           |            |              |
| [place]       |  JOAQUIN LEE            |           |            |              |
|             | M.D.-Radiologist 2:      |           |            |              |
|             | MARKIE GRUBBS           |           |            |              |
|             | MARGARET RALPH |           |            |              |
|             |                          |           |            |              |
|             |                          |           |            |              |
|             |             [phone]  |           |            |              |
|             |  ABDOMINAL, TWO VIEWS:   |           |            |              |
|             |   96   Dictated:    |           |            |              |
|             |   96 FINDINGS:   No |           |            |              |
|             |  previous films are      |           |            |              |
|             | available for            |           |            |              |
|             | comparison. The          |           |            |              |
|             | previously described     |           |            |              |
|             | 1-1/2 cm pulmonary       |           |            |              |
|             | nodule in the left       |           |            |              |
|             | lungbase is again noted. |           |            |              |
|             |    On supine examination |           |            |              |
|             |  gas is present in       |           |            |              |
|             | bothnondilated loops of  |           |            |              |
|             | both large and small     |           |            |              |
|             | bowel with a moderate    |           |            |              |
|             | amountof stool present   |           |            |              |
 
|             | in the ascending colon   |           |            |              |
|             | and cecal region.   Gas  |           |            |              |
|             | ispresent in the rectum. |           |            |              |
|             |    On the upright        |           |            |              |
|             | examination no air fluid |           |            |              |
|             |  levelsare identified.   |           |            |              |
|             |   There is a severe      |           |            |              |
|             | scoliosis of the lumbar  |           |            |              |
|             | spine withthe convexity  |           |            |              |
|             | oriented to the left.    |           |            |              |
|             |   A moderate amount of   |           |            |              |
|             | sclerosisadjacent to the |           |            |              |
|             |  endplates of the lumbar |           |            |              |
|             |  vertebral bodies in the |           |            |              |
|             |  regionof facet joints   |           |            |              |
|             | consistent with          |           |            |              |
|             | degenerative disease.    |           |            |              |
|             |   Left total             |           |            |              |
|             | hiparthroplasty is seen. |           |            |              |
|             |    There is a focal      |           |            |              |
|             | calcification            |           |            |              |
|             | presentoverlying the     |           |            |              |
|             | left renal shadow in the |           |            |              |
|             |  region of the mid to    |           |            |              |
|             | lower polemeasuring      |           |            |              |
|             | approximately 4 mm.   No |           |            |              |
|             |  abnormal calcifications |           |            |              |
|             |  are seenwithin the      |           |            |              |
|             | pelvis.  IMPRESSION: 1.  |           |            |              |
|             |   1-1/2 cm pulmonary     |           |            |              |
|             | nodule in the left lung  |           |            |              |
|             | base.   Comparison       |           |            |              |
|             | tomore remote chest      |           |            |              |
|             | radiographs is           |           |            |              |
|             | recommended to assess    |           |            |              |
|             | for stability. 2.        |           |            |              |
|             |   Probable left renal    |           |            |              |
|             | calculus. 3.             |           |            |              |
|             |   Nonspecific bowel gas  |           |            |              |
|             | pattern without evidence |           |            |              |
|             |  of obstruction          |           |            |              |
|             | orperforation. 4.        |           |            |              |
|             |   Severe scoliosis of    |           |            |              |
|             | the lumbar spine with    |           |            |              |
|             | degenerative disease.    |           |            |              |
|             | END OF IMPRESSION:       |           |            |              |
+-------------+--------------------------+-----------+------------+--------------+
 
 
 
+----------+
| Specimen |
+----------+
|          |
+----------+
 
 
 
+----------------------+---------+--------------------+--------------+
| Performing           | Address | City/State/Zipcode | Phone Number |
 
| Organization         |         |                    |              |
+----------------------+---------+--------------------+--------------+
|   [facility] DEPARTMENT OF |         |                    |              |
|  RADIOLOGY           |         |                    |              |
+----------------------+---------+--------------------+--------------+
 CHEST 2 VIEW (1996  1:40 PM PST)
 
+-----------+--------------------------+-----------+------------+--------------+
| Component | Value                    | Ref Range | Performed  | Pathologist  |
|           |                          |           | At         | Signature    |
+-----------+--------------------------+-----------+------------+--------------+
| CHEST, 2  | Radiologist 1: ROBBY,   |           |            |              |
| [place]  | [facility]              |           |            |              |
| [facility]    | J.-Radiologist 2: VAN    |           |            |              |
|           | [facility] LION,         |           |            |              |
|           | MARGARET MG       |           |            |              |
|           |                          |           |            |              |
|           |                          |           |            |              |
|           |        CHEST, |           |            |              |
|           |  2 VIEWS:   96 at   |           |            |              |
|           | 1340   hrs   Dictated:   |           |            |              |
|           |   96 COMPARISON:    |           |            |              |
|           |   No previous films are  |           |            |              |
|           | available for            |           |            |              |
|           | comparison. FINDINGS:    |           |            |              |
|           |   If prior chest         |           |            |              |
|           | radiographs are          |           |            |              |
|           | available, they would    |           |            |              |
|           | behelpful for comparing  |           |            |              |
|           | to the current study,    |           |            |              |
|           | which the                |           |            |              |
|           | RadiologyDepartment can  |           |            |              |
|           | do if these old films    |           |            |              |
|           | could be provided. Lung  |           |            |              |
|           | volumes are normal.      |           |            |              |
|           |   Cardiomediastinal      |           |            |              |
|           | silhouette               |           |            |              |
|           | isunremarkable without   |           |            |              |
|           | evidence of              |           |            |              |
|           | lymphadenopathy.   The   |           |            |              |
|           | righthemidiaphragm is    |           |            |              |
|           | somewhat eventrated.     |           |            |              |
|           |   No pleural effusion    |           |            |              |
|           | orpneumothorax is        |           |            |              |
|           | identified. A 1.5 x 1.5  |           |            |              |
|           | cm lung nodule is noted  |           |            |              |
|           | within the mid left      |           |            |              |
|           | lower lobe.This is       |           |            |              |
|           | suspicious for           |           |            |              |
|           | metastatic disease       |           |            |              |
|           | versus primary lung      |           |            |              |
|           | lesion. IMPRESSION: 1.   |           |            |              |
|           |   1.5 x 1.5 cm left      |           |            |              |
|           | lower lobe lung nodule.  |           |            |              |
|           | 2.   The lungs are       |           |            |              |
|           | otherwise grossly clear. |           |            |              |
|           |    END OF IMPRESSION:    |           |            |              |
+-----------+--------------------------+-----------+------------+--------------+
 
 
 
 
+----------+
| Specimen |
+----------+
|          |
+----------+
 
 
 
+----------------------+---------+--------------------+--------------+
| Performing           | Address | City/State/Zipcode | Phone Number |
| Organization         |         |                    |              |
+----------------------+---------+--------------------+--------------+
|   [facility] DEPARTMENT OF |         |                    |              |
|  RADIOLOGY           |         |                    |              |
+----------------------+---------+--------------------+--------------+
 documented in this encounter
 
 Visit Diagnoses
 Not on filedocumented in this encounter

## 2019-12-04 NOTE — XMS
Encounter Summary
  Created on: 2019
 
 Margaret Ferreira
 External Reference #: [id number]
 : 30
 Sex: Female
 
 Demographics
 
 
+-----------------------+----------------------+
| Address               | [address]        |
|                       | [place]  [address] |
+-----------------------+----------------------+
| Home Phone            | [phone]      |
+-----------------------+----------------------+
| Preferred Language    | Unknown              |
+-----------------------+----------------------+
| Marital Status        |               |
+-----------------------+----------------------+
| Jainism Affiliation | Unknown              |
+-----------------------+----------------------+
| Race                  | Unknown              |
+-----------------------+----------------------+
| Ethnic Group          | Unknown              |
+-----------------------+----------------------+
 
 
 Author
 
 
+--------------+--------------------------------------------+
| Author       | [facility] [place]  |
|              | and [place]                                |
+--------------+--------------------------------------------+
| Organization | [facility]  |
|              | and [place]                                |
+--------------+--------------------------------------------+
| Address      | Unknown                                    |
+--------------+--------------------------------------------+
| Phone        | Unavailable                                |
+--------------+--------------------------------------------+
 
 
 
 Support
 
 
+--------------+--------------+---------+-----------------+
| Name         | Relationship | Address | Phone           |
+--------------+--------------+---------+-----------------+
| Lawson Hanna | ECON         | Unknown | [phone] |
+--------------+--------------+---------+-----------------+
 
 
 
 Care Team Providers
 
 
 
+-----------------------------+------+-----------------+
| Care Team Member Name       | Role | Phone           |
+-----------------------------+------+-----------------+
| Bessy Paulino | PCP  | [phone] |
|  [place]                       |      |                 |
+-----------------------------+------+-----------------+
 
 
 
 Reason for Visit
 
 
+-------------------+----------+
| Reason            | Comments |
+-------------------+----------+
| Medication Refill |          |
+-------------------+----------+
 
 
 
 Encounter Details
 
 
+--------+--------+----------------------+---------------------+-------------------+
| Date   | Type   | Department           | Care Team           | Description       |
+--------+--------+----------------------+---------------------+-------------------+
| / | Refill |   [facility]       |   K[facility] Ontiver[facility]  | Medication Refill |
|    |        | [facility]    | E, DO  [address]   |                   |
|        |        | [facility]  [address]  | [place], [place]       |                   |
|        |        | [address]  [place],   | [address]          |                   |
|        |        | [place] [address]        | [phone]        |                   |
|        |        | [phone]         | [phone] (Fax)  |                   |
+--------+--------+----------------------+---------------------+-------------------+
 
 
 
 Social History
 
 
+-------------------+-------+-----------+--------+------+
| Tobacco Use       | Types | Packs/Day | Years  | Date |
|                   |       |           | Used   |      |
+-------------------+-------+-----------+--------+------+
| Passive Smoke     |       |           |        |      |
| Exposure - Never  |       |           |        |      |
| Smoker            |       |           |        |      |
+-------------------+-------+-----------+--------+------+
 
 
 
+---------------------+---+---+---+
| Smokeless Tobacco:  |   |   |   |
| Never Used          |   |   |   |
+---------------------+---+---+---+
 
 
 
+-------------+----------------------+---------+----------+
 
| Alcohol Use | Drinks/Week          | oz/Week | Comments |
+-------------+----------------------+---------+----------+
| No          |   0 Standard drinks  | 0.0     |          |
|             | or equivalent        |         |          |
+-------------+----------------------+---------+----------+
 
 
 
+------------------+---------------+
| Sex Assigned at  | Date Recorded |
| Birth            |               |
+------------------+---------------+
| Not on file      |               |
+------------------+---------------+
 
 
 
+----------------+-------------+-------------+
| Job Start Date | Occupation  | Industry    |
+----------------+-------------+-------------+
| Not on file    | Not on file | Not on file |
+----------------+-------------+-------------+
 
 
 
+----------------+--------------+------------+
| Travel History | Travel Start | Travel End |
+----------------+--------------+------------+
 
 
 
+-------------------------------------+
| No recent travel history available. |
+-------------------------------------+
 documented as of this encounter
 
 Plan of Treatment
 Not on filedocumented as of this encounter
 
 Visit Diagnoses
 Not on filedocumented in this encounter

## 2019-12-04 NOTE — NUR
Patient arrives to unit via stretcher. Patient transferred to hospital bed
with 3 people. Vital signs taken, assessment complete.  Attends placed with 1
incontinent void. Family in room with patient, oriented to room. Hematoma on
right abdomen is ecchymotic with tenderness to palpation. Bruise outlined with
pen, will continue to monitor hematoma.

## 2019-12-04 NOTE — XMS
Encounter Summary
  Created on: 2019
 
 Margaret Ferreira
 External Reference #: [id number]
 : 30
 Sex: Female
 
 Demographics
 
 
+-----------------------+------------------------+
| Address               | [address]      |
|                       | [place]  [address]   |
+-----------------------+------------------------+
| Home Phone            | [phone]        |
+-----------------------+------------------------+
| Preferred Language    | Unknown                |
+-----------------------+------------------------+
| Marital Status        |                 |
+-----------------------+------------------------+
| Yazidism Affiliation | MET                    |
+-----------------------+------------------------+
| Race                  | White                  |
+-----------------------+------------------------+
| Ethnic Group          | Not  or  |
+-----------------------+------------------------+
 
 
 Author
 
 
+--------------+------------------------------+
| Author       | [facility] |
+--------------+------------------------------+
| Organization | [facility] |
+--------------+------------------------------+
| Address      | Unknown                      |
+--------------+------------------------------+
| Phone        | Unavailable                  |
+--------------+------------------------------+
 
 
 
 Support
 
 
+--------------+--------------+---------------------+-----------------+
| Name         | Relationship | Address             | Phone           |
+--------------+--------------+---------------------+-----------------+
| Lawson Ferreira | ECON         | [address]          | [phone] |
|              |              | [place], [place]   |                 |
|              |              | [address]               |                 |
+--------------+--------------+---------------------+-----------------+
 
 
 
 Care Team Providers
 
 
 
+-----------------------+------+-------------+
| Care Team Member Name | Role | Phone       |
+-----------------------+------+-------------+
 PCP  | Unavailable |
+-----------------------+------+-------------+
 
 
 
 Encounter Details
 
 
+--------+-------------+----------------------+---------------------+---------------+
| Date   | Type        | Department           | Care Team           | Description   |
+--------+-------------+----------------------+---------------------+---------------+
| / | Office      |   General Internal   |   Note, Outpatient  | Progress Note |
|    | Visit-Trans | Medicine  [address]    | Clinic              |               |
|        | [address]      | [address]  |                     |               |
|        |             |  Mailcode: [address]      |                     |               |
|        |             | Outpatient Clinic    |                     |               |
|        |             | [address]       |                     |               |
|        |             | [place], [place]         |                     |               |
|        |             | [address]           |                     |               |
|        |             | [phone]         |                     |               |
+--------+-------------+----------------------+---------------------+---------------+
 
 
 
 Social History
 
 
+----------------+-------+-----------+--------+------+
| Tobacco Use    | Types | Packs/Day | Years  | Date |
|                |       |           | Used   |      |
+----------------+-------+-----------+--------+------+
| Never Assessed |       |           |        |      |
+----------------+-------+-----------+--------+------+
 
 
 
+------------------+---------------+
| Sex Assigned at  | Date Recorded |
| Birth            |               |
+------------------+---------------+
| Not on file      |               |
+------------------+---------------+
 
 
 
+----------------+-------------+-------------+
| Job Start Date | Occupation  | Industry    |
+----------------+-------------+-------------+
| Not on file    | Not on file | Not on file |
+----------------+-------------+-------------+
 
 
 
+----------------+--------------+------------+
| Travel History | Travel Start | Travel End |
 
+----------------+--------------+------------+
 
 
 
+-------------------------------------+
| No recent travel history available. |
+-------------------------------------+
 documented as of this encounter
 
 Progress Notes
 Interface, Transcription In - 2007  3:09 AM PDT CLINIC DATE: 96
  
  [facility] CLINIC:
  
  Ms. Ferreira returns to the [facility] seven months after surgery
  which included anterior colporrhaphy, vaginal hysterectomy, sacral spinous
  fixation and subsequent laparotomy for hemoperitoneum. She had a slow
  recovery from her surgery and particularly with regard to abdominal
  discomfort and bladder symptoms of bladder irritability. She now returns
  reporting general improvement. Her bladder function has essentially
  returned to normal although she occasionally has episodes of urgency at
  night time. She continues to have some upper abdominal discomfort which is
  somewhat nonspecific, but seems to be aggravated by certain food stuffs.
  Her energy level is slowly returning to normal. Bowel function has been
  entirely normal.
  
  EXAMINATION: Examination shows the vaginal vault to be well supported,
  there is some laxity of the anterior vaginal wall, but this does not
  represent a significant defect and the posterior wall is well supported.
  
  IMPRESSION: Ms. Ferreira continues to make satisfactory but slow recovery
  from her surgery.
  
  PLAN: She will be seeing her internist, Dr. Chou in [place] for ongoing
  medical care and she was encouraged to increase her level of activity and
  particularly to do pelvic floor exercises. She returns to [place] on a
  regular basis as her daughter lives in this area and she will return for
  further check one year from now.
  
  
  
  BRITTANY Perez M.D.
  Professor and ,
  Obstetrics and Gynecology
  
  EPK/sct
  D: 96
  T: 96
   Electronically signed by Interface, Transcription In at 2007  3:09 AM PDTdocumented
 in this encounter
 
 Plan of Treatment
 Not on filedocumented as of this encounter
 
 Visit Diagnoses
 Not on filedocumented in this encounter

## 2019-12-04 NOTE — NUR
Patient laying in bed watching tv. Ice pack in place around hematoma with ace
bandage. Bruising remains within outline per previous assessments. Swelling
consistent with previous assessments. Vital signs taken. Patient reports pain
of 8/10, despite prn pain medication. Plan to assess pain management options.

## 2019-12-04 NOTE — NUR
SPOKE TO DR FOUNTAIN ON THE TELEPHONE TO UPDATE HIM ON CURRENT H&H RESULT OF 8.9
AND 26.8. PER DR FOUNTAIN CONTINUE TO MONITOR. OKAY TO CONITNUE VS FREQUENCY AT
Q4HRS TO MONITOR VS. NO NEW ORDERS.

## 2019-12-04 NOTE — XMS
Encounter Summary
  Created on: 2019
 
 Margaret Ferreira
 External Reference #: [id number]
 : 30
 Sex: Female
 
 Demographics
 
 
+-----------------------+----------------------+
| Address               | [address]        |
|                       | [place]  [address] |
+-----------------------+----------------------+
| Home Phone            | [phone]      |
+-----------------------+----------------------+
| Preferred Language    | Unknown              |
+-----------------------+----------------------+
| Marital Status        |               |
+-----------------------+----------------------+
| Worship Affiliation | Unknown              |
+-----------------------+----------------------+
| Race                  | Unknown              |
+-----------------------+----------------------+
| Ethnic Group          | Unknown              |
+-----------------------+----------------------+
 
 
 Author
 
 
+--------------+--------------------------------------------+
| Author       | [facility]  |
|              | and [place]                                |
+--------------+--------------------------------------------+
| Organization | [facility]  |
|              | and [place]                                |
+--------------+--------------------------------------------+
| Address      | Unknown                                    |
+--------------+--------------------------------------------+
| Phone        | Unavailable                                |
+--------------+--------------------------------------------+
 
 
 
 Support
 
 
+--------------+--------------+---------+-----------------+
| Name         | Relationship | Address | Phone           |
+--------------+--------------+---------+-----------------+
| Lawson Hanna | ECON         | Unknown | [phone] |
+--------------+--------------+---------+-----------------+
 
 
 
 Care Team Providers
 
 
 
+-----------------------------+------+-----------------+
| Care Team Member Name       | Role | Phone           |
+-----------------------------+------+-----------------+
| Bessy Paulino | PCP  | [phone] |
|  [place]                       |      |                 |
+-----------------------------+------+-----------------+
 
 
 
 Reason for Visit
 
 
+-------------------+----------+
| Reason            | Comments |
+-------------------+----------+
| Medication Refill |          |
+-------------------+----------+
 
 
 
 Encounter Details
 
 
+--------+--------+----------------------+---------------------+-------------------+
| Date   | Type   | Department           | Care Team           | Description       |
+--------+--------+----------------------+---------------------+-------------------+
| / | Refill |   [facility]       |   Kv[facility] Ontiveros  | Medication Refill |
|    |        | [facility]    | E, DO  [address]   |                   |
|        |        | MEDICAL CLINIC  [address]  | [place], [place]       |                   |
|        |        | [address]  [place],   | [address]          |                   |
|        |        | [place] [address]        | [phone]        |                   |
|        |        | [phone]         | [phone] (Fax)  |                   |
+--------+--------+----------------------+---------------------+-------------------+
 
 
 
 Social History
 
 
+-------------------+-------+-----------+--------+------+
| Tobacco Use       | Types | Packs/Day | Years  | Date |
|                   |       |           | Used   |      |
+-------------------+-------+-----------+--------+------+
| Passive Smoke     |       |           |        |      |
| Exposure - Never  |       |           |        |      |
| Smoker            |       |           |        |      |
+-------------------+-------+-----------+--------+------+
 
 
 
+---------------------+---+---+---+
| Smokeless Tobacco:  |   |   |   |
| Never Used          |   |   |   |
+---------------------+---+---+---+
 
 
 
+-------------+----------------------+---------+----------+
 
| Alcohol Use | Drinks/Week          | oz/Week | Comments |
+-------------+----------------------+---------+----------+
| No          |   0 Standard drinks  | 0.0     |          |
|             | or equivalent        |         |          |
+-------------+----------------------+---------+----------+
 
 
 
+------------------+---------------+
| Sex Assigned at  | Date Recorded |
| Birth            |               |
+------------------+---------------+
| Not on file      |               |
+------------------+---------------+
 
 
 
+----------------+-------------+-------------+
| Job Start Date | Occupation  | Industry    |
+----------------+-------------+-------------+
| Not on file    | Not on file | Not on file |
+----------------+-------------+-------------+
 
 
 
+----------------+--------------+------------+
| Travel History | Travel Start | Travel End |
+----------------+--------------+------------+
 
 
 
+-------------------------------------+
| No recent travel history available. |
+-------------------------------------+
 documented as of this encounter
 
 Plan of Treatment
 Not on filedocumented as of this encounter
 
 Visit Diagnoses
 Not on filedocumented in this encounter

## 2019-12-04 NOTE — XMS
Clinical Summary
  Created on: 2019
 
 Margaret Ferreira
 External Reference #: [id number]
 : 30
 Sex: Female
 
 Demographics
 
 
+-----------------------+----------------------+
| Address               | [address]        |
|                       | [place]  [address] |
+-----------------------+----------------------+
| Home Phone            | [phone]      |
+-----------------------+----------------------+
| Preferred Language    | Unknown              |
+-----------------------+----------------------+
| Marital Status        |               |
+-----------------------+----------------------+
| [employer] Affiliation | Unknown              |
+-----------------------+----------------------+
| Race                  | Unknown              |
+-----------------------+----------------------+
| Ethnic Group          | Unknown              |
+-----------------------+----------------------+
 
 
 Author
 
 
+--------------+------------------------------------------+
| Author       | [facility] [employer] [facility]  |
|              | [facility])                                 |
+--------------+------------------------------------------+
| Organization | [facility] [employer] (Historical as of  |
|              | 19)                                 |
+--------------+------------------------------------------+
| Address      | Unknown                                  |
+--------------+------------------------------------------+
| Phone        | Unavailable                              |
+--------------+------------------------------------------+
 
 
 
 Support
 
 
+--------------+--------------+---------+-----------------+
| Name         | Relationship | Address | Phone           |
+--------------+--------------+---------+-----------------+
| Lawson Ferreira | [place]         | Unknown | [phone] |
+--------------+--------------+---------+-----------------+
 
 
 
 Care Team Providers
 
 
 
+--------------------------+------+-----------------+
| Care Team Member Name    | Role | Phone           |
+--------------------------+------+-----------------+
| Bessy Paulino PA-C | PP   | [phone] |
+--------------------------+------+-----------------+
 
 
 
 Allergies
 
 
+----------------+-----------+----------+----------+----------+
| Active Allergy | Reactions | Severity | Noted    | Comments |
|                |           |          | Date     |          |
+----------------+-----------+----------+----------+----------+
| Lisinopril     | Cough     | Low      | 20 |          |
|                |           |          | 17       |          |
+----------------+-----------+----------+----------+----------+
 
 
 
 Current Medications
 
 
+----------------------+----------------------+----------+---------+------+------+-------+
| Prescription         | Sig.                 | Disp.    | Refills | Star | End  | Statu |
|                      |                      |          |         | t    | Date | s     |
|                      |                      |          |         | Date |      |       |
+----------------------+----------------------+----------+---------+------+------+-------+
|   losartan (COZAAR)  |                      |          |         | 04/0 |      | Activ |
| 50 MG tablet         |                      |          |         | 3/20 |      | e     |
|                      |                      |          |         | 17   |      |       |
+----------------------+----------------------+----------+---------+------+------+-------+
|   traMADol (ULTRAM)  |                      |          |         | 04/1 |      | Activ |
| 50 MG tablet         |                      |          |         | 3/20 |      | e     |
|                      |                      |          |         | 17   |      |       |
+----------------------+----------------------+----------+---------+------+------+-------+
|   ibandronate sodium | Inject 3 mg into the |          |         |      |      | Activ |
|  (BONIVA) 3 MG/3ML   |  vein.               |          |         |      |      | e     |
| SOLN injection       |                      |          |         |      |      |       |
+----------------------+----------------------+----------+---------+------+------+-------+
|   Calcium Carbonate  | Take  by mouth.      |          |         |      |      | Activ |
| (CALCIUM 600 PO)     |                      |          |         |      |      | e     |
+----------------------+----------------------+----------+---------+------+------+-------+
|                      | Take  by mouth.      |          |         |      |      | Activ |
| GLUCOSAMINE-CHONDROI |                      |          |         |      |      | e     |
| TIN PO               |                      |          |         |      |      |       |
+----------------------+----------------------+----------+---------+------+------+-------+
|   Omega-3 Fatty      | Take  by mouth.      |          |         |      |      | Activ |
| Acids (OMEGA 3 PO)   |                      |          |         |      |      | e     |
+----------------------+----------------------+----------+---------+------+------+-------+
|   DIPHENHYDRAMINE    | Take  by mouth.      |          |         |      |      | Activ |
| HCL, SLEEP, PO       |                      |          |         |      |      | e     |
+----------------------+----------------------+----------+---------+------+------+-------+
|   gabapentin         | Take 300 mg by mouth |          |         |      |      | Activ |
| (NEURONTIN) 300 MG   |  4 (four) times      |          |         |      |      | e     |
| capsule              | daily.               |          |         |      |      |       |
+----------------------+----------------------+----------+---------+------+------+-------+
 
|   nitrofurantoin     | Take 100 mg by mouth |          |         |      |      | Activ |
| (MACRODANTIN) 100 MG |  daily.              |          |         |      |      | e     |
|  capsule             |                      |          |         |      |      |       |
+----------------------+----------------------+----------+---------+------+------+-------+
|   Multiple           | Take  by mouth.      |          |         |      |      | Activ |
| Vitamins-Minerals    |                      |          |         |      |      | e     |
| (MULTIPLE            |                      |          |         |      |      |       |
| VITAMINS/WOMENS PO)  |                      |          |         |      |      |       |
+----------------------+----------------------+----------+---------+------+------+-------+
|   pramipexole        | Take 0.25 mg by      |          |         |      |      | Activ |
| (MIRAPEX) 0.25 MG    | mouth 2 (two) times  |          |         |      |      | e     |
| tablet               | daily.               |          |         |      |      |       |
+----------------------+----------------------+----------+---------+------+------+-------+
|   levothyroxine      | Take 150 mcg by      |          |         |      |      | Activ |
| (SYNTHROID) 150 MCG  | mouth daily.         |          |         |      |      | e     |
| tablet               |                      |          |         |      |      |       |
+----------------------+----------------------+----------+---------+------+------+-------+
|   acetaminophen      | Take 325 mg by mouth |          |         |      |      | Activ |
| (TYLENOL) 325 MG     |  every 6 (six) hours |          |         |      |      | e     |
| tablet               |  as needed for Pain. |          |         |      |      |       |
+----------------------+----------------------+----------+---------+------+------+-------+
|   albuterol (PROAIR  | Inhale 2 puffs into  |          |         |      |      | Activ |
| HFA) 108 (90 BASE)   | the lungs every 4    |          |         |      |      | e     |
| MCG/ACT inhaler      | (four) hours as      |          |         |      |      |       |
|                      | needed for Wheezing. |          |         |      |      |       |
+----------------------+----------------------+----------+---------+------+------+-------+
|   azithromycin       | TAKE TWO TABLETS BY  |   6      | 0       | 10/0 |      | Activ |
| (ZITHROMAX) 250 MG   | MOUTH NOW then ONE   | tablet   |         | 4/20 |      | e     |
| tabletIndications:   | TABLET DAILY DAY 2-5 |          |         | 17   |      |       |
| Moderate COPD        |                      |          |         |      |      |       |
| (chronic obstructive |                      |          |         |      |      |       |
|  pulmonary disease)  |                      |          |         |      |      |       |
| ([facility])                |                      |          |         |      |      |       |
+----------------------+----------------------+----------+---------+------+------+-------+
|   predniSONE         | Take 2 tabs daily    |   11     | 0       | 10/0 |      | Activ |
| (DELTASONE) 20 MG    | for 3 days, then 1   | tablet   |         | 4/20 |      | e     |
| tabletIndications:   | tab daily for 5 days |          |         | 17   |      |       |
| Moderate COPD        |  then STOP           |          |         |      |      |       |
| (chronic obstructive |                      |          |         |      |      |       |
|  pulmonary disease)  |                      |          |         |      |      |       |
| ([facility])                |                      |          |         |      |      |       |
+----------------------+----------------------+----------+---------+------+------+-------+
|   albuterol (PROAIR  | Inhale 2 puffs into  |   1      | 5       | 02/2 | 02/2 | Activ |
| HFA) 108 (90 Base)   | the lungs every 4    | Inhaler  |         | 0/20 | 0/20 | e     |
| MCG/ACT inhaler      | (four) hours as      |          |         | 19   | 20   |       |
|                      | needed for Wheezing. |          |         |      |      |       |
+----------------------+----------------------+----------+---------+------+------+-------+
|   ANORO ELLIPTA      | Inhale 1 puff into   |   3 each | 3       | 07/ |      | Activ |
| 62.5-25 MCG/INH      | the lungs daily.     |          |         | 4/20 |      | e     |
| inhalerIndications:  |                      |          |         | 19   |      |       |
| Moderate COPD        |                      |          |         |      |      |       |
| (chronic obstructive |                      |          |         |      |      |       |
|  pulmonary disease)  |                      |          |         |      |      |       |
| ([facility])                |                      |          |         |      |      |       |
+----------------------+----------------------+----------+---------+------+------+-------+
 
 
 
 Active Problems
 
 
 
+-----------------------+------------+
| Problem               | Noted Date |
+-----------------------+------------+
| Aortic valve stenosis | 2018 |
+-----------------------+------------+
 
 
 
+--------------------------+
|   Overview:   Overview:  |
| trivial                  |
+--------------------------+
 
 
 
+-------------------------------------------------------------+------------+
| GERD (gastroesophageal reflux disease)                      | 2018 |
+-------------------------------------------------------------+------------+
| Hypertension                                                | 2018 |
+-------------------------------------------------------------+------------+
| Hypothyroidism                                              | 2018 |
+-------------------------------------------------------------+------------+
| Macular degeneration                                        | 2018 |
+-------------------------------------------------------------+------------+
| Osteoarthrosis                                              | 2018 |
+-------------------------------------------------------------+------------+
| RLS (restless legs syndrome)                                | 2018 |
+-------------------------------------------------------------+------------+
| Scoliosis                                                   | 2018 |
+-------------------------------------------------------------+------------+
| Urinary incontinence                                        | 2018 |
+-------------------------------------------------------------+------------+
| Hiatal hernia                                               | 10/04/2017 |
+-------------------------------------------------------------+------------+
| Moderate COPD (chronic obstructive pulmonary disease) ([facility]) | 2017 |
+-------------------------------------------------------------+------------+
 
 
 
 Family History
 
 
+-----------------+----------+------+----------+
| Medical History | Relation | Name | Comments |
+-----------------+----------+------+----------+
| COPD            | Father   |      |          |
+-----------------+----------+------+----------+
| Stroke          | Mother   |      |          |
+-----------------+----------+------+----------+
 
 
 
+----------+------+----------+----------+
| Relation | Name | Status   | Comments |
+----------+------+----------+----------+
| Father   |      |  |          |
|          |      |   (Age   |          |
|          |      | 88)      |          |
+----------+------+----------+----------+
 
| Mother   |      |  |          |
|          |      |   (Age   |          |
|          |      | 90)      |          |
+----------+------+----------+----------+
 
 
 
 Social History
 
 
+--------------+-------+-----------+--------+------+
| Tobacco Use  | Types | Packs/Day | Years  | Date |
|              |       |           | Used   |      |
+--------------+-------+-----------+--------+------+
| Never Smoker |       |           |        |      |
+--------------+-------+-----------+--------+------+
 
 
 
+---------------------+---+---+---+
| Smokeless Tobacco:  |   |   |   |
| Never Used          |   |   |   |
+---------------------+---+---+---+
 
 
 
+-------------+-----------+---------+----------+
| Alcohol Use | Drinks/We | oz/Week | Comments |
|             | ek        |         |          |
+-------------+-----------+---------+----------+
| Yes         |           |         |          |
+-------------+-----------+---------+----------+
 
 
 
+------------------+---------------+
| Sex Assigned at  | Date Recorded |
| Birth            |               |
+------------------+---------------+
| Not on file      |               |
+------------------+---------------+
 
 
 
 Last Filed Vital Signs
 
 
+-------------------+---------------------+-------------------------+
| Vital Sign        | Reading             | Time Taken              |
+-------------------+---------------------+-------------------------+
| Blood Pressure    | 133/73              | 2018  9:57 AM PDT |
+-------------------+---------------------+-------------------------+
| Pulse             | 87                  | 2018  9:57 AM PDT |
+-------------------+---------------------+-------------------------+
| Temperature       | 36.8   C (98.2   F) | 2018  9:57 AM PDT |
+-------------------+---------------------+-------------------------+
| Respiratory Rate  | -                   | -                       |
+-------------------+---------------------+-------------------------+
| Oxygen Saturation | 98%                 | 2018  9:57 AM PDT |
+-------------------+---------------------+-------------------------+
 
| Inhaled Oxygen    | -                   | -                       |
| Concentration     |                     |                         |
+-------------------+---------------------+-------------------------+
| Weight            | 61.2 kg (135 lb)    | 2018  9:57 AM PDT |
+-------------------+---------------------+-------------------------+
| Height            | 160 cm (5' 3")      | 2018  9:57 AM PDT |
+-------------------+---------------------+-------------------------+
| Body Mass Index   | 23.91               | 2018  9:57 AM PDT |
+-------------------+---------------------+-------------------------+
 
 
 
 Plan of Treatment
 Not on file
 
 Results
 Not on filefrom Last 3 Months
 
 Insurance
 
 
+-------------------+--------+-------------+------+-------+----------------------+
| Payer             | Benefi | Subscriber  | Type | Phone | Address              |
|                   | t Plan | ID          |      |       |                      |
|                   |  /     |             |      |       |                      |
|                   | Group  |             |      |       |                      |
+-------------------+--------+-------------+------+-------+----------------------+
| MEDICARE          | MEDICA | [id number]  |      |       |   [address]        |
|                   | RE     |             |      |       | [place] [address] |
|                   | IP-OP  |             |      |       |                      |
+-------------------+--------+-------------+------+-------+----------------------+
| [employer] | [employer] | [id number] |      |       |                      |
|                   |        |             |      |       |                      |
|                   | HEALTH |             |      |       |                      |
|                   | CARE - |             |      |       |                      |
|                   |  [employer]  |             |      |       |                      |
+-------------------+--------+-------------+------+-------+----------------------+
 
 
 
+----------------+--------+-------------+--------+-------------+---------------------+
| Guarantor Name | [employer] | Relation to | Date   | Phone       | Billing Address     |
|                | t Type |  Patient    | of     |             |                     |
|                |        |             | Birth  |             |                     |
+----------------+--------+-------------+--------+-------------+---------------------+
| MARGARET FERREIRA  | Person | Self        | / |   Home:     |   [address]     |
|                | al/Fam |             | 1930   | [phone] [place] [address] |
|                | [place]    |             |        | [address]        |                     |
+----------------+--------+-------------+--------+-------------+---------------------+

## 2019-12-04 NOTE — XMS
Encounter Summary
  Created on: 2019
 
 Margaret Ferreira
 External Reference #: [id number]
 : 30
 Sex: Female
 
 Demographics
 
 
+-----------------------+------------------------+
| Address               | [address]      |
|                       | [place]  [address]   |
+-----------------------+------------------------+
| Home Phone            | [phone]        |
+-----------------------+------------------------+
| Preferred Language    | Unknown                |
+-----------------------+------------------------+
| Marital Status        |                 |
+-----------------------+------------------------+
| Zoroastrian Affiliation | MET                    |
+-----------------------+------------------------+
| Race                  | White                  |
+-----------------------+------------------------+
| Ethnic Group          | Not  or  |
+-----------------------+------------------------+
 
 
 Author
 
 
+--------------+------------------------------+
| Author       | [facility] |
+--------------+------------------------------+
| Organization | [facility] |
+--------------+------------------------------+
| Address      | Unknown                      |
+--------------+------------------------------+
| Phone        | Unavailable                  |
+--------------+------------------------------+
 
 
 
 Support
 
 
+--------------+--------------+---------------------+-----------------+
| Name         | Relationship | Address             | Phone           |
+--------------+--------------+---------------------+-----------------+
| Lawson Ferreira | ECON         | [address]          | [phone] |
|              |              | [place], [place]   |                 |
|              |              | [address]               |                 |
+--------------+--------------+---------------------+-----------------+
 
 
 
 Care Team Providers
 
 
 
+-----------------------+------+-------------+
| Care Team Member Name | Role | Phone       |
+-----------------------+------+-------------+
 PCP  | Unavailable |
+-----------------------+------+-------------+
 
 
 
 Encounter Details
 
 
+--------+-------------+----------------------+---------------------+---------------+
| Date   | Type        | Department           | Care Team           | Description   |
+--------+-------------+----------------------+---------------------+---------------+
| / | Office      |   General Internal   |   Note, Outpatient  | Progress Note |
|    | Visit-Trans | Medicine  [address]    | Clinic              |               |
|        | [address]      | [address]  |                     |               |
|        |             |  Mailcode: [address]      |                     |               |
|        |             | Outpatient Clinic    |                     |               |
|        |             | [address]       |                     |               |
|        |             | [place], [place]         |                     |               |
|        |             | [address]           |                     |               |
|        |             | [phone]         |                     |               |
+--------+-------------+----------------------+---------------------+---------------+
 
 
 
 Social History
 
 
+----------------+-------+-----------+--------+------+
| Tobacco Use    | Types | Packs/Day | Years  | Date |
|                |       |           | Used   |      |
+----------------+-------+-----------+--------+------+
| Never Assessed |       |           |        |      |
+----------------+-------+-----------+--------+------+
 
 
 
+------------------+---------------+
| Sex Assigned at  | Date Recorded |
| Birth            |               |
+------------------+---------------+
| Not on file      |               |
+------------------+---------------+
 
 
 
+----------------+-------------+-------------+
| Job Start Date | Occupation  | Industry    |
+----------------+-------------+-------------+
| Not on file    | Not on file | Not on file |
+----------------+-------------+-------------+
 
 
 
+----------------+--------------+------------+
| Travel History | Travel Start | Travel End |
 
+----------------+--------------+------------+
 
 
 
+-------------------------------------+
| No recent travel history available. |
+-------------------------------------+
 documented as of this encounter
 
 Progress Notes
 Interface, Transcription In - 2007  3:07 AM PDT CLINIC DATE: 96
  
  [facility]
  
  SUBJECTIVE: [facility] having
  herself just returned from a trip to [place]. She reports that her
  energy levels continue to improve but she still has concerns regarding
  bladder function. She also has concerns regarding sweating of feet that has
  not improved with an increase in dose of Hygroton. With specific regard to
  bladder function, she describes relatively normal function during the day
  with no incontinence of urine, either of the urge or stress variety.
  However, at night time she finds that when she wakes at night and moves to
  go to the bathroom she voids immediately on standing. This is a very
  specific history, again suggesting an urge type of incontinence rather than
  stress incontinence. She also continues to be aware of some abdominal
  discomfort.
  
  PHYSICAL EXAMINATION: Examination does not demonstrate any urinary stress
  incontinence by simple examination, although there is some descensus of the
  anterior vaginal wall and urethra. The vaginal vault, however, is well
  supported, as is the posterior wall of the vagina.
  
  IMPRESSION: Ms. Ferreira continues to make progress, although the residual
  urge incontinence is frustrating. It is recommended that she continue with
  Levsinex over the next month or so and that she restart estrogen therapy and
  she also spent some time with Melvi Spencer to discuss behavioral
  modification to assist with this urge incontinence problem. A letter is
  also written to her internist in [place], Dr. Ramesh.
  
  
  
  BRITTANY Perez M.D.
  Professor and ,
  Obstetrics and Gynecology
  
  EPK:calista
  D: 96
  T: 96
  C: 06/10/96  C2: 96 av
  cc:
  
  
   GWEN RAMESH MD
   INTERNAL MEDICINE
   [address] [place] [id number]
   Electronically signed by Interface, Transcription In at 2007  3:07 AM PDTdocumented
 in this encounter
 
 Plan of Treatment
 
 Not on filedocumented as of this encounter
 
 Visit Diagnoses
 Not on filedocumented in this encounter

## 2019-12-04 NOTE — XMS
Encounter Summary
  Created on: 2019
 
 Margaret Ferreira
 External Reference #: [id number]
 : 30
 Sex: Female
 
 Demographics
 
 
+-----------------------+------------------------+
| Address               | [address]      |
|                       | [place]  [address]   |
+-----------------------+------------------------+
| Home Phone            | [phone]        |
+-----------------------+------------------------+
| Preferred Language    | Unknown                |
+-----------------------+------------------------+
| Marital Status        |                 |
+-----------------------+------------------------+
| Spiritism Affiliation | MET                    |
+-----------------------+------------------------+
| Race                  | White                  |
+-----------------------+------------------------+
| Ethnic Group          | Not  or  |
+-----------------------+------------------------+
 
 
 Author
 
 
+--------------+------------------------------+
| Author       | [facility] |
+--------------+------------------------------+
| Organization | [facility] |
+--------------+------------------------------+
| Address      | Unknown                      |
+--------------+------------------------------+
| Phone        | Unavailable                  |
+--------------+------------------------------+
 
 
 
 Support
 
 
+--------------+--------------+---------------------+-----------------+
| Name         | Relationship | Address             | Phone           |
+--------------+--------------+---------------------+-----------------+
| Lawson Ferreira | ECON         | [address]          | [phone] |
|              |              | [place], [place]   |                 |
|              |              | [address]               |                 |
+--------------+--------------+---------------------+-----------------+
 
 
 
 Care Team Providers
 
 
 
+-----------------------+------+-------------+
| Care Team Member Name | Role | Phone       |
+-----------------------+------+-------------+
 PCP  | Unavailable |
+-----------------------+------+-------------+
 
 
 
 Encounter Details
 
 
+--------+-------------+----------------------+---------------------+------------------+
| Date   | Type        | Department           | Care Team           | Description      |
+--------+-------------+----------------------+---------------------+------------------+
| / | Procedure - |   Digestive Health   |   Record, Operation | Operative Report |
|    |             | [facility]  [address] |                     |                  |
|        | Transcribed |  [address]         |                     |                  |
|        |             | Mailcode:  Center    |                     |                  |
|        |             | for [facility] and       |                     |                  |
|        |             | Healing, [address]  |                     |                  |
|        |             |  [place], [place]        |                     |                  |
|        |             | [address]           |                     |                  |
|        |             | [phone]         |                     |                  |
+--------+-------------+----------------------+---------------------+------------------+
 
 
 
 Social History
 
 
+----------------+-------+-----------+--------+------+
| Tobacco Use    | Types | Packs/Day | Years  | Date |
|                |       |           | Used   |      |
+----------------+-------+-----------+--------+------+
| Never Assessed |       |           |        |      |
+----------------+-------+-----------+--------+------+
 
 
 
+------------------+---------------+
| Sex Assigned at  | Date Recorded |
| Birth            |               |
+------------------+---------------+
| Not on file      |               |
+------------------+---------------+
 
 
 
+----------------+-------------+-------------+
| Job Start Date | Occupation  | Industry    |
+----------------+-------------+-------------+
| Not on file    | Not on file | Not on file |
+----------------+-------------+-------------+
 
 
 
+----------------+--------------+------------+
| Travel History | Travel Start | Travel End |
 
+----------------+--------------+------------+
 
 
 
+-------------------------------------+
| No recent travel history available. |
+-------------------------------------+
 documented as of this encounter
 
 Plan of Treatment
 Not on filedocumented as of this encounter
 
 Procedures
 
 
+------------------+--------+-------------+----------------------+----------------------+
| Procedure Name   | Priori | Date/Time   | Associated Diagnosis | Comments             |
|                  | ty     |             |                      |                      |
+------------------+--------+-------------+----------------------+----------------------+
| OPERATION RECORD |        | 1996  |                      |   Results for this   |
|                  |        | 12:00 AM    |                      | procedure are in the |
|                  |        | PST         |                      |  results section.    |
+------------------+--------+-------------+----------------------+----------------------+
 documented in this encounter
 
 Results
 OPERATION RECORD (1996 12:00 AM PST)
 
+------------------------------------------------------------------------------+
| Procedure Note                                                               |
+------------------------------------------------------------------------------+
|   1996 12:00 AM [facility]                                          |
|   [facility]                                                 |
|  [address], [place] [address] [phone]  |
|   [facility]                                           |
|                                                                              |
|  OPERATION RECORD                                                            |
|                                                                              |
|  Med Rec No.: [id number] Date: 96                                     |
|                                                                              |
|  Name: Margaret Ferreira                                                      |
|                                                                              |
|                                                                              |
|  ATTENDING SURGEON: BRITTANY Perez M.D.                                       |
|   Professor and ,                                                    |
|   Obstetrics and Gynecology                                                  |
|                                                                              |
|  ASSISTANT(S): Kellee Herring M.D.                                         |
|   Resident, Obstetrics and Gynecology                                        |
|                                                                              |
|  PREOPERATIVE DIAGNOSIS(ES): Intraperitoneal versus retroperitoneal bleed    |
|   status post vaginal hysterectomy with                                      |
|   sacrospinous fixation.                                                     |
|                                                                              |
|  POSTOPERATIVE DIAGNOSIS(ES): Intraperitoneal hemorrhage from left pelvic    |
|   vascular pedicle.                                                          |
|                                                                              |
|  OPERATION(S) PERFORMED: Examination under anesthesia with                   |
|   exploratory laparotomy and bilateral                                       |
|   salpingo-oophorectomy.                                                     |
 
|                                                                              |
|  SPECIMEN(S) REMOVED: Bilateral adnexa.                                      |
|                                                                              |
|  ANESTHESIA: General endotracheal anesthesia.                                |
|                                                                              |
|  INDICATIONS: The patient is a 56-year-old woman who                         |
|   underwent a total vaginal hysterectomy with                                |
|   sacrospinous fixation and an                                               |
|  anterior colporrhaphy on 1996. During her postoperative period, |
|  she developed hypotension with tachycardia and relative oliguria. Her       |
|  abdominal exam became increasingly tender in the postoperative period. Her  |
|  postoperative hematocrit was 24.3.                                          |
|                                                                              |
|  FINDINGS: Examination under anesthesia did not reveal                       |
|   a pelvic hematoma. There was a 1.5 liter                                   |
|   hemoperitoneum. No                                                         |
|  obvious bleeding site was identified. However, there was a hematoma along   |
|  the left broad ligament.                                                    |
|                                                                              |
|  PROCEDURE: General anesthesia was administered. The                         |
|   patient was placed in low dorsolithotomy                                   |
|   position. Brief examination                                                |
|  under anesthesia was performed with no evidence of a pelvic hematoma. A     |
|  laparotomy was initiated. The patient was sterilely prepped and draped.     |
|                                                                              |
|  A Pfannenstiel incision was made and carried down through the subcutaneous  |
|  tissue to the fascia. The fascia was scored in the midline. The fascial     |
|  incision was carried laterally. The fascia was dissected off the rectus     |
|  muscles. The rectus muscles were bluntly divided in the midline. The        |
|  peritoneum was tented up. Hemoperitoneum was evident. The peritoneal        |
|  cavity was entered and approximately 1.5 liters of blood clot was evacuated |
|  from the peritoneum. After copious irrigation, the bowel was packed. The    |
|  vascular pedicles were inspected along the right side. All the vascular     |
|  pedicles were intact. The tube and ovary on the right looked normal, and    |
|  there was no retroperitoneal hemorrhage. On the left side, there was a left |
|  pelvic sidewall hematoma. The round ligament on the left was ligated with 0 |
|  Vicryl suture. The retroperitoneal space was opened and clot was evacuated. |
|                                                                              |
|  At this point, the left infundibulopelvic ligament was clamped, cut, and    |
|  doubly ligated with 0 Vicryl suture. The left adnexa was removed. The left  |
|  uterine artery pedicle was identified and re-ligated with 0 Vicryl suture,  |
|  although this area was not obviously bleeding. On the right side, the       |
|  infundibulopelvic ligament was isolated, clamped, cut, and doubly ligated   |
|  with 0 Vicryl suture. The right adnexa was removed.                         |
|                                                                              |
|  Copious irrigation was used. There were no bleeding points in the pelvis.   |
|  All the vascular pedicles were inspected and were hemostatic. The base of   |
|  the bladder was inspected and it was hemostatic as was the vaginal cuff.    |
|  The abdomen was irrigated again. The self-retaining retractor was withdrawn |
|  as well as the lap sponges. A David-Slaughter drain was placed in the         |
|  intraperitoneal cavity and sewn in with 2-0 nylon suture. The fascia was    |
|  closed with a running suture of 0 Vicryl from corner to midline. The        |
|  subcutaneous tissue was irrigated, and the skin was closed with 4-0 Vicryl  |
|  running suture.                                                             |
|                                                                              |
|  The patient was transferred in stable condition to the Post Anesthesia Care |
|  Unit. Sponge and needle counts were announced as correct.                   |
|                                                                              |
|  Estimated blood loss was 2 liters. The patient received 6 units of packed   |
|  red blood cells, 3 units of fresh frozen plasma, and 4 liters of            |
 
|  Crystalloid. There were no apparent complications.                          |
|                                                                              |
|  KAREN Chun M.D.                                    |
|  Resident, Obstetrics & Gynecology Professor and ,                   |
|   Obstetrics and Gynecology                                                  |
|                                                                              |
|  [employer]                                                                      |
|  D: 96                                                                 |
|  T: 96 1:41 P                                                          |
|                                                                              |
|  cc:                                                                         |
|                                                                              |
|  GWEN RAMESH MD                                                            |
|  [address] [facility] [address]                                                      |
|  [place] [address]                                                          |
|                                                                              |
|  [address]LE MD                                                         |
|  [address]                                                                 |
|  [place] [address]                                                          |
|                                                                              |
+------------------------------------------------------------------------------+
 documented in this encounter
 
 Visit Diagnoses
 Not on filedocumented in this encounter

## 2019-12-04 NOTE — XMS
Encounter Summary
  Created on: 2019
 
 Margaret Ferreira
 External Reference #: [id number]
 : 30
 Sex: Female
 
 Demographics
 
 
+-----------------------+----------------------+
| Address               | [address]        |
|                       | [place]  [address] |
+-----------------------+----------------------+
| Home Phone            | [phone]      |
+-----------------------+----------------------+
| Preferred Language    | Unknown              |
+-----------------------+----------------------+
| Marital Status        |               |
+-----------------------+----------------------+
| Yazidi Affiliation | Unknown              |
+-----------------------+----------------------+
| Race                  | Unknown              |
+-----------------------+----------------------+
| Ethnic Group          | Unknown              |
+-----------------------+----------------------+
 
 
 Author
 
 
+--------------+--------------------------------------------+
| Author       | [facility]  |
|              | and [place]                                |
+--------------+--------------------------------------------+
| Organization | [facility]  |
|              | and [place]                                |
+--------------+--------------------------------------------+
| Address      | Unknown                                    |
+--------------+--------------------------------------------+
| Phone        | Unavailable                                |
+--------------+--------------------------------------------+
 
 
 
 Support
 
 
+--------------+--------------+---------+-----------------+
| Name         | Relationship | Address | Phone           |
+--------------+--------------+---------+-----------------+
| Lawson Hanna | ECON         | Unknown | [phone] |
+--------------+--------------+---------+-----------------+
 
 
 
 Care Team Providers
 
 
 
+-----------------------------+------+-----------------+
| Care Team Member Name       | Role | Phone           |
+-----------------------------+------+-----------------+
| Bessy Paulino | PCP  | [phone] |
|  [place]                       |      |                 |
+-----------------------------+------+-----------------+
 
 
 
 Reason for Visit
 
 
+-------------------+----------+
| Reason            | Comments |
+-------------------+----------+
| Medication Refill |          |
+-------------------+----------+
 
 
 
 Encounter Details
 
 
+--------+--------+----------------------+---------------------+-------------------+
| Date   | Type   | Department           | Care Team           | Description       |
+--------+--------+----------------------+---------------------+-------------------+
| / | Refill |   [facility]       |   Kvng Ontiveros  | Medication Refill |
|    |        | [facility]    | E, DO  [address]   |                   |
|        |        | [facility]  [address]  | [place], [place]       |                   |
|        |        | [address]  [place],   | [address]          |                   |
|        |        | [place] [address]        | [phone]        |                   |
|        |        | [phone]         | [phone] (Fax)  |                   |
+--------+--------+----------------------+---------------------+-------------------+
 
 
 
 Social History
 
 
+-------------------+-------+-----------+--------+------+
| Tobacco Use       | Types | Packs/Day | Years  | Date |
|                   |       |           | Used   |      |
+-------------------+-------+-----------+--------+------+
| Passive Smoke     |       |           |        |      |
| Exposure - Never  |       |           |        |      |
| Smoker            |       |           |        |      |
+-------------------+-------+-----------+--------+------+
 
 
 
+---------------------+---+---+---+
| Smokeless Tobacco:  |   |   |   |
| Never Used          |   |   |   |
+---------------------+---+---+---+
 
 
 
+-------------+----------------------+---------+----------+
 
| Alcohol Use | Drinks/Week          | oz/Week | Comments |
+-------------+----------------------+---------+----------+
| No          |   0 Standard drinks  | 0.0     |          |
|             | or equivalent        |         |          |
+-------------+----------------------+---------+----------+
 
 
 
+------------------+---------------+
| Sex Assigned at  | Date Recorded |
| Birth            |               |
+------------------+---------------+
| Not on file      |               |
+------------------+---------------+
 
 
 
+----------------+-------------+-------------+
| Job Start Date | Occupation  | Industry    |
+----------------+-------------+-------------+
| Not on file    | Not on file | Not on file |
+----------------+-------------+-------------+
 
 
 
+----------------+--------------+------------+
| Travel History | Travel Start | Travel End |
+----------------+--------------+------------+
 
 
 
+-------------------------------------+
| No recent travel history available. |
+-------------------------------------+
 documented as of this encounter
 
 Plan of Treatment
 Not on filedocumented as of this encounter
 
 Visit Diagnoses
 Not on filedocumented in this encounter

## 2019-12-04 NOTE — XMS
Clinical Summary
  Created on: 2019
 
 Margaret Ferreira
 External Reference #: [id number]
 : 30
 Sex: Female
 
 Demographics
 
 
+-----------------------+----------------------+
| Address               | [address]        |
|                       | [place]  [address] |
+-----------------------+----------------------+
| Home Phone            | [phone]      |
+-----------------------+----------------------+
| Preferred Language    | Unknown              |
+-----------------------+----------------------+
| Marital Status        |               |
+-----------------------+----------------------+
| Anabaptist Affiliation | Unknown              |
+-----------------------+----------------------+
| Race                  | Unknown              |
+-----------------------+----------------------+
| Ethnic Group          | Unknown              |
+-----------------------+----------------------+
 
 
 Author
 
 
+--------------+------------------------------------------+
| Author       | [facility]  |
|              | [facility]                                 |
+--------------+------------------------------------------+
| Organization | [facility] (Historical as of  |
|              | 19)                                 |
+--------------+------------------------------------------+
| Address      | Unknown                                  |
+--------------+------------------------------------------+
| Phone        | Unavailable                              |
+--------------+------------------------------------------+
 
 
 
 Support
 
 
+--------------+--------------+---------+-----------------+
| Name         | Relationship | Address | Phone           |
+--------------+--------------+---------+-----------------+
| Lawson Ferreira | [place]         | Unknown | [phone] |
+--------------+--------------+---------+-----------------+
 
 
 
 Care Team Providers
 
 
 
+--------------------------+------+-----------------+
| Care Team Member Name    | Role | Phone           |
+--------------------------+------+-----------------+
| Bessy Paulino PA-C | PP   | [phone] |
+--------------------------+------+-----------------+
 
 
 
 Allergies
 
 
+----------------+-----------+----------+----------+----------+
| Active Allergy | Reactions | Severity | Noted    | Comments |
|                |           |          | Date     |          |
+----------------+-----------+----------+----------+----------+
| Lisinopril     | Cough     | Low      | 20 |          |
|                |           |          | 17       |          |
+----------------+-----------+----------+----------+----------+
 
 
 
 Current Medications
 
 
+----------------------+----------------------+----------+---------+------+------+-------+
| Prescription         | Sig.                 | Disp.    | Refills | Star | End  | Statu |
|                      |                      |          |         | t    | Date | s     |
|                      |                      |          |         | Date |      |       |
+----------------------+----------------------+----------+---------+------+------+-------+
|   losartan (COZAAR)  |                      |          |         | 04/0 |      | Activ |
| 50 MG tablet         |                      |          |         | 3/20 |      | e     |
|                      |                      |          |         | 17   |      |       |
+----------------------+----------------------+----------+---------+------+------+-------+
|   traMADol (ULTRAM)  |                      |          |         | 04/1 |      | Activ |
| 50 MG tablet         |                      |          |         | 3/20 |      | e     |
|                      |                      |          |         | 17   |      |       |
+----------------------+----------------------+----------+---------+------+------+-------+
|   ibandronate sodium | Inject 3 mg into the |          |         |      |      | Activ |
|  (BONIVA) 3 MG/3ML   |  vein.               |          |         |      |      | e     |
| SOLN injection       |                      |          |         |      |      |       |
+----------------------+----------------------+----------+---------+------+------+-------+
|   Calcium Carbonate  | Take  by mouth.      |          |         |      |      | Activ |
| (CALCIUM 600 PO)     |                      |          |         |      |      | e     |
+----------------------+----------------------+----------+---------+------+------+-------+
|                      | Take  by mouth.      |          |         |      |      | Activ |
| GLUCOSAMINE-CHONDROI |                      |          |         |      |      | e     |
| TIN PO               |                      |          |         |      |      |       |
+----------------------+----------------------+----------+---------+------+------+-------+
|   Omega-3 Fatty      | Take  by mouth.      |          |         |      |      | Activ |
| Acids (OMEGA 3 PO)   |                      |          |         |      |      | e     |
+----------------------+----------------------+----------+---------+------+------+-------+
|   DIPHENHYDRAMINE    | Take  by mouth.      |          |         |      |      | Activ |
| HCL, SLEEP, PO       |                      |          |         |      |      | e     |
+----------------------+----------------------+----------+---------+------+------+-------+
|   gabapentin         | Take 300 mg by mouth |          |         |      |      | Activ |
| (NEURONTIN) 300 MG   |  4 (four) times      |          |         |      |      | e     |
| capsule              | daily.               |          |         |      |      |       |
+----------------------+----------------------+----------+---------+------+------+-------+
 
|   nitrofurantoin     | Take 100 mg by mouth |          |         |      |      | Activ |
| (MACRODANTIN) 100 MG |  daily.              |          |         |      |      | e     |
|  capsule             |                      |          |         |      |      |       |
+----------------------+----------------------+----------+---------+------+------+-------+
|   Multiple           | Take  by mouth.      |          |         |      |      | Activ |
| Vitamins-Minerals    |                      |          |         |      |      | e     |
| (MULTIPLE            |                      |          |         |      |      |       |
| VITAMINS/WOMENS PO)  |                      |          |         |      |      |       |
+----------------------+----------------------+----------+---------+------+------+-------+
|   pramipexole        | Take 0.25 mg by      |          |         |      |      | Activ |
| (MIRAPEX) 0.25 MG    | mouth 2 (two) times  |          |         |      |      | e     |
| tablet               | daily.               |          |         |      |      |       |
+----------------------+----------------------+----------+---------+------+------+-------+
|   levothyroxine      | Take 150 mcg by      |          |         |      |      | Activ |
| (SYNTHROID) 150 MCG  | mouth daily.         |          |         |      |      | e     |
| tablet               |                      |          |         |      |      |       |
+----------------------+----------------------+----------+---------+------+------+-------+
|   acetaminophen      | Take 325 mg by mouth |          |         |      |      | Activ |
| (TYLENOL) 325 MG     |  every 6 (six) hours |          |         |      |      | e     |
| tablet               |  as needed for Pain. |          |         |      |      |       |
+----------------------+----------------------+----------+---------+------+------+-------+
|   albuterol (PROAIR  | Inhale 2 puffs into  |          |         |      |      | Activ |
| HFA) 108 (90 BASE)   | the lungs every 4    |          |         |      |      | e     |
| MCG/ACT inhaler      | (four) hours as      |          |         |      |      |       |
|                      | needed for Wheezing. |          |         |      |      |       |
+----------------------+----------------------+----------+---------+------+------+-------+
|   azithromycin       | TAKE TWO TABLETS BY  |   6      | 0       | 10/0 |      | Activ |
| (ZITHROMAX) 250 MG   | MOUTH NOW then ONE   | tablet   |         | 4/20 |      | e     |
| tabletIndications:   | TABLET DAILY DAY 2-5 |          |         | 17   |      |       |
| Moderate COPD        |                      |          |         |      |      |       |
| (chronic obstructive |                      |          |         |      |      |       |
|  pulmonary disease)  |                      |          |         |      |      |       |
| ([facility])                |                      |          |         |      |      |       |
+----------------------+----------------------+----------+---------+------+------+-------+
|   predniSONE         | Take 2 tabs daily    |   11     | 0       | 10/0 |      | Activ |
| (DELTASONE) 20 MG    | for 3 days, then 1   | tablet   |         | 4/20 |      | e     |
| tabletIndications:   | tab daily for 5 days |          |         | 17   |      |       |
| Moderate COPD        |  then STOP           |          |         |      |      |       |
| (chronic obstructive |                      |          |         |      |      |       |
|  pulmonary disease)  |                      |          |         |      |      |       |
| ([facility])                |                      |          |         |      |      |       |
+----------------------+----------------------+----------+---------+------+------+-------+
|   albuterol (PROAIR  | Inhale 2 puffs into  |   1      | 5       | 02/2 | 02/2 | Activ |
| HFA) 108 (90 Base)   | the lungs every 4    | Inhaler  |         | 0/20 | 0/20 | e     |
| MCG/ACT inhaler      | (four) hours as      |          |         | 19   | 20   |       |
|                      | needed for Wheezing. |          |         |      |      |       |
+----------------------+----------------------+----------+---------+------+------+-------+
|   ANORO ELLIPTA      | Inhale 1 puff into   |   3 each | 3       | 07/ |      | Activ |
| 62.5-25 MCG/INH      | the lungs daily.     |          |         | 4/20 |      | e     |
| inhalerIndications:  |                      |          |         | 19   |      |       |
| Moderate COPD        |                      |          |         |      |      |       |
| (chronic obstructive |                      |          |         |      |      |       |
|  pulmonary disease)  |                      |          |         |      |      |       |
| ([facility])                |                      |          |         |      |      |       |
+----------------------+----------------------+----------+---------+------+------+-------+
 
 
 
 Active Problems
 
 
 
+-----------------------+------------+
| Problem               | Noted Date |
+-----------------------+------------+
| Aortic valve stenosis | 2018 |
+-----------------------+------------+
 
 
 
+--------------------------+
|   Overview:   Overview:  |
| trivial                  |
+--------------------------+
 
 
 
+-------------------------------------------------------------+------------+
| GERD (gastroesophageal reflux disease)                      | 2018 |
+-------------------------------------------------------------+------------+
| Hypertension                                                | 2018 |
+-------------------------------------------------------------+------------+
| Hypothyroidism                                              | 2018 |
+-------------------------------------------------------------+------------+
| Macular degeneration                                        | 2018 |
+-------------------------------------------------------------+------------+
| Osteoarthrosis                                              | 2018 |
+-------------------------------------------------------------+------------+
| RLS (restless legs syndrome)                                | 2018 |
+-------------------------------------------------------------+------------+
| Scoliosis                                                   | 2018 |
+-------------------------------------------------------------+------------+
| Urinary incontinence                                        | 2018 |
+-------------------------------------------------------------+------------+
| Hiatal hernia                                               | 10/04/2017 |
+-------------------------------------------------------------+------------+
| Moderate COPD (chronic obstructive pulmonary disease) (HCC) | 2017 |
+-------------------------------------------------------------+------------+
 
 
 
 Family History
 
 
+-----------------+----------+------+----------+
| Medical History | Relation | Name | Comments |
+-----------------+----------+------+----------+
| COPD            | Father   |      |          |
+-----------------+----------+------+----------+
| Stroke          | Mother   |      |          |
+-----------------+----------+------+----------+
 
 
 
+----------+------+----------+----------+
| Relation | Name | Status   | Comments |
+----------+------+----------+----------+
| Father   |      |  |          |
|          |      |   (Age   |          |
|          |      | 88)      |          |
+----------+------+----------+----------+
 
| Mother   |      |  |          |
|          |      |   (Age   |          |
|          |      | 90)      |          |
+----------+------+----------+----------+
 
 
 
 Social History
 
 
+--------------+-------+-----------+--------+------+
| Tobacco Use  | Types | Packs/Day | Years  | Date |
|              |       |           | Used   |      |
+--------------+-------+-----------+--------+------+
| Never Smoker |       |           |        |      |
+--------------+-------+-----------+--------+------+
 
 
 
+---------------------+---+---+---+
| Smokeless Tobacco:  |   |   |   |
| Never Used          |   |   |   |
+---------------------+---+---+---+
 
 
 
+-------------+-----------+---------+----------+
| Alcohol Use | Drinks/We | oz/Week | Comments |
|             | ek        |         |          |
+-------------+-----------+---------+----------+
| Yes         |           |         |          |
+-------------+-----------+---------+----------+
 
 
 
+------------------+---------------+
| Sex Assigned at  | Date Recorded |
| Birth            |               |
+------------------+---------------+
| Not on file      |               |
+------------------+---------------+
 
 
 
 Last Filed Vital Signs
 
 
+-------------------+---------------------+-------------------------+
| Vital Sign        | Reading             | Time Taken              |
+-------------------+---------------------+-------------------------+
| Blood Pressure    | 133/73              | 2018  9:57 AM PDT |
+-------------------+---------------------+-------------------------+
| Pulse             | 87                  | 2018  9:57 AM PDT |
+-------------------+---------------------+-------------------------+
| Temperature       | 36.8   C (98.2   F) | 2018  9:57 AM PDT |
+-------------------+---------------------+-------------------------+
| Respiratory Rate  | -                   | -                       |
+-------------------+---------------------+-------------------------+
| Oxygen Saturation | 98%                 | 2018  9:57 AM PDT |
+-------------------+---------------------+-------------------------+
 
| Inhaled Oxygen    | -                   | -                       |
| Concentration     |                     |                         |
+-------------------+---------------------+-------------------------+
| Weight            | 61.2 kg (135 lb)    | 2018  9:57 AM PDT |
+-------------------+---------------------+-------------------------+
| Height            | 160 cm (5' 3")      | 2018  9:57 AM PDT |
+-------------------+---------------------+-------------------------+
| Body Mass Index   | 23.91               | 2018  9:57 AM PDT |
+-------------------+---------------------+-------------------------+
 
 
 
 Plan of Treatment
 Not on file
 
 Results
 Not on filefrom Last 3 Months
 
 Insurance
 
 
+-------------------+--------+-------------+------+-------+----------------------+
| Payer             | Benefi | Subscriber  | Type | Phone | Address              |
|                   | t Plan | ID          |      |       |                      |
|                   |  /     |             |      |       |                      |
|                   | Group  |             |      |       |                      |
+-------------------+--------+-------------+------+-------+----------------------+
| MEDICARE          | MEDICA | [id number]  |      |       |   [address]        |
|                   | RE     |             |      |       | [place] [address] |
|                   | IP-OP  |             |      |       |                      |
+-------------------+--------+-------------+------+-------+----------------------+
| [employer] | [employer] | [id number] |      |       |                      |
|                   |        |             |      |       |                      |
|                   | HEALTH |             |      |       |                      |
|                   | CARE - |             |      |       |                      |
|                   |  [employer]  |             |      |       |                      |
+-------------------+--------+-------------+------+-------+----------------------+
 
 
 
+----------------+--------+-------------+--------+-------------+---------------------+
| Guarantor Name | [employer]oun | Relation to | Date   | Phone       | Billing Address     |
|                | t Type |  Patient    | of     |             |                     |
|                |        |             | Birth  |             |                     |
+----------------+--------+-------------+--------+-------------+---------------------+
| MARGARET FERREIRA  | Person | Self        | / |   Home:     |   [address]     |
|                | al/Fam |             | 1930   | [phone] | [place] [address] |
|                | angélica    |             |        | [address]        |                     |
+----------------+--------+-------------+--------+-------------+---------------------+

## 2019-12-04 NOTE — XMS
Encounter Summary
  Created on: 2019
 
 Margaret Ferreira
 External Reference #: [id number]
 : 30
 Sex: Female
 
 Demographics
 
 
+-----------------------+------------------------+
| Address               | [address]      |
|                       | [place]  [address]   |
+-----------------------+------------------------+
| Home Phone            | [phone]        |
+-----------------------+------------------------+
| Preferred Language    | Unknown                |
+-----------------------+------------------------+
| Marital Status        |                 |
+-----------------------+------------------------+
| Episcopalian Affiliation | MET                    |
+-----------------------+------------------------+
| Race                  | White                  |
+-----------------------+------------------------+
| Ethnic Group          | Not  or  |
+-----------------------+------------------------+
 
 
 Author
 
 
+--------------+------------------------------+
| Author       | [facility] |
+--------------+------------------------------+
| Organization | [facility] |
+--------------+------------------------------+
| Address      | Unknown                      |
+--------------+------------------------------+
| Phone        | Unavailable                  |
+--------------+------------------------------+
 
 
 
 Support
 
 
+--------------+--------------+---------------------+-----------------+
| Name         | Relationship | Address             | Phone           |
+--------------+--------------+---------------------+-----------------+
| Lawson Ferreira | ECON         | [address]          | [phone] |
|              |              | [place], [place]   |                 |
|              |              | [address]               |                 |
+--------------+--------------+---------------------+-----------------+
 
 
 
 Care Team Providers
 
 
 
+-----------------------+------+-------------+
| Care Team Member Name | Role | Phone       |
+-----------------------+------+-------------+
 PCP  | Unavailable |
+-----------------------+------+-------------+
 
 
 
 Encounter Details
 
 
+--------+-------------+----------------------+---------------------+------------------+
| Date   | Type        | Department           | Care Team           | Description      |
+--------+-------------+----------------------+---------------------+------------------+
| / | Procedure - |   Dig[facility]ive [facility]   |   Record, Operation | Operative Report |
|    |             | [place] [facility]  [address] |                     |                  |
|        | Transcribed |  [address]         |                     |                  |
|        |             | Mailcode:  Center    |                     |                  |
|        |             | for [facility] and       |                     |                  |
|        |             | Healing, [address]  |                     |                  |
|        |             |  [place], [place]        |                     |                  |
|        |             | [address]           |                     |                  |
|        |             | [phone]         |                     |                  |
+--------+-------------+----------------------+---------------------+------------------+
 
 
 
 Social History
 
 
+----------------+-------+-----------+--------+------+
| Tobacco Use    | Types | Packs/Day | Years  | Date |
|                |       |           | Used   |      |
+----------------+-------+-----------+--------+------+
| Never Assessed |       |           |        |      |
+----------------+-------+-----------+--------+------+
 
 
 
+------------------+---------------+
| Sex Assigned at  | Date Recorded |
| Birth            |               |
+------------------+---------------+
| Not on file      |               |
+------------------+---------------+
 
 
 
+----------------+-------------+-------------+
| Job Start Date | Occupation  | Industry    |
+----------------+-------------+-------------+
| Not on file    | Not on file | Not on file |
+----------------+-------------+-------------+
 
 
 
+----------------+--------------+------------+
| Travel History | Travel Start | Travel End |
 
+----------------+--------------+------------+
 
 
 
+-------------------------------------+
| No recent travel history available. |
+-------------------------------------+
 documented as of this encounter
 
 Plan of Treatment
 Not on filedocumented as of this encounter
 
 Procedures
 
 
+------------------+--------+-------------+----------------------+----------------------+
| Procedure Name   | Priori | Date/Time   | Associated Diagnosis | Comments             |
|                  | ty     |             |                      |                      |
+------------------+--------+-------------+----------------------+----------------------+
| OPERATION RECORD |        | 1996  |                      |   Results for this   |
|                  |        | 12:00 AM    |                      | procedure are in the |
|                  |        | PST         |                      |  results section.    |
+------------------+--------+-------------+----------------------+----------------------+
 documented in this encounter
 
 Results
 OPERATION RECORD (1996 12:00 AM PST)
 
+------------------------------------------------------------------------------+
| Procedure Note                                                               |
+------------------------------------------------------------------------------+
|   1996 12:00 AM [facility]                                          |
|   [facility]                                                 |
|  [address], [place] [address] [phone]  |
|   [facility]                                           |
|                                                                              |
|  OPERATION RECORD                                                            |
|                                                                              |
|  Med Rec No.: [id number] Date: 96                                     |
|                                                                              |
|  Name: Margaret Ferreira                                                      |
|                                                                              |
|                                                                              |
|  ATTENDING SURGEON: BRITTANY Perez M.D.                                       |
|   Professor and ,                                                    |
|   Obstetrics and Gynecology                                                  |
|                                                                              |
|  ASSISTANT(S): Kellee Herring M.D.                                         |
|   Resident, Obstetrics and Gynecology                                        |
|                                                                              |
|  PREOPERATIVE DIAGNOSIS(ES): Intraperitoneal versus retroperitoneal bleed    |
|   status post vaginal hysterectomy with                                      |
|   sacrospinous fixation.                                                     |
|                                                                              |
|  POSTOPERATIVE DIAGNOSIS(ES): Intraperitoneal hemorrhage from left pelvic    |
|   vascular pedicle.                                                          |
|                                                                              |
|  OPERATION(S) PERFORMED: Examination under anesthesia with                   |
|   exploratory laparotomy and bilateral                                       |
|   salpingo-oophorectomy.                                                     |
 
|                                                                              |
|  SPECIMEN(S) REMOVED: Bilateral adnexa.                                      |
|                                                                              |
|  ANESTHESIA: General endotracheal anesthesia.                                |
|                                                                              |
|  INDICATIONS: The patient is a 56-year-old woman who                         |
|   underwent a total vaginal hysterectomy with                                |
|   sacrospinous fixation and an                                               |
|  anterior colporrhaphy on 1996. During her postoperative period, |
|  she developed hypotension with tachycardia and relative oliguria. Her       |
|  abdominal exam became increasingly tender in the postoperative period. Her  |
|  postoperative hematocrit was 24.3.                                          |
|                                                                              |
|  FINDINGS: Examination under anesthesia did not reveal                       |
|   a pelvic hematoma. There was a 1.5 liter                                   |
|   hemoperitoneum. No                                                         |
|  obvious bleeding site was identified. However, there was a hematoma along   |
|  the left broad ligament.                                                    |
|                                                                              |
|  PROCEDURE: General anesthesia was administered. The                         |
|   patient was placed in low dorsolithotomy                                   |
|   position. Brief examination                                                |
|  under anesthesia was performed with no evidence of a pelvic hematoma. A     |
|  laparotomy was initiated. The patient was sterilely prepped and draped.     |
|                                                                              |
|  A Pfannenstiel incision was made and carried down through the subcutaneous  |
|  tissue to the fascia. The fascia was scored in the midline. The fascial     |
|  incision was carried laterally. The fascia was dissected off the rectus     |
|  muscles. The rectus muscles were bluntly divided in the midline. The        |
|  peritoneum was tented up. Hemoperitoneum was evident. The peritoneal        |
|  cavity was entered and approximately 1.5 liters of blood clot was evacuated |
|  from the peritoneum. After copious irrigation, the bowel was packed. The    |
|  vascular pedicles were inspected along the right side. All the vascular     |
|  pedicles were intact. The tube and ovary on the right looked normal, and    |
|  there was no retroperitoneal hemorrhage. On the left side, there was a left |
|  pelvic sidewall hematoma. The round ligament on the left was ligated with 0 |
|  Vicryl suture. The retroperitoneal space was opened and clot was evacuated. |
|                                                                              |
|  At this point, the left infundibulopelvic ligament was clamped, cut, and    |
|  doubly ligated with 0 Vicryl suture. The left adnexa was removed. The left  |
|  uterine artery pedicle was identified and re-ligated with 0 Vicryl suture,  |
|  although this area was not obviously bleeding. On the right side, the       |
|  infundibulopelvic ligament was isolated, clamped, cut, and doubly ligated   |
|  with 0 Vicryl suture. The right adnexa was removed.                         |
|                                                                              |
|  Copious irrigation was used. There were no bleeding points in the pelvis.   |
|  All the vascular pedicles were inspected and were hemostatic. The base of   |
|  the bladder was inspected and it was hemostatic as was the vaginal cuff.    |
|  The abdomen was irrigated again. The self-retaining retractor was withdrawn |
|  as well as the lap sponges. A David-Slaughter drain was placed in the         |
|  intraperitoneal cavity and sewn in with 2-0 nylon suture. The fascia was    |
|  closed with a running suture of 0 Vicryl from corner to midline. The        |
|  subcutaneous tissue was irrigated, and the skin was closed with 4-0 Vicryl  |
|  running suture.                                                             |
|                                                                              |
|  The patient was transferred in stable condition to the Post Anesthesia Care |
|  Unit. Sponge and needle counts were announced as correct.                   |
|                                                                              |
|  Estimated blood loss was 2 liters. The patient received 6 units of packed   |
|  red blood cells, 3 units of fresh frozen plasma, and 4 liters of            |
 
|  Crystalloid. There were no apparent complications.                          |
|                                                                              |
|  KAREN Chun M.D.                                    |
|  Resident, Obstetrics & Gynecology Professor and ,                   |
|   Obstetrics and Gynecology                                                  |
|                                                                              |
|  [employer]                                                                      |
|  D: 96                                                                 |
|  T: 96 1:41 P                                                          |
|                                                                              |
|  cc:                                                                         |
|                                                                              |
|  GWEN RAMESH MD                                                            |
|  [address] [facility] [address]                                                      |
|  [place] [address]                                                          |
|                                                                              |
|  NICK COLE MD                                                         |
|  [address]                                                                 |
|  [place] [address]                                                          |
|                                                                              |
+------------------------------------------------------------------------------+
 documented in this encounter
 
 Visit Diagnoses
 Not on filedocumented in this encounter

## 2019-12-04 NOTE — XMS
Encounter Summary
  Created on: 2019
 
 Margaret Ferreira
 External Reference #: [id number]
 : 30
 Sex: Female
 
 Demographics
 
 
+-----------------------+------------------------+
| Address               | [address]      |
|                       | [place]  [address]   |
+-----------------------+------------------------+
| Home Phone            | [phone]        |
+-----------------------+------------------------+
| Preferred Language    | Unknown                |
+-----------------------+------------------------+
| Marital Status        |                 |
+-----------------------+------------------------+
| [employer]ist Affiliation | MET                    |
+-----------------------+------------------------+
| Race                  | White                  |
+-----------------------+------------------------+
| Ethnic Group          | Not  or  |
+-----------------------+------------------------+
 
 
 Author
 
 
+--------------+------------------------------+
| Author       | [facility] |
+--------------+------------------------------+
| Organization | [facility] |
+--------------+------------------------------+
| Address      | Unknown                      |
+--------------+------------------------------+
| Phone        | Unavailable                  |
+--------------+------------------------------+
 
 
 
 Support
 
 
+--------------+--------------+---------------------+-----------------+
| Name         | Relationship | Address             | Phone           |
+--------------+--------------+---------------------+-----------------+
| Lawson Ferreira | ECON         | [address]          | [phone] |
|              |              | [place], [place]   |                 |
|              |              | [address]               |                 |
+--------------+--------------+---------------------+-----------------+
 
 
 
 Care Team Providers
 
 
 
+-----------------------+------+-------------+
| Care Team Member Name | Role | Phone       |
+-----------------------+------+-------------+
 PCP  | Unavailable |
+-----------------------+------+-------------+
 
 
 
 Encounter Details
 
 
+--------+-------------+----------------------+--------------------+-------------+
| Date   | Type        | Department           | Care Team          | Description |
+--------+-------------+----------------------+--------------------+-------------+
| / | Transcribed |   [facility] at  |   Dictation, Other | Transcribed |
|    |             | [facility]  [address]     |                    |             |
|        |             | [address]      |                    |             |
|        |             | Mailcode: [id number]  [address]  |                    |             |
|        |             | [address]         |                    |             |
|        |             | [place]  [place],  |                    |             |
|        |             | [place] [address]        |                    |             |
|        |             | [phone]         |                    |             |
+--------+-------------+----------------------+--------------------+-------------+
 
 
 
 Social History
 
 
+----------------+-------+-----------+--------+------+
| Tobacco Use    | Types | Packs/Day | Years  | Date |
|                |       |           | Used   |      |
+----------------+-------+-----------+--------+------+
| Never Assessed |       |           |        |      |
+----------------+-------+-----------+--------+------+
 
 
 
+------------------+---------------+
| Sex Assigned at  | Date Recorded |
| Birth            |               |
+------------------+---------------+
| Not on file      |               |
+------------------+---------------+
 
 
 
+----------------+-------------+-------------+
| Job Start Date | Occupation  | Industry    |
+----------------+-------------+-------------+
| Not on file    | Not on file | Not on file |
+----------------+-------------+-------------+
 
 
 
+----------------+--------------+------------+
| Travel History | Travel Start | Travel End |
+----------------+--------------+------------+
 
 
 
 
+-------------------------------------+
| No recent travel history available. |
+-------------------------------------+
 documented as of this encounter
 
 Progress Notes
 Interface, Transcription In - 2007  3:07 AM PDT  [address] [address] [phone] or [phone]
  
  1996
  
  
  GWEN RAMESH MD
   [address] [facility] [place] [address]
  
  
  RE:Margaret Ferreira
  MR#:[id number]
  
  Dear Dr. Ramesh:
  
  I saw your patient, Margaret Ferreira, again recently on her return from
  [place]. Unfortunately, she continues to be troubled by some urge
  type incontinence which interestingly does not occur at all during the
  daytime and only affects her when she wakes during the night and occurs when
  she moves out of bed to go to the bathroom. There is nothing in her history
  to suggest a stress type component.
  
  Examination shows that she does have some descensus of the anterior vaginal
  wall and urethra. The vaginal vault itself is very well supported.
  
  I do not feel at this stage that this type of incontinence could be well
  managed by any further surgical procedure. She continues to be concerned
  about some generalized abdominal discomfort, and she also is concerned about
  some edema of both legs that was more marked while she was active in
  [place]. I have prescribed some Levsinex at nighttime as a bladder
  "sedative" as she did notice some improvement from this when she was using
  it before. Additionally, I have encouraged her to go back on to Premarin
  which she previously discontinued because of breast tenderness. I think in
  view of her quite marked bladder symptoms that a resumption of Premarin
  therapy would be appropriate. She also has spent some time with the nurse
  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
  instability, and I hope that she was able to give her some help.
  
  I am anxious to stay in touch with Ms. Fererira if this problem is not
  resolved and will be available at any time if you feel that we need to
  reevaluate the situation on her behalf. Thanks for your help and best
  wishes.
  
  Yours sincerely,
  
  
 
  
  BRITTANY Perez M.D.
  Professor and ,
  Obstetrics and Gynecology
  
  EPK:ash
  D: 96 T: 96 C:96 rh
   Electronically signed by Interface, Transcription In at 2007  3:07 AM PDTdocumented
 in this encounter
 
 Plan of Treatment
 Not on filedocumented as of this encounter
 
 Visit Diagnoses
 Not on filedocumented in this encounter

## 2019-12-04 NOTE — XMS
Encounter Summary
  Created on: 2019
 
 Margaret Ferreira
 External Reference #: [id number]
 : 30
 Sex: Female
 
 Demographics
 
 
+-----------------------+----------------------+
| Address               | [address]        |
|                       | [place]  [address] |
+-----------------------+----------------------+
| Home Phone            | [phone]      |
+-----------------------+----------------------+
| Preferred Language    | Unknown              |
+-----------------------+----------------------+
| Marital Status        |               |
+-----------------------+----------------------+
| Synagogue Affiliation | Unknown              |
+-----------------------+----------------------+
| Race                  | Unknown              |
+-----------------------+----------------------+
| Ethnic Group          | Unknown              |
+-----------------------+----------------------+
 
 
 Author
 
 
+--------------+--------------------------------------------+
| Author       | [facility]  |
|              | and [place]                                |
+--------------+--------------------------------------------+
| Organization | [facility]  |
|              | and [place]                                |
+--------------+--------------------------------------------+
| Address      | Unknown                                    |
+--------------+--------------------------------------------+
| Phone        | Unavailable                                |
+--------------+--------------------------------------------+
 
 
 
 Support
 
 
+--------------+--------------+---------+-----------------+
| Name         | Relationship | Address | Phone           |
+--------------+--------------+---------+-----------------+
| Lawson Hanna | ECON         | Unknown | [phone] |
+--------------+--------------+---------+-----------------+
 
 
 
 Care Team Providers
 
 
 
+-----------------------+------+-----------------+
| Care Team Member Name | Role | Phone           |
+-----------------------+------+-----------------+
| Calixto Chou MD | PCP  | [phone] |
+-----------------------+------+-----------------+
 
 
 
 Reason for Visit
 
 
+----------------+----------+
| Reason         | Comments |
+----------------+----------+
| Establish Care |          |
+----------------+----------+
 Evaluate & Treat (Routine)
 
+--------+--------+-------------+--------------+--------------+---------------+
| Status | Reason | Specialty   | Diagnoses /  | Referred By  | Referred To   |
|        |        |             | Procedures   | Contact      | Contact       |
+--------+--------+-------------+--------------+--------------+---------------+
| Closed |        | Pulmonology |   Diagnoses  |   Marier,    |   Wilda, |
|        |        |             |  Chronic     | Calixto Sage,  |  Mary PADRON   |
|        |        |             | airway       | MD  [address]     | MD  [address]     |
|        |        |             | obstruction, | [place]    | [address]     |
|        |        |             |  not         | [address]        | [place],  |
|        |        |             | elsewhere    | [place],   | [place] [address]      |
|        |        |             | classified   | [place]           |               |
|        |        |             | Procedures   | [id number]   |               |
|        |        |             | NEW PT       | Phone:       |               |
|        |        |             | CONSULT      | [phone] |               |
|        |        |             |              |   Fax:       |               |
|        |        |             |              | [phone] |               |
+--------+--------+-------------+--------------+--------------+---------------+
 
 
 
 
 Encounter Details
 
 
+--------+---------+----------------------+----------------+----------------------+
| Date   | Type    | Department           | Care Team      | Description          |
+--------+---------+----------------------+----------------+----------------------+
| / | Office  |   [facility]          |   Wilda,  | COPD (chronic        |
|    | Visit   | PULMONARY  [address]     | Mary PADRON MD  | obstructive          |
|        |         | [place]  [place], |                | pulmonary disease)   |
|        |         |  [place] [address]       |                | ([facility]) (Primary Dx);  |
|        |         | [phone]         |                | Aortic valve         |
|        |         |                      |                | stenosis             |
+--------+---------+----------------------+----------------+----------------------+
 
 
 
 Social History
 
 
 
+--------------+-------+-----------+--------+------+
| Tobacco Use  | Types | Packs/Day | Years  | Date |
|              |       |           | Used   |      |
+--------------+-------+-----------+--------+------+
| Never Smoker |       |           |        |      |
+--------------+-------+-----------+--------+------+
 
 
 
+-------------------------------------------------------------------+
| Comments: her parents both smoked, also worked at "[employer] |
+-------------------------------------------------------------------+
 
 
 
+-------------+-------------+---------+----------+
| Alcohol Use | Drinks/Week | oz/Week | Comments |
+-------------+-------------+---------+----------+
| No          |             |         |          |
+-------------+-------------+---------+----------+
 
 
 
+------------------+---------------+
| Sex Assigned at  | Date Recorded |
| Birth            |               |
+------------------+---------------+
| Not on file      |               |
+------------------+---------------+
 
 
 
+----------------+-------------+-------------+
| Job Start Date | Occupation  | Industry    |
+----------------+-------------+-------------+
| Not on file    | Not on file | Not on file |
+----------------+-------------+-------------+
 
 
 
+----------------+--------------+------------+
| Travel History | Travel Start | Travel End |
+----------------+--------------+------------+
 
 
 
+-------------------------------------+
| No recent travel history available. |
+-------------------------------------+
 documented as of this encounter
 
 Last Filed Vital Signs
 
 
+-------------------+---------------------+----------------------+----------+
| Vital Sign        | Reading             | Time Taken           | Comments |
+-------------------+---------------------+----------------------+----------+
| Blood Pressure    | 124/58              | 2015  3:12 PM  |          |
|                   |                     | PST                  |          |
+-------------------+---------------------+----------------------+----------+
 
| Pulse             | 92                  | 2015  3:12 PM  |          |
|                   |                     | PST                  |          |
+-------------------+---------------------+----------------------+----------+
| Temperature       | 36.4   C (97.6   F) | 2015  3:12 PM  |          |
|                   |                     | PST                  |          |
+-------------------+---------------------+----------------------+----------+
| Respiratory Rate  | -                   | -                    |          |
+-------------------+---------------------+----------------------+----------+
| Oxygen Saturation | 90%                 | 2015  3:12 PM  |          |
|                   |                     | PST                  |          |
+-------------------+---------------------+----------------------+----------+
| Inhaled Oxygen    | -                   | -                    |          |
| Concentration     |                     |                      |          |
+-------------------+---------------------+----------------------+----------+
| Weight            | 59.4 kg (131 lb)    | 2015  3:12 PM  |          |
|                   |                     | PST                  |          |
+-------------------+---------------------+----------------------+----------+
| Height            | 157.5 cm (5' 2")    | 2015  3:12 PM  |          |
|                   |                     | PST                  |          |
+-------------------+---------------------+----------------------+----------+
| Body Mass Index   | 23.96               | 2015  3:12 PM  |          |
|                   |                     | PST                  |          |
+-------------------+---------------------+----------------------+----------+
 documented in this encounter
 
 Patient Instructions
 Patient Instructions Mary Astudillo MD - 2015  4:24 PM PSTStay on Qvar as bef
ore. 
 
 Start on Spiriva one capsule inhaled once daily. 
 
 Start on ProAir 2 puffs inhaled every 6 hours as needed. 
 
 Do an overnight oxygen test through [facility] [employer] [facility]. Call the [employer] before yo
u pick it up to make sure they have a box available. You will  a box at the [employer]. Do the test on room air. Wear the finger probe through the night and then return
 the box for a download the next day. 
 
 Do walking oxygen test at [facility].
 
 Use a spacer with the Qvar and ProAir. 
 Electronically signed by Mary Astudillo MD at 2015  4:31 PM PST
 documented in this encounter
 
 Progress Notes
 Mary Astudillo MD - 2015  3:32 PM PSTFormatting of this note might be differe
nt from the original.
 
 Pulmonary Consult
 
 Referring Provider: Calixto Chou MD 
 
 HPI 
  
 Margaret Ferreira is a 84 y.o. female patient of Calixto Chou here today for evalua
tion of COPD. 
 
 She notes she was diagnosed with COPD a couple of years ago, and how long she has been on a
n inhaler. She has had shortness of breath for about 2 years as well. She feels like her stefanie
athing has been about the same over the last 2 years. She notes she has had pneumonia and santana hernández has also has episodes of bronchitis a couple of times a year, basically any time she gets 
a cold. 
 
 Currently they are able to walk 100 feet at their own pace on level ground, using a walker 
or a shopping cart to assist. The distance walked is predominately limited by shortness of b
reath or back pain. One year ago, she feels that they could walk possibly a little further, 
maybe twice the distance.  She is not exercising regularly at the moment. She was doing wate
r walking, but not with the weather being cold. 
 
 She does cough in the mornings when she first gets up (her  thinks for 1-2 hours), a
nd does produce mucous. The mucous is clear typically in color. She has not had hemoptysis.
 
 Triggers for their shortness of breath include exertion, and she notes she is more short of
 breath towards the end of the day. Relieving factors include relaxing. 
 
 Treatments that they have tried to this point include Qvar 40 mcg 2 puffs twice daily and a
 rescue inhaler (she is not sure which one). Currently they are on Qvar 40 mcg 2 puffs twice
 daily. She thinks this has helped her some. She has been taking this for about 2 years. She
 has never used the rescue inhaler. 
 
 She has not been evaluated for nocturnal oxygen and does not use it. She has not had ambula
ting oximetry testing. 
 
 Past Medical History
 Past Medical History 
 Diagnosis Date 
   COPD (chronic obstructive pulmonary disease) (HCC)  
   Peptic ulcer disease  
   GERD (gastroesophageal reflux disease)  
   Hiatal hernia  
   Hypertension  
   Hypothyroidism  
   Urine incontinence  
   Aortic valve stenosis  
   trivial 
   Hearing loss  
   Osteoarthritis  
   Osteoporosis  
   Plantar wart  
   RLS (restless legs syndrome)  
   Scoliosis  
   Pneumonia  
   Pulmonary nodule  
   Macular degeneration  
  
  
 Past Surgical History
 Past Surgical History 
 Procedure Date 
   Jj and bso 1996 
   Cholecystectomy 1996 
   Total hip arthroplasty  
   Left 
   Abscess drainage on calf  
   Bladder suspension  
   Upper gastrointestinal endoscopy  
  
 
 Family History: 
 Family History 
 
 Problem Relation Age of Onset 
   COPD Father  
   Asthma Father  
   Allergies Father  
   Hypertension Father  
   Tobacco Use Father  
   Asthma Sister  
   Hypertension Mother  
   Stroke Mother  
  
 
 Social History: 
 History 
 
 Social History 
   Marital Status:  
   Spouse Name: N/A 
   Number of Children: N/A 
   Years of Education: N/A 
 
 Occupational History 
     
 
 Social History Main Topics 
   Smoking status: Never Smoker  
   Smokeless tobacco: None 
    Comment: her parents both smoked, also worked at a [employer] 
   Alcohol Use: No 
   Drug Use: No 
   Sexually Active: None 
 
 Other Topics Concern 
   None 
 
 Social History Narrative 
  [place]: in [place] With: her husbandGrew up: in [place] Has previously lived in: [place], [place]
Exposure to toxic chemicals: has been exposed to halon (1301) before, they had to evacuate t
he computer room at the timeExposure to asbestos: noExposure to tuberculosis: no Has had a P
PD or Quantiferon before: noHas pets at home: no Has ever owned birds: no Other animal expos
ures: has had a dog and cat beforeHobbies: playing cards  
 
 Allergies:
 Allergies 
 Allergen Reactions 
   Lisinopril  
   Cough 
   
 
 Medications:
 Outpatient Encounter Prescriptions as of 2015 
 Medication Sig Dispense Refill 
   beclomethasone (QVAR) 40 mcg/puff inhaler Inhale 2 puffs into the lungs 2 times daily. 
    
   benzonatate (TESSALON PERLES) 100 mg capsule Take 100 mg by mouth 3 times daily as need
ed.     
   Calcium Carbonate (CALCIUM 600 PO) Take  by mouth Daily.     
   Cholecalciferol (VITAMIN D-3) 2000 units CAPS Take  by mouth Daily.     
   diclofenac (VOLTAREN-XR) 100 mg ER tablet Take 100 mg by mouth daily (with breakfast). 
    
   Diclofenac Potassium 50 MG PACK Take  by mouth.     
 
   Docosahexaenoic Acid (DHA OMEGA 3) 100 MG CAPS Take  by mouth.     
   levothyroxine (SYNTHROID, LEVOTHROID) 150 mcg tablet Take 150 mcg by mouth every mornin
g (before breakfast).     
   Multiple Vitamins-Minerals (MULTIVITAMIN PO) Take  by mouth Daily.     
   olmesartan-hydrochlorothiazide (BENICAR HCT) 40-25 MG per tablet Take 0.5 tablets by mo
uth Daily.     
   omeprazole (PRILOSEC) 20 mg capsule Take 20 mg by mouth every morning (before breakfast
).     
   pramipexole (MIRAPEX) 0.25 mg tablet Take 0.25 mg by mouth 3 times daily.     
   pseudoePHEDrine (SUDAFED) 30 mg tablet Take 30 mg by mouth every 4 hours as needed.    
 
   traMADol (ULTRAM) 50 mg tablet Take 50 mg by mouth every 6 hours as needed.     
 
 Facility-Administered Encounter Medications as of 2015 
 Medication Dose Route Frequency Provider Last Rate Last Dose 
   [COMPLETED] albuterol 2.5 mg/3 mL nebulizer solution 2.5 mg  2.5 mg Nebulization RT Onc
e Mary Astudillo MD   2.5 mg at 01/13/15 1407 
  
 Review of Systems
 Constitutional:  Denies fever, chills, sweats, and change in weight. 
 Eyes:  Denies vision change and eye irritation. 
 ENT:  Denies earache,  decreased hearing, nosebleeds, sore throat, and hoarseness. She does
 get nasal drainage and congestion. It does not seem to be seasonal.  
 Resp:   See HPI.  
 CV:  Denies chest pain, palpitations, syncope, and peripheral edema. 
 GI:  Denies heartburn, nausea, vomiting, and abdominal pain. 
 : She does get some urinary incontinence, but no difficulty urinating.  
 Musculoskeletal: Denies joint swelling. Has joint stiffness and some leg cramps.  
 Derm: Denies rash, itching, dryness, and suspicious lesions. 
 Neurologic: Denies frequent headaches, seizures, and vertigo. She has tingling in her hand,
 which is related to neck arthritis.  
 Psych: Denies depression, anxiety, and suicidal ideation. 
 Endo: Denies cold intolerance, heat intolerance, and unusual weight change. 
 Heme: Denies abnormal bruising, bleeding, and enlarged lymph nodes. 
 Allergy: Denies food allergies, allergic rash. 
 
 Objective 
 
 /58 | Pulse 92 | Temp 36.4 C (97.6 F) (Tympanic) | Ht 1.575 m (5' 2") | Wt 59.421
 kg (131 lb) | BMI 23.95 kg/m2 | SpO2 90%  RA
 
 General Appearance:  Alert, cooperative, no distress, appears stated age 
 Head:  Normocephalic, without obvious abnormality, atraumatic 
 Eyes:  PERRL, conjunctiva clear, no scleral icterus, EOM's intact 
 Ears:  Normal TM's, external auditory canals, slightly diminished acuity 
 Nose: Nares normal, septum midline, mucosa normal 
 Mouth: No oral lesions or exudate 
 Neck: Supple, symmetrical, no adenopathy 
 Lungs:   No accessory muscle use, breath sounds are diminished bilaterally with prolongatio
n of the expiratory phase, no wheezes, crackles or rhonchi 
 Chest Wall:  No deformity 
 Heart:  Regular rate and rhythm, 2/6 systolic murmur, no rub or gallop 
 Abdomen:   Soft, non-tender, non-distended 
 Extremities:  No cyanosis, clubbing, or edema 
 Pulses: Radial pulses 2+ and symmetric 
 Skin: Warm and dry 
 Lymph nodes: Cervical and supraclavicular nodes normal 
 
 Data:
 Chest x-ray done 2014 was reviewed and interpreted in clinic today. It shows a l
 
arge non reduced hiatal hernia.
 
 Pulmonary function tests were performed prior to clinic today and were reviewed and interpr
eted in clinic today.  They show moderate obstructive disease with a significant response to
 inhaled bronchodilator on spirometry, and a moderately reduced diffusion capacity. She coul
d not pant fast enough to do lung volume testing. 
 
 Echocardiogram was performed on July 15, 2011 and results were reviewed in clinic today. It
 shows a densely sclerotic aortic valve, with a 15 mmHg gradient, mild AI, mild MR, mild TR,
 mild pulmonary hypertension. LV systolic function was normal with grade 1 diastolic dysfunc
tion. 
 
 Calixto Chou's notes were reviewed in clinic today.
 
 Immunization History 
 Administered Date(s) Administered 
   PNEUMOCOCCAL POLYSACCHARIDE 23-VALENT (PPSV23) 2012 
   TRIVALENT INFLUENZA, PRESERATIVE FREE (PED/ADOL/ADULT) 2014 
 
 Assessment  
 
 1. COPD (chronic obstructive pulmonary disease) (HCC) - This is moderate with a significant
 bronchodilator effect noted, though no asthma type symptoms noted. I suggested she stay on 
her ICS given bronchodilator effect seen, and start on a LAAC for now, with consideration of
 also transitioning to a ICS/LABA inhaler as well. In addition, given borderline oxygen satu
rations, I recommended ambulating oximetry and overnight oximetry testing.  
 2. Aortic valve stenosis - This was trivial back in  with dense sclerosis noted. I do n
ot think that a more recent echo has been obtained, and she does not appear to have volume i
ssues on exam today, but does have a fairly prominent murmur (which could be just the sclero
sis alone). If we cannot improve her symptoms, consideration could be made for repeat echoca
rdiogram.  
 
 Plan 
 1.Stay on Qvar twice daily. 
 2.Start on Spiriva once daily. 
 3.Start on ProAir 2 puffs every 6 hours as needed. 
 4. Check overnight oximetry on room air through [facility]. 
 5. Check ambulating oximetry at [facility].
 6. I recommended use of a spacer with both her Qvar and her ProAir, and this was ordered. 
 
 She was advised to call if new pulmonary symptoms were to develop.
 
 Return to clinic in 2 months, or sooner with concerns.
 
 CC: Calixto Chou MD
 
 Portions of this report were transcribed using voice recognition software.  Every effort wa
s made to ensure accuracy; however, inadvertent computerized transcription errors may be pre
sent.
 
 Electronically signed by: Mary Astudillo MD  
 
 Electronically signed by Mary Astudillo MD at 2015  9:55 AM PSTdocumented in t
his encounter
 
 Plan of Treatment
 
 
+----------------------+-------------+--------+----------------------+----------------------
+
 
| Name                 | Type        | Priori | Associated Diagnoses | Order Schedule       
|
|                      |             | ty     |                      |                      
|
+----------------------+-------------+--------+----------------------+----------------------
+
| Ambulating oximetry- | Respiratory | ASAP   |   COPD (chronic      | Expected:            
|
|  Ancillary           |  Care       |        | obstructive          | 2015, Expires: 
|
|                      |             |        | pulmonary disease)   |  2016          
|
|                      |             |        | (HCC)                |                      
|
+----------------------+-------------+--------+----------------------+----------------------
+
| Overnight oximetry,  | Respiratory | Routin |   COPD (chronic      | Expected:            
|
|  room air            |  Care       | e      | obstructive          | 2015, Expires: 
|
|                      |             |        | pulmonary disease)   |  2016          
|
|                      |             |        | (HCC)                |                      
|
+----------------------+-------------+--------+----------------------+----------------------
+
 documented as of this encounter
 
 Visit Diagnoses
 
 
+---------------------------------------------------------------------------------+
| Diagnosis                                                                       |
+---------------------------------------------------------------------------------+
|   COPD (chronic obstructive pulmonary disease) (HCC) - Primary  Chronic airway  |
| obstruction, not elsewhere classified                                           |
+---------------------------------------------------------------------------------+
|   Aortic valve stenosis  Aortic valve disorders                                 |
+---------------------------------------------------------------------------------+
 documented in this encounter

## 2019-12-04 NOTE — XMS
Encounter Summary
  Created on: 2019
 
 Margaret Ferreira
 External Reference #: [id number]
 : 30
 Sex: Female
 
 Demographics
 
 
+-----------------------+----------------------+
| Address               | [address]        |
|                       | [place]  [address] |
+-----------------------+----------------------+
| Home Phone            | [phone]      |
+-----------------------+----------------------+
| Preferred Language    | Unknown              |
+-----------------------+----------------------+
| Marital Status        |               |
+-----------------------+----------------------+
| Congregational Affiliation | Unknown              |
+-----------------------+----------------------+
| Race                  | Unknown              |
+-----------------------+----------------------+
| Ethnic Group          | Unknown              |
+-----------------------+----------------------+
 
 
 Author
 
 
+--------------+--------------------------------------------+
| Author       | [facility]  |
|              | and [place]                                |
+--------------+--------------------------------------------+
| Organization | [facility]  |
|              | and [place]                                |
+--------------+--------------------------------------------+
| Address      | Unknown                                    |
+--------------+--------------------------------------------+
| Phone        | Unavailable                                |
+--------------+--------------------------------------------+
 
 
 
 Support
 
 
+--------------+--------------+---------+-----------------+
| Name         | Relationship | Address | Phone           |
+--------------+--------------+---------+-----------------+
| Lawson Hanna | ECON         | Unknown | [phone] |
+--------------+--------------+---------+-----------------+
 
 
 
 Care Team Providers
 
 
 
+-----------------------+------+-----------------+
| Care Team Member Name | Role | Phone           |
+-----------------------+------+-----------------+
| Calixto Chou MD | PCP  | [phone] |
+-----------------------+------+-----------------+
 
 
 
 Reason for Referral
 Evaluate & Treat (Routine)
 
+--------+--------------+-----------+--------------+--------------+---------------+
| Status | Reason       | Specialty | Diagnoses /  | Referred By  | Referred To   |
|        |              |           | Procedures   | Contact      | Contact       |
+--------+--------------+-----------+--------------+--------------+---------------+
| Closed |   Specialty  | Physical  |   Diagnoses  |              |   [facility]       |
|        | Services     | Therapy   |  Leg         | [place],  | [facility]       |
|        | Required     |           | weakness,    | Mary PADRON,   | HOSPITAL      |
|        |              |           | bilateral    | MD  [address]    | [address] |
|        |              |           | Chronic      | [address]    |  [place]          |
|        |              |           | obstructive  | [place], | [place], [place] |
|        |              |           | pulmonary    |  [place] [address]    |  [address]   |
|        |              |           | disease,     |              | Phone:        |
|        |              |           | unspecified  |              | [phone]  |
|        |              |           | COPD type    |              |  Fax:         |
|        |              |           | ([facility])        |              | [phone]  |
|        |              |           | Procedures   |              |               |
|        |              |           | [facility]      |              |               |
|        |              |           | THERAPY      |              |               |
|        |              |           | EVALUATION   |              |               |
|        |              |           | [facility]           |              |               |
|        |              |           | THERAPEUTIC  |              |               |
|        |              |           | EXERCISES    |              |               |
+--------+--------------+-----------+--------------+--------------+---------------+
 
 
 
 
 Reason for Visit
 
 
+--------+--------------------+
| Reason | Comments           |
+--------+--------------------+
| COPD   | 3 month follow up  |
+--------+--------------------+
 
 
 
 Encounter Details
 
 
+--------+---------+----------------------+----------------+----------------------+
| Date   | Type    | Department           | Care Team      | Description          |
+--------+---------+----------------------+----------------+----------------------+
| / | Office  |   [facility] [place]          |   [place],  | Chronic obstructive  |
| 2016   | Visit   | PULMONARY  [address]     | Mary PADRON MD  | pulmonary disease,   |
|        |         | [address]  [place], |                | unspecified COPD     |
 
|        |         |  [place] [address]       |                | type (HCC);          |
|        |         | [phone]         |                | Gastroesophageal     |
|        |         |                      |                | reflux disease,      |
|        |         |                      |                | esophagitis presence |
|        |         |                      |                |  not specified; Leg  |
|        |         |                      |                | weakness, bilateral  |
+--------+---------+----------------------+----------------+----------------------+
 
 
 
 Social History
 
 
+--------------+-------+-----------+--------+------+
| Tobacco Use  | Types | Packs/Day | Years  | Date |
|              |       |           | Used   |      |
+--------------+-------+-----------+--------+------+
| Never Smoker |       |           |        |      |
+--------------+-------+-----------+--------+------+
 
 
 
+---------------------+---+---+---+
| Smokeless Tobacco:  |   |   |   |
| Never Used          |   |   |   |
+---------------------+---+---+---+
 
 
 
+-------------------------------------------------------------------+
| Tobacco Cessation: Counseling Given: No                           |
| Comments: her parents both smoked, also worked at a [employer] |
+-------------------------------------------------------------------+
 
 
 
+-------------+----------------------+---------+----------+
| Alcohol Use | Drinks/Week          | oz/Week | Comments |
+-------------+----------------------+---------+----------+
| No          |   0 Standard drinks  | 0.0     |          |
|             | or equivalent        |         |          |
+-------------+----------------------+---------+----------+
 
 
 
+------------------+---------------+
| Sex Assigned at  | Date Recorded |
| Birth            |               |
+------------------+---------------+
| Not on file      |               |
+------------------+---------------+
 
 
 
+----------------+-------------+-------------+
| Job Start Date | Occupation  | Industry    |
+----------------+-------------+-------------+
| Not on file    | Not on file | Not on file |
+----------------+-------------+-------------+
 
 
 
 
+----------------+--------------+------------+
| Travel History | Travel Start | Travel End |
+----------------+--------------+------------+
 
 
 
+-------------------------------------+
| No recent travel history available. |
+-------------------------------------+
 documented as of this encounter
 
 Last Filed Vital Signs
 
 
+-------------------+------------------+----------------------+----------+
| Vital Sign        | Reading          | Time Taken           | Comments |
+-------------------+------------------+----------------------+----------+
| Blood Pressure    | 110/64           | 2016 11:32 AM  |          |
|                   |                  | PDT                  |          |
+-------------------+------------------+----------------------+----------+
| Pulse             | 78               | 2016 11:32 AM  |          |
|                   |                  | PDT                  |          |
+-------------------+------------------+----------------------+----------+
| Temperature       | -                | -                    |          |
+-------------------+------------------+----------------------+----------+
| Respiratory Rate  | -                | -                    |          |
+-------------------+------------------+----------------------+----------+
| Oxygen Saturation | 97%              | 2016 11:32 AM  |          |
|                   |                  | PDT                  |          |
+-------------------+------------------+----------------------+----------+
| Inhaled Oxygen    | -                | -                    |          |
| Concentration     |                  |                      |          |
+-------------------+------------------+----------------------+----------+
| Weight            | 61.2 kg (135 lb) | 2016 11:32 AM  |          |
|                   |                  | PDT                  |          |
+-------------------+------------------+----------------------+----------+
| Height            | 162.6 cm (5' 4") | 2016 11:32 AM  |          |
|                   |                  | PDT                  |          |
+-------------------+------------------+----------------------+----------+
| Body Mass Index   | 23.17            | 2016 11:32 AM  |          |
|                   |                  | PDT                  |          |
+-------------------+------------------+----------------------+----------+
 documented in this encounter
 
 Patient Instructions
 Patient Instructions Mary Astudillo MD - 2016 12:29 PM PDTI am ordering the w
arm water therapy for at the [facility]. 
 
 I would ask the therapist if they can do a wheelchair evaluation through [facility] and 
have them let us know, or call us so we can order one there or here. 
 
 Stay on the Spiriva once daily. I reordered the albuterol (ProAir) to use as needed as well
. 
 
 You might see Dr. Chou about the hand/wrist and see if some other brace might allow you t
o more easily get in and out of the chair. Electronically signed by Mary Astudillo MD
 at 2016 12:32 PM PDT
 documented in this encounter
 
 
 Progress Notes
 Mary Astudillo MD - 2016 11:36 AM PDTFormatting of this note might be differe
nt from the original.
 
 
 Pulmonary Follow Up
 
 HPI 
  
 Margaret Ferreira is a 85 y.o. female patient of Calixto Chou MD here today for foll
ow up of COPD. 
 
 At their last visit, we had ordered Incruse in place of the Spiriva, but this was denied by
 the insurance. Since their last visit she feels like she has been doing overall pretty stab
le. She has not had any acute illnesses.  
 
 She notes that she has not woken up at night with coughing in several months. She does coug
h some when she first wakes up and is bringing up greyish in color. She is not coughing duri
ng the daytime. 
 
 She is currently on a regimen of Spiriva one capsule inhaled daily.  She is not using the r
escue inhaler at all, but thinks she should be using it about 2-3 times a week.  She returns
 today for routine follow up. 
 
 She has been out of breath a couple of times, for example when she is getting ready for bed
, or during the daytime. She would like to get a new rescue inhaler as her current ProAir in
[facility] is quite old. She notes if she sits and rests, her breathing gets better. She has to w
alk with a walker all the time, and is not doing as much walking as she should, mostly becau
se of her legs. She has also had a lot of family stressors, losing a grandchild, and her hus
band having heart surgery recently. She does think physical therapy would help, and would li
ke to do this once her  is more stable. 
 
 She has been evaluated for nocturnal oxygen and does not need to use it.
 
 She sleeps with her head elevated 6 inches at night and is on the omeprazole daily. She is 
not noticing the heartburn right now. 
 
 Past Medical History
 Past Medical History 
 Diagnosis Date 
   COPD (chronic obstructive pulmonary disease) (HCC)  
   Peptic ulcer disease  
   GERD (gastroesophageal reflux disease)  
   Hiatal hernia  
   Hypertension  
   Hypothyroidism  
   Urine incontinence  
   Aortic valve stenosis  
   trivial 
   Hearing loss  
   Osteoarthritis  
   Osteoporosis  
   Plantar wart  
   RLS (restless legs syndrome)  
   Scoliosis  
   Pneumonia  
   Pulmonary nodule  
   Macular degeneration  
   bilateral now 
 
  
  
 Past Surgical History
 Past Surgical History 
 Procedure Laterality Date 
   Jj and bso  1996 
   Cholecystectomy  1996 
   Total hip arthroplasty Left 1989 
   Abscess drainage on calf  2003 
   Bladder suspension   
   Upper gastrointestinal endoscopy  1996 
  
 
 Social History: 
 History 
 
 Social History 
   Marital Status:  
   Spouse Name: N/A 
   Number of Children: N/A 
   Years of Education: N/A 
 
 Occupational History 
     
 
 Social History Main Topics 
   Smoking status: Never Smoker  
   Smokeless tobacco: Never Used 
    Comment: her parents both smoked, also worked at a [employer] 
   Alcohol Use: No 
   Drug Use: No 
   Sexual Activity: Not on file 
 
 Other Topics Concern 
   None 
 
 Social History Narrative 
  Lives: in [place]  
  With: her  
  Grew up: in [place]  
  Has previously lived in: [place], [place] 
   
  Exposure to toxic chemicals: has been exposed to halon (1301) before, they had to evacuate
 the computer room at the time 
  Exposure to asbestos: no 
  Exposure to tuberculosis: no  
  Has had a PPD or Quantiferon before: no 
   
  Has pets at home: no  
  Has ever owned birds: no  
  Other animal exposures: has had a dog and cat before 
   
  Hobbies: playing cards 
    
   
   
 
 Allergies:
 Allergies 
 Allergen Reactions 
 
   Lisinopril  
   Cough 
   
 
 Medications:
 Outpatient Encounter Prescriptions as of 3/22/2016 
 Medication Sig Dispense Refill 
   albuterol (PROAIR HFA) 90 mcg/puff inhaler Inhale 2 puffs into the lungs every 6 hours 
as needed for Wheezing or Shortness of Breath. 1 Inhaler 5 
   benzonatate (TESSALON PERLES) 100 mg capsule Take 100 mg by mouth 3 times daily as need
ed.   
   Calcium Carbonate (CALCIUM 600 PO) Take  by mouth Daily.   
   Cholecalciferol (VITAMIN D-3) 2000 units CAPS Take  by mouth Daily.   
   Docosahexaenoic Acid (DHA OMEGA 3) 100 MG CAPS Take  by mouth.   
   gabapentin (NEURONTIN) 300 mg capsule Take 300 mg by mouth 2 times daily.   
   levothyroxine (SYNTHROID, LEVOTHROID) 150 mcg tablet Take 150 mcg by mouth every mornin
g (before breakfast).   
   Multiple Vitamins-Minerals (MULTIVITAMIN PO) Take  by mouth Daily.   
   [DISCONTINUED] olmesartan-hydrochlorothiazide (BENICAR HCT) 40-25 MG per tablet Take 0.
5 tablets by mouth Daily.   
   omeprazole (PRILOSEC) 20 mg capsule Take 20 mg by mouth every morning (before breakfast
).   
   pramipexole (MIRAPEX) 0.25 mg tablet Take 0.25 mg by mouth nightly.   
   pseudoePHEDrine (SUDAFED) 30 mg tablet Take 30 mg by mouth every 4 hours as needed.   
   Spacer/Aero Chamber Mouthpiece MISC Use with inhaler as directed. 1 each 1 
   tiotropium (SPIRIVA) 18 mcg inhalation capsule Inhale 1 capsule into the lungs Daily. 3
0 capsule 11 
   traMADol (ULTRAM) 50 mg tablet Take 50 mg by mouth every 6 hours as needed.   
 
 No facility-administered encounter medications on file as of 3/22/2016. 
 
 Review of Systems:
 General: 
 []Weight loss/gain (over 10 lbs) []Fever/chills/sweats  []Night sweats
 EENT: 
 []Hearing loss      []Vision loss/change []Sinus congestion/nasal drainage  []Nosebleeds  [
x]Hoarseness - "by the end of the day once in awhile"
 Cardiac: 
 []Chest pain []Palpitations/heart racing  []Swelling of legs/ankles  []Waking up at night s
hort of breath []Difficulty sleeping flat
 Gastrointestinal: 
 []Nausea/vomiting []Difficulty swallowing  [x]Heartburn/acid reflux - on Omeprazole []Loss 
of appetite []Abdominal pain 
 Urologic: 
 []Blood in urine []Frequent urination at night []Burning/painful urination [x]Difficulty wi
th urination - incontinence
 
 Objective 
 
 /64 mmHg | Pulse 78 | Ht 1.626 m (5' 4") | Wt 61.236 kg (135 lb) | BMI 23.16 kg/m2 | 
SpO2 97% | Breastfeeding? No RA
 
 General Appearance:  Alert, cooperative, no distress, appears stated age, in a wheelchair, 
accompanied by her  
 Head:  Normocephalic, without obvious abnormality, atraumatic 
 Eyes:  PERRL, conjunctiva clear, no scleral icterus, EOM's intact 
 Ears:  Normal TM's, external auditory canals, normal acuity 
 Nose: Nares normal, septum midline, mucosa normal 
 Mouth: No oral lesions or exudate 
 Neck: Supple, symmetrical, no adenopathy 
 
 Lungs:   No accessory muscle use, breath sounds are diminished bilaterally with prolongatio
n of the expiratory phase, no wheezes, crackles or rhonchi 
 Chest Wall:  No deformity 
 Heart:  Regular rate and rhythm, no murmur, rub or gallop 
 Abdomen:   Soft, non-tender, non-distended 
 Extremities:  No cyanosis, clubbing, 1+ bilateral lower extremity edema 
 Pulses: Radial pulses 2+ and symmetric 
 Skin: Warm and dry 
 Lymph nodes: Cervical and supraclavicular nodes normal 
 
 Data:
 Immunization History 
 Administered Date(s) Administered 
   INFLUENZA, HIGH DOSE SEASONAL (ADULT) 10/21/2015 
   INFLUENZA, TRIVALENT PRESERVATIVE FREE (PED/ADOL/ADULT) 2014 
   PNEUMOCOCCAL CONJUGATE 13-VALENT (PCV13) 10/12/2015 
   PNEUMOCOCCAL POLYSACCHARIDE 23-VALENT (PPSV23) 2012 
 
 Assessment  
 
   ICD-10-CM ICD-9-CM  
 1. Chronic obstructive pulmonary disease, unspecified COPD type (HCC) J44.9 496 Symptoms elin hernández well controlled on Spiriva alone, with good control of cough and dyspnea (though not very 
active due to leg issues). 
 We will try to get her in to PT to increase her activity. 
 Ambulatory referral to Physical Therapy 
 2. Gastroesophageal reflux disease, esophagitis presence not specified K21.9 530.81 This se
ems to be controlled with her bed elevated, and on medication.  
 3. Leg weakness, bilateral M62.81 729.89 Notes this is a big factor in her walking more. Trevor hernández is interested in doing PT again as she lost a lot of ground with being less active surroun
ding recent events. I referred her to warm water therapy at the [facility], I also suggested a whee
lchair evaluation so she can get in and out the chair more easily without a hand injury (rec
ently sustained). They will inquire if this can be done there, and call. 
 Ambulatory referral to Physical Therapy 
 
 Plan 
 1.Continue Spiriva once daily. 
 2.Referred for warm water therapy at the [facility]. 
 3.Ask if wheelchair evaluation can be done at the [facility], and if not we can order here. 
 4. Continue on omeprazole with lifestyle measures to control reflux as well. 
 5. I asked her to ask Dr. Chou if a brace for her hand/wrist would help the pain so she c
an get out of her wheelchair more easily. 
 
 She was advised to call if new pulmonary symptoms were to develop.
 
 Return to clinic as needed. 
 
 CC: Calixto Chou MD
 
 Portions of this report were transcribed using voice recognition software.  Every effort wa
s made to ensure accuracy; however, inadvertent computerized transcription errors may be pre
sent.
 
 Electronically signed by: Mary Astudillo MD  Electronically signed by Mary Astudillo MD at 2016  1:19 PM PDTdocumented in this encounter
 
 Plan of Treatment
 
 
+----------------------+-------------+--------+----------------------+---------------------+
 
| Name                 | Type        | Priori | Associated Diagnoses | Order Schedule      |
|                      |             | ty     |                      |                     |
+----------------------+-------------+--------+----------------------+---------------------+
| Ambulatory referral  | Outpatient  | Routin |   Leg weakness,      | Ordered: 2016 |
| to Physical Therapy  | Referral    | e      | bilateral  Chronic   |                     |
|                      |             |        | obstructive          |                     |
|                      |             |        | pulmonary disease,   |                     |
|                      |             |        | unspecified COPD     |                     |
|                      |             |        | type (HCC)           |                     |
+----------------------+-------------+--------+----------------------+---------------------+
 documented as of this encounter
 
 Visit Diagnoses
 
 
+------------------------------------------------------------------------------+
| Diagnosis                                                                    |
+------------------------------------------------------------------------------+
|   Chronic obstructive pulmonary disease, unspecified COPD type (HCC)         |
+------------------------------------------------------------------------------+
|   Gastroesophageal reflux disease, esophagitis presence not specified        |
+------------------------------------------------------------------------------+
|   Leg weakness, bilateral  Other musculoskeletal symptoms referable to limbs |
+------------------------------------------------------------------------------+
 documented in this encounter

## 2019-12-04 NOTE — XMS
Encounter Summary
  Created on: 2019
 
 Margaret Ferreira
 External Reference #: [id number]
 : 30
 Sex: Female
 
 Demographics
 
 
+-----------------------+----------------------+
| Address               | [address]        |
|                       | [place]  [address] |
+-----------------------+----------------------+
| Home Phone            | [phone]      |
+-----------------------+----------------------+
| Preferred Language    | Unknown              |
+-----------------------+----------------------+
| Marital Status        |               |
+-----------------------+----------------------+
| Orthodox Affiliation | Unknown              |
+-----------------------+----------------------+
| Race                  | Unknown              |
+-----------------------+----------------------+
| Ethnic Group          | Unknown              |
+-----------------------+----------------------+
 
 
 Author
 
 
+--------------+--------------------------------------------+
| Author       | [facility]  |
|              | and [place]                                |
+--------------+--------------------------------------------+
| Organization | [facility]  |
|              | and [place]                                |
+--------------+--------------------------------------------+
| Address      | Unknown                                    |
+--------------+--------------------------------------------+
| Phone        | Unavailable                                |
+--------------+--------------------------------------------+
 
 
 
 Support
 
 
+--------------+--------------+---------+-----------------+
| Name         | Relationship | Address | Phone           |
+--------------+--------------+---------+-----------------+
| Lawson Hanna | ECON         | Unknown | [phone] |
+--------------+--------------+---------+-----------------+
 
 
 
 Care Team Providers
 
 
 
+-----------------------+------+-----------------+
| Care Team Member Name | Role | Phone           |
+-----------------------+------+-----------------+
| Calixto Chou MD | PCP  | [phone] |
+-----------------------+------+-----------------+
 
 
 
 Reason for Visit
 
 
+--------+--------------------+
| Reason | Comments           |
+--------+--------------------+
| COPD   | 6 month follow up  |
+--------+--------------------+
 
 
 
 Encounter Details
 
 
+--------+---------+----------------------+----------------+----------------------+
| Date   | Type    | Department           | Care Team      | Description          |
+--------+---------+----------------------+----------------+----------------------+
| / | Office  |   [facility] [place]          |   [place],  | Chronic obstructive  |
| 2015   | Visit   | PULMONARY  [address]     | Mary PADRON MD  | pulmonary disease,   |
|        |         | [place], |                | unspecified COPD     |
|        |         |  [place] [address]       |                | type (HCC); Cough;   |
|        |         | [phone]         |                | Gastroesophageal     |
|        |         |                      |                | reflux disease,      |
|        |         |                      |                | esophagitis presence |
|        |         |                      |                |  not specified; Need |
|        |         |                      |                |  for vaccination     |
|        |         |                      |                | with 13-polyvalent   |
|        |         |                      |                | pneumococcal         |
|        |         |                      |                | conjugate vaccine    |
+--------+---------+----------------------+----------------+----------------------+
 
 
 
 Social History
 
 
+--------------+-------+-----------+--------+------+
| Tobacco Use  | Types | Packs/Day | Years  | Date |
|              |       |           | Used   |      |
+--------------+-------+-----------+--------+------+
| Never Smoker |       |           |        |      |
+--------------+-------+-----------+--------+------+
 
 
 
+---------------------+---+---+---+
| Smokeless Tobacco:  |   |   |   |
| Never Used          |   |   |   |
+---------------------+---+---+---+
 
 
 
 
+-------------------------------------------------------------------+
| Tobacco Cessation: Counseling Given: No                           |
| Comments: her parents both smoked, also worked at [employer] |
+-------------------------------------------------------------------+
 
 
 
+-------------+----------------------+---------+----------+
| Alcohol Use | Drinks/Week          | oz/Week | Comments |
+-------------+----------------------+---------+----------+
| No          |   0 Standard drinks  | 0.0     |          |
|             | or equivalent        |         |          |
+-------------+----------------------+---------+----------+
 
 
 
+------------------+---------------+
| Sex Assigned at  | Date Recorded |
| Birth            |               |
+------------------+---------------+
| Not on file      |               |
+------------------+---------------+
 
 
 
+----------------+-------------+-------------+
| Job Start Date | Occupation  | Industry    |
+----------------+-------------+-------------+
| Not on file    | Not on file | Not on file |
+----------------+-------------+-------------+
 
 
 
+----------------+--------------+------------+
| Travel History | Travel Start | Travel End |
+----------------+--------------+------------+
 
 
 
+-------------------------------------+
| No recent travel history available. |
+-------------------------------------+
 documented as of this encounter
 
 Last Filed Vital Signs
 
 
+-------------------+------------------+----------------------+----------+
| Vital Sign        | Reading          | Time Taken           | Comments |
+-------------------+------------------+----------------------+----------+
| Blood Pressure    | 110/64           | 2015 12:42 PM  |          |
|                   |                  | PST                  |          |
+-------------------+------------------+----------------------+----------+
| Pulse             | 76               | 2015 12:42 PM  |          |
|                   |                  | PST                  |          |
+-------------------+------------------+----------------------+----------+
| Temperature       | -                | -                    |          |
+-------------------+------------------+----------------------+----------+
 
| Respiratory Rate  | -                | -                    |          |
+-------------------+------------------+----------------------+----------+
| Oxygen Saturation | 95%              | 2015 12:42 PM  |          |
|                   |                  | PST                  |          |
+-------------------+------------------+----------------------+----------+
| Inhaled Oxygen    | -                | -                    |          |
| Concentration     |                  |                      |          |
+-------------------+------------------+----------------------+----------+
| Weight            | 63.5 kg (140 lb) | 2015 12:42 PM  |          |
|                   |                  | PST                  |          |
+-------------------+------------------+----------------------+----------+
| Height            | 162.6 cm (5' 4") | 2015 12:42 PM  |          |
|                   |                  | PST                  |          |
+-------------------+------------------+----------------------+----------+
| Body Mass Index   | 24.03            | 2015 12:42 PM  |          |
|                   |                  | PST                  |          |
+-------------------+------------------+----------------------+----------+
 documented in this encounter
 
 Patient Instructions
 Patient Instructions Mary Astudillo MD - 2015  1:52 PM PSTI would try to cut 
the coffee back to 1 cup in the morning. I would also skip the ice cream before bed time. If
 you want it, you should eat supper a little earlier, and then eat the ice cream 4 hours bef
ore bedtime. Try making your lunch the biggest meal of the day, then eat a small dinner garcia
y, then have the ice cream at 6:30pm. 
 
 Go ahead and stop the Qvar. If you have symptoms, use the ProAir (red one).
 
 When you run out of your current Spiriva, start on Incruse Ellipta 1 inhalation once daily.
 If this is not covered, let us know and we will try to find something else. 
 
 When you see Dr. Chou next, make sure you get the Prevnar.Electronically signed by Marty Astudillo MD at 2015  2:02 PM PST
 documented in this encounter
 
 Progress Notes
 Mary Astudillo MD - 2015 12:47 PM PSTFormatting of this note might be differe
nt from the original.
 
 
 Pulmonary Follow Up
 
 HPI 
  
 Margaret Ferreira is a 85 y.o. female patient of Calixto Chou MD here today for foll
ow up of COPD. 
 
 At their last visit, we had continued her on Spiriva. She trialed stopping this in July as 
she was having difficulty walking due to hip and knee issues, and she thought the Spiriva mi
ght be causing it. She went back on it as this did not seem to get better off of this. 
 
 She has had a few episodes of difficulty breathing in the evenings when it was cold out. Trevor hernández used her Qvar inhaler, and felt like it helped her. She had been busy during the daytime, 
and usually if she sits and rests, this will improve, but a couple of times, this did not, s
o she used the Qvar, and she did not feel like she was short of breath that night. She does 
also have ProAir in the car. 
 
 She is currently on a regimen of Spriva once daily. She has never tried using the ProAir as
 she was uncertain if she should use this or the Qvar for acute symptoms. She returns today 
for routine follow up. 
 
 
 Currently she is able to walk very little at her own pace on level ground. She is not exerc
ising regularly. She has limited her walking because of her hips and knees, which just don't
 work. She was doing physical therapy in the water, which is on hold right now for physical 
therapy. 
 
 She has been coughing in the night. She also coughs in the morning when she first gets up a
nd then it subsides. In the night when she coughs, she has one time had to get up and vomit 
up her stomach contents. She does have a lot of mucous production, which is cloudy/clear. Trevor hernández has not had hemoptysis. 
 
 She has been evaluated for nocturnal oxygen and does not need to use it.
 
 She does not have symptoms of heartburn or reflux. She takes omeprazole once daily for this
. She sleeps with the head of the bed elevated 6 inches. She eats dinner about 4 hours befor
e bedtime, and about 50% of the time eats ice cream before bedtime. She does drink 2-3 cups 
of coffee in the morning. 
 
 Past Medical History
 Past Medical History 
 Diagnosis Date 
   COPD (chronic obstructive pulmonary disease) (HCC)  
   Peptic ulcer disease  
   GERD (gastroesophageal reflux disease)  
   Hiatal hernia  
   Hypertension  
   Hypothyroidism  
   Urine incontinence  
   Aortic valve stenosis  
   trivial 
   Hearing loss  
   Osteoarthritis  
   Osteoporosis  
   Plantar wart  
   RLS (restless legs syndrome)  
   Scoliosis  
   Pneumonia  
   Pulmonary nodule  
   Macular degeneration  
   bilateral now 
   
 Past Surgical History
 Past Surgical History 
 Procedure Laterality Date 
   Jj and bso   
   Cholecystectomy   
   Total hip arthroplasty Left  
   Abscess drainage on calf   
   Bladder suspension   
   Upper gastrointestinal endoscopy   
  
 Social History: 
 History 
 
 Social History 
   Marital Status:  
   Spouse Name: N/A 
   Number of Children: N/A 
   Years of Education: N/A 
 
 
 Occupational History 
     
 
 Social History Main Topics 
   Smoking status: Never Smoker  
   Smokeless tobacco: Never Used 
    Comment: her parents both smoked, also worked at a [employer] 
   Alcohol Use: No 
   Drug Use: No 
   Sexual Activity: None 
 
 Other Topics Concern 
   None 
 
 Social History Narrative 
  Lives: in [place]  
  With: her  
  Grew up: in [place]  
  Has previously lived in: [place], [place] 
   
  Exposure to toxic chemicals: has been exposed to halon (1301) before, they had to evacuate
 the computer room at the time 
  Exposure to asbestos: no 
  Exposure to tuberculosis: no  
  Has had a PPD or Quantiferon before: no 
   
  Has pets at home: no  
  Has ever owned birds: no  
  Other animal exposures: has had a dog and cat before 
   
  Hobbies: playing cards 
    
   
   
 
 Allergies:
 Allergies 
 Allergen Reactions 
   Lisinopril  
   Cough 
   
 Medications:
 Outpatient Encounter Prescriptions as of 2015 
 Medication Sig Dispense Refill 
   albuterol (PROAIR HFA) 90 mcg/puff inhaler Inhale 2 puffs into the lungs every 6 hours 
as needed for Wheezing or Shortness of Breath. 1 Inhaler 5 
   benzonatate (TESSALON PERLES) 100 mg capsule Take 100 mg by mouth 3 times daily as need
ed.   
   Calcium Carbonate (CALCIUM 600 PO) Take  by mouth Daily.   
   Cholecalciferol (VITAMIN D-3) 2000 units CAPS Take  by mouth Daily.   
   [DISCONTINUED] diclofenac (VOLTAREN-XR) 100 mg ER tablet Take 100 mg by mouth daily (wi
th breakfast).   
   [DISCONTINUED] Diclofenac Potassium 50 MG PACK Take  by mouth.   
   Docosahexaenoic Acid (DHA OMEGA 3) 100 MG CAPS Take  by mouth.   
   gabapentin (NEURONTIN) 300 mg capsule Take 300 mg by mouth 2 times daily.   
   levothyroxine (SYNTHROID, LEVOTHROID) 150 mcg tablet Take 150 mcg by mouth every mornin
g (before breakfast).   
   Multiple Vitamins-Minerals (MULTIVITAMIN PO) Take  by mouth Daily.   
   olmesartan-hydrochlorothiazide (BENICAR HCT) 40-25 MG per tablet Take 0.5 tablets by mo
uth Daily.   
 
   omeprazole (PRILOSEC) 20 mg capsule Take 20 mg by mouth every morning (before breakfast
).   
   pramipexole (MIRAPEX) 0.25 mg tablet Take 0.25 mg by mouth 3 times daily.   
   pseudoePHEDrine (SUDAFED) 30 mg tablet Take 30 mg by mouth every 4 hours as needed.   
   Spacer/Aero Chamber Mouthpiece MISC Use with inhaler as directed. 1 each 1 
   tiotropium (SPIRIVA HANDIHALER) 18 mcg inhalation capsule Inhale 1 capsule into the biju
gs Daily. 30 capsule 11 
   traMADol (ULTRAM) 50 mg tablet Take 50 mg by mouth every 6 hours as needed.   
 
 No facility-administered encounter medications on file as of 2015. 
 
 Review of Systems:
 General: 
 []Weight loss/gain (over 10 lbs) []Fever/chills/sweats  []Night sweats
 EENT: 
 []Hearing loss      []Vision loss/change []Sinus congestion/nasal drainage []Nosebleeds  []
Hoarseness
 Cardiac: 
 []Chest pain []Palpitations/heart racing  [x]Swelling of legs/ankles  []Waking up at night 
short of breath []Difficulty sleeping flat
 Gastrointestinal: 
 []Nausea/vomiting []Difficulty swallowing  []Heartburn/acid reflux []Loss of appetite  []Ab
dominal pain 
 Urologic: 
 []Blood in urine []Frequent urination at night []Burning/painful urination []Difficulty wit
h urination
 
 Objective 
 
 /64 mmHg | Pulse 76 | Ht 1.626 m (5' 4") | Wt 63.504 kg (140 lb) | BMI 24.02 kg/m2 | 
SpO2 95% | Breastfeeding? No RA
 
 General Appearance:  Alert, cooperative, no distress, appears stated age 
 Head:  Normocephalic, without obvious abnormality, atraumatic 
 Eyes:  PERRL, conjunctiva clear, no scleral icterus, EOM's intact 
 Ears:  Normal TM's, external auditory canals, normal acuity 
 Nose: Nares normal, septum midline, mucosa normal 
 Mouth: No oral lesions or exudate 
 Neck: Supple, symmetrical, no adenopathy 
 Lungs:   No accessory muscle use, breath sounds are diminished bilaterally with prolongatio
n of the expiratory phase, no wheezes, crackles or rhonchi 
 Chest Wall:  No deformity 
 Heart:  Regular rate and rhythm, no murmur, rub or gallop 
 Abdomen:   Soft, non-tender, non-distended 
 Extremities:  No cyanosis, clubbing, 1+ bilateral lower extremity edema 
 Pulses: Radial pulses 2+ and symmetric 
 Skin: Warm and dry 
 Lymph nodes: Cervical and supraclavicular nodes normal 
 
 Data:
 Immunization History 
 Administered Date(s) Administered 
   INFLUENZA, HIGH DOSE SEASONAL (ADULT) 10/21/2015 
   INFLUENZA, TRIVALENT PRESERVATIVE FREE (PED/ADOL/ADULT) 2014 
   PNEUMOCOCCAL POLYSACCHARIDE 23-VALENT (PPSV23) 2012 
  
 Assessment  
 
   ICD-10-CM ICD-9-CM  
 1. Chronic obstructive pulmonary disease, unspecified COPD type (HCC) J44.9 496 This seems 
 
well controlled on Spiriva once daily in terms of dyspnea.  
 2. Cough R05 786.2 Has had cough occuring recently on and off, with some mucous production 
with no associated illness. Given that it is nocturnal and morning in occurrence, without pu
rulent sputum, dyspnea or fever, I would not say she is having an exacerbation. I do wonder 
if she is having worsening silent nocturnal reflux as a side effect of the Spiriva, which is
 a known possibility. 
 Though reluctant, I suggested trying switching this to Incruse in the same class, if covere
d by her insurance, to see if the cough improves as being a newer generation agent, it repor
tedly has less side effects like heartburn.  
 3. Gastroesophageal reflux disease, esophagitis presence not specified K21.9 530.81 On once
 daily omeprazole. She has had post tussive emesis, unclear if this was reflux related. She 
does drink a fair amount of coffee and often eats ice cream before bedtime, which we address
ed.  
 4. Need for vaccination with 13-polyvalent pneumococcal conjugate vaccine Z23 V03.82 Discus
sed today and she notes Dr. Chou had mentioned she needed this at her next appointment at 
his office, so we will defer to his next visit, so she can keep her vaccine records all in o
ne place.  
 
 Plan 
 1.Discussed anti reflux measures. 
 2.Change Spiriva to Incruse one inhalation once daily. 
 3.Stop the Qvar.
 4. Use the ProAir as needed. 
 5. Get Prevnar vaccine at next appointment with Dr. Chou. 
 
 She was advised to call if new pulmonary symptoms were to develop.
 
 Return to clinic in 3 months, or sooner with concerns.
 
 CC: Calixto Chou MD
 
 Portions of this report were transcribed using voice recognition software.  Every effort wa
s made to ensure accuracy; however, inadvertent computerized transcription errors may be pre
sent.
 
 Electronically signed by: Mary Astudillo MD  Electronically signed by Mary Astudillo MD at 2015  3:57 PM PSTdocumented in this encounter
 
 Plan of Treatment
 Not on filedocumented as of this encounter
 
 Visit Diagnoses
 
 
+--------------------------------------------------------------------------+
| Diagnosis                                                                |
+--------------------------------------------------------------------------+
|   Chronic obstructive pulmonary disease, unspecified COPD type (HCC)     |
+--------------------------------------------------------------------------+
|   Cough                                                                  |
+--------------------------------------------------------------------------+
|   Gastroesophageal reflux disease, esophagitis presence not specified    |
+--------------------------------------------------------------------------+
|   Need for vaccination with 13-polyvalent pneumococcal conjugate vaccine |
+--------------------------------------------------------------------------+
 documented in this encounter

## 2019-12-04 NOTE — XMS
Encounter Summary
  Created on: 2019
 
 Margaret Ferreira
 External Reference #: [id number]
 : 30
 Sex: Female
 
 Demographics
 
 
+-----------------------+----------------------+
| Address               | [address]        |
|                       | [place]  [address] |
+-----------------------+----------------------+
| Home Phone            | [phone]      |
+-----------------------+----------------------+
| Preferred Language    | Unknown              |
+-----------------------+----------------------+
| Marital Status        |               |
+-----------------------+----------------------+
| Uatsdin Affiliation | Unknown              |
+-----------------------+----------------------+
| Race                  | Unknown              |
+-----------------------+----------------------+
| Ethnic Group          | Unknown              |
+-----------------------+----------------------+
 
 
 Author
 
 
+--------------+--------------------------------------------+
| Author       | [facility] [place]  |
|              | and [place]                                |
+--------------+--------------------------------------------+
| Organization | [facility] [place]  |
|              | and [place]                                |
+--------------+--------------------------------------------+
| Address      | Unknown                                    |
+--------------+--------------------------------------------+
| Phone        | Unavailable                                |
+--------------+--------------------------------------------+
 
 
 
 Support
 
 
+--------------+--------------+---------+-----------------+
| Name         | Relationship | Address | Phone           |
+--------------+--------------+---------+-----------------+
| Lawson Hanna | ECON         | Unknown | [phone] |
+--------------+--------------+---------+-----------------+
 
 
 
 Care Team Providers
 
 
 
+-----------------------+------+-----------------+
| Care Team Member Name | Role | Phone           |
+-----------------------+------+-----------------+
| Calixto Chou MD | PCP  | [phone] |
+-----------------------+------+-----------------+
 
 
 
 Reason for Visit
 
 
+--------+----------+
| Reason | Comments |
+--------+----------+
| COPD   |          |
+--------+----------+
 
 
 
 Encounter Details
 
 
+--------+---------+----------------------+----------------+---------------------+
| Date   | Type    | Department           | Care Team      | Description         |
+--------+---------+----------------------+----------------+---------------------+
| / | Office  |   [facility]          |   Wilda,  | COPD (chronic       |
| 2015   | Visit   | PULMONARY  [address]     | Mary PADRON MD  | obstructive         |
|        |         | [place]  [place], |                | pulmonary disease)  |
|        |         |  [place] [address]       |                | ([facility]); Need for     |
|        |         | [phone]         |                | vaccination with    |
|        |         |                      |                | 13-polyvalent       |
|        |         |                      |                | pneumococcal        |
|        |         |                      |                | conjugate vaccine   |
+--------+---------+----------------------+----------------+---------------------+
 
 
 
 Social History
 
 
+--------------+-------+-----------+--------+------+
| Tobacco Use  | Types | Packs/Day | Years  | Date |
|              |       |           | Used   |      |
+--------------+-------+-----------+--------+------+
| Never Smoker |       |           |        |      |
+--------------+-------+-----------+--------+------+
 
 
 
+-------------------------------------------------------------------+
| Comments: her parents both smoked, also worked at [employer] |
+-------------------------------------------------------------------+
 
 
 
+-------------+-------------+---------+----------+
| Alcohol Use | Drinks/Week | oz/Week | Comments |
+-------------+-------------+---------+----------+
 
| No          |             |         |          |
+-------------+-------------+---------+----------+
 
 
 
+------------------+---------------+
| Sex Assigned at  | Date Recorded |
| Birth            |               |
+------------------+---------------+
| Not on file      |               |
+------------------+---------------+
 
 
 
+----------------+-------------+-------------+
| Job Start Date | Occupation  | Industry    |
+----------------+-------------+-------------+
| Not on file    | Not on file | Not on file |
+----------------+-------------+-------------+
 
 
 
+----------------+--------------+------------+
| Travel History | Travel Start | Travel End |
+----------------+--------------+------------+
 
 
 
+-------------------------------------+
| No recent travel history available. |
+-------------------------------------+
 documented as of this encounter
 
 Last Filed Vital Signs
 
 
+-------------------+----------------------+----------------------+----------+
| Vital Sign        | Reading              | Time Taken           | Comments |
+-------------------+----------------------+----------------------+----------+
| Blood Pressure    | 132/78               | 2015 12:44 PM  |          |
|                   |                      | PDT                  |          |
+-------------------+----------------------+----------------------+----------+
| Pulse             | 107                  | 2015 12:44 PM  |          |
|                   |                      | PDT                  |          |
+-------------------+----------------------+----------------------+----------+
| Temperature       | -                    | -                    |          |
+-------------------+----------------------+----------------------+----------+
| Respiratory Rate  | -                    | -                    |          |
+-------------------+----------------------+----------------------+----------+
| Oxygen Saturation | 96%                  | 2015 12:44 PM  |          |
|                   |                      | PDT                  |          |
+-------------------+----------------------+----------------------+----------+
| Inhaled Oxygen    | -                    | -                    |          |
| Concentration     |                      |                      |          |
+-------------------+----------------------+----------------------+----------+
| Weight            | 62.2 kg (137 lb 3.2  | 2015 12:44 PM  |          |
|                   | oz)                  | PDT                  |          |
+-------------------+----------------------+----------------------+----------+
| Height            | 157.5 cm (5' 2")     | 2015 12:44 PM  |          |
|                   |                      | PDT                  |          |
 
+-------------------+----------------------+----------------------+----------+
| Body Mass Index   | 25.09                | 2015 12:44 PM  |          |
|                   |                      | PDT                  |          |
+-------------------+----------------------+----------------------+----------+
 documented in this encounter
 
 Patient Instructions
 Patient Instructions Mary Astudillo MD - 2015  1:11 PM PDTCall the insurance 
and find out if Incruse would be less expensive than the Spiriva. You can also ask if the Sp
iriva Respimat is covered as it may be easier to use with your hand arthritis. 
 
 Go ahead and stop the Qvar. If you have more breathing problems off of this, let me know. 
 
 I would recommend that you get a Prevnar 13 vaccine. You can get this at Dr. Chou's offic
e or at a local pharmacy. Electronically signed by Mary Astudillo MD at 2015  1
:35 PM PDT
 documented in this encounter
 
 Progress Notes
 Mary Astudillo MD - 2015  1:04 PM PDTFormatting of this note might be differe
nt from the original.
 
 Pulmonary Follow Up
 
 HPI 
  
 Margaret Ferreira is a 84 y.o. female patient of Calixto Chou here today for follow
 up of COPD. 
 
 At their last visit, we had started her on Spiriva, checked a ambulating oximetry, and an o
vernight oximetry. Since their last visit she feels like she has been doing much better. She
 had a cold for a couple of weeks and had to take antibiotics and cold medicine at the end o
. 
 
 She is currently on a regimen of Spiriva one capsule inhaled daily and Qvar twice daily.  S
he does feel like this medication regimen is working for them. She has not used the ProAir a
t all since last seen. She came close to using it twice when she was cooking wilson. She retu
rns today for routine follow up. 
 
 Currently she is able to walk several hundred feet at her own pace on level ground. She is 
not exercising regularly. She had been exercising up until about a month ago when she got si
ck. 
 
 Other than when she has been sick, since being on the Spiriva, she has not been sick.  She 
has not had hemoptysis.
 
 She has been evaluated for nocturnal oxygen and does not need to use it. She has been evalu
ated for daytime oxygen and does not need to use it. 
 
 She did have a shot in her eye yesterday in [facility]. 
 
 Past Medical History
 Past Medical History 
 Diagnosis Date 
   COPD (chronic obstructive pulmonary disease) (HCC)  
   Peptic ulcer disease  
   GERD (gastroesophageal reflux disease)  
   Hiatal hernia  
   Hypertension  
   Hypothyroidism  
 
   Urine incontinence  
   Aortic valve stenosis  
   trivial 
   Hearing loss  
   Osteoarthritis  
   Osteoporosis  
   Plantar wart  
   RLS (restless legs syndrome)  
   Scoliosis  
   Pneumonia  
   Pulmonary nodule  
   Macular degeneration  
   bilateral now 
  
  
 Past Surgical History
 Past Surgical History 
 Procedure Laterality Date 
   Jj and bso   
   Cholecystectomy   
   Total hip arthroplasty Left  
   Abscess drainage on calf   
   Bladder suspension   
   Upper gastrointestinal endoscopy   
  
 
 Social History: 
 History 
 
 Social History 
   Marital Status:  
   Spouse Name: N/A 
   Number of Children: N/A 
   Years of Education: N/A 
 
 Occupational History 
     
 
 Social History Main Topics 
   Smoking status: Never Smoker  
   Smokeless tobacco: None 
    Comment: her parents both smoked, also worked at a [employer] 
   Alcohol Use: No 
   Drug Use: No 
   Sexual Activity: None 
 
 Other Topics Concern 
   None 
 
 Social History Narrative 
  Lives: in [place]  
  With: her  
  Grew up: in [place]  
  Has previously lived in: [place], [place] 
   
  Exposure to toxic chemicals: has been exposed to halon (1301) before, they had to evacuate
 the computer room at the time 
  Exposure to asbestos: no 
  Exposure to tuberculosis: no  
  Has had a PPD or Quantiferon before: no 
 
   
  Has pets at home: no  
  Has ever owned birds: no  
  Other animal exposures: has had a dog and cat before 
   
  Hobbies: playing cards 
    
   
   
 
 Allergies:
 Allergies 
 Allergen Reactions 
   Lisinopril  
   Cough 
   
 
 Medications:
 Outpatient Encounter Prescriptions as of 3/18/2015 
 Medication Sig Dispense Refill 
   [DISCONTINUED] aclidinium (TUDORZA PRESSAIR) 400 mcg/puff inhaler Inhale 1 puff into th
e lungs 2 times daily.  1 Inhaler  12 
   albuterol (PROAIR HFA) 90 mcg/puff inhaler Inhale 2 puffs into the lungs every 6 hours 
as needed for Wheezing or Shortness of Breath.  1 Inhaler  5 
   beclomethasone (QVAR) 40 mcg/puff inhaler Inhale 2 puffs into the lungs 2 times daily. 
    
   benzonatate (TESSALON PERLES) 100 mg capsule Take 100 mg by mouth 3 times daily as need
ed.     
   Calcium Carbonate (CALCIUM 600 PO) Take  by mouth Daily.     
   Cholecalciferol (VITAMIN D-3) 2000 units CAPS Take  by mouth Daily.     
   diclofenac (VOLTAREN-XR) 100 mg ER tablet Take 100 mg by mouth daily (with breakfast). 
    
   Diclofenac Potassium 50 MG PACK Take  by mouth.     
   Docosahexaenoic Acid (DHA OMEGA 3) 100 MG CAPS Take  by mouth.     
   levothyroxine (SYNTHROID, LEVOTHROID) 150 mcg tablet Take 150 mcg by mouth every mornin
g (before breakfast).     
   Multiple Vitamins-Minerals (MULTIVITAMIN PO) Take  by mouth Daily.     
   olmesartan-hydrochlorothiazide (BENICAR HCT) 40-25 MG per tablet Take 0.5 tablets by mo
uth Daily.     
   omeprazole (PRILOSEC) 20 mg capsule Take 20 mg by mouth every morning (before breakfast
).     
   pramipexole (MIRAPEX) 0.25 mg tablet Take 0.25 mg by mouth 3 times daily.     
   pseudoePHEDrine (SUDAFED) 30 mg tablet Take 30 mg by mouth every 4 hours as needed.    
 
   Spacer/Aero Chamber Mouthpiece MISC Use with inhaler as directed.  1 each  1 
   tiotropium (SPIRIVA HANDIHALER) 18 mcg inhalation capsule Inhale 1 capsule into the biju
gs Daily.  30 capsule  11 
   traMADol (ULTRAM) 50 mg tablet Take 50 mg by mouth every 6 hours as needed.     
 
 No facility-administered encounter medications on file as of 3/18/2015. 
 
 Review of Systems:
 General: 
 []Weight loss/gain (over 10 lbs) []Fever/chills/sweats  []Night sweats
 EENT: 
 [x]Hearing loss - has hearing aides    [x]Vision loss/change- slight []Sinus congestion/rayshawn
al drainage []Nosebleeds  [x]Hoarseness- improved
 Cardiac: 
 [x]Chest pain- mild when she had some shortness of breath []Palpitations/heart racing  []Sw
elling of legs/ankles  []Waking up at night short of breath 
 
 Gastrointestinal: 
 []Nausea/vomiting []Difficulty swallowing []Heartburn/acid reflux []Loss of appetite []Abdo
chriss pain 
 Urologic: 
 []Blood in urine []Frequent urination at night []Burning/painful urination []Difficulty wit
h urination
 
 Objective 
 
 /78  | Pulse 107  | Ht 1.575 m (5' 2")  | Wt 62.234 kg (137 lb 3.2 oz)  | BMI 25.09 k
g/m2    | SpO2 96%  RA
 
 General Appearance:  Alert, cooperative, no distress, appears stated age 
 Head:  Normocephalic, without obvious abnormality, atraumatic 
 Eyes:  PERRL, conjunctiva clear, no scleral icterus, EOM's intact 
 Ears:  Wearing hearing aides, fairly normal acuity 
 Nose: Nares normal, septum midline, mucosa normal 
 Mouth: No oral lesions or exudate 
 Neck: Supple, symmetrical, no adenopathy 
 Lungs:   No accessory muscle use, breath sounds are slightly diminished bilaterally, no whe
ezes, crackles or rhonchi 
 Chest Wall:  No deformity 
 Heart:  Regular rate and rhythm, occasional premature beat, no murmur, rub or gallop 
 Abdomen:   Soft, non-tender, non-distended 
 Extremities:  No cyanosis, clubbing, trace ankle edema bilaterally 
 Pulses: Radial pulses 2+ and symmetric 
 Skin: Warm and dry 
 Lymph nodes: Cervical and supraclavicular nodes normal 
 
 Data:
 Overnight oximetry was done on 2015 on room air and was reviewed and interprete
d in clinic today. It shows she spent 87 minutes with a saturation less than 88 %. Most of t
his time is noted to be artifactual.
 
 Ambulating oximetry was done on 2015 and was reviewed and interpreted in clinic
 today. It shows she desaturated to 92% on exertion.
 
 Immunization History 
 Administered Date(s) Administered 
   PNEUMOCOCCAL POLYSACCHARIDE 23-VALENT (PPSV23) 2012 
   TRIVALENT INFLUENZA, PRESERATIVE FREE (PED/ADOL/ADULT) 2014 
 
 Assessment  
 
 1. COPD (chronic obstructive pulmonary disease) (HCC) - Doing well on Spiriva, though her c
o pay is high. I suggested we stop the Qvar as she did not get much benefit from it. In bryant
tion, I asked her to see if the Spiriva Respimat is covered as this may be easier for her to
 use or to see if Incruse is less expensive. 
 2. Need for vaccination with 13-polyvalent pneumococcal conjugate vaccine - Prevnar 13 give
n today in accordance with updated guidelines.  
 
 Plan 
 1.Continue Spiriva Handihaler for now, explore Spiriva Respimat and Incruse as alternatives
. 
 2. Prevnar 13 given today in accordance with updated guidelines. 
 3.Stop Qvar.
 4. Continue to use albuterol as needed. 
 
 She was advised to call if new pulmonary symptoms were to develop.
 
 
 Return to clinic in 6 months, or sooner with concerns.
 
 CC: Calixto Chou
 
 Portions of this report were transcribed using voice recognition software.  Every effort wa
s made to ensure accuracy; however, inadvertent computerized transcription errors may be pre
sent.
 
 Electronically signed by: Mary Astudillo MD  
 
 Electronically signed by Mary Astudillo MD at 2015  9:10 PM PDTdocumented in t
his encounter
 
 Plan of Treatment
 Not on filedocumented as of this encounter
 
 Visit Diagnoses
 
 
+----------------------------------------------------------------------------------------+
| Diagnosis                                                                              |
+----------------------------------------------------------------------------------------+
|   COPD (chronic obstructive pulmonary disease) (HCC)  Chronic airway obstruction, not  |
| elsewhere classified                                                                   |
+----------------------------------------------------------------------------------------+
|   Need for vaccination with 13-polyvalent pneumococcal conjugate vaccine               |
+----------------------------------------------------------------------------------------+
 documented in this encounter

## 2019-12-04 NOTE — XMS
Encounter Summary
  Created on: 2019
 
 Margaret Ferreira
 External Reference #: [id number]
 : 30
 Sex: Female
 
 Demographics
 
 
+-----------------------+----------------------+
| Address               | [address]        |
|                       | [place]  [address] |
+-----------------------+----------------------+
| Home Phone            | [phone]      |
+-----------------------+----------------------+
| Preferred Language    | Unknown              |
+-----------------------+----------------------+
| Marital Status        |               |
+-----------------------+----------------------+
| Islam Affiliation | Unknown              |
+-----------------------+----------------------+
| Race                  | Unknown              |
+-----------------------+----------------------+
| Ethnic Group          | Unknown              |
+-----------------------+----------------------+
 
 
 Author
 
 
+--------------+--------------------------------------------+
| Author       | [facility] [place]  |
|              | and [place]                                |
+--------------+--------------------------------------------+
| Organization | [facility] [place]  |
|              | and [place]                                |
+--------------+--------------------------------------------+
| Address      | Unknown                                    |
+--------------+--------------------------------------------+
| Phone        | Unavailable                                |
+--------------+--------------------------------------------+
 
 
 
 Support
 
 
+--------------+--------------+---------+-----------------+
| Name         | Relationship | Address | Phone           |
+--------------+--------------+---------+-----------------+
| Lawson Hanna | ECON         | Unknown | [phone] |
+--------------+--------------+---------+-----------------+
 
 
 
 Care Team Providers
 
 
 
+-----------------------+------+-----------------+
| Care Team Member Name | Role | Phone           |
+-----------------------+------+-----------------+
| Calixto Chou MD | PCP  | [phone] |
+-----------------------+------+-----------------+
 
 
 
 Encounter Details
 
 
+--------+----------+----------------------+----------------------+-------------+
| Date   | Type     | Department           | Care Team            | Description |
+--------+----------+----------------------+----------------------+-------------+
| 07/10/ | Abstract |   [facility] [place]          |   Joel Cottrell,   |             |
|    |          | PHYSIATRY  [address]     | MD  [address]  |             |
|        |          | [place]  [place], |  [place]     |             |
|        |          |  [place] [address]       | [id number] [phone]  |             |
|        |          | [phone]         |  [phone] (Fax)  |             |
+--------+----------+----------------------+----------------------+-------------+
 
 
 
 Social History
 
 
+--------------+-------+-----------+--------+------+
| Tobacco Use  | Types | Packs/Day | Years  | Date |
|              |       |           | Used   |      |
+--------------+-------+-----------+--------+------+
| Never Smoker |       |           |        |      |
+--------------+-------+-----------+--------+------+
 
 
 
+---------------------+---+---+---+
| Smokeless Tobacco:  |   |   |   |
| Never Used          |   |   |   |
+---------------------+---+---+---+
 
 
 
+-------------------------------------------------------------------+
| Comments: her parents both smoked, also worked at [employer] |
+-------------------------------------------------------------------+
 
 
 
+-------------+----------------------+---------+----------+
| Alcohol Use | Drinks/Week          | oz/Week | Comments |
+-------------+----------------------+---------+----------+
| No          |   0 Standard drinks  | 0.0     |          |
|             | or equivalent        |         |          |
+-------------+----------------------+---------+----------+
 
 
 
+------------------+---------------+
 
| Sex Assigned at  | Date Recorded |
| Birth            |               |
+------------------+---------------+
| Not on file      |               |
+------------------+---------------+
 
 
 
+----------------+-------------+-------------+
| Job Start Date | Occupation  | Industry    |
+----------------+-------------+-------------+
| Not on file    | Not on file | Not on file |
+----------------+-------------+-------------+
 
 
 
+----------------+--------------+------------+
| Travel History | Travel Start | Travel End |
+----------------+--------------+------------+
 
 
 
+-------------------------------------+
| No recent travel history available. |
+-------------------------------------+
 documented as of this encounter
 
 Plan of Treatment
 Not on filedocumented as of this encounter
 
 Visit Diagnoses
 Not on filedocumented in this encounter

## 2019-12-04 NOTE — XMS
Encounter Summary
  Created on: 2019
 
 Margaret Ferreira
 External Reference #: [id number]
 : 30
 Sex: Female
 
 Demographics
 
 
+-----------------------+----------------------+
| Address               | [address]        |
|                       | [place]  [address] |
+-----------------------+----------------------+
| Home Phone            | [phone]      |
+-----------------------+----------------------+
| Preferred Language    | Unknown              |
+-----------------------+----------------------+
| Marital Status        |               |
+-----------------------+----------------------+
| Latter-day Affiliation | Unknown              |
+-----------------------+----------------------+
| Race                  | Unknown              |
+-----------------------+----------------------+
| Ethnic Group          | Unknown              |
+-----------------------+----------------------+
 
 
 Author
 
 
+--------------+--------------------------------------------+
| Author       | [facility]  |
|              | and [place]                                |
+--------------+--------------------------------------------+
| Organization | [facility]  |
|              | and [place]                                |
+--------------+--------------------------------------------+
| Address      | Unknown                                    |
+--------------+--------------------------------------------+
| Phone        | Unavailable                                |
+--------------+--------------------------------------------+
 
 
 
 Support
 
 
+--------------+--------------+---------+-----------------+
| Name         | Relationship | Address | Phone           |
+--------------+--------------+---------+-----------------+
| Lawson Hanna | ECON         | Unknown | [phone] |
+--------------+--------------+---------+-----------------+
 
 
 
 Care Team Providers
 
 
 
+-----------------------+------+-----------------+
| Care Team Member Name | Role | Phone           |
+-----------------------+------+-----------------+
| Calixto Chou MD | PCP  | [phone] |
+-----------------------+------+-----------------+
 
 
 
 Reason for Visit
 
 
+--------+----------+
| Reason | Comments |
+--------+----------+
| COPD   |          |
+--------+----------+
 
 
 
 Encounter Details
 
 
+--------+---------+----------------------+----------------+---------------------+
| Date   | Type    | Department           | Care Team      | Description         |
+--------+---------+----------------------+----------------+---------------------+
| / | Office  |   [facility]          |   Wilda,  | COPD (chronic       |
| 2015   | Visit   | PULMONARY  [address]     | Mary PADRON MD  | obstructive         |
|        |         | [place]  [place], |                | pulmonary disease)  |
|        |         |  [place] [address]       |                | ([facility]); Need for     |
|        |         | [phone]         |                | vaccination with    |
|        |         |                      |                | 13-polyvalent       |
|        |         |                      |                | pneumococcal        |
|        |         |                      |                | conjugate vaccine   |
+--------+---------+----------------------+----------------+---------------------+
 
 
 
 Social History
 
 
+--------------+-------+-----------+--------+------+
| Tobacco Use  | Types | Packs/Day | Years  | Date |
|              |       |           | Used   |      |
+--------------+-------+-----------+--------+------+
| Never Smoker |       |           |        |      |
+--------------+-------+-----------+--------+------+
 
 
 
+-------------------------------------------------------------------+
| Comments: her parents both smoked, also worked at [employer] |
+-------------------------------------------------------------------+
 
 
 
+-------------+-------------+---------+----------+
| Alcohol Use | Drinks/Week | oz/Week | Comments |
+-------------+-------------+---------+----------+
 
| No          |             |         |          |
+-------------+-------------+---------+----------+
 
 
 
+------------------+---------------+
| Sex Assigned at  | Date Recorded |
| Birth            |               |
+------------------+---------------+
| Not on file      |               |
+------------------+---------------+
 
 
 
+----------------+-------------+-------------+
| Job Start Date | Occupation  | Industry    |
+----------------+-------------+-------------+
| Not on file    | Not on file | Not on file |
+----------------+-------------+-------------+
 
 
 
+----------------+--------------+------------+
| Travel History | Travel Start | Travel End |
+----------------+--------------+------------+
 
 
 
+-------------------------------------+
| No recent travel history available. |
+-------------------------------------+
 documented as of this encounter
 
 Last Filed Vital Signs
 
 
+-------------------+----------------------+----------------------+----------+
| Vital Sign        | Reading              | Time Taken           | Comments |
+-------------------+----------------------+----------------------+----------+
| Blood Pressure    | 132/78               | 2015 12:44 PM  |          |
|                   |                      | PDT                  |          |
+-------------------+----------------------+----------------------+----------+
| Pulse             | 107                  | 2015 12:44 PM  |          |
|                   |                      | PDT                  |          |
+-------------------+----------------------+----------------------+----------+
| Temperature       | -                    | -                    |          |
+-------------------+----------------------+----------------------+----------+
| Respiratory Rate  | -                    | -                    |          |
+-------------------+----------------------+----------------------+----------+
| Oxygen Saturation | 96%                  | 2015 12:44 PM  |          |
|                   |                      | PDT                  |          |
+-------------------+----------------------+----------------------+----------+
| Inhaled Oxygen    | -                    | -                    |          |
| Concentration     |                      |                      |          |
+-------------------+----------------------+----------------------+----------+
| Weight            | 62.2 kg (137 lb 3.2  | 2015 12:44 PM  |          |
|                   | oz)                  | PDT                  |          |
+-------------------+----------------------+----------------------+----------+
| Height            | 157.5 cm (5' 2")     | 2015 12:44 PM  |          |
|                   |                      | PDT                  |          |
 
+-------------------+----------------------+----------------------+----------+
| Body Mass Index   | 25.09                | 2015 12:44 PM  |          |
|                   |                      | PDT                  |          |
+-------------------+----------------------+----------------------+----------+
 documented in this encounter
 
 Patient Instructions
 Patient Instructions Mary Astudillo MD - 2015  1:11 PM PDTCall the insurance 
and find out if Incruse would be less expensive than the Spiriva. You can also ask if the Sp
iriva Respimat is covered as it may be easier to use with your hand arthritis. 
 
 Go ahead and stop the Qvar. If you have more breathing problems off of this, let me know. 
 
 I would recommend that you get a Prevnar 13 vaccine. You can get this at Dr. Chou's offic
e or at a local pharmacy. Electronically signed by Mary Astudillo MD at 2015  1
:35 PM PDT
 documented in this encounter
 
 Progress Notes
 Mary Astudlilo MD - 2015  1:04 PM PDTFormatting of this note might be differe
nt from the original.
 
 Pulmonary Follow Up
 
 HPI 
  
 Margaret Ferreira is a 84 y.o. female patient of Calixto Chou here today for follow
 up of COPD. 
 
 At their last visit, we had started her on Spiriva, checked a ambulating oximetry, and an o
vernight oximetry. Since their last visit she feels like she has been doing much better. She
 had a cold for a couple of weeks and had to take antibiotics and cold medicine at the end o
. 
 
 She is currently on a regimen of Spiriva one capsule inhaled daily and Qvar twice daily.  S
he does feel like this medication regimen is working for them. She has not used the ProAir a
t all since last seen. She came close to using it twice when she was cooking [place]. She retu
rns today for routine follow up. 
 
 Currently she is able to walk several hundred feet at her own pace on level ground. She is 
not exercising regularly. She had been exercising up until about a month ago when she got si
ck. 
 
 Other than when she has been sick, since being on the Spiriva, she has not been sick.  She 
has not had hemoptysis.
 
 She has been evaluated for nocturnal oxygen and does not need to use it. She has been evalu
ated for daytime oxygen and does not need to use it. 
 
 She did have a shot in her eye yesterday in [facility]. 
 
 Past Medical History
 Past Medical History 
 Diagnosis Date 
   COPD (chronic obstructive pulmonary disease) (HCC)  
   Peptic ulcer disease  
   GERD (gastroesophageal reflux disease)  
   Hiatal hernia  
   Hypertension  
   Hypothyroidism  
 
   Urine incontinence  
   Aortic valve stenosis  
   trivial 
   Hearing loss  
   Osteoarthritis  
   Osteoporosis  
   Plantar wart  
   RLS (restless legs syndrome)  
   Scoliosis  
   Pneumonia  
   Pulmonary nodule  
   Macular degeneration  
   bilateral now 
  
  
 Past Surgical History
 Past Surgical History 
 Procedure Laterality Date 
   Jj and bso   
   Cholecystectomy   
   Total hip arthroplasty Left  
   Abscess drainage on calf   
   Bladder suspension   
   Upper gastrointestinal endoscopy   
  
 
 Social History: 
 History 
 
 Social History 
   Marital Status:  
   Spouse Name: N/A 
   Number of Children: N/A 
   Years of Education: N/A 
 
 Occupational History 
     
 
 Social History Main Topics 
   Smoking status: Never Smoker  
   Smokeless tobacco: None 
    Comment: her parents both smoked, also worked at a [employer] 
   Alcohol Use: No 
   Drug Use: No 
   Sexual Activity: None 
 
 Other Topics Concern 
   None 
 
 Social History Narrative 
  Lives: in [place]  
  With: her  
  Grew up: in [place]  
  Has previously lived in: [place], [place] 
   
  Exposure to toxic chemicals: has been exposed to halon (1301) before, they had to evacuate
 the computer room at the time 
  Exposure to asbestos: no 
  Exposure to tuberculosis: no  
  Has had a PPD or Quantiferon before: no 
 
   
  Has pets at home: no  
  Has ever owned birds: no  
  Other animal exposures: has had a dog and cat before 
   
  Hobbies: playing cards 
    
   
   
 
 Allergies:
 Allergies 
 Allergen Reactions 
   Lisinopril  
   Cough 
   
 
 Medications:
 Outpatient Encounter Prescriptions as of 3/18/2015 
 Medication Sig Dispense Refill 
   [DISCONTINUED] aclidinium (TUDORZA PRESSAIR) 400 mcg/puff inhaler Inhale 1 puff into th
e lungs 2 times daily.  1 Inhaler  12 
   albuterol (PROAIR HFA) 90 mcg/puff inhaler Inhale 2 puffs into the lungs every 6 hours 
as needed for Wheezing or Shortness of Breath.  1 Inhaler  5 
   beclomethasone (QVAR) 40 mcg/puff inhaler Inhale 2 puffs into the lungs 2 times daily. 
    
   benzonatate (TESSALON PERLES) 100 mg capsule Take 100 mg by mouth 3 times daily as need
ed.     
   Calcium Carbonate (CALCIUM 600 PO) Take  by mouth Daily.     
   Cholecalciferol (VITAMIN D-3) 2000 units CAPS Take  by mouth Daily.     
   diclofenac (VOLTAREN-XR) 100 mg ER tablet Take 100 mg by mouth daily (with breakfast). 
    
   Diclofenac Potassium 50 MG PACK Take  by mouth.     
   Docosahexaenoic Acid (DHA OMEGA 3) 100 MG CAPS Take  by mouth.     
   levothyroxine (SYNTHROID, LEVOTHROID) 150 mcg tablet Take 150 mcg by mouth every mornin
g (before breakfast).     
   Multiple Vitamins-Minerals (MULTIVITAMIN PO) Take  by mouth Daily.     
   olmesartan-hydrochlorothiazide (BENICAR HCT) 40-25 MG per tablet Take 0.5 tablets by mo
uth Daily.     
   omeprazole (PRILOSEC) 20 mg capsule Take 20 mg by mouth every morning (before breakfast
).     
   pramipexole (MIRAPEX) 0.25 mg tablet Take 0.25 mg by mouth 3 times daily.     
   pseudoePHEDrine (SUDAFED) 30 mg tablet Take 30 mg by mouth every 4 hours as needed.    
 
   Spacer/Aero Chamber Mouthpiece MISC Use with inhaler as directed.  1 each  1 
   tiotropium (SPIRIVA HANDIHALER) 18 mcg inhalation capsule Inhale 1 capsule into the biju
gs Daily.  30 capsule  11 
   traMADol (ULTRAM) 50 mg tablet Take 50 mg by mouth every 6 hours as needed.     
 
 No facility-administered encounter medications on file as of 3/18/2015. 
 
 Review of Systems:
 General: 
 []Weight loss/gain (over 10 lbs) []Fever/chills/sweats  []Night sweats
 EENT: 
 [x]Hearing loss - has hearing aides    [x]Vision loss/change- slight []Sinus congestion/rayshawn
al drainage []Nosebleeds  [x]Hoarseness- improved
 Cardiac: 
 [x]Chest pain- mild when she had some shortness of breath []Palpitations/heart racing  []Sw
elling of legs/ankles  []Waking up at night short of breath 
 
 Gastrointestinal: 
 []Nausea/vomiting []Difficulty swallowing []Heartburn/acid reflux []Loss of appetite []Abdo
chriss pain 
 Urologic: 
 []Blood in urine []Frequent urination at night []Burning/painful urination []Difficulty wit
h urination
 
 Objective 
 
 /78  | Pulse 107  | Ht 1.575 m (5' 2")  | Wt 62.234 kg (137 lb 3.2 oz)  | BMI 25.09 k
g/m2    | SpO2 96%  RA
 
 General Appearance:  Alert, cooperative, no distress, appears stated age 
 Head:  Normocephalic, without obvious abnormality, atraumatic 
 Eyes:  PERRL, conjunctiva clear, no scleral icterus, EOM's intact 
 Ears:  Wearing hearing aides, fairly normal acuity 
 Nose: Nares normal, septum midline, mucosa normal 
 Mouth: No oral lesions or exudate 
 Neck: Supple, symmetrical, no adenopathy 
 Lungs:   No accessory muscle use, breath sounds are slightly diminished bilaterally, no whe
ezes, crackles or rhonchi 
 Chest Wall:  No deformity 
 Heart:  Regular rate and rhythm, occasional premature beat, no murmur, rub or gallop 
 Abdomen:   Soft, non-tender, non-distended 
 Extremities:  No cyanosis, clubbing, trace ankle edema bilaterally 
 Pulses: Radial pulses 2+ and symmetric 
 Skin: Warm and dry 
 Lymph nodes: Cervical and supraclavicular nodes normal 
 
 Data:
 Overnight oximetry was done on 2015 on room air and was reviewed and interprete
d in clinic today. It shows she spent 87 minutes with a saturation less than 88 %. Most of t
his time is noted to be artifactual.
 
 Ambulating oximetry was done on 2015 and was reviewed and interpreted in clinic
 today. It shows she desaturated to 92% on exertion.
 
 Immunization History 
 Administered Date(s) Administered 
   PNEUMOCOCCAL POLYSACCHARIDE 23-VALENT (PPSV23) 2012 
   TRIVALENT INFLUENZA, PRESERATIVE FREE (PED/ADOL/ADULT) 2014 
 
 Assessment  
 
 1. COPD (chronic obstructive pulmonary disease) (HCC) - Doing well on Spiriva, though her c
o pay is high. I suggested we stop the Qvar as she did not get much benefit from it. In bryant
tion, I asked her to see if the Spiriva Respimat is covered as this may be easier for her to
 use or to see if Incruse is less expensive. 
 2. Need for vaccination with 13-polyvalent pneumococcal conjugate vaccine - Prevnar 13 give
n today in accordance with updated guidelines.  
 
 Plan 
 1.Continue Spiriva Handihaler for now, explore Spiriva Respimat and Incruse as alternatives
. 
 2. Prevnar 13 given today in accordance with updated guidelines. 
 3.Stop Qvar.
 4. Continue to use albuterol as needed. 
 
 She was advised to call if new pulmonary symptoms were to develop.
 
 
 Return to clinic in 6 months, or sooner with concerns.
 
 CC: Calixto Chou
 
 Portions of this report were transcribed using voice recognition software.  Every effort wa
s made to ensure accuracy; however, inadvertent computerized transcription errors may be pre
sent.
 
 Electronically signed by: Mary Astudillo MD  
 
 Electronically signed by Mary Astudillo MD at 2015  9:10 PM PDTdocumented in t
his encounter
 
 Plan of Treatment
 Not on filedocumented as of this encounter
 
 Visit Diagnoses
 
 
+----------------------------------------------------------------------------------------+
| Diagnosis                                                                              |
+----------------------------------------------------------------------------------------+
|   COPD (chronic obstructive pulmonary disease) (HCC)  Chronic airway obstruction, not  |
| elsewhere classified                                                                   |
+----------------------------------------------------------------------------------------+
|   Need for vaccination with 13-polyvalent pneumococcal conjugate vaccine               |
+----------------------------------------------------------------------------------------+
 documented in this encounter

## 2019-12-04 NOTE — XMS
Encounter Summary
  Created on: 2019
 
 Margaret Ferreira
 External Reference #: [id number]
 : 30
 Sex: Female
 
 Demographics
 
 
+-----------------------+------------------------+
| Address               | [address]      |
|                       | [place]  [address]   |
+-----------------------+------------------------+
| Home Phone            | [phone]        |
+-----------------------+------------------------+
| Preferred Language    | Unknown                |
+-----------------------+------------------------+
| Marital Status        |                 |
+-----------------------+------------------------+
| Anabaptism Affiliation | MET                    |
+-----------------------+------------------------+
| Race                  | White                  |
+-----------------------+------------------------+
| Ethnic Group          | Not  or  |
+-----------------------+------------------------+
 
 
 Author
 
 
+--------------+------------------------------+
| Author       | [facility] |
+--------------+------------------------------+
| Organization | [facility] |
+--------------+------------------------------+
| Address      | Unknown                      |
+--------------+------------------------------+
| Phone        | Unavailable                  |
+--------------+------------------------------+
 
 
 
 Support
 
 
+--------------+--------------+---------------------+-----------------+
| Name         | Relationship | Address             | Phone           |
+--------------+--------------+---------------------+-----------------+
| Lawson Ferreira | ECON         | [address]          | [phone] |
|              |              | [place], [place]   |                 |
|              |              | [address]               |                 |
+--------------+--------------+---------------------+-----------------+
 
 
 
 Care Team Providers
 
 
 
+-----------------------+------+-------------+
| Care Team Member Name | Role | Phone       |
+-----------------------+------+-------------+
 PCP  | Unavailable |
+-----------------------+------+-------------+
 
 
 
 Encounter Details
 
 
+--------+-------------+----------------------+---------------------+---------------+
| Date   | Type        | Department           | Care Team           | Description   |
+--------+-------------+----------------------+---------------------+---------------+
| / | Office      |   General Internal   |   Note, Outpatient  | Progress Note |
|    | Visit-Trans | Medicine  [address]    | Clinic              |               |
|        | [address]      | [address]  |                     |               |
|        |             |  Mailcode: [address]      |                     |               |
|        |             | Outpatient Clinic    |                     |               |
|        |             | [address]       |                     |               |
|        |             | [place], [place]         |                     |               |
|        |             | [address]           |                     |               |
|        |             | [phone]         |                     |               |
+--------+-------------+----------------------+---------------------+---------------+
 
 
 
 Social History
 
 
+----------------+-------+-----------+--------+------+
| Tobacco Use    | Types | Packs/Day | Years  | Date |
|                |       |           | Used   |      |
+----------------+-------+-----------+--------+------+
| Never Assessed |       |           |        |      |
+----------------+-------+-----------+--------+------+
 
 
 
+------------------+---------------+
| Sex Assigned at  | Date Recorded |
| Birth            |               |
+------------------+---------------+
| Not on file      |               |
+------------------+---------------+
 
 
 
+----------------+-------------+-------------+
| Job Start Date | Occupation  | Industry    |
+----------------+-------------+-------------+
| Not on file    | Not on file | Not on file |
+----------------+-------------+-------------+
 
 
 
+----------------+--------------+------------+
| Travel History | Travel Start | Travel End |
 
+----------------+--------------+------------+
 
 
 
+-------------------------------------+
| No recent travel history available. |
+-------------------------------------+
 documented as of this encounter
 
 Progress Notes
 Interface, Transcription In - 2007  6:36 AM PDT CLINIC DATE: 96
  
  [facility]
  
  The patient is seen for her second postoperative visit on 96. She
  continues to make satisfactory although rather slow progress following her
  recent hospital admission at which time she had a pelvic floor repair
  including vaginal hysterectomy and sacrospinous fixation. Her surgery was
  complicated by intraperitoneal bleed requiring laparotomy. She continues to
  stay with her daughter in [place] and is becoming more active, has a
  reasonable appetite and normal bowel function. She continues to report some
  urgency and urge incontinence. On examination, the abdominal wound is
  well-healed. There are no local areas of induration, though there is still
  some tenderness on palpation of the rectus muscle. The patient still needs
  a fair amount of pain medication and is moving cautiously. Overall,
  however, she appears to be making a slow but steady and satisfactory
  postoperative recovery. She will remain in the [place] area for a few more
  days before returning to [place]. A further follow-up appointment has not
  been given but the patient has been asked to report back by phone to clinic
  before she returns to [place].
  
  
  
  BRITTANY Perez M.D.
  Professor and 
  Obstetrics and Gynecology
  
  EPK:carolina
  D: 96
  T: 96
  
  cc:
  
  
  
  THALIA COLE MD
  [address] [place] [address] 8
  [place] [address]
   Electronically signed by Interface, Transcription In at 2007  6:36 AM PDTdocumented
 in this encounter
 
 Plan of Treatment
 Not on filedocumented as of this encounter
 
 Visit Diagnoses
 Not on filedocumented in this encounter

## 2019-12-04 NOTE — XMS
Encounter Summary
  Created on: 2019
 
 Margaret Ferreira
 External Reference #: [id number]
 : 30
 Sex: Female
 
 Demographics
 
 
+-----------------------+------------------------+
| Address               | [address]      |
|                       | [place]  [address]   |
+-----------------------+------------------------+
| Home Phone            | [phone]        |
+-----------------------+------------------------+
| Preferred Language    | Unknown                |
+-----------------------+------------------------+
| Marital Status        |                 |
+-----------------------+------------------------+
| Rastafarian Affiliation | MET                    |
+-----------------------+------------------------+
| Race                  | White                  |
+-----------------------+------------------------+
| Ethnic Group          | Not  or  |
+-----------------------+------------------------+
 
 
 Author
 
 
+--------------+------------------------------+
| Author       | [facility] |
+--------------+------------------------------+
| Organization | [facility] |
+--------------+------------------------------+
| Address      | Unknown                      |
+--------------+------------------------------+
| Phone        | Unavailable                  |
+--------------+------------------------------+
 
 
 
 Support
 
 
+--------------+--------------+---------------------+-----------------+
| Name         | Relationship | Address             | Phone           |
+--------------+--------------+---------------------+-----------------+
| Lawson Ferreira | ECON         | [address]          | [phone] |
|              |              | [place], [place]   |                 |
|              |              | [address]               |                 |
+--------------+--------------+---------------------+-----------------+
 
 
 
 Care Team Providers
 
 
 
+-----------------------+------+-------------+
| Care Team Member Name | Role | Phone       |
+-----------------------+------+-------------+
 PCP  | Unavailable |
+-----------------------+------+-------------+
 
 
 
 Encounter Details
 
 
+--------+----------+----------------------+--------------------+-------------+
| Date   | Type     | Department           | Care Team          | Description |
+--------+----------+----------------------+--------------------+-------------+
| / | Results  |   [facility]  [address]  |   [facility]   |             |
|    | Only     | [address]  | [phone]       |             |
|        |          |  [place], [place]        |                    |             |
|        |          | [address]           |                    |             |
|        |          | [phone]         |                    |             |
+--------+----------+----------------------+--------------------+-------------+
 
 
 
 Social History
 
 
+----------------+-------+-----------+--------+------+
| Tobacco Use    | Types | Packs/Day | Years  | Date |
|                |       |           | Used   |      |
+----------------+-------+-----------+--------+------+
| Never Assessed |       |           |        |      |
+----------------+-------+-----------+--------+------+
 
 
 
+------------------+---------------+
| Sex Assigned at  | Date Recorded |
| Birth            |               |
+------------------+---------------+
| Not on file      |               |
+------------------+---------------+
 
 
 
+----------------+-------------+-------------+
| Job Start Date | Occupation  | Industry    |
+----------------+-------------+-------------+
| Not on file    | Not on file | Not on file |
+----------------+-------------+-------------+
 
 
 
+----------------+--------------+------------+
| Travel History | Travel Start | Travel End |
+----------------+--------------+------------+
 
 
 
 
+-------------------------------------+
| No recent travel history available. |
+-------------------------------------+
 documented as of this encounter
 
 Plan of Treatment
 Not on filedocumented as of this encounter
 
 Procedures
 
 
+--------------------+--------+------------+----------------------+----------------------+
| Procedure Name     | Priori | Date/Time  | Associated Diagnosis | Comments             |
|                    | ty     |            |                      |                      |
+--------------------+--------+------------+----------------------+----------------------+
| SURGICAL PATHOLOGY | Routin | 1996 |                      |   Results for this   |
|                    | e      |            |                      | procedure are in the |
|                    |        |            |                      |  results section.    |
+--------------------+--------+------------+----------------------+----------------------+
 documented in this encounter
 
 Results
 SURGICAL PATHOLOGY (1996)
 
+-----------+--------------------------+-----------+-------------+--------------+
| Component | Value                    | Ref Range | Performed   | Pathologist  |
|           |                          |           | At          | Signature    |
+-----------+--------------------------+-----------+-------------+--------------+
| SURGICAL  | SOURCE OF SPECIMEN: SEE  |           | [facility]        |              |
| PATHOLOGY | RESULTS                  |           | DEPARTMENT  |              |
|           | Preliminary              |           | OF          |              |
|           | History:CLINICAL         |           | PATHOLOGY   |              |
|           | HISTORYThe patient is a  |           |             |              |
|           | 65 year old              |           |             |              |
|           | postmenopausal female    |           |             |              |
|           | with                     |           |             |              |
|           | completeprocidentia.     |           |             |              |
|           |   She is  4 para  |           |             |              |
|           | 4 and does not receive   |           |             |              |
|           | any hormonaltherapy.     |           |             |              |
|           |    GROSS DESCRIPTIONTwo  |           |             |              |
|           | specimens are received   |           |             |              |
|           | labelled as follows:     |           |             |              |
|           |    #1 UTERUS AND CERVIX: |           |             |              |
|           |  The specimen is         |           |             |              |
|           | received fresh and       |           |             |              |
|           | consists of auterus with |           |             |              |
|           |  attached cervix and     |           |             |              |
|           | vaginal cuff weighing 50 |           |             |              |
|           |  grams in toto.Bilateral |           |             |              |
|           |  fallopian tubes,        |           |             |              |
|           | ovaries and parametria   |           |             |              |
|           | are not present.         |           |             |              |
|           | Theuterus and cervix     |           |             |              |
|           | measure 8.5 cm x 5.0 x   |           |             |              |
|           | 2.5 cm. The serosal      |           |             |              |
|           | surface issmooth,        |           |             |              |
|           | pink-tan and glistening. |           |             |              |
|           |  The uterus is opened at |           |             |              |
|           |  3:00. Thevaginal cuff   |           |             |              |
 
|           | is 0.2 to 0.3 cm in      |           |             |              |
|           | length and has a smooth  |           |             |              |
|           | white-tanmucosa without  |           |             |              |
|           | grossly evident lesions. |           |             |              |
|           |  The ectocervical mucosa |           |             |              |
|           |  issmooth and pale tan.  |           |             |              |
|           | A firm nodule measuring  |           |             |              |
|           | 1.5 x 0.8 x 0.8 cm       |           |             |              |
|           | ispalpated within the    |           |             |              |
|           | posterior portion of the |           |             |              |
|           |  cervix. The cervical    |           |             |              |
|           | mucosais white-tan,      |           |             |              |
|           | smooth and glistening.   |           |             |              |
|           | The endometrial cavity   |           |             |              |
|           | istriangular and         |           |             |              |
|           | measures 2.5 cm in       |           |             |              |
|           | intercornual diameter    |           |             |              |
|           | and 3.5 cm inlength. The |           |             |              |
|           |  endometrium is          |           |             |              |
|           | white-tan and thin,      |           |             |              |
|           | measuring 1 mm           |           |             |              |
|           | inthickness. No focal    |           |             |              |
|           | lesions are seen. Serial |           |             |              |
|           |  cross sections reveal   |           |             |              |
|           | themyometrium to be      |           |             |              |
|           | generally tan and        |           |             |              |
|           | unremarkable, measuring  |           |             |              |
|           | 1.5 cm ingreatest        |           |             |              |
|           | thickness.               |           |             |              |
|           | Representative sections  |           |             |              |
|           | are submitted as         |           |             |              |
|           | follows:Cassette A,      |           |             |              |
|           | posterior ectocervix;    |           |             |              |
|           | Cassette B, anterior     |           |             |              |
|           | cervix; CassetteC, full  |           |             |              |
|           | thickness section of     |           |             |              |
|           | endomyometrium; Cassette |           |             |              |
|           |  D,                      |           |             |              |
|           | representativesections   |           |             |              |
|           | of endometrium.       #2 |           |             |              |
|           |    VAGINAL MUCOSA:   The |           |             |              |
|           |  specimen is received in |           |             |              |
|           |  formalin and consistsof |           |             |              |
|           |  two fragments of        |           |             |              |
|           | white-tan mucosa.   One  |           |             |              |
|           | fragment measures 7.0 x  |           |             |              |
|           | 3.0 x0.5 cm and the      |           |             |              |
|           | other fragment measures  |           |             |              |
|           | 6.0 x 3.0 x 0.5 cm.      |           |             |              |
|           |   The mucosalsurfaces    |           |             |              |
|           | are white-tan and shiny  |           |             |              |
|           | with no grossly visible  |           |             |              |
|           | lesions.   Thespecimen   |           |             |              |
|           | is serially sectioned    |           |             |              |
|           | and representative       |           |             |              |
|           | sections are submittedin |           |             |              |
|           |  cassettes 2A-B.         |           |             |              |
|           | All tissue sections      |           |             |              |
|           | taken are submitted for  |           |             |              |
|           | microscopic              |           |             |              |
 
|           | evaluation.Case dictated |           |             |              |
|           |  by: Daniel Mulligan        |           |             |              |
|           | MSIII/cc/kag             |           |             |              |
|           |    FINAL DIAGNOSISUTERUS |           |             |              |
|           |  AND CERVIX:   CERVIX:   |           |             |              |
|           |      NABOTHIAN CYSTS     |           |             |              |
|           |   SQUAMOUS METAPLASIA    |           |             |              |
|           |    HYPERKERATOSIS        |           |             |              |
|           |   ENDOMETRIUM:      NO   |           |             |              |
|           | DIAGNOSTIC ABNORMALITY   |           |             |              |
|           |   MYOMETRIUM:            |           |             |              |
|           |   ADENOMYOSISVAGINAL     |           |             |              |
|           | MUCOSA:   NO DIAGNOSTIC  |           |             |              |
|           | ABNORMALITY       Case   |           |             |              |
|           | reviewed by:   Marshall   |           |             |              |
|           | KAREN WashingtonT:          |           |             |              |
|           | 96/f       My       |           |             |              |
|           | electronic signature     |           |             |              |
|           | indicates that I have    |           |             |              |
|           | personally reviewed      |           |             |              |
|           | alldiagnostic slides,    |           |             |              |
|           | the gross and/or         |           |             |              |
|           | microscopic portion of   |           |             |              |
|           | thisreport and           |           |             |              |
|           | formulated the final     |           |             |              |
|           | diagnosis.               |           |             |              |
+-----------+--------------------------+-----------+-------------+--------------+
 
 
 
+----------+
| Specimen |
+----------+
| Other    |
+----------+
 
 
 
+----------------------+------------------------+--------------------+--------------+
| Performing           | Address                | City/State/Zipcode | Phone Number |
| Organization         |                        |                    |              |
+----------------------+------------------------+--------------------+--------------+
|   [facility] |   [address]  | [place] [address] |              |
|  PATHOLOGY           | [address]                |                    |              |
+----------------------+------------------------+--------------------+--------------+
 documented in this encounter
 
 Visit Diagnoses
 Not on filedocumented in this encounter

## 2019-12-04 NOTE — XMS
Encounter Summary
  Created on: 2019
 
 Margaret Ferreira
 External Reference #: [id number]
 : 30
 Sex: Female
 
 Demographics
 
 
+-----------------------+----------------------+
| Address               | [address]        |
|                       | [place]  [address] |
+-----------------------+----------------------+
| Home Phone            | [phone]      |
+-----------------------+----------------------+
| Preferred Language    | Unknown              |
+-----------------------+----------------------+
| Marital Status        |               |
+-----------------------+----------------------+
| Jehovah's witness Affiliation | Unknown              |
+-----------------------+----------------------+
| Race                  | Unknown              |
+-----------------------+----------------------+
| Ethnic Group          | Unknown              |
+-----------------------+----------------------+
 
 
 Author
 
 
+--------------+--------------------------------------------+
| Author       | [facility]  |
|              | and [place]                                |
+--------------+--------------------------------------------+
| Organization | [facility]  |
|              | and [place]                                |
+--------------+--------------------------------------------+
| Address      | Unknown                                    |
+--------------+--------------------------------------------+
| Phone        | Unavailable                                |
+--------------+--------------------------------------------+
 
 
 
 Support
 
 
+--------------+--------------+---------+-----------------+
| Name         | Relationship | Address | Phone           |
+--------------+--------------+---------+-----------------+
| Lawson [place] | ECON         | Unknown | [phone] |
+--------------+--------------+---------+-----------------+
 
 
 
 Care Team Providers
 
 
 
+-----------------------------+------+-----------------+
| Care Team Member Name       | Role | Phone           |
+-----------------------------+------+-----------------+
| Bessy Paulino | PCP  | [phone] |
|  [place]                       |      |                 |
+-----------------------------+------+-----------------+
 
 
 
 Reason for Visit
 
 
+-------------------+----------+
| Reason            | Comments |
+-------------------+----------+
| Medication Refill |          |
+-------------------+----------+
 
 
 
 Encounter Details
 
 
+--------+--------+----------------------+---------------------+-------------------+
| Date   | Type   | Department           | Care Team           | Description       |
+--------+--------+----------------------+---------------------+-------------------+
| / | Refill |   [facility]       |   Kvng Ontiveros  | Medication Refill |
|    |        | [facility]    | E, DO  [address]   |                   |
|        |        | [facility]  [address]  | [place], [place]       |                   |
|        |        | [address]  [place],   | [address]          |                   |
|        |        | [place] [address]        | [phone]        |                   |
|        |        | [phone]         | [phone] (Fax)  |                   |
+--------+--------+----------------------+---------------------+-------------------+
 
 
 
 Social History
 
 
+--------------+-------+-----------+--------+------+
| Tobacco Use  | Types | Packs/Day | Years  | Date |
|              |       |           | Used   |      |
+--------------+-------+-----------+--------+------+
| Never Smoker |       |           |        |      |
+--------------+-------+-----------+--------+------+
 
 
 
+---------------------+---+---+---+
| Smokeless Tobacco:  |   |   |   |
| Never Used          |   |   |   |
+---------------------+---+---+---+
 
 
 
+-------------------------------------------------------------------+
| Comments: her parents both smoked, also worked at a [employer] |
+-------------------------------------------------------------------+
 
 
 
 
+-------------+----------------------+---------+----------+
| Alcohol Use | Drinks/Week          | oz/Week | Comments |
+-------------+----------------------+---------+----------+
| No          |   0 Standard drinks  | 0.0     |          |
|             | or equivalent        |         |          |
+-------------+----------------------+---------+----------+
 
 
 
+------------------+---------------+
| Sex Assigned at  | Date Recorded |
| Birth            |               |
+------------------+---------------+
| Not on file      |               |
+------------------+---------------+
 
 
 
+----------------+-------------+-------------+
| Job Start Date | Occupation  | Industry    |
+----------------+-------------+-------------+
| Not on file    | Not on file | Not on file |
+----------------+-------------+-------------+
 
 
 
+----------------+--------------+------------+
| Travel History | Travel Start | Travel End |
+----------------+--------------+------------+
 
 
 
+-------------------------------------+
| No recent travel history available. |
+-------------------------------------+
 documented as of this encounter
 
 Plan of Treatment
 Not on filedocumented as of this encounter
 
 Visit Diagnoses
 Not on filedocumented in this encounter

## 2019-12-04 NOTE — XMS
Clinical Summary
  Created on: 2019
 
 Margaret Ferreira
 External Reference #: [id number]
 : 30
 Sex: Female
 
 Demographics
 
 
+-----------------------+----------------------+
| Address               | [address]        |
|                       | [place]  [address] |
+-----------------------+----------------------+
| Home Phone            | [phone]      |
+-----------------------+----------------------+
| Preferred Language    | Unknown              |
+-----------------------+----------------------+
| Marital Status        |               |
+-----------------------+----------------------+
| Moravian Affiliation | Unknown              |
+-----------------------+----------------------+
| Race                  | Unknown              |
+-----------------------+----------------------+
| Ethnic Group          | Unknown              |
+-----------------------+----------------------+
 
 
 Author
 
 
+--------------+--------------------------------------------+
| Author       | [facility]  |
|              | and [place]                                |
+--------------+--------------------------------------------+
| Organization | [facility]  |
|              | and [place]                                |
+--------------+--------------------------------------------+
| Address      | Unknown                                    |
+--------------+--------------------------------------------+
| Phone        | Unavailable                                |
+--------------+--------------------------------------------+
 
 
 
 Support
 
 
+--------------+--------------+---------+-----------------+
| Name         | Relationship | Address | Phone           |
+--------------+--------------+---------+-----------------+
| Lawson Hanna | ECON         | Unknown | [phone] |
+--------------+--------------+---------+-----------------+
 
 
 
 Care Team Providers
 
 
 
+-----------------------------+------+-----------------+
| Care Team Member Name       | Role | Phone           |
+-----------------------------+------+-----------------+
| Bessy Paulino | PCP  | [phone] |
|  PA-C                       |      |                 |
+-----------------------------+------+-----------------+
 
 
 
 Allergies
 
 
+----------------+----------------------+----------+----------+----------------------+
| Active Allergy | Reactions            | Severity | Noted    | Comments             |
|                |                      |          | Date     |                      |
+----------------+----------------------+----------+----------+----------------------+
| Lisinopril     | Other (See Comments) | Low      | 20 |   Cough              |
|                |                      |          | 14       |                      |
+----------------+----------------------+----------+----------+----------------------+
| Naproxen       | Other (See Comments) |          | 20 |   Reaction not       |
|                |                      |          | 18       | specified in outside |
|                |                      |          |          |  medical records     |
+----------------+----------------------+----------+----------+----------------------+
 
 
 
 Medications
 
 
+----------------------+----------------------+-----------+---------+------+------+-------+
| Medication           | Sig                  | Dispensed | Refills | Star | End  | Statu |
|                      |                      |           |         | t    | Date | s     |
|                      |                      |           |         | Date |      |       |
+----------------------+----------------------+-----------+---------+------+------+-------+
|   Calcium Carbonate  | Take 600 mg by mouth |           | 0       |      |      | Activ |
| (CALCIUM 600 PO)     |  Daily.              |           |         |      |      | e     |
+----------------------+----------------------+-----------+---------+------+------+-------+
|   Docosahexaenoic    | Take  by mouth.      |           | 0       |      |      | Activ |
| Acid (DHA OMEGA 3)   |                      |           |         |      |      | e     |
| 100 MG CAPS          |                      |           |         |      |      |       |
+----------------------+----------------------+-----------+---------+------+------+-------+
|   Multiple           | Take  by mouth       |           | 0       |      |      | Activ |
| Vitamins-Minerals    | Daily.               |           |         |      |      | e     |
| (MULTIVITAMIN PO)    |                      |           |         |      |      |       |
+----------------------+----------------------+-----------+---------+------+------+-------+
|   omeprazole         | Take 20 mg by mouth  |           | 0       |      |      | Activ |
| (PRILOSEC) 20 mg     | every morning        |           |         |      |      | e     |
| capsule              | (before breakfast).  |           |         |      |      |       |
+----------------------+----------------------+-----------+---------+------+------+-------+
|   traMADol (ULTRAM)  | Take 100 mg by mouth |           | 0       |      |      | Activ |
| 50 mg tablet         |  4 times daily.      |           |         |      |      | e     |
+----------------------+----------------------+-----------+---------+------+------+-------+
|   Cholecalciferol    | Take  by mouth       |           | 0       |      |      | Activ |
| (VITAMIN D-3) 2000   | Daily.               |           |         |      |      | e     |
| units CAPS           |                      |           |         |      |      |       |
+----------------------+----------------------+-----------+---------+------+------+-------+
|   Spacer/Aero        | Use with inhaler as  |   1 each  | 1       | 01/1 |      | Activ |
| Chamber Mouthpiece   | directed.            |           |         | 3/20 |      | e     |
 
| MISC                 |                      |           |         | 15   |      |       |
+----------------------+----------------------+-----------+---------+------+------+-------+
|   albuterol (PROAIR  | Inhale 2 puffs into  |   1       | 5       | 03/2 |      | Activ |
| HFA) 90 mcg/puff     | the lungs every 6    | Inhaler   |         | 2/20 |      | e     |
| inhaler              | hours as needed for  |           |         | 16   |      |       |
|                      | Wheezing or          |           |         |      |      |       |
|                      | Shortness of Breath. |           |         |      |      |       |
+----------------------+----------------------+-----------+---------+------+------+-------+
|   losartan (COZAAR)  | Take 100 mg by mouth |           | 0       |      |      | Activ |
| 100 MG tablet        |  Daily.              |           |         |      |      | e     |
+----------------------+----------------------+-----------+---------+------+------+-------+
|   NITROFURANTOIN     | Take  by mouth       |           | 0       |      |      | Activ |
| MACROCRYSTAL PO      | Daily.               |           |         |      |      | e     |
+----------------------+----------------------+-----------+---------+------+------+-------+
|   DULoxetine         | Take 60 mg by mouth  |           | 0       |      |      | Activ |
| (CYMBALTA) 60 mg DR  | Daily.               |           |         |      |      | e     |
| capsule              |                      |           |         |      |      |       |
+----------------------+----------------------+-----------+---------+------+------+-------+
|   gabapentin         | Take 300 mg by mouth |           | 0       |      |      | Activ |
| (NEURONTIN) 300 mg   |  3 times daily.      |           |         |      |      | e     |
| capsule              |                      |           |         |      |      |       |
+----------------------+----------------------+-----------+---------+------+------+-------+
|                      | Inhale 1 puff into   |           | 0       | 04/1 |      | Activ |
| umeclidinium-vilante | the lungs.           |           |         | 0/20 |      | e     |
| rol (ANORO ELLIPTA)  |                      |           |         | 18   |      |       |
| 62.5-25 mcg/puff     |                      |           |         |      |      |       |
| inhaler              |                      |           |         |      |      |       |
+----------------------+----------------------+-----------+---------+------+------+-------+
|   diclofenac         | Take 50 mg by mouth  |           | 0       |      |      | Activ |
| (VOLTAREN) 50 mg EC  | 2 times daily.       |           |         |      |      | e     |
| tablet               |                      |           |         |      |      |       |
+----------------------+----------------------+-----------+---------+------+------+-------+
|   pramipexole        | TAKE ONE TABLET BY   |   180     | 0       | 09/0 |      | Activ |
| (MIRAPEX) 0.25 mg    | MOUTH TWICE A DAY    | tablet    |         | 5/20 |      | e     |
| tablet               |                      |           |         | 18   |      |       |
+----------------------+----------------------+-----------+---------+------+------+-------+
|   levothyroxine      | Take 150 mcg by      |           | 0       |      |      | Activ |
| (SYNTHROID) 150 mcg  | mouth every morning. |           |         |      |      | e     |
| tablet               |                      |           |         |      |      |       |
+----------------------+----------------------+-----------+---------+------+------+-------+
|   Misc Natural       | Take  by mouth       |           | 0       |      |      | Activ |
| Products             | Daily.               |           |         |      |      | e     |
| (GLUCOS-CHONDROIT-MS |                      |           |         |      |      |       |
| M COMPLEX) TABS      |                      |           |         |      |      |       |
+----------------------+----------------------+-----------+---------+------+------+-------+
|                      | Take 1 tablet by     |           | 0       |      |      | Activ |
| HYDROcodone-acetamin | mouth every 6 hours  |           |         |      |      | e     |
| ophen (NORCO) 5-325  | as needed for Pain.  |           |         |      |      |       |
| mg per tablet        |                      |           |         |      |      |       |
+----------------------+----------------------+-----------+---------+------+------+-------+
|                      | Take  by mouth.      |           | 0       |      |      | Activ |
| GLUCOSAMINE-CHONDROI |                      |           |         |      |      | e     |
| TIN PO               |                      |           |         |      |      |       |
+----------------------+----------------------+-----------+---------+------+------+-------+
|   albuterol 90       | Inhale 2 puffs into  |           | 0       | 02/2 | 02/2 | Activ |
| mcg/puff inhaler     | the lungs every 4    |           |         | 0/20 | 0/20 | e     |
|                      | hours as needed for  |           |         | 19   | 20   |       |
|                      | Wheezing.            |           |         |      |      |       |
+----------------------+----------------------+-----------+---------+------+------+-------+
|                      | Inhale 1 puff into   |           | 0       | 07/2 |      | Activ |
 
| umeclidinium-vilante | the lungs Daily.     |           |         | 4/20 |      | e     |
| rol (ANORO ELLIPTA)  |                      |           |         | 19   |      |       |
| 62.5-25 mcg/puff     |                      |           |         |      |      |       |
| inhaler              |                      |           |         |      |      |       |
+----------------------+----------------------+-----------+---------+------+------+-------+
 
 
 
 Active Problems
 
 
+----------------------------------------------+------------+
| Problem                                      | Noted Date |
+----------------------------------------------+------------+
| COPD (chronic obstructive pulmonary disease) | 2014 |
+----------------------------------------------+------------+
| GERD (gastroesophageal reflux disease)       |            |
+----------------------------------------------+------------+
| Hiatal hernia                                |            |
+----------------------------------------------+------------+
| Hypertension                                 |            |
+----------------------------------------------+------------+
| Hypothyroidism                               |            |
+----------------------------------------------+------------+
| Urinary incontinence                         |            |
+----------------------------------------------+------------+
| Aortic valve stenosis                        |            |
+----------------------------------------------+------------+
 
 
 
+-----------------------+
|   Overview:   trivial |
+-----------------------+
 
 
 
+------------------------------+---+
| Hearing loss                 |   |
+------------------------------+---+
| Osteoarthrosis               |   |
+------------------------------+---+
| Osteoporosis                 |   |
+------------------------------+---+
| RLS (restless legs syndrome) |   |
+------------------------------+---+
| Scoliosis                    |   |
+------------------------------+---+
| Macular degeneration         |   |
+------------------------------+---+
 
 
 
 Resolved Problems
 
 
+-----------------------------------------------------------------+----------+-----------+
| Problem                                                         | Noted    | Resolved  |
|                                                                 | Date     | Date      |
+-----------------------------------------------------------------+----------+-----------+
 
| Cough                                                           | 20 |  |
|                                                                 | 15       | 6         |
+-----------------------------------------------------------------+----------+-----------+
| Need for vaccination with 13-polyvalent pneumococcal conjugate  | 20 |  |
| vaccine                                                         | 15       | 6         |
+-----------------------------------------------------------------+----------+-----------+
 
 
 
 Immunizations
 
 
+----------------------+----------------------+----------+
| Name                 | Administration Dates | Next Due |
+----------------------+----------------------+----------+
| INFLUENZA 65 Y OR >, | 10/21/2015           |          |
|  TRIVALENT HIGH-DOSE |                      |          |
+----------------------+----------------------+----------+
| INFLUENZA PF 18 Y OR | 2014           |          |
|  >,TRIVALENT         |                      |          |
| RECOMBINANT          |                      |          |
+----------------------+----------------------+----------+
| PNEUMOCOCCAL         | 10/12/2015           |          |
| CONJUGATE 13-VALENT  |                      |          |
| (PCV13)              |                      |          |
+----------------------+----------------------+----------+
| PNEUMOCOCCAL         | 2012           |          |
| POLYSACCHARIDE       |                      |          |
| 23-VALENT (PPSV23)   |                      |          |
+----------------------+----------------------+----------+
| TD PF (5 LF TETANUS) | 2009           |          |
|  (ADOL/ADULT)        |                      |          |
+----------------------+----------------------+----------+
 
 
 
 Family History
 
 
+-----------------+----------+------+----------+
| Medical History | Relation | Name | Comments |
+-----------------+----------+------+----------+
| Allergies       | Father   |      |          |
+-----------------+----------+------+----------+
| Asthma          | Father   |      |          |
+-----------------+----------+------+----------+
| COPD            | Father   |      |          |
+-----------------+----------+------+----------+
| Hypertension    | Father   |      |          |
+-----------------+----------+------+----------+
| Tobacco Use     | Father   |      |          |
+-----------------+----------+------+----------+
| Hypertension    | Mother   |      |          |
+-----------------+----------+------+----------+
| Stroke          | Mother   |      |          |
+-----------------+----------+------+----------+
| Asthma          | Sister   |      |          |
+-----------------+----------+------+----------+
 
 
 
 
+----------+------+----------+----------+
| Relation | Name | Status   | Comments |
+----------+------+----------+----------+
| Father   |      |  | COPD     |
+----------+------+----------+----------+
| Mother   |      |  | old age  |
+----------+------+----------+----------+
| Sister   |      | Alive    |          |
+----------+------+----------+----------+
 
 
 
 Social History
 
 
+-------------------+-------+-----------+--------+------+
| Tobacco Use       | Types | Packs/Day | Years  | Date |
|                   |       |           | Used   |      |
+-------------------+-------+-----------+--------+------+
| Passive Smoke     |       |           |        |      |
| Exposure - Never  |       |           |        |      |
| Smoker            |       |           |        |      |
+-------------------+-------+-----------+--------+------+
 
 
 
+---------------------+---+---+---+
| Smokeless Tobacco:  |   |   |   |
| Never Used          |   |   |   |
+---------------------+---+---+---+
 
 
 
+-----------------------------------------+
| Tobacco Cessation: Counseling Given: No |
+-----------------------------------------+
 
 
 
+-------------+----------------------+---------+----------+
| Alcohol Use | Drinks/Week          | oz/Week | Comments |
+-------------+----------------------+---------+----------+
| No          |   0 Standard drinks  | 0.0     |          |
|             | or equivalent        |         |          |
+-------------+----------------------+---------+----------+
 
 
 
+------------------+---------------+
| Sex Assigned at  | Date Recorded |
| Birth            |               |
+------------------+---------------+
| Not on file      |               |
+------------------+---------------+
 
 
 
+----------------+-------------+-------------+
| Job Start Date | Occupation  | Industry    |
 
+----------------+-------------+-------------+
| Not on file    | Not on file | Not on file |
+----------------+-------------+-------------+
 
 
 
+----------------+--------------+------------+
| Travel History | Travel Start | Travel End |
+----------------+--------------+------------+
 
 
 
+-------------------------------------+
| No recent travel history available. |
+-------------------------------------+
 
 
 
 Last Filed Vital Signs
 
 
+-------------------+---------------------+----------------------+----------+
| Vital Sign        | Reading             | Time Taken           | Comments |
+-------------------+---------------------+----------------------+----------+
| Blood Pressure    | 115/64              | 11/15/2018 11:06 AM  |          |
|                   |                     | PST                  |          |
+-------------------+---------------------+----------------------+----------+
| Pulse             | 77                  | 11/15/2018 11:06 AM  |          |
|                   |                     | PST                  |          |
+-------------------+---------------------+----------------------+----------+
| Temperature       | 36.8   C (98.2   F) | 2018 10:07 AM  |          |
|                   |                     | PDT                  |          |
+-------------------+---------------------+----------------------+----------+
| Respiratory Rate  | -                   | -                    |          |
+-------------------+---------------------+----------------------+----------+
| Oxygen Saturation | 97%                 | 2016 11:32 AM  |          |
|                   |                     | PDT                  |          |
+-------------------+---------------------+----------------------+----------+
| Inhaled Oxygen    | -                   | -                    |          |
| Concentration     |                     |                      |          |
+-------------------+---------------------+----------------------+----------+
| Weight            | 61.2 kg (135 lb)    | 11/15/2018 11:06 AM  |          |
|                   |                     | PST                  |          |
+-------------------+---------------------+----------------------+----------+
| Height            | 162.6 cm (5' 4")    | 11/15/2018 11:06 AM  |          |
|                   |                     | PST                  |          |
+-------------------+---------------------+----------------------+----------+
| Body Mass Index   | 23.17               | 11/15/2018 11:06 AM  |          |
|                   |                     | PST                  |          |
+-------------------+---------------------+----------------------+----------+
 
 
 
 Plan of Treatment
 
 
+---------------------+-----------+------------------------+----------+
| Health Maintenance  | Due Date  | Last Done              | Comments |
+---------------------+-----------+------------------------+----------+
| Vaccine: Zoster (1  |  |                        |          |
 
| of 2)               | 0         |                        |          |
+---------------------+-----------+------------------------+----------+
| Vaccine:            |  | 2009             |          |
| Dtap/Tdap/Td (1 -   | 9         |                        |          |
| Tdap)               |           |                        |          |
+---------------------+-----------+------------------------+----------+
| Adult Annual        |  |                        |          |
| Wellness Visit      | 5         |                        |          |
+---------------------+-----------+------------------------+----------+
| Vaccine: Influenza  |  | 10/21/2015, 2014 |          |
| (#1)                | 9         |                        |          |
+---------------------+-----------+------------------------+----------+
| Vaccine:            | Completed | 10/12/2015, 2012 |          |
| Pneumococcal 65+    |           |                        |          |
+---------------------+-----------+------------------------+----------+
 
 
 
 Results
 Not on filefrom Last 3 Months
 
 Insurance
 
 
+----------+--------+-------------+--------+-------------+---------+--------+
| Payer    | Benefi | Subscriber  | Effect | Phone       | Address | Type   |
|          | t Plan | ID          | venecia    |             |         |        |
|          |  /     |             | Dates  |             |         |        |
|          | Group  |             |        |             |         |        |
+----------+--------+-------------+--------+-------------+---------+--------+
| MEDICARE | MEDICA | [id number]  | 19 | [phone] |         | [employer] |
|          | RE     |             | 95-Pre | 5           |         | re     |
|          | PART A |             | sent   |             |         |        |
|          |  AND B |             |        |             |         |        |
+----------+--------+-------------+--------+-------------+---------+--------+
| [employer]     | [employer]ARP   | [id number] | 20 | [phone] |         | Indemn |
|          | MDCR   |             | 15-Pre | 0           |         | ity    |
|          | SUPPL  |             | sent   |             |         |        |
+----------+--------+-------------+--------+-------------+---------+--------+
 
 
 
+----------------------+--------+-------------+--------+-------------+---------------------+
| Guarantor Name       | Accoun | Relation to | Date   | Phone       | Billing Address     |
|                      | t Type |  Patient    | of     |             |                     |
|                      |        |             | Birth  |             |                     |
+----------------------+--------+-------------+--------+-------------+---------------------+
| Margaret Ferreira | Person | Self        | / |             |   [address]     |
|                      | al/Fam |             | 1930   | [phone] | [place] [address] |
|                      | angélica    |             |        | 7 (Home)    |                     |
+----------------------+--------+-------------+--------+-------------+---------------------+
 
 
 
 Advance Directives
 
 
+-----------+-----------------+----------------+-------------+
| Type      | Date Recorded   | Patient        | Explanation |
|           |                 | Representative |             |
 
+-----------+-----------------+----------------+-------------+
| Power of  |                 |                |             |
|   |                 |                |             |
+-----------+-----------------+----------------+-------------+
| Advance   | 2015  1:04 |                |             |
| Directive |  PM             |                |             |
+-----------+-----------------+----------------+-------------+

## 2019-12-04 NOTE — XMS
Encounter Summary
  Created on: 2019
 
 Margaret Ferreira
 External Reference #: [id number]
 : 30
 Sex: Female
 
 Demographics
 
 
+-----------------------+----------------------+
| Address               | [address]        |
|                       | [place]  [address] |
+-----------------------+----------------------+
| Home Phone            | [phone]      |
+-----------------------+----------------------+
| Preferred Language    | Unknown              |
+-----------------------+----------------------+
| Marital Status        |               |
+-----------------------+----------------------+
| Anabaptism Affiliation | Unknown              |
+-----------------------+----------------------+
| Race                  | Unknown              |
+-----------------------+----------------------+
| Ethnic Group          | Unknown              |
+-----------------------+----------------------+
 
 
 Author
 
 
+--------------+--------------------------------------------+
| Author       | [facility]  |
|              | and [place]                                |
+--------------+--------------------------------------------+
| Organization | [facility]  |
|              | and [place]                                |
+--------------+--------------------------------------------+
| Address      | Unknown                                    |
+--------------+--------------------------------------------+
| Phone        | Unavailable                                |
+--------------+--------------------------------------------+
 
 
 
 Support
 
 
+--------------+--------------+---------+-----------------+
| Name         | Relationship | Address | Phone           |
+--------------+--------------+---------+-----------------+
| Lawson Hanna | ECON         | Unknown | [phone] |
+--------------+--------------+---------+-----------------+
 
 
 
 Care Team Providers
 
 
 
+-----------------------------+------+-----------------+
| Care Team Member Name       | Role | Phone           |
+-----------------------------+------+-----------------+
| Bessy Paulino | PCP  | [phone] |
|  [place]                       |      |                 |
+-----------------------------+------+-----------------+
 
 
 
 Reason for Visit
 
 
+-------------------+----------+
| Reason            | Comments |
+-------------------+----------+
| Medication Refill |          |
+-------------------+----------+
 
 
 
 Encounter Details
 
 
+--------+--------+----------------------+---------------------+-------------------+
| Date   | Type   | Department           | Care Team           | Description       |
+--------+--------+----------------------+---------------------+-------------------+
| / | Refill |   [facility]       |   Kvng Ontiveros  | Medication Refill |
|    |        | [facility]    | E, DO  [address]   |                   |
|        |        | [facility]  [address]  | [place], [place]       |                   |
|        |        | [address]  [place],   | [address]          |                   |
|        |        | [place] [address]        | [phone]        |                   |
|        |        | [phone]         | [phone] (Fax)  |                   |
+--------+--------+----------------------+---------------------+-------------------+
 
 
 
 Social History
 
 
+-------------------+-------+-----------+--------+------+
| Tobacco Use       | Types | Packs/Day | Years  | Date |
|                   |       |           | Used   |      |
+-------------------+-------+-----------+--------+------+
| Passive Smoke     |       |           |        |      |
| Exposure - Never  |       |           |        |      |
| Smoker            |       |           |        |      |
+-------------------+-------+-----------+--------+------+
 
 
 
+---------------------+---+---+---+
| Smokeless Tobacco:  |   |   |   |
| Never Used          |   |   |   |
+---------------------+---+---+---+
 
 
 
+-------------+----------------------+---------+----------+
 
| Alcohol Use | Drinks/Week          | oz/Week | Comments |
+-------------+----------------------+---------+----------+
| No          |   0 Standard drinks  | 0.0     |          |
|             | or equivalent        |         |          |
+-------------+----------------------+---------+----------+
 
 
 
+------------------+---------------+
| Sex Assigned at  | Date Recorded |
| Birth            |               |
+------------------+---------------+
| Not on file      |               |
+------------------+---------------+
 
 
 
+----------------+-------------+-------------+
| Job Start Date | Occupation  | Industry    |
+----------------+-------------+-------------+
| Not on file    | Not on file | Not on file |
+----------------+-------------+-------------+
 
 
 
+----------------+--------------+------------+
| Travel History | Travel Start | Travel End |
+----------------+--------------+------------+
 
 
 
+-------------------------------------+
| No recent travel history available. |
+-------------------------------------+
 documented as of this encounter
 
 Plan of Treatment
 Not on filedocumented as of this encounter
 
 Visit Diagnoses
 Not on filedocumented in this encounter

## 2019-12-04 NOTE — XMS
Encounter Summary
  Created on: 2019
 
 Margaret Ferreira
 External Reference #: [id number]
 : 30
 Sex: Female
 
 Demographics
 
 
+-----------------------+------------------------+
| Address               | [address]      |
|                       | [place]  [address]   |
+-----------------------+------------------------+
| Home Phone            | [phone]        |
+-----------------------+------------------------+
| Preferred Language    | Unknown                |
+-----------------------+------------------------+
| Marital Status        |                 |
+-----------------------+------------------------+
| Orthodox Affiliation | MET                    |
+-----------------------+------------------------+
| Race                  | White                  |
+-----------------------+------------------------+
| Ethnic Group          | Not  or  |
+-----------------------+------------------------+
 
 
 Author
 
 
+--------------+------------------------------+
| Author       | [facility] |
+--------------+------------------------------+
| Organization | [facility] |
+--------------+------------------------------+
| Address      | Unknown                      |
+--------------+------------------------------+
| Phone        | Unavailable                  |
+--------------+------------------------------+
 
 
 
 Support
 
 
+--------------+--------------+---------------------+-----------------+
| Name         | Relationship | Address             | Phone           |
+--------------+--------------+---------------------+-----------------+
| Lawson Ferreira | ECON         | [address]          | [phone] |
|              |              | [place], [place]   |                 |
|              |              | [address]               |                 |
+--------------+--------------+---------------------+-----------------+
 
 
 
 Care Team Providers
 
 
 
+-----------------------+------+-------------+
| Care Team Member Name | Role | Phone       |
+-----------------------+------+-------------+
 PCP  | Unavailable |
+-----------------------+------+-------------+
 
 
 
 Encounter Details
 
 
+--------+-------------+----------------------+----------------------+------------------+
| Date   | Type        | Department           | Care Team            | Description      |
+--------+-------------+----------------------+----------------------+------------------+
| / | Discharge   |   [facility] at  |   Summary, Discharge | D/C Summary ODDS |
|    | Summary-Tra | [facility]  [address]     |                      |                  |
|        | nscribed    | [address]      |                      |                  |
|        |             | Mailcode: [address]  [address]  |                      |                  |
|        |             | [place] [address]         |                      |                  |
|        |             | [place]  [facility]  |                      |                  |
|        |             | [place] [address]        |                      |                  |
|        |             | [phone]         |                      |                  |
+--------+-------------+----------------------+----------------------+------------------+
 
 
 
 Social History
 
 
+----------------+-------+-----------+--------+------+
| Tobacco Use    | Types | Packs/Day | Years  | Date |
|                |       |           | Used   |      |
+----------------+-------+-----------+--------+------+
| Never Assessed |       |           |        |      |
+----------------+-------+-----------+--------+------+
 
 
 
+------------------+---------------+
| Sex Assigned at  | Date Recorded |
| Birth            |               |
+------------------+---------------+
| Not on file      |               |
+------------------+---------------+
 
 
 
+----------------+-------------+-------------+
| Job Start Date | Occupation  | Industry    |
+----------------+-------------+-------------+
| Not on file    | Not on file | Not on file |
+----------------+-------------+-------------+
 
 
 
+----------------+--------------+------------+
| Travel History | Travel Start | Travel End |
+----------------+--------------+------------+
 
 
 
 
+-------------------------------------+
| No recent travel history available. |
+-------------------------------------+
 documented as of this encounter
 
 Discharge Summaries
 Interface, Transcription In - 2007  7:57 AM PDT  [address] [address] [phone]
   [facility]
  
  MEDICAL SUMMARY OF HOSPITALIZATION
  
  Med Rec No.: [id number] Admission Date: 96
  
  Name: Margaret Ferreira Discharge Date: 96
  
  
  STAFF PHYSICIAN: BRITTANY Perez M.D.
   Professor and ,
   Obstetrics and Gynecology
  
  PRINCIPAL FINAL DIAGNOSIS: 1. Complete procidentia.
  
  ADDITIONAL DIAGNOSIS(ES): 2. Cystocele.
   3. Hemoperitoneum.
  
  PRINCIPAL PROCEDURE: 1. Total vaginal hysterectomy with
   sacrospinous fixation.
  
  ADDITIONAL PROCEDURES: 2. Anterior colporrhaphy
   3. Exploratory laparotomy.
   4. Bilateral salpingo-oophorectomy.
   5. Transfusion of blood products.
   6. Arterial line placement.
  
  REASON FOR ADMISSION: The patient is a 65-year-old  6 para
   4-0-2-4 woman who has been postmenopausal
   for many years. She is
   referred from Dr. Marvin Cole in [place]
   for complete procidentia.
  
  SIGNIFICANT HISTORY: A Pap smear on 1995 which
   demonstrated endometrial cells. This was
   performed two days after
  Provera withdrawal. The patient has not been on hormone replacement
  therapy. Endometrial biopsy with endocervical curettage was obtained and
  was negative. Mammogram and electrocardiogram (EKG) performed on 
  were normal. The patient has suffered from significant uterine prolapse for
  a number of years. This is uncomfortable and often requires reduction to
  void and has made intercourse uncomfortable.
  
  PAST SURGERIES: Tonsillectomy and adenoidectomy as a child,
   dilation and curettage times two, left hip
   replacement in .
  
  PAST MEDICAL HISTORY: 1) Arthritis treated with nonsteroidals. 2)
 
   Hypertension for over ten years. 3) Peptic
   ulcer disease exacerbated by
   acids. 4) Radiation for hyperthyroidism as
   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
  
  MEDICATIONS: Hygroton, 25 mg p.o. q. day.
   K-Dur, 16 milliequivalents p.o. q. day.
   Zantac, 150 mg p.o. b.i.d.
   Calcium, 1200 mg q. day.
   Synthroid, 0.125 mg per week.
   Cardura, 2 mg q. day.
   Voltaren, 75 mg b.i.d.
  
  PAST OBSTETRIC HISTORY: Significant for four normal spontaneous
   vaginal deliveries without complication and
   two miscarriages.
  
  PHYSICAL EXAMINATION: GENERAL: Pleasant, in no acute distress.
   NECK: Supple, no adenopathy or thyromegaly.
   LUNGS: Clear.
  HEART: II/VI systolic ejection murmur, distant heart sounds. ABDOMEN:
  Slightly obese, soft, nontender. No organomegaly. PELVIC EXAM, EXTERNAL
  GENITALIA: Normal postmenopausal vulva. Cervix appears normal. There is a
  three out of four cystocele. Uterus prolapse approximately 3 to 4 cm beyond
  the introitus with strain. No rectocele. Minimal enterocele. With the
  uterus replaced, there is no urine leak with cough.
  
  HOSPITAL COURSE: The patient was admitted on 1996
   and underwent total vaginal hysterectomy,
   sacrospinous fixation
  and anterior colporrhaphy. The procedure went smoothly, without any
  complications. In the immediate postoperative period she was stable.
  However, on the night after surgery, she developed acute hypotension and
  worsening abdominal pain which was concerning for ongoing bleeding. A
  hematocrit was obtained which was 24.3. The patient was taken emergently
  back to the Operating Room, where she underwent exploratory laparotomy and
  evacuation of a hemoperitoneum, approximately 1.5 liters. No obvious
  bleeding site was identified, although there was a hematoma along the left
  broad ligament. At the time of her exploratory laparotomy, she had a
  bilateral salpingo-oophorectomy performed. Small atrophic ovaries were
  removed.
  
  The remainder of her hospital course was unremarkable. She received a total
  of seven units packed red blood cells and one unit of fresh frozen plasma
  perioperatively following her second surgery. Her Hollins catheter was
  removed on the second postoperative day and she was able to void
  spontaneously without any difficulty. Her postoperative ileus resolved
  spontaneously, and by the fourth postoperative day, she was tolerating a
  regular diet. Her hematocrit stabilized to a final value of 35.4. She
  remained afebrile throughout her hospital course. She was discharged on
  
  1996 in stable condition.
  
  DISPOSITION: Home.
  
  DISCHARGE MEDICATIONS: Tylox 1 to 2 p.o. q. 4 to 6 hours p.r.n.
   pain. She was given 40.
   Premarin 0.625 mg p.o. q. day.
   Colace 100 mg p.o. b.i.d. p.r.n.
   constipation.
 
   Ferrous sulfate 325 mg p.o. b.i.d.
  
  CONDITION ON DISCHARGE: Stable.
  
  DISCHARGE INSTRUCTION(S): Her follow-up care was to be with Dr. Perez
   approximately one week after discharge. She
   was instructed to call or
  return with fevers, chills, nausea, vomiting, worsening abdominal pain or
  leg pain.
  
  
  
  
  Nadja Carcamo M.D.
  Resident, Obstetrics and Gynecology
  BRITTANY Perez M.D.
  Professor and ,
  Obstetrics and Gynecology
  
  CLOVIS /porsche
  D: 96
  T: 96 5:02 P
  
  
  cc:
  
  
  
   GWEN RAMESH MD
   [address] [place] [address] [address]
   [place] [address]
  
  
  
   NICK COLE MD
    [address]
   [place] [address]
   Electronically signed by Interface, Transcription In at 2007  7:57 AM PDTdocumented
 in this encounter
 
 Plan of Treatment
 Not on filedocumented as of this encounter
 
 Visit Diagnoses
 Not on filedocumented in this encounter

## 2019-12-04 NOTE — XMS
Encounter Summary
  Created on: 2019
 
 Margaret Ferreira
 External Reference #: [id number]
 : 30
 Sex: Female
 
 Demographics
 
 
+-----------------------+------------------------+
| Address               | [address]      |
|                       | [place]  [address]   |
+-----------------------+------------------------+
| Home Phone            | [phone]        |
+-----------------------+------------------------+
| Preferred Language    | Unknown                |
+-----------------------+------------------------+
| Marital Status        |                 |
+-----------------------+------------------------+
| Hindu Affiliation | MET                    |
+-----------------------+------------------------+
| Race                  | White                  |
+-----------------------+------------------------+
| Ethnic Group          | Not  or  |
+-----------------------+------------------------+
 
 
 Author
 
 
+--------------+------------------------------+
| Author       | [facility] |
+--------------+------------------------------+
| Organization | [facility] |
+--------------+------------------------------+
| Address      | Unknown                      |
+--------------+------------------------------+
| Phone        | Unavailable                  |
+--------------+------------------------------+
 
 
 
 Support
 
 
+--------------+--------------+---------------------+-----------------+
| Name         | Relationship | Address             | Phone           |
+--------------+--------------+---------------------+-----------------+
| Lawson Ferreira | ECON         | [address]          | [phone] |
|              |              | [place], [place]   |                 |
|              |              | [address]               |                 |
+--------------+--------------+---------------------+-----------------+
 
 
 
 Care Team Providers
 
 
 
+-----------------------+------+-------------+
| Care Team Member Name | Role | Phone       |
+-----------------------+------+-------------+
 PCP  | Unavailable |
+-----------------------+------+-------------+
 
 
 
 Encounter Details
 
 
+--------+-------------+----------------------+--------------------+-------------+
| Date   | Type        | Department           | Care Team          | Description |
+--------+-------------+----------------------+--------------------+-------------+
| / | Transcribed |   [facility] Clinic at  |   Dictation, Other | Transcribed |
|    |             | [facility]  [address]     |                    |             |
|        |             | [address]      |                    |             |
|        |             | Mailcode: [address]  [address]  |                    |             |
|        |             | [address]         |                    |             |
|        |             | [place]  [place],  |                    |             |
|        |             | [place] [address]        |                    |             |
|        |             | [phone]         |                    |             |
+--------+-------------+----------------------+--------------------+-------------+
 
 
 
 Social History
 
 
+----------------+-------+-----------+--------+------+
| Tobacco Use    | Types | Packs/Day | Years  | Date |
|                |       |           | Used   |      |
+----------------+-------+-----------+--------+------+
| Never Assessed |       |           |        |      |
+----------------+-------+-----------+--------+------+
 
 
 
+------------------+---------------+
| Sex Assigned at  | Date Recorded |
| Birth            |               |
+------------------+---------------+
| Not on file      |               |
+------------------+---------------+
 
 
 
+----------------+-------------+-------------+
| Job Start Date | Occupation  | Industry    |
+----------------+-------------+-------------+
| Not on file    | Not on file | Not on file |
+----------------+-------------+-------------+
 
 
 
+----------------+--------------+------------+
| Travel History | Travel Start | Travel End |
+----------------+--------------+------------+
 
 
 
 
+-------------------------------------+
| No recent travel history available. |
+-------------------------------------+
 documented as of this encounter
 
 Progress Notes
 Interface, Transcription In - 2007  3:07 AM PDT  [address] [address] [phone] or [phone]
  
  1996
  
  
  GWEN RAMESH MD
   [address] [facility] [place] [address]
  
  
  RE:Margaret Ferreira
  MR#:[id number]
  
  Dear Dr. Ramesh:
  
  I saw your patient, Margaret Ferreira, again recently on her return from
  [place]. Unfortunately, she continues to be troubled by some urge
  type incontinence which interestingly does not occur at all during the
  daytime and only affects her when she wakes during the night and occurs when
  she moves out of bed to go to the bathroom. There is nothing in her history
  to suggest a stress type component.
  
  Examination shows that she does have some descensus of the anterior vaginal
  wall and urethra. The vaginal vault itself is very well supported.
  
  I do not feel at this stage that this type of incontinence could be well
  managed by any further surgical procedure. She continues to be concerned
  about some generalized abdominal discomfort, and she also is concerned about
  some edema of both legs that was more marked while she was active in
  [place]. I have prescribed some Levsinex at nighttime as a bladder
  "sedative" as she did notice some improvement from this when she was using
  it before. Additionally, I have encouraged her to go back on to Premarin
  which she previously discontinued because of breast tenderness. I think in
  view of her quite marked bladder symptoms that a resumption of Premarin
  therapy would be appropriate. She also has spent some time with the nurse
  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
  instability, and I hope that she was able to give her some help.
  
  I am anxious to stay in touch with Ms. Ferreira if this problem is not
  resolved and will be available at any time if you feel that we need to
  reevaluate the situation on her behalf. Thanks for your help and best
  wishes.
  
  Yours sincerely,
  
  
 
  
  BRITTANY Perez M.D.
  Professor and ,
  Obstetrics and Gynecology
  
  EPK:ash
  D: 96 T: 96 C:96 rh
   Electronically signed by Interface, Transcription In at 2007  3:07 AM PDTdocumented
 in this encounter
 
 Plan of Treatment
 Not on filedocumented as of this encounter
 
 Visit Diagnoses
 Not on filedocumented in this encounter

## 2019-12-04 NOTE — XMS
Encounter Summary
  Created on: 2019
 
 Margaret Ferreira
 External Reference #: [id number]
 : 30
 Sex: Female
 
 Demographics
 
 
+-----------------------+----------------------+
| Address               | [address]        |
|                       | [place]  [address] |
+-----------------------+----------------------+
| Home Phone            | [phone]      |
+-----------------------+----------------------+
| Preferred Language    | Unknown              |
+-----------------------+----------------------+
| Marital Status        |               |
+-----------------------+----------------------+
| Pentecostal Affiliation | Unknown              |
+-----------------------+----------------------+
| Race                  | Unknown              |
+-----------------------+----------------------+
| Ethnic Group          | Unknown              |
+-----------------------+----------------------+
 
 
 Author
 
 
+--------------+--------------------------------------------+
| Author       | [facility] [place]  |
|              | and [place]                                |
+--------------+--------------------------------------------+
| Organization | [facility] [place]  |
|              | and [place]                                |
+--------------+--------------------------------------------+
| Address      | Unknown                                    |
+--------------+--------------------------------------------+
| Phone        | Unavailable                                |
+--------------+--------------------------------------------+
 
 
 
 Support
 
 
+--------------+--------------+---------+-----------------+
| Name         | Relationship | Address | Phone           |
+--------------+--------------+---------+-----------------+
| Lawson Hanna | ECON         | Unknown | [phone] |
+--------------+--------------+---------+-----------------+
 
 
 
 Care Team Providers
 
 
 
+-----------------------------+------+-----------------+
| Care Team Member Name       | Role | Phone           |
+-----------------------------+------+-----------------+
| Bessy Paulino | PCP  | [phone] |
|  [place]                       |      |                 |
+-----------------------------+------+-----------------+
 
 
 
 Reason for Visit
 
 
+-------------------+----------+
| Reason            | Comments |
+-------------------+----------+
| Medication Refill |          |
+-------------------+----------+
 
 
 
 Encounter Details
 
 
+--------+--------+----------------------+---------------------+-------------------+
| Date   | Type   | Department           | Care Team           | Description       |
+--------+--------+----------------------+---------------------+-------------------+
| / | Refill |   [facility]       |   Kvng Ontiveros  | Medication Refill |
|    |        | [facility]    | E, DO  [address]   |                   |
|        |        | [facility] [facility]  [address]  | [place], [place]       |                   |
|        |        | [address]  [place],   | [address]          |                   |
|        |        | [place] [address]        | [phone]        |                   |
|        |        | [phone]         | [phone] (Fax)  |                   |
+--------+--------+----------------------+---------------------+-------------------+
 
 
 
 Social History
 
 
+-------------------+-------+-----------+--------+------+
| Tobacco Use       | Types | Packs/Day | Years  | Date |
|                   |       |           | Used   |      |
+-------------------+-------+-----------+--------+------+
| Passive Smoke     |       |           |        |      |
| Exposure - Never  |       |           |        |      |
| Smoker            |       |           |        |      |
+-------------------+-------+-----------+--------+------+
 
 
 
+---------------------+---+---+---+
| Smokeless Tobacco:  |   |   |   |
| Never Used          |   |   |   |
+---------------------+---+---+---+
 
 
 
+-------------+----------------------+---------+----------+
 
| Alcohol Use | Drinks/Week          | oz/Week | Comments |
+-------------+----------------------+---------+----------+
| No          |   0 Standard drinks  | 0.0     |          |
|             | or equivalent        |         |          |
+-------------+----------------------+---------+----------+
 
 
 
+------------------+---------------+
| Sex Assigned at  | Date Recorded |
| Birth            |               |
+------------------+---------------+
| Not on file      |               |
+------------------+---------------+
 
 
 
+----------------+-------------+-------------+
| Job Start Date | Occupation  | Industry    |
+----------------+-------------+-------------+
| Not on file    | Not on file | Not on file |
+----------------+-------------+-------------+
 
 
 
+----------------+--------------+------------+
| Travel History | Travel Start | Travel End |
+----------------+--------------+------------+
 
 
 
+-------------------------------------+
| No recent travel history available. |
+-------------------------------------+
 documented as of this encounter
 
 Plan of Treatment
 Not on filedocumented as of this encounter
 
 Visit Diagnoses
 Not on filedocumented in this encounter

## 2019-12-04 NOTE — XMS
Encounter Summary
  Created on: 2019
 
 Margaret Ferreira
 External Reference #: [id number]
 : 30
 Sex: Female
 
 Demographics
 
 
+-----------------------+----------------------+
| Address               | [address]        |
|                       | [place]  [address] |
+-----------------------+----------------------+
| Home Phone            | [phone]      |
+-----------------------+----------------------+
| Preferred Language    | Unknown              |
+-----------------------+----------------------+
| Marital Status        |               |
+-----------------------+----------------------+
| Oriental orthodox Affiliation | Unknown              |
+-----------------------+----------------------+
| Race                  | Unknown              |
+-----------------------+----------------------+
| Ethnic Group          | Unknown              |
+-----------------------+----------------------+
 
 
 Author
 
 
+--------------+--------------------------------------------+
| Author       | [facility] [place]  |
|              | and [place]                                |
+--------------+--------------------------------------------+
| Organization | [facility] [place]  |
|              | and [place]                                |
+--------------+--------------------------------------------+
| Address      | Unknown                                    |
+--------------+--------------------------------------------+
| Phone        | Unavailable                                |
+--------------+--------------------------------------------+
 
 
 
 Support
 
 
+--------------+--------------+---------+-----------------+
| Name         | Relationship | Address | Phone           |
+--------------+--------------+---------+-----------------+
| Lawson Hanna | ECON         | Unknown | [phone] |
+--------------+--------------+---------+-----------------+
 
 
 
 Care Team Providers
 
 
 
+-----------------------------+------+-----------------+
| Care Team Member Name       | Role | Phone           |
+-----------------------------+------+-----------------+
| Bessy Paulino | PCP  | [phone] |
|  PA-C                       |      |                 |
+-----------------------------+------+-----------------+
 
 
 
 Encounter Details
 
 
+--------+----------+----------------------+----------------------+-------------+
| Date   | Type     | Department           | Care Team            | Description |
+--------+----------+----------------------+----------------------+-------------+
| / | Abstract |   [facility]          |   Provider,          |             |
|    |          | PHYSIATRY  [address]     | [address] |             |
|        |          | [place] |  [place]        |             |
|        |          |  [place] [address]       | [place] [address]     |             |
|        |          | [phone]         |                      |             |
+--------+----------+----------------------+----------------------+-------------+
 
 
 
 Social History
 
 
+-------------------+-------+-----------+--------+------+
| Tobacco Use       | Types | Packs/Day | Years  | Date |
|                   |       |           | Used   |      |
+-------------------+-------+-----------+--------+------+
| Passive Smoke     |       |           |        |      |
| Exposure - Never  |       |           |        |      |
| Smoker            |       |           |        |      |
+-------------------+-------+-----------+--------+------+
 
 
 
+---------------------+---+---+---+
| Smokeless Tobacco:  |   |   |   |
| Never Used          |   |   |   |
+---------------------+---+---+---+
 
 
 
+-------------+----------------------+---------+----------+
| Alcohol Use | Drinks/Week          | oz/Week | Comments |
+-------------+----------------------+---------+----------+
| No          |   0 Standard drinks  | 0.0     |          |
|             | or equivalent        |         |          |
+-------------+----------------------+---------+----------+
 
 
 
+------------------+---------------+
| Sex Assigned at  | Date Recorded |
| Birth            |               |
+------------------+---------------+
 
| Not on file      |               |
+------------------+---------------+
 
 
 
+----------------+-------------+-------------+
| Job Start Date | Occupation  | Industry    |
+----------------+-------------+-------------+
| Not on file    | Not on file | Not on file |
+----------------+-------------+-------------+
 
 
 
+----------------+--------------+------------+
| Travel History | Travel Start | Travel End |
+----------------+--------------+------------+
 
 
 
+-------------------------------------+
| No recent travel history available. |
+-------------------------------------+
 documented as of this encounter
 
 Plan of Treatment
 Not on filedocumented as of this encounter
 
 Visit Diagnoses
 Not on filedocumented in this encounter

## 2019-12-04 NOTE — XMS
Encounter Summary
  Created on: 2019
 
 Margaret Ferreira
 External Reference #: [id number]
 : 30
 Sex: Female
 
 Demographics
 
 
+-----------------------+----------------------+
| Address               | [address]        |
|                       | [place]  [address] |
+-----------------------+----------------------+
| Home Phone            | [phone]      |
+-----------------------+----------------------+
| Preferred Language    | Unknown              |
+-----------------------+----------------------+
| Marital Status        |               |
+-----------------------+----------------------+
| Gnosticist Affiliation | Unknown              |
+-----------------------+----------------------+
| Race                  | Unknown              |
+-----------------------+----------------------+
| Ethnic Group          | Unknown              |
+-----------------------+----------------------+
 
 
 Author
 
 
+--------------+--------------------------------------------+
| Author       | [facility] [place]  |
|              | and [place]                                |
+--------------+--------------------------------------------+
| Organization | [facility] [place]  |
|              | and [place]                                |
+--------------+--------------------------------------------+
| Address      | Unknown                                    |
+--------------+--------------------------------------------+
| Phone        | Unavailable                                |
+--------------+--------------------------------------------+
 
 
 
 Support
 
 
+--------------+--------------+---------+-----------------+
| Name         | Relationship | Address | Phone           |
+--------------+--------------+---------+-----------------+
| Lawson Hanna | ECON         | Unknown | [phone] |
+--------------+--------------+---------+-----------------+
 
 
 
 Care Team Providers
 
 
 
+-----------------------------+------+-----------------+
| Care Team Member Name       | Role | Phone           |
+-----------------------------+------+-----------------+
| Bessy Paulino | PCP  | [phone] |
|  [place]                       |      |                 |
+-----------------------------+------+-----------------+
 
 
 
 Reason for Visit
 
 
+-------------------+----------+
| Reason            | Comments |
+-------------------+----------+
| Medication Refill |          |
+-------------------+----------+
 
 
 
 Encounter Details
 
 
+--------+--------+----------------------+---------------------+-------------------+
| Date   | Type   | Department           | Care Team           | Description       |
+--------+--------+----------------------+---------------------+-------------------+
| / | Refill |   [facility]       |   Kvng Ontiveros  | Medication Refill |
|    |        | [facility]    | E, DO  [address]   |                   |
|        |        | [address]  | [place], [place]       |                   |
|        |        | [address]  [place],   | [address]          |                   |
|        |        | [place] [address]        | [phone]        |                   |
|        |        | [phone]         | [phone] (Fax)  |                   |
+--------+--------+----------------------+---------------------+-------------------+
 
 
 
 Social History
 
 
+--------------+-------+-----------+--------+------+
| Tobacco Use  | Types | Packs/Day | Years  | Date |
|              |       |           | Used   |      |
+--------------+-------+-----------+--------+------+
| Never Smoker |       |           |        |      |
+--------------+-------+-----------+--------+------+
 
 
 
+---------------------+---+---+---+
| Smokeless Tobacco:  |   |   |   |
| Never Used          |   |   |   |
+---------------------+---+---+---+
 
 
 
+-------------------------------------------------------------------+
| Comments: her parents both smoked, also worked at a [employer] |
+-------------------------------------------------------------------+
 
 
 
 
+-------------+----------------------+---------+----------+
| Alcohol Use | Drinks/Week          | oz/Week | Comments |
+-------------+----------------------+---------+----------+
| No          |   0 Standard drinks  | 0.0     |          |
|             | or equivalent        |         |          |
+-------------+----------------------+---------+----------+
 
 
 
+------------------+---------------+
| Sex Assigned at  | Date Recorded |
| Birth            |               |
+------------------+---------------+
| Not on file      |               |
+------------------+---------------+
 
 
 
+----------------+-------------+-------------+
| Job Start Date | Occupation  | Industry    |
+----------------+-------------+-------------+
| Not on file    | Not on file | Not on file |
+----------------+-------------+-------------+
 
 
 
+----------------+--------------+------------+
| Travel History | Travel Start | Travel End |
+----------------+--------------+------------+
 
 
 
+-------------------------------------+
| No recent travel history available. |
+-------------------------------------+
 documented as of this encounter
 
 Plan of Treatment
 Not on filedocumented as of this encounter
 
 Visit Diagnoses
 Not on filedocumented in this encounter

## 2019-12-04 NOTE — XMS
Encounter Summary
  Created on: 2019
 
 Margaret Ferreira
 External Reference #: [id number]
 : 30
 Sex: Female
 
 Demographics
 
 
+-----------------------+----------------------+
| Address               | [address]        |
|                       | [place]  [address] |
+-----------------------+----------------------+
| Home Phone            | [phone]      |
+-----------------------+----------------------+
| Preferred Language    | Unknown              |
+-----------------------+----------------------+
| Marital Status        |               |
+-----------------------+----------------------+
| Scientologist Affiliation | Unknown              |
+-----------------------+----------------------+
| Race                  | Unknown              |
+-----------------------+----------------------+
| Ethnic Group          | Unknown              |
+-----------------------+----------------------+
 
 
 Author
 
 
+--------------+--------------------------------------------+
| Author       | [facility] [place]  |
|              | and [place]                                |
+--------------+--------------------------------------------+
| Organization | [facility] [place]  |
|              | and [place]                                |
+--------------+--------------------------------------------+
| Address      | Unknown                                    |
+--------------+--------------------------------------------+
| Phone        | Unavailable                                |
+--------------+--------------------------------------------+
 
 
 
 Support
 
 
+--------------+--------------+---------+-----------------+
| Name         | Relationship | Address | Phone           |
+--------------+--------------+---------+-----------------+
| Lawson Hanna | ECON         | Unknown | [phone] |
+--------------+--------------+---------+-----------------+
 
 
 
 Care Team Providers
 
 
 
+-----------------------+------+-----------------+
| Care Team Member Name | Role | Phone           |
+-----------------------+------+-----------------+
| Calixto Chou MD | PCP  | [phone] |
+-----------------------+------+-----------------+
 
 
 
 Reason for Visit
 
 
+--------+----------+
| Reason | Comments |
+--------+----------+
| Other  | PT       |
+--------+----------+
 
 
 
 Encounter Details
 
 
+--------+-----------+----------------------+----------------+-------------+
| Date   | Type      | Department           | Care Team      | Description |
+--------+-----------+----------------------+----------------+-------------+
| / | Telephone |   [facility]  [place]          |   [place],  | Other (PT)  |
|    |           | PULMONARY  [address]     | [place]  |             |
|        |           | [place]  [place], |                |             |
|        |           |  [place] [address]       |                |             |
|        |           | [phone]         |                |             |
+--------+-----------+----------------------+----------------+-------------+
 
 
 
 Social History
 
 
+--------------+-------+-----------+--------+------+
| Tobacco Use  | Types | Packs/Day | Years  | Date |
|              |       |           | Used   |      |
+--------------+-------+-----------+--------+------+
| Never Smoker |       |           |        |      |
+--------------+-------+-----------+--------+------+
 
 
 
+---------------------+---+---+---+
| Smokeless Tobacco:  |   |   |   |
| Never Used          |   |   |   |
+---------------------+---+---+---+
 
 
 
+-------------------------------------------------------------------+
| Comments: her parents both smoked, also worked at [employer] |
+-------------------------------------------------------------------+
 
 
 
 
+-------------+----------------------+---------+----------+
| Alcohol Use | Drinks/Week          | oz/Week | Comments |
+-------------+----------------------+---------+----------+
| No          |   0 Standard drinks  | 0.0     |          |
|             | or equivalent        |         |          |
+-------------+----------------------+---------+----------+
 
 
 
+------------------+---------------+
| Sex Assigned at  | Date Recorded |
| Birth            |               |
+------------------+---------------+
| Not on file      |               |
+------------------+---------------+
 
 
 
+----------------+-------------+-------------+
| Job Start Date | Occupation  | Industry    |
+----------------+-------------+-------------+
| Not on file    | Not on file | Not on file |
+----------------+-------------+-------------+
 
 
 
+----------------+--------------+------------+
| Travel History | Travel Start | Travel End |
+----------------+--------------+------------+
 
 
 
+-------------------------------------+
| No recent travel history available. |
+-------------------------------------+
 documented as of this encounter
 
 Plan of Treatment
 Not on filedocumented as of this encounter
 
 Visit Diagnoses
 Not on filedocumented in this encounter

## 2019-12-04 NOTE — XMS
Encounter Summary
  Created on: 2019
 
 Margaret Ferreira
 External Reference #: [id number]
 : 30
 Sex: Female
 
 Demographics
 
 
+-----------------------+------------------------+
| Address               | [address]      |
|                       | [place]  [address]   |
+-----------------------+------------------------+
| Home Phone            | [phone]        |
+-----------------------+------------------------+
| Preferred Language    | Unknown                |
+-----------------------+------------------------+
| Marital Status        |                 |
+-----------------------+------------------------+
| Syn[employer] Affiliation | MET                    |
+-----------------------+------------------------+
| Race                  | White                  |
+-----------------------+------------------------+
| Ethnic Group          | Not  or  |
+-----------------------+------------------------+
 
 
 Author
 
 
+--------------+------------------------------+
| Author       | [facility] |
+--------------+------------------------------+
| Organization | [facility] |
+--------------+------------------------------+
| Address      | Unknown                      |
+--------------+------------------------------+
| Phone        | Unavailable                  |
+--------------+------------------------------+
 
 
 
 Support
 
 
+--------------+--------------+---------------------+-----------------+
| Name         | Relationship | Address             | Phone           |
+--------------+--------------+---------------------+-----------------+
| Lawson Ferreira | ECON         | [address]          | [phone] |
|              |              | [place], [place]   |                 |
|              |              | [address]               |                 |
+--------------+--------------+---------------------+-----------------+
 
 
 
 Care Team Providers
 
 
 
+-----------------------+------+-------------+
| Care Team Member Name | Role | Phone       |
+-----------------------+------+-------------+
 PCP  | Unavailable |
+-----------------------+------+-------------+
 
 
 
 Encounter Details
 
 
+--------+-------------+----------------------+---------------------+------------------+
| Date   | Type        | Department           | Care Team           | Description      |
+--------+-------------+----------------------+---------------------+------------------+
| / | Procedure - |   [facility]   |   Record, Operation | Operative Report |
|    |             | [facility]  [address] |                     |                  |
|        | Transcribed |  [facility]         |                     |                  |
|        |             | Mailcode:  Center    |                     |                  |
|        |             | [facility]       |                     |                  |
|        |             | Healing, [address]  |                     |                  |
|        |             |  [place], [place]        |                     |                  |
|        |             | [address]           |                     |                  |
|        |             | [phone]         |                     |                  |
+--------+-------------+----------------------+---------------------+------------------+
 
 
 
 Social History
 
 
+----------------+-------+-----------+--------+------+
| Tobacco Use    | Types | Packs/Day | Years  | Date |
|                |       |           | Used   |      |
+----------------+-------+-----------+--------+------+
| Never Assessed |       |           |        |      |
+----------------+-------+-----------+--------+------+
 
 
 
+------------------+---------------+
| Sex Assigned at  | Date Recorded |
| Birth            |               |
+------------------+---------------+
| Not on file      |               |
+------------------+---------------+
 
 
 
+----------------+-------------+-------------+
| Job Start Date | Occupation  | Industry    |
+----------------+-------------+-------------+
| Not on file    | Not on file | Not on file |
+----------------+-------------+-------------+
 
 
 
+----------------+--------------+------------+
| Travel History | Travel Start | Travel End |
 
+----------------+--------------+------------+
 
 
 
+-------------------------------------+
| No recent travel history available. |
+-------------------------------------+
 documented as of this encounter
 
 Plan of Treatment
 Not on filedocumented as of this encounter
 
 Procedures
 
 
+------------------+--------+-------------+----------------------+----------------------+
| Procedure Name   | Priori | Date/Time   | Associated Diagnosis | Comments             |
|                  | ty     |             |                      |                      |
+------------------+--------+-------------+----------------------+----------------------+
| OPERATION RECORD |        | 1996  |                      |   Results for this   |
|                  |        | 12:00 AM    |                      | procedure are in the |
|                  |        | PST         |                      |  results section.    |
+------------------+--------+-------------+----------------------+----------------------+
 documented in this encounter
 
 Results
 OPERATION RECORD (1996 12:00 AM PST)
 
+-------------------------------------------------------------------------------+
| Procedure Note                                                                |
+-------------------------------------------------------------------------------+
|   1996 12:00 AM [facility]                                           |
|   [facility]                                                  |
|  [address], [place] [address] [phone]   |
|   [facility]                                            |
|                                                                               |
|  OPERATION RECORD                                                             |
|                                                                               |
|  Med Rec No.: [id number] Date: 96                                      |
|                                                                               |
|  Name: Margarte Ferreira                                                       |
|                                                                               |
|                                                                               |
|  ATTENDING SURGEON: BRITTANY Perez M.D.                                        |
|   Professor and ,                                                     |
|   Obstetrics and Gynecology                                                   |
|                                                                               |
|  ASSISTANT(S): Nadja Carcamo M.D.                                                 |
|   Resident, Obstetrics and Gynecology                                         |
|                                                                               |
|  POSTOPERATIVE DIAGNOSIS(ES): 1. Complete procidentia.                        |
|   2. Cystocele.                                                               |
|                                                                               |
|  OPERATION(S) PERFORMED: 1. Total vaginal hysterectomy.                       |
|   2. Anterior colporrhaphy.                                                   |
|   3. Transvaginal sacrospinous ligament                                       |
|   fixation.                                                                   |
|                                                                               |
|  ANESTHESIA: General endotracheal anesthesia.                                 |
|                                                                               |
 
|  ESTIMATED BLOOD LOSS: 450 cc.                                                |
|                                                                               |
|  SPECIMEN(S) REMOVED: Uterus with cervix and vaginal mucosa to                |
|   Pathology.                                                                  |
|                                                                               |
|  FLUIDS: 2100 cc Crystalloid.                                                 |
|                                                                               |
|  INDICATIONS: The patient is a 65-year-old, multiparous                       |
|   woman with a progressively worsening vaginal                                |
|   vault relaxation. She                                                       |
|  presented with complete procidentia. She had no associated with              |
|  incontinence of stool or urine. She presented for definite surgical          |
|  correction of her vaginal vault relaxation.                                  |
|                                                                               |
|  FINDINGS: There was complete eversion of the vaginal                         |
|   vault and prolapse of the cervix and uterus.                                |
|   There was a large                                                           |
|  cystocele and an unsupported vaginal apex. At the termination of the case,   |
|  there was excellent elevation of the vaginal apex. There was no obvious      |
|  rectocele or enterocele sac present. The ovaries were not well seen but      |
|  were palpably quite atrophic and small.                                      |
|                                                                               |
|  PROCEDURE: The patient was taken to the Operating Room                       |
|   where she was placed in the dorsolithotomy                                  |
|   position with her legs in                                                   |
|  sling stirrups. She was positioned while awake to help position her          |
|  comfortably given her history of left hip replacement. General anesthesia    |
|  was administered without complication. She was prepped and draped in the     |
|  usual sterile fashion. Her bladder was emptied prior to beginning the case.  |
|                                                                               |
|  The hysterectomy was performed first. The cervix was grasped with a          |
|  single-tooth tenaculum. The mucosa was injected at the cervicovaginal        |
|  junction using 1% lidocaine with dilute epinephrine. The vaginal mucosa was  |
|  incised using the scalpel at the cervicovaginal junction circumferentially   |
|  around the cervix. The anterior colpotomy was then attempted but was not     |
|  initially successful. Therefore, attention was turned to a posterior         |
|  colpotomy. Using sharp dissection, the posterior peritoneum and the          |
|  posterior cul-de-sac were identified and entered sharply without trauma to   |
|  bowel. The uterosacral ligaments were clamped bilaterally, transected, and   |
|  ligated using 0 Vicryl suture ligature.                                      |
|                                                                               |
|  The anterior colpotomy was performed, carefully identifying the anterior     |
|  peritoneum. The vesicouterine pouch was entered atraumatically. The          |
|  cardinal ligaments were sequentially clamped, cut, and ligated using 0       |
|  Vicryl suture ligature. Ends of suture for both the uterosacral ligament     |
|  and cardinal ligaments were held for plication at the termination of the     |
|  case. The uterine vessels were clamped bilaterally, transected, and doubly   |
|  ligated using 0 Vicryl suture ligature. The utero-ovarian ligaments were     |
|  identified, clamped, and transected. These were ligated bilaterally using a  |
|  free tie of 0 Vicryl suture ligature. The uterus was removed. Pedicles       |
|  were inspected and all were found to be hemostatic. The cuff was packed      |
|  temporarily using a pediatric lap sponge.                                    |
|                                                                               |
|  Attention was turned to the anterior colporrhaphy. The vaginal mucosa was    |
|  grasped at the apex using Allis clamps. Using 1% lidocaine with dilute       |
|  epinephrine, the vaginal mucosa was injected in a linear fashion from just   |
|  below the urethra to the vaginal apex in the midline. The vaginal mucosa     |
|  was undermined using Metzenbaum scissors and incised in the midline to       |
|  approximately 2 cm below the urethral meatus. The vaginal mucosa was         |
|  dissected laterally away from the underlying bladder, with care being taken  |
 
|  to avoid injury to bladder during the dissection. When the redundant         |
|  vaginal mucosa had been completely dissected away from the bladder, the      |
|  vagina was plicated in the midline using 0 Vicryl suture ligature in         |
|  interrupted stitches to completely obliterate the cystocele. Upon placing    |
|  the initial stitch on the right side of the urethra, some bleeding was       |
|  encountered. Hemostasis was obtained using figure-of-eight stitches. When    |
|  the cystocele had been completely obliterated by medial plication, excess    |
|  vaginal mucosa was excised. The vaginal mucosa was closed using mattress     |
|  sutures with 0 Vicryl suture ligature.                                       |
|                                                                               |
|  Attention was now turned to the sacrospinous ligament fixation. An incision  |
|  was made just inside the hymenal ring in the posterior vaginal mucosa. The   |
|                                                                               |
|  vaginal mucosa was undermined for a length of approximately 3 cm for entry   |
|  into the right ischiorectal fossa. Using sharp and blunt dissection, the     |
|  sacrospinous ligament was identified and dissected for adequate              |
|  visualization. Using retractors for visualization and a Delgado ligature        |
|  carrier, two stitches of Juliette-Jerman suture were placed through the coccygeus   |
|  muscle and uterosacral ligament, approximately 1-1/2 to 2 fingerbreadths     |
|  medial to the ischial spine. These sutures were plicated to the apex of the  |
|  vagina. During plication of the second stitch, it was noted to have pulled   |
|  through the coccygeus muscle and was removed. The remaining stitch was       |
|  plicated in a pulley-like stitch and tied down, thus elevating the apex of   |
|  the vagina in the usual fashion. The vaginal mucosa was closed using 0       |
|  Vicryl suture in interrupted mattress stitches. The vagina was packed with   |
|  vaginal packing soaked in Sultrin Cream.                                     |
|                                                                               |
|  After the termination of the surgical procedure and just prior to attempts   |
|  at extubation, the patient was noted to have a bradycardic episode to        |
|  approximately 46 beats/minute with associated hypotension. Her lowest blood  |
|  pressure was 80/44. She received 10 mg of ephedrine and 200 cc of            |
|  additional Crystalloid fluid. She responded to this with an elevation in     |
|  her blood pressure to approximately 100/50 and an increase in her pulse rate |
|  to the 60s.                                                                  |
|                                                                               |
|  Sponge and needle counts were correct times two. The patient tolerated the   |
|  procedure well. She was taken to the Post Anesthesia Care Unit, awake but    |
|  intubated. Once the Post Anesthesia Care Unit, she was extubated without     |
|  difficulty. She was breathing spontaneously.                                 |
|                                                                               |
|  KAREN Fontanez M.D.                                            |
|  Resident, Professor and ,                                            |
|  Obstetrics & Gynecology Obstetrics and Gynecology                            |
|                                                                               |
|  [facility]                                                                        |
|  D: 96                                                                  |
|  T: 96 9:15 A                                                           |
|                                                                               |
|  cc:                                                                          |
|                                                                               |
|  GWEN RAMESH MD                                                             |
|  [address] [facility] [address]                                                       |
|  [place] [address]                                                           |
|                                                                               |
 
|  NICK COLE MD                                                          |
|   [address]                                                                  |
|  [place] [address]                                                           |
|                                                                               |
+-------------------------------------------------------------------------------+
 documented in this encounter
 
 Visit Diagnoses
 Not on filedocumented in this encounter

## 2019-12-04 NOTE — XMS
Encounter Summary
  Created on: 2019
 
 Margaret Ferreira
 External Reference #: [id number]
 : 30
 Sex: Female
 
 Demographics
 
 
+-----------------------+------------------------+
| Address               | [address]      |
|                       | [place]  [address]   |
+-----------------------+------------------------+
| Home Phone            | [phone]        |
+-----------------------+------------------------+
| Preferred Language    | Unknown                |
+-----------------------+------------------------+
| Marital Status        |                 |
+-----------------------+------------------------+
| Denominational Affiliation | MET                    |
+-----------------------+------------------------+
| Race                  | White                  |
+-----------------------+------------------------+
| Ethnic Group          | Not  or  |
+-----------------------+------------------------+
 
 
 Author
 
 
+--------------+------------------------------+
| Author       | [facility] |
+--------------+------------------------------+
| Organization | [facility] |
+--------------+------------------------------+
| Address      | Unknown                      |
+--------------+------------------------------+
| Phone        | Unavailable                  |
+--------------+------------------------------+
 
 
 
 Support
 
 
+--------------+--------------+---------------------+-----------------+
| Name         | Relationship | Address             | Phone           |
+--------------+--------------+---------------------+-----------------+
| Lawson Ferreira | ECON         | [address]          | [phone] |
|              |              | [place], [place]   |                 |
|              |              | [address]               |                 |
+--------------+--------------+---------------------+-----------------+
 
 
 
 Care Team Providers
 
 
 
+-----------------------+------+-------------+
| Care Team Member Name | Role | Phone       |
+-----------------------+------+-------------+
 PCP  | Unavailable |
+-----------------------+------+-------------+
 
 
 
 Encounter Details
 
 
+--------+-------------+----------------------+---------------------+---------------+
| Date   | Type        | Department           | Care Team           | Description   |
+--------+-------------+----------------------+---------------------+---------------+
| / | Office      |   General Internal   |   Note, Outpatient  | Progress Note |
|    | Visit-Trans | Medicine  [address]    | Clinic              |               |
|        | [address]      | [address]  |                     |               |
|        |             |  Mailcode: [address]      |                     |               |
|        |             | Outpatient Clinic    |                     |               |
|        |             | [address]       |                     |               |
|        |             | [place], [place]         |                     |               |
|        |             | [address]           |                     |               |
|        |             | [phone]         |                     |               |
+--------+-------------+----------------------+---------------------+---------------+
 
 
 
 Social History
 
 
+----------------+-------+-----------+--------+------+
| Tobacco Use    | Types | Packs/Day | Years  | Date |
|                |       |           | Used   |      |
+----------------+-------+-----------+--------+------+
| Never Assessed |       |           |        |      |
+----------------+-------+-----------+--------+------+
 
 
 
+------------------+---------------+
| Sex Assigned at  | Date Recorded |
| Birth            |               |
+------------------+---------------+
| Not on file      |               |
+------------------+---------------+
 
 
 
+----------------+-------------+-------------+
| Job Start Date | Occupation  | Industry    |
+----------------+-------------+-------------+
| Not on file    | Not on file | Not on file |
+----------------+-------------+-------------+
 
 
 
+----------------+--------------+------------+
| Travel History | Travel Start | Travel End |
 
+----------------+--------------+------------+
 
 
 
+-------------------------------------+
| No recent travel history available. |
+-------------------------------------+
 documented as of this encounter
 
 Progress Notes
 Interface, Transcription In - 2007  7:06 AM PST CLINIC DATE: 96
  
  [facility]
  
  SUBJECTIVE: [facility]
  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
  hemorrhage requiring laparotomy and control of the bleeding. She had a slow
  post-operative course, but has now made steady progress and reports that she
  is feeling much better although "she still has some way to go". Her most
  troublesome symptom at present is of urge incontinence, particularly on
  waking during the night or on getting out of bed in the morning. She
  continues to have pain with her arthritis which is being managed in
  [place] by Dr. Avendaño. She has had limited activity and feels tired, but
  is much more confident about her progress than when last seen. She will be
  traveling to [place] next week and will be staying there for one month
  and requested that a report be sent to Dr. Douglas in [place], [place].
  
  PHYSICAL EXAMINATION: Examination shows the vaginal vault to be well
  supported and the vaginal canal of adequate length and capacity. There is
  some descensus of the anterior vaginal wall which is not aggravated by
  straining. No stress incontinence is demonstrated. Bimanual examination
  does not demonstrate any pelvic masses and no local areas of tenderness.
  
  IMPRESSION: Ms. Ferreira now appears to be making satisfactory progress.
  
  PLAN: A prescription for Levsinex was provided for use at night time and
  she will be seen again on her return from [place] in one month's time.
  
  
  
  BRITTANY Perez M.D.
  Professor and ,
  Obstetrics and Gynecology
  
  EPK/zoie
  D: 96
  T: 96
   Electronically signed by Interface, Transcription In at 2007  7:06 AM PSTdocumented
 in this encounter
 
 Plan of Treatment
 Not on filedocumented as of this encounter
 
 Visit Diagnoses
 Not on filedocumented in this encounter

## 2019-12-04 NOTE — XMS
Encounter Summary
  Created on: 2019
 
 Margaret Ferreira
 External Reference #: [id number]
 : 30
 Sex: Female
 
 Demographics
 
 
+-----------------------+------------------------+
| Address               | [address]      |
|                       | [place]  [address]   |
+-----------------------+------------------------+
| Home Phone            | [phone]        |
+-----------------------+------------------------+
| Preferred Language    | Unknown                |
+-----------------------+------------------------+
| Marital Status        |                 |
+-----------------------+------------------------+
| Mosque Affiliation | MET                    |
+-----------------------+------------------------+
| Race                  | White                  |
+-----------------------+------------------------+
| Ethnic Group          | Not  or  |
+-----------------------+------------------------+
 
 
 Author
 
 
+--------------+------------------------------+
| Author       | [facility] |
+--------------+------------------------------+
| Organization | [facility] |
+--------------+------------------------------+
| Address      | Unknown                      |
+--------------+------------------------------+
| Phone        | Unavailable                  |
+--------------+------------------------------+
 
 
 
 Support
 
 
+--------------+--------------+---------------------+-----------------+
| Name         | Relationship | Address             | Phone           |
+--------------+--------------+---------------------+-----------------+
| Lawson Ferreira | ECON         | [address]          | [phone] |
|              |              | [place], [place]   |                 |
|              |              | [address]               |                 |
+--------------+--------------+---------------------+-----------------+
 
 
 
 Care Team Providers
 
 
 
+-----------------------+------+-------------+
| Care Team Member Name | Role | Phone       |
+-----------------------+------+-------------+
 PCP  | Unavailable |
+-----------------------+------+-------------+
 
 
 
 Encounter Details
 
 
+--------+-------------+----------------------+----------------------+------------------+
| Date   | Type        | Department           | Care Team            | Description      |
+--------+-------------+----------------------+----------------------+------------------+
| / | Discharge   |   All[facility] Clinic at  |   Summary, Discharge | D/C Summary ODDS |
|    | Summary-Tra | [facility]  [address]     |                      |                  |
|        | nscribed    | [address]      |                      |                  |
|        |             | Mailcode: [address]  [address]  |                      |                  |
|        |             | [address]         |                      |                  |
|        |             | [place]  [facility]  |                      |                  |
|        |             | [place] [address]        |                      |                  |
|        |             | [phone]         |                      |                  |
+--------+-------------+----------------------+----------------------+------------------+
 
 
 
 Social History
 
 
+----------------+-------+-----------+--------+------+
| Tobacco Use    | Types | Packs/Day | Years  | Date |
|                |       |           | Used   |      |
+----------------+-------+-----------+--------+------+
| Never Assessed |       |           |        |      |
+----------------+-------+-----------+--------+------+
 
 
 
+------------------+---------------+
| Sex Assigned at  | Date Recorded |
| Birth            |               |
+------------------+---------------+
| Not on file      |               |
+------------------+---------------+
 
 
 
+----------------+-------------+-------------+
| Job Start Date | Occupation  | Industry    |
+----------------+-------------+-------------+
| Not on file    | Not on file | Not on file |
+----------------+-------------+-------------+
 
 
 
+----------------+--------------+------------+
| Travel History | Travel Start | Travel End |
+----------------+--------------+------------+
 
 
 
 
+-------------------------------------+
| No recent travel history available. |
+-------------------------------------+
 documented as of this encounter
 
 Discharge Summaries
 Interface, Transcription In - 2007  7:57 AM PDT  [address] [address] [phone]
   [facility]
  
  MEDICAL SUMMARY OF HOSPITALIZATION
  
  Med Rec No.: [id number] Admission Date: 96
  
  Name: Margaret Ferreira Discharge Date: 96
  
  
  STAFF PHYSICIAN: BRITTANY Perez M.D.
   Professor and ,
   Obstetrics and Gynecology
  
  PRINCIPAL FINAL DIAGNOSIS: 1. Complete procidentia.
  
  ADDITIONAL DIAGNOSIS(ES): 2. Cystocele.
   3. Hemoperitoneum.
  
  PRINCIPAL PROCEDURE: 1. Total vaginal hysterectomy with
   sacrospinous fixation.
  
  ADDITIONAL PROCEDURES: 2. Anterior colporrhaphy
   3. Exploratory laparotomy.
   4. Bilateral salpingo-oophorectomy.
   5. Transfusion of blood products.
   6. Arterial line placement.
  
  REASON FOR ADMISSION: The patient is a 65-year-old  6 para
   4-0-2-4 woman who has been postmenopausal
   for many years. She is
   referred from Dr. Marvin Cole in [place]
   for complete procidentia.
  
  SIGNIFICANT HISTORY: A Pap smear on 1995 which
   demonstrated endometrial cells. This was
   performed two days after
  Provera withdrawal. The patient has not been on hormone replacement
  therapy. Endometrial biopsy with endocervical curettage was obtained and
  was negative. Mammogram and electrocardiogram (EKG) performed on 
  were normal. The patient has suffered from significant uterine prolapse for
  a number of years. This is uncomfortable and often requires reduction to
  void and has made intercourse uncomfortable.
  
  PAST SURGERIES: Tonsillectomy and adenoidectomy as a child,
   dilation and curettage times two, left hip
   replacement in .
  
  PAST MEDICAL HISTORY: 1) Arthritis treated with nonsteroidals. 2)
 
   Hypertension for over ten years. 3) Peptic
   ulcer disease exacerbated by
   acids. 4) Radiation for hyperthyroidism as
   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
  
  MEDICATIONS: Hygroton, 25 mg p.o. q. day.
   K-Dur, 16 milliequivalents p.o. q. day.
   Zantac, 150 mg p.o. b.i.d.
   Calcium, 1200 mg q. day.
   Synthroid, 0.125 mg per week.
   Cardura, 2 mg q. day.
   Voltaren, 75 mg b.i.d.
  
  PAST OBSTETRIC HISTORY: Significant for four normal spontaneous
   vaginal deliveries without complication and
   two miscarriages.
  
  PHYSICAL EXAMINATION: GENERAL: Pleasant, in no acute distress.
   NECK: Supple, no adenopathy or thyromegaly.
   LUNGS: Clear.
  HEART: II/VI systolic ejection murmur, distant heart sounds. ABDOMEN:
  Slightly obese, soft, nontender. No organomegaly. PELVIC EXAM, EXTERNAL
  GENITALIA: Normal postmenopausal vulva. Cervix appears normal. There is a
  three out of four cystocele. Uterus prolapse approximately 3 to 4 cm beyond
  the introitus with strain. No rectocele. Minimal enterocele. With the
  uterus replaced, there is no urine leak with cough.
  
  HOSPITAL COURSE: The patient was admitted on 1996
   and underwent total vaginal hysterectomy,
   sacrospinous fixation
  and anterior colporrhaphy. The procedure went smoothly, without any
  complications. In the immediate postoperative period she was stable.
  However, on the night after surgery, she developed acute hypotension and
  worsening abdominal pain which was concerning for ongoing bleeding. A
  hematocrit was obtained which was 24.3. The patient was taken emergently
  back to the Operating Room, where she underwent exploratory laparotomy and
  evacuation of a hemoperitoneum, approximately 1.5 liters. No obvious
  bleeding site was identified, although there was a hematoma along the left
  broad ligament. At the time of her exploratory laparotomy, she had a
  bilateral salpingo-oophorectomy performed. Small atrophic ovaries were
  removed.
  
  The remainder of her hospital course was unremarkable. She received a total
  of seven units packed red blood cells and one unit of fresh frozen plasma
  perioperatively following her second surgery. Her Hollins catheter was
  removed on the second postoperative day and she was able to void
  spontaneously without any difficulty. Her postoperative ileus resolved
  spontaneously, and by the fourth postoperative day, she was tolerating a
  regular diet. Her hematocrit stabilized to a final value of 35.4. She
  remained afebrile throughout her hospital course. She was discharged on
  
  1996 in stable condition.
  
  DISPOSITION: Home.
  
  DISCHARGE MEDICATIONS: Tylox 1 to 2 p.o. q. 4 to 6 hours p.r.n.
   pain. She was given 40.
   Premarin 0.625 mg p.o. q. day.
   Colace 100 mg p.o. b.i.d. p.r.n.
   constipation.
 
   Ferrous sulfate 325 mg p.o. b.i.d.
  
  CONDITION ON DISCHARGE: Stable.
  
  DISCHARGE INSTRUCTION(S): Her follow-up care was to be with Dr. Perez
   approximately one week after discharge. She
   was instructed to call or
  return with fevers, chills, nausea, vomiting, worsening abdominal pain or
  leg pain.
  
  
  
  
  Nadja Carcamo M.D.
  Resident, Obstetrics and Gynecology
  BRITTANY Perez M.D.
  Professor and ,
  Obstetrics and Gynecology
  
  CLOVIS /porsche
  D: 96
  T: 96 5:02 P
  
  
  cc:
  
  
  
   GWEN RAMESH MD
   [address] [place] [address] [address]
   [place] [address]
  
  
  
   NICK COLE MD
    [address]
   [place] [address]
   Electronically signed by Interface, Transcription In at 2007  7:57 AM PDTdocumented
 in this encounter
 
 Plan of Treatment
 Not on filedocumented as of this encounter
 
 Visit Diagnoses
 Not on filedocumented in this encounter

## 2019-12-04 NOTE — XMS
Encounter Summary
  Created on: 2019
 
 Margaret Ferreira
 External Reference #: [id number]
 : 30
 Sex: Female
 
 Demographics
 
 
+-----------------------+----------------------+
| Address               | [address]        |
|                       | [place]  [address] |
+-----------------------+----------------------+
| Home Phone            | [phone]      |
+-----------------------+----------------------+
| Preferred Language    | Unknown              |
+-----------------------+----------------------+
| Marital Status        |               |
+-----------------------+----------------------+
| Congregation Affiliation | Unknown              |
+-----------------------+----------------------+
| Race                  | Unknown              |
+-----------------------+----------------------+
| Ethnic Group          | Unknown              |
+-----------------------+----------------------+
 
 
 Author
 
 
+--------------+--------------------------------------------+
| Author       | [facility]  |
|              | and [place]                                |
+--------------+--------------------------------------------+
| Organization | [facility]  |
|              | and [place]                                |
+--------------+--------------------------------------------+
| Address      | Unknown                                    |
+--------------+--------------------------------------------+
| Phone        | Unavailable                                |
+--------------+--------------------------------------------+
 
 
 
 Support
 
 
+--------------+--------------+---------+-----------------+
| Name         | Relationship | Address | Phone           |
+--------------+--------------+---------+-----------------+
| Lawson Hanna | ECON         | Unknown | [phone] |
+--------------+--------------+---------+-----------------+
 
 
 
 Care Team Providers
 
 
 
+-----------------------+------+-----------------+
| Care Team Member Name | Role | Phone           |
+-----------------------+------+-----------------+
| Calixto Chou MD | PCP  | [phone] |
+-----------------------+------+-----------------+
 
 
 
 Reason for Visit
 
 
+---------+--------------------+
| Reason  | Comments           |
+---------+--------------------+
| Results | nocturnal oximetry |
+---------+--------------------+
 
 
 
 Encounter Details
 
 
+--------+-----------+----------------------+----------------+---------------------+
| Date   | Type      | Department           | Care Team      | Description         |
+--------+-----------+----------------------+----------------+---------------------+
| / | Telephone |   [facility]          |   [place],  | Results (nocturnal  |
|    |           | PULMONARY  [address]     | Mary PADRON MD  | oximetry)           |
|        |           | [place]  [place], |                |                     |
|        |           |  [place] [address]       |                |                     |
|        |           | [phone]         |                |                     |
+--------+-----------+----------------------+----------------+---------------------+
 
 
 
 Social History
 
 
+--------------+-------+-----------+--------+------+
| Tobacco Use  | Types | Packs/Day | Years  | Date |
|              |       |           | Used   |      |
+--------------+-------+-----------+--------+------+
| Never Smoker |       |           |        |      |
+--------------+-------+-----------+--------+------+
 
 
 
+-------------------------------------------------------------------+
| Comments: her parents both smoked, also worked at [employer] |
+-------------------------------------------------------------------+
 
 
 
+-------------+-------------+---------+----------+
| Alcohol Use | Drinks/Week | oz/Week | Comments |
+-------------+-------------+---------+----------+
| No          |             |         |          |
+-------------+-------------+---------+----------+
 
 
 
 
+------------------+---------------+
| Sex Assigned at  | Date Recorded |
| Birth            |               |
+------------------+---------------+
| Not on file      |               |
+------------------+---------------+
 
 
 
+----------------+-------------+-------------+
| Job Start Date | Occupation  | Industry    |
+----------------+-------------+-------------+
| Not on file    | Not on file | Not on file |
+----------------+-------------+-------------+
 
 
 
+----------------+--------------+------------+
| Travel History | Travel Start | Travel End |
+----------------+--------------+------------+
 
 
 
+-------------------------------------+
| No recent travel history available. |
+-------------------------------------+
 documented as of this encounter
 
 Plan of Treatment
 Not on filedocumented as of this encounter
 
 Visit Diagnoses
 Not on filedocumented in this encounter

## 2019-12-04 NOTE — NUR
PT RESTING IN BED, EYES CLOSED. RESPIRATIONS EVEN AND UNLABORED. CALL LIGHT IN
REACH. SPOKE TO FAMILY IN HALLWAY FOR PT UPDATE, QUESTIONS ANSWERED.

## 2019-12-04 NOTE — XMS
Encounter Summary
  Created on: 2019
 
 RejiMargaret hernández ANG
 : 30
 Sex: Female
 
 Demographics
 
 
+-----------------------+------------------------+
| Address               | [address]      |
|                       | [place]  [address]   |
+-----------------------+------------------------+
| Home Phone            | [phone]        |
+-----------------------+------------------------+
| Preferred Language    | Unknown                |
+-----------------------+------------------------+
| Marital Status        |                 |
+-----------------------+------------------------+
| Sabianist Affiliation | MET                    |
+-----------------------+------------------------+
| Race                  | White                  |
+-----------------------+------------------------+
| Ethnic Group          | Not  or  |
+-----------------------+------------------------+
 
 
 Author
 
 
+--------------+-------------+
| Organization | Unknown     |
+--------------+-------------+
| Address      | Unknown     |
+--------------+-------------+
| Phone        | Unavailable |
+--------------+-------------+
 
 
 
 Support
 
 
+--------------+--------------+---------------------+-----------------+
| Name         | Relationship | Address             | Phone           |
+--------------+--------------+---------------------+-----------------+
| Lawson Ferreira | ECON         | [address]          | [phone] |
|              |              | [place], [place]   |                 |
|              |              | [address]               |                 |
+--------------+--------------+---------------------+-----------------+
 
 
 
 Care Team Providers
 
 
+-----------------------+------+-------------+
 
| Care Team Member Name | Role | Phone       |
+-----------------------+------+-------------+
 PCP  | Unavailable |
+-----------------------+------+-------------+
 
 
 
 Encounter Details
 
 
+--------+----------+------------+--------------------+-------------+
| Date   | Type     | Department | Care Team          | Description |
+--------+----------+------------+--------------------+-------------+
| / | Results  |            |   Other, Faculty   |             |
|    | Only     |            | [phone]       |             |
+--------+----------+------------+--------------------+-------------+
 
 
 
 Social History
 
 
+----------------+-------+-----------+--------+------+
| Tobacco Use    | Types | Packs/Day | Years  | Date |
|                |       |           | Used   |      |
+----------------+-------+-----------+--------+------+
| Never Assessed |       |           |        |      |
+----------------+-------+-----------+--------+------+
 
 
 
+------------------+---------------+
| Sex Assigned at  | Date Recorded |
| Birth            |               |
+------------------+---------------+
| Not on file      |               |
+------------------+---------------+
 
 
 
+----------------+-------------+-------------+
| Job Start Date | Occupation  | Industry    |
+----------------+-------------+-------------+
| Not on file    | Not on file | Not on file |
+----------------+-------------+-------------+
 
 
 
+----------------+--------------+------------+
| Travel History | Travel Start | Travel End |
+----------------+--------------+------------+
 
 
 
+-------------------------------------+
| No recent travel history available. |
+-------------------------------------+
 documented as of this encounter
 
 Plan of Treatment
 
 Not on filedocumented as of this encounter
 
 Procedures
 
 
+--------------------+--------+-------------+----------------------+----------------------+
| Procedure Name     | Priori | Date/Time   | Associated Diagnosis | Comments             |
|                    | ty     |             |                      |                      |
+--------------------+--------+-------------+----------------------+----------------------+
| CHEST, 1 VIEW,     | Urgent | 1996  |                      |   Results for this   |
| PORTABLE           |        |  3:00 AM    |                      | procedure are in the |
|                    |        | PST         |                      |  results section.    |
+--------------------+--------+-------------+----------------------+----------------------+
| SURGICAL PATHOLOGY | Routin | 1996  |                      |   Results for this   |
|                    | e      |             |                      | procedure are in the |
|                    |        |             |                      |  results section.    |
+--------------------+--------+-------------+----------------------+----------------------+
 documented in this encounter
 
 Results
 CHEST, 1 VIEW, PORTABLE (1996  3:00 AM PST)
 
+-----------+--------------------------+-----------+------------+--------------+
| Component | Value                    | Ref Range | Performed  | Pathologist  |
|           |                          |           | At         | Signature    |
+-----------+--------------------------+-----------+------------+--------------+
| CHEST, 1  | Radiologist 1: MARZENA,  |           |            |              |
| VIEW,     Kezia BARROW M.D.-Radiologist |           |            |              |
| PORTABLE  |  2: Nicolas Bryant, |           |            |              |
|           |  [facility]ARGARET HERNANDEZ  |           |            |              |
|           |                          |           |            |              |
|           |                          |           |            |              |
|           |          AP   |           |            |              |
|           | PORTABLE CHEST done on   |           |            |              |
|           | 96 at              |           |            |              |
|           | 03:00Dictated on         |           |            |              |
|           | 96 COMPARISON:     |           |            |              |
|           |   Exam of 96        |           |            |              |
|           | FINDINGS:   A right      |           |            |              |
|           | internal jugular         |           |            |              |
|           | intravenous catheter is  |           |            |              |
|           | in place.Its tip         |           |            |              |
|           | projects into the distal |           |            |              |
|           |  superior vena cava.     |           |            |              |
|           |   There is               |           |            |              |
|           | nopneumothorax. The lung |           |            |              |
|           |  volumes are low         |           |            |              |
|           | bilaterally.   There is  |           |            |              |
|           | interval increase inthe  |           |            |              |
|           | pulmonary vessels and    |           |            |              |
|           | their markings appear    |           |            |              |
|           | indistinct.   There      |           |            |              |
|           | isobscuration of the     |           |            |              |
|           | left hemidiaphragm by    |           |            |              |
|           | basilar opacity.         |           |            |              |
|           |   Heartappears grossly   |           |            |              |
|           | stable.   IMPRESSION: 1. |           |            |              |
|           |    Increasing pulmonary  |           |            |              |
|           | edema. 2.   Low lung     |           |            |              |
|           | volumes with left        |           |            |              |
 
|           | basilar opacity          |           |            |              |
|           | representing             |           |            |              |
|           | eitheratelectasis or     |           |            |              |
|           | infiltrate. 3.   The tip |           |            |              |
|           |  of the internal jugular |           |            |              |
|           |  line is in the superior |           |            |              |
|           |  vena cavaand there is   |           |            |              |
|           | no pneumothorax. END OF  |           |            |              |
|           | IMPRESSION:              |           |            |              |
+-----------+--------------------------+-----------+------------+--------------+
 
 
 
+----------+
| Specimen |
+----------+
|          |
+----------+
 
 
 
+---------------------------------+--------------+
| Narrative                       | Performed At |
+---------------------------------+--------------+
|   Ordered by [facility]    |              |
+---------------------------------+--------------+
 
 
 
+----------------------+---------+--------------------+--------------+
| Performing           | Address | City/State/Zipcode | Phone Number |
| Organization         |         |                    |              |
+----------------------+---------+--------------------+--------------+
|   [facility] |         |                    |              |
|  RADIOLOGY           |         |                    |              |
+----------------------+---------+--------------------+--------------+
 SURGICAL PATHOLOGY (1996)
 
+-----------+--------------------------+-----------+-------------+--------------+
| Component | Value                    | Ref Range | Performed   | Pathologist  |
|           |                          |           | At          | Signature    |
+-----------+--------------------------+-----------+-------------+--------------+
| SURGICAL  | SOURCE OF SPECIMEN: SEE  |           | [facility]        |              |
| PATHOLOGY | RESULTS                  |           | DEPARTMENT  |              |
|           | Preliminary              |           | OF          |              |
|           | History:CLINICAL         |           | PATHOLOGY   |              |
|           | HISTORYThe patient is a  |           |             |              |
|           | 65 year old woman. NO    |           |             |              |
|           | HISTORY PROVIDED.        |           |             |              |
|           | GROSS DESCRIPTIONTwo     |           |             |              |
|           | specimens are received   |           |             |              |
|           | in formalin labeled as   |           |             |              |
|           | follows:       #1 RIGHT  |           |             |              |
|           | OVARY: Present is a      |           |             |              |
|           | distal portion of        |           |             |              |
|           | fallopian tube and       |           |             |              |
|           | ovarymeasuring 3.0 x 2.8 |           |             |              |
|           |  x 1.5 cm. in aggregate. |           |             |              |
|           |  The fallopian tube is   |           |             |              |
|           | ofnormal course and      |           |             |              |
 
|           | caliber, and has a       |           |             |              |
|           | smooth, pink serosal     |           |             |              |
|           | surface.Adjacent to the  |           |             |              |
|           | fimbriated edge of the   |           |             |              |
|           | tube is a cystic         |           |             |              |
|           | structure filledwith     |           |             |              |
|           | clear fluid. It measures |           |             |              |
|           |  1.1 x 0.8 x 0.6 cm. The |           |             |              |
|           |  ovary has aslightly     |           |             |              |
|           | nodular, white surface   |           |             |              |
|           | and measures 2.2 x 1.0 x |           |             |              |
|           |  0.6 cm. Oncross section |           |             |              |
|           |  the ovary has numerous  |           |             |              |
|           | cysts filled with clear  |           |             |              |
|           | fluid,ranging from 0.3   |           |             |              |
|           | to 0.5 cm. in diameter.  |           |             |              |
|           | The remainder of the     |           |             |              |
|           | parenchymais firm and    |           |             |              |
|           | white. Representative    |           |             |              |
|           | sections are submitted   |           |             |              |
|           | as follows:Cassette 1A   |           |             |              |
|           | contains fallopian tube  |           |             |              |
|           | and cyst adjacent to     |           |             |              |
|           | fallopian tube;cassette  |           |             |              |
|           | 1B contains ovary.       |           |             |              |
|           |  #2 LEFT OVARY: Present  |           |             |              |
|           | are a portion of         |           |             |              |
|           | fallopian tube and an    |           |             |              |
|           | ovarymeasuring 3.3 x 2.0 |           |             |              |
|           |  x 0.7 cm. in aggregate. |           |             |              |
|           |  The fallopian tube is   |           |             |              |
|           | ofnormal course and      |           |             |              |
|           | caliber. Its serosal     |           |             |              |
|           | surface is smooth and    |           |             |              |
|           | pink. Theovary itself    |           |             |              |
|           | measures 2.5 x 1.3 x 0.8 |           |             |              |
|           |  cm. The surface is      |           |             |              |
|           | white andslightly        |           |             |              |
|           | irregular. On cut        |           |             |              |
|           | section the parenchyma   |           |             |              |
|           | has a vague              |           |             |              |
|           | nodularityand is         |           |             |              |
|           | yellow-white. No cysts   |           |             |              |
|           | are identified.          |           |             |              |
|           | Representative sections  |           |             |              |
|           | offallopian tube and     |           |             |              |
|           | ovary are submitted in   |           |             |              |
|           | cassette 2A.       All   |           |             |              |
|           | tissue sections taken    |           |             |              |
|           | are submitted for        |           |             |              |
|           | microscopic              |           |             |              |
|           | evaluation.Case dictated |           |             |              |
|           |  by:   Андрей Costa,    |           |             |              |
|           | M.D./f                   |           |             |              |
|           | FINAL DIAGNOSISRIGHT     |           |             |              |
|           | OVARY:   FALLOPIAN TUBE  |           |             |              |
|           | WITH NO DIAGNOSTIC       |           |             |              |
|           | ABNORMALITY   PARATUBAL  |           |             |              |
|           | CYST   SENESCENT OVARY   |           |             |              |
|           |   INCLUSION CYSTSLEFT    |           |             |              |
 
|           | OVARY:   FALLOPIAN TUBE  |           |             |              |
|           | WITH NO DIAGNOSTIC       |           |             |              |
|           | ABNORMALITY   SENESCENT  |           |             |              |
|           | OVARY       Note: In the |           |             |              |
|           |  absence of clinical     |           |             |              |
|           | history, the diagnosis   |           |             |              |
|           | is based ongross and/or  |           |             |              |
|           | histologic findings      |           |             |              |
|           | only.       Case         |           |             |              |
|           | reviewed by:   Marshall   |           |             |              |
|           | Felix                 |           |             |              |
|           | M.D.T:96:dj         |           |             |              |
|           | My electronic signature  |           |             |              |
|           | indicates that I have    |           |             |              |
|           | personally reviewed      |           |             |              |
|           | alldiagnostic slides,    |           |             |              |
|           | the gross and/or         |           |             |              |
|           | microscopic portion of   |           |             |              |
|           | thisreport and           |           |             |              |
|           | formulated the final     |           |             |              |
|           | diagnosis.               |           |             |              |
+-----------+--------------------------+-----------+-------------+--------------+
 
 
 
+----------+
| Specimen |
+----------+
| Other    |
+----------+
 
 
 
+----------------------+------------------------+--------------------+--------------+
| Performing           | Address                | City/State/Zipcode | Phone Number |
| Organization         |                        |                    |              |
+----------------------+------------------------+--------------------+--------------+
|   [facility] |   [address]  | [place] [address] |              |
|  PATHOLOGY           | [address]                |                    |              |
+----------------------+------------------------+--------------------+--------------+
 documented in this encounter
 
 Visit Diagnoses
 Not on filedocumented in this encounter

## 2019-12-04 NOTE — XMS
Encounter Summary
  Created on: 2019
 
 Margaret Ferreira
 External Reference #: [id number]
 : 30
 Sex: Female
 
 Demographics
 
 
+-----------------------+----------------------+
| Address               | [address]        |
|                       | [place]  [address] |
+-----------------------+----------------------+
| Home Phone            | [phone]      |
+-----------------------+----------------------+
| Preferred Language    | Unknown              |
+-----------------------+----------------------+
| Marital Status        |               |
+-----------------------+----------------------+
| Congregational Affiliation | Unknown              |
+-----------------------+----------------------+
| Race                  | Unknown              |
+-----------------------+----------------------+
| Ethnic Group          | Unknown              |
+-----------------------+----------------------+
 
 
 Author
 
 
+--------------+--------------------------------------------+
| Author       | [facility]  |
|              | and [place]                                |
+--------------+--------------------------------------------+
| Organization | [facility]  |
|              | and [place]                                |
+--------------+--------------------------------------------+
| Address      | Unknown                                    |
+--------------+--------------------------------------------+
| Phone        | Unavailable                                |
+--------------+--------------------------------------------+
 
 
 
 Support
 
 
+--------------+--------------+---------+-----------------+
| Name         | Relationship | Address | Phone           |
+--------------+--------------+---------+-----------------+
| Lawson Hanna | ECON         | Unknown | [phone] |
+--------------+--------------+---------+-----------------+
 
 
 
 Care Team Providers
 
 
 
+-----------------------+------+-----------------+
| Care Team Member Name | Role | Phone           |
+-----------------------+------+-----------------+
| Calixto Chou MD | PCP  | [phone] |
+-----------------------+------+-----------------+
 
 
 
 Reason for Visit
 
 
+---------+--------------------+
| Reason  | Comments           |
+---------+--------------------+
| Results | nocturnal oximetry |
+---------+--------------------+
 
 
 
 Encounter Details
 
 
+--------+-----------+----------------------+----------------+---------------------+
| Date   | Type      | Department           | Care Team      | Description         |
+--------+-----------+----------------------+----------------+---------------------+
| / | Telephone |   [facility]          |   [place],  | Results (nocturnal  |
|    |           | PULMONARY  [address]     | Mary PADRON MD  | oximetry)           |
|        |           | [address]  [place], |                |                     |
|        |           |  [place] [address]       |                |                     |
|        |           | [phone]         |                |                     |
+--------+-----------+----------------------+----------------+---------------------+
 
 
 
 Social History
 
 
+--------------+-------+-----------+--------+------+
| Tobacco Use  | Types | Packs/Day | Years  | Date |
|              |       |           | Used   |      |
+--------------+-------+-----------+--------+------+
| Never Smoker |       |           |        |      |
+--------------+-------+-----------+--------+------+
 
 
 
+-------------------------------------------------------------------+
| Comments: her parents both smoked, also worked at [employer] |
+-------------------------------------------------------------------+
 
 
 
+-------------+-------------+---------+----------+
| Alcohol Use | Drinks/Week | oz/Week | Comments |
+-------------+-------------+---------+----------+
| No          |             |         |          |
+-------------+-------------+---------+----------+
 
 
 
 
+------------------+---------------+
| Sex Assigned at  | Date Recorded |
| Birth            |               |
+------------------+---------------+
| Not on file      |               |
+------------------+---------------+
 
 
 
+----------------+-------------+-------------+
| Job Start Date | Occupation  | Industry    |
+----------------+-------------+-------------+
| Not on file    | Not on file | Not on file |
+----------------+-------------+-------------+
 
 
 
+----------------+--------------+------------+
| Travel History | Travel Start | Travel End |
+----------------+--------------+------------+
 
 
 
+-------------------------------------+
| No recent travel history available. |
+-------------------------------------+
 documented as of this encounter
 
 Plan of Treatment
 Not on filedocumented as of this encounter
 
 Visit Diagnoses
 Not on filedocumented in this encounter

## 2019-12-04 NOTE — XMS
Encounter Summary
  Created on: 2019
 
 Margaret Ferreira
 External Reference #: [id number]
 : 30
 Sex: Female
 
 Demographics
 
 
+-----------------------+----------------------+
| Address               | [address]        |
|                       | [place]  [address] |
+-----------------------+----------------------+
| Home Phone            | [phone]      |
+-----------------------+----------------------+
| Preferred Language    | Unknown              |
+-----------------------+----------------------+
| Marital Status        |               |
+-----------------------+----------------------+
| Holiness Affiliation | Unknown              |
+-----------------------+----------------------+
| Race                  | Unknown              |
+-----------------------+----------------------+
| Ethnic Group          | Unknown              |
+-----------------------+----------------------+
 
 
 Author
 
 
+--------------+--------------------------------------------+
| Author       | [facility]  |
|              | and [place]                                |
+--------------+--------------------------------------------+
| Organization | [facility]  |
|              | and [place]                                |
+--------------+--------------------------------------------+
| Address      | Unknown                                    |
+--------------+--------------------------------------------+
| Phone        | Unavailable                                |
+--------------+--------------------------------------------+
 
 
 
 Support
 
 
+--------------+--------------+---------+-----------------+
| Name         | Relationship | Address | Phone           |
+--------------+--------------+---------+-----------------+
| Lawson Hanna | ECON         | Unknown | [phone] |
+--------------+--------------+---------+-----------------+
 
 
 
 Care Team Providers
 
 
 
+-----------------------+------+-----------------+
| Care Team Member Name | Role | Phone           |
+-----------------------+------+-----------------+
| Calixto Chou MD | PCP  | [phone] |
+-----------------------+------+-----------------+
 
 
 
 Encounter Details
 
 
+--------+----------+----------------------+----------------+---------------------+
| Date   | Type     | Department           | Care Team      | Description         |
+--------+----------+----------------------+----------------+---------------------+
| / | Abstract |   [facility] [place]          |   [place],  | COPD (chronic       |
|    |          | PULMONARY  [address]     | Mary PADRON MD  | obstructive         |
|        |          | [place]  [place], |                | pulmonary disease)  |
|        |          |  [place] [address]       |                | ([facility]) (Primary Dx)  |
|        |          | [phone]         |                |                     |
+--------+----------+----------------------+----------------+---------------------+
 
 
 
 Social History
 
 
+----------------+-------+-----------+--------+------+
| Tobacco Use    | Types | Packs/Day | Years  | Date |
|                |       |           | Used   |      |
+----------------+-------+-----------+--------+------+
| Never Assessed |       |           |        |      |
+----------------+-------+-----------+--------+------+
 
 
 
+------------------+---------------+
| Sex Assigned at  | Date Recorded |
| Birth            |               |
+------------------+---------------+
| Not on file      |               |
+------------------+---------------+
 
 
 
+----------------+-------------+-------------+
| Job Start Date | Occupation  | Industry    |
+----------------+-------------+-------------+
| Not on file    | Not on file | Not on file |
+----------------+-------------+-------------+
 
 
 
+----------------+--------------+------------+
| Travel History | Travel Start | Travel End |
+----------------+--------------+------------+
 
 
 
 
+-------------------------------------+
| No recent travel history available. |
+-------------------------------------+
 documented as of this encounter
 
 Plan of Treatment
 Not on filedocumented as of this encounter
 
 Visit Diagnoses
 
 
+---------------------------------------------------------------------------------+
| Diagnosis                                                                       |
+---------------------------------------------------------------------------------+
|   COPD (chronic obstructive pulmonary disease) (HCC) - Primary  Chronic airway  |
| obstruction, not elsewhere classified                                           |
+---------------------------------------------------------------------------------+
 documented in this encounter

## 2019-12-04 NOTE — NUR
ATTEMPT TO GET PT UP TO BEDSIDE COMMODE, PER REQUEST.  UNABLE TO DO SO D/T
PAIN IN LEFT SIDE.  BACK TO BED, USING BEDPAN, PT ALREADY INCONT LG AMOUNT
URINE, PAINFUL, BUT ABLE TO GET ON BEDPAN.  ABLE TO TO TURN MORE TO THE LEFT
VS RIGHT.  DID NOT VOID.  WAS REPOSITIONED FOR COMFORT.  DAUGHTER AND 
AT BEDSIDE.

## 2019-12-04 NOTE — XMS
Encounter Summary
  Created on: 2019
 
 Margaret Ferreira
 External Reference #: [id number]
 : 30
 Sex: Female
 
 Demographics
 
 
+-----------------------+------------------------+
| Address               | [address]      |
|                       | [place]  [address]   |
+-----------------------+------------------------+
| Home Phone            | [phone]        |
+-----------------------+------------------------+
| Preferred Language    | Unknown                |
+-----------------------+------------------------+
| Marital Status        |                 |
+-----------------------+------------------------+
| Evangelical Affiliation | MET                    |
+-----------------------+------------------------+
| Race                  | White                  |
+-----------------------+------------------------+
| Ethnic Group          | Not  or  |
+-----------------------+------------------------+
 
 
 Author
 
 
+--------------+------------------------------+
| Author       | [facility] |
+--------------+------------------------------+
| Organization | [facility] |
+--------------+------------------------------+
| Address      | Unknown                      |
+--------------+------------------------------+
| Phone        | Unavailable                  |
+--------------+------------------------------+
 
 
 
 Support
 
 
+--------------+--------------+---------------------+-----------------+
| Name         | Relationship | Address             | Phone           |
+--------------+--------------+---------------------+-----------------+
| Lawson Ferreira | ECON         | [address]          | [phone] |
|              |              | [place], [place]   |                 |
|              |              | [address]               |                 |
+--------------+--------------+---------------------+-----------------+
 
 
 
 Care Team Providers
 
 
 
+-----------------------+------+-------------+
| Care Team Member Name | Role | Phone       |
+-----------------------+------+-------------+
 PCP  | Unavailable |
+-----------------------+------+-------------+
 
 
 
 Encounter Details
 
 
+--------+----------+----------------------+--------------------+-------------+
| Date   | Type     | Department           | Care Team          | Description |
+--------+----------+----------------------+--------------------+-------------+
| / | Results  |   [facility]  [address]  |   [place]   |             |
|    | Only     | [address]  | [phone]       |             |
|        |          |  [place], [place]        |                    |             |
|        |          | [address]           |                    |             |
|        |          | [phone]         |                    |             |
+--------+----------+----------------------+--------------------+-------------+
 
 
 
 Social History
 
 
+----------------+-------+-----------+--------+------+
| Tobacco Use    | Types | Packs/Day | Years  | Date |
|                |       |           | Used   |      |
+----------------+-------+-----------+--------+------+
| Never Assessed |       |           |        |      |
+----------------+-------+-----------+--------+------+
 
 
 
+------------------+---------------+
| Sex Assigned at  | Date Recorded |
| Birth            |               |
+------------------+---------------+
| Not on file      |               |
+------------------+---------------+
 
 
 
+----------------+-------------+-------------+
| Job Start Date | Occupation  | Industry    |
+----------------+-------------+-------------+
| Not on file    | Not on file | Not on file |
+----------------+-------------+-------------+
 
 
 
+----------------+--------------+------------+
| Travel History | Travel Start | Travel End |
+----------------+--------------+------------+
 
 
 
 
+-------------------------------------+
| No recent travel history available. |
+-------------------------------------+
 documented as of this encounter
 
 Plan of Treatment
 Not on filedocumented as of this encounter
 
 Procedures
 
 
+--------------------+--------+------------+----------------------+----------------------+
| Procedure Name     | Priori | Date/Time  | Associated Diagnosis | Comments             |
|                    | ty     |            |                      |                      |
+--------------------+--------+------------+----------------------+----------------------+
| SURGICAL PATHOLOGY | Routin | 1996 |                      |   Results for this   |
|                    | e      |            |                      | procedure are in the |
|                    |        |            |                      |  results section.    |
+--------------------+--------+------------+----------------------+----------------------+
 documented in this encounter
 
 Results
 SURGICAL PATHOLOGY (1996)
 
+-----------+--------------------------+-----------+-------------+--------------+
| Component | Value                    | Ref Range | Performed   | Pathologist  |
|           |                          |           | At          | Signature    |
+-----------+--------------------------+-----------+-------------+--------------+
| SURGICAL  | SOURCE OF SPECIMEN: SEE  |           | [facility]        |              |
| PATHOLOGY | RESULTS                  |           | DEPARTMENT  |              |
|           | Preliminary              |           | OF          |              |
|           | History:CLINICAL         |           | PATHOLOGY   |              |
|           | HISTORYThe patient is a  |           |             |              |
|           | 65 year old              |           |             |              |
|           | postmenopausal female    |           |             |              |
|           | with                     |           |             |              |
|           | completeprocidentia.     |           |             |              |
|           |   She is  4 para  |           |             |              |
|           | 4 and does not receive   |           |             |              |
|           | any hormonaltherapy.     |           |             |              |
|           |    GROSS DESCRIPTIONTwo  |           |             |              |
|           | specimens are received   |           |             |              |
|           | labelled as follows:     |           |             |              |
|           |    #1 UTERUS AND CERVIX: |           |             |              |
|           |  The specimen is         |           |             |              |
|           | received fresh and       |           |             |              |
|           | consists of auterus with |           |             |              |
|           |  attached cervix and     |           |             |              |
|           | vaginal cuff weighing 50 |           |             |              |
|           |  grams in toto.Bilateral |           |             |              |
|           |  fallopian tubes,        |           |             |              |
|           | ovaries and parametria   |           |             |              |
|           | are not present.         |           |             |              |
|           | Theuterus and cervix     |           |             |              |
|           | measure 8.5 cm x 5.0 x   |           |             |              |
|           | 2.5 cm. The serosal      |           |             |              |
|           | surface issmooth,        |           |             |              |
|           | pink-tan and glistening. |           |             |              |
|           |  The uterus is opened at |           |             |              |
|           |  3:00. Thevaginal cuff   |           |             |              |
 
|           | is 0.2 to 0.3 cm in      |           |             |              |
|           | length and has a smooth  |           |             |              |
|           | white-tanmucosa without  |           |             |              |
|           | grossly evident lesions. |           |             |              |
|           |  The ectocervical mucosa |           |             |              |
|           |  issmooth and pale tan.  |           |             |              |
|           | A firm nodule measuring  |           |             |              |
|           | 1.5 x 0.8 x 0.8 cm       |           |             |              |
|           | ispalpated within the    |           |             |              |
|           | posterior portion of the |           |             |              |
|           |  cervix. The cervical    |           |             |              |
|           | mucosais white-tan,      |           |             |              |
|           | smooth and glistening.   |           |             |              |
|           | The endometrial cavity   |           |             |              |
|           | istriangular and         |           |             |              |
|           | measures 2.5 cm in       |           |             |              |
|           | intercornual diameter    |           |             |              |
|           | and 3.5 cm inlength. The |           |             |              |
|           |  endometrium is          |           |             |              |
|           | white-tan and thin,      |           |             |              |
|           | measuring 1 mm           |           |             |              |
|           | inthickness. No focal    |           |             |              |
|           | lesions are seen. Serial |           |             |              |
|           |  cross sections reveal   |           |             |              |
|           | themyometrium to be      |           |             |              |
|           | generally tan and        |           |             |              |
|           | unremarkable, measuring  |           |             |              |
|           | 1.5 cm ingreatest        |           |             |              |
|           | thickness.               |           |             |              |
|           | Representative sections  |           |             |              |
|           | are submitted as         |           |             |              |
|           | follows:Cassette A,      |           |             |              |
|           | posterior ectocervix;    |           |             |              |
|           | Cassette B, anterior     |           |             |              |
|           | cervix; CassetteC, full  |           |             |              |
|           | thickness section of     |           |             |              |
|           | endomyometrium; Cassette |           |             |              |
|           |  D,                      |           |             |              |
|           | representativesections   |           |             |              |
|           | of endometrium.       #2 |           |             |              |
|           |    VAGINAL MUCOSA:   The |           |             |              |
|           |  specimen is received in |           |             |              |
|           |  formalin and consistsof |           |             |              |
|           |  two fragments of        |           |             |              |
|           | white-tan mucosa.   One  |           |             |              |
|           | fragment measures 7.0 x  |           |             |              |
|           | 3.0 x0.5 cm and the      |           |             |              |
|           | other fragment measures  |           |             |              |
|           | 6.0 x 3.0 x 0.5 cm.      |           |             |              |
|           |   The mucosalsurfaces    |           |             |              |
|           | are white-tan and shiny  |           |             |              |
|           | with no grossly visible  |           |             |              |
|           | lesions.   Thespecimen   |           |             |              |
|           | is serially sectioned    |           |             |              |
|           | and representative       |           |             |              |
|           | sections are submittedin |           |             |              |
|           |  cassettes 2A-B.         |           |             |              |
|           | All tissue sections      |           |             |              |
|           | taken are submitted for  |           |             |              |
|           | microscopic              |           |             |              |
 
|           | evaluation.Case dictated |           |             |              |
|           |  by: Daniel Mulligan        |           |             |              |
|           | MSIII/cc/kag             |           |             |              |
|           |    FINAL DIAGNOSISUTERUS |           |             |              |
|           |  AND CERVIX:   CERVIX:   |           |             |              |
|           |      NABOTHIAN CYSTS     |           |             |              |
|           |   SQUAMOUS METAPLASIA    |           |             |              |
|           |    HYPERKERATOSIS        |           |             |              |
|           |   ENDOMETRIUM:      NO   |           |             |              |
|           | DIAGNOSTIC ABNORMALITY   |           |             |              |
|           |   MYOMETRIUM:            |           |             |              |
|           |   ADENOMYOSISVAGINAL     |           |             |              |
|           | MUCOSA:   NO DIAGNOSTIC  |           |             |              |
|           | ABNORMALITY       Case   |           |             |              |
|           | reviewed by:   Marshall   |           |             |              |
|           | KAREN WashingtonT:          |           |             |              |
|           | 96/f       My       |           |             |              |
|           | electronic signature     |           |             |              |
|           | indicates that I have    |           |             |              |
|           | personally reviewed      |           |             |              |
|           | alldiagnostic slides,    |           |             |              |
|           | the gross and/or         |           |             |              |
|           | microscopic portion of   |           |             |              |
|           | thisreport and           |           |             |              |
|           | formulated the final     |           |             |              |
|           | diagnosis.               |           |             |              |
+-----------+--------------------------+-----------+-------------+--------------+
 
 
 
+----------+
| Specimen |
+----------+
| Other    |
+----------+
 
 
 
+----------------------+------------------------+--------------------+--------------+
| Performing           | Address                | City/State/Zipcode | Phone Number |
| Organization         |                        |                    |              |
+----------------------+------------------------+--------------------+--------------+
|   [facility] |   [address]  | [place] [address] |              |
|  PATHOLOGY           | [address]                |                    |              |
+----------------------+------------------------+--------------------+--------------+
 documented in this encounter
 
 Visit Diagnoses
 Not on filedocumented in this encounter

## 2019-12-04 NOTE — XMS
Encounter Summary
  Created on: 2019
 
 Margaret Ferreira
 External Reference #: [id number]
 : 30
 Sex: Female
 
 Demographics
 
 
+-----------------------+------------------------+
| Address               | [address]      |
|                       | [place]  [address]   |
+-----------------------+------------------------+
| Home Phone            | [phone]        |
+-----------------------+------------------------+
| Preferred Language    | Unknown                |
+-----------------------+------------------------+
| Marital Status        |                 |
+-----------------------+------------------------+
| Hinduism Affiliation | MET                    |
+-----------------------+------------------------+
| Race                  | White                  |
+-----------------------+------------------------+
| Ethnic Group          | Not  or  |
+-----------------------+------------------------+
 
 
 Author
 
 
+--------------+------------------------------+
| Author       | [facility] |
+--------------+------------------------------+
| Organization | [facility] |
+--------------+------------------------------+
| Address      | Unknown                      |
+--------------+------------------------------+
| Phone        | Unavailable                  |
+--------------+------------------------------+
 
 
 
 Support
 
 
+--------------+--------------+---------------------+-----------------+
| Name         | Relationship | Address             | Phone           |
+--------------+--------------+---------------------+-----------------+
| Lawson Ferreira | ECON         | [address]          | [phone] |
|              |              | [place], [place]   |                 |
|              |              | [address]               |                 |
+--------------+--------------+---------------------+-----------------+
 
 
 
 Care Team Providers
 
 
 
+-----------------------+------+-------------+
| Care Team Member Name | Role | Phone       |
+-----------------------+------+-------------+
 PCP  | Unavailable |
+-----------------------+------+-------------+
 
 
 
 Encounter Details
 
 
+--------+-------------+----------------------+----------------------+------------------+
| Date   | Type        | Department           | Care Team            | Description      |
+--------+-------------+----------------------+----------------------+------------------+
| / | Discharge   |   [facility] at  |   Summary, Discharge | D/C Summary ODDS |
|    | Summary-Tra | [facility]  [address]     |                      |                  |
|        | nscribed    | [address]      |                      |                  |
|        |             | Mailcode: [address]  |                      |                  |
|        |             | [address]         |                      |                  |
|        |             | [place]  [facility]  |                      |                  |
|        |             | [place] [address]        |                      |                  |
|        |             | [phone]         |                      |                  |
+--------+-------------+----------------------+----------------------+------------------+
 
 
 
 Social History
 
 
+----------------+-------+-----------+--------+------+
| Tobacco Use    | Types | Packs/Day | Years  | Date |
|                |       |           | Used   |      |
+----------------+-------+-----------+--------+------+
| Never Assessed |       |           |        |      |
+----------------+-------+-----------+--------+------+
 
 
 
+------------------+---------------+
| Sex Assigned at  | Date Recorded |
| Birth            |               |
+------------------+---------------+
| Not on file      |               |
+------------------+---------------+
 
 
 
+----------------+-------------+-------------+
| Job Start Date | Occupation  | Industry    |
+----------------+-------------+-------------+
| Not on file    | Not on file | Not on file |
+----------------+-------------+-------------+
 
 
 
+----------------+--------------+------------+
| Travel History | Travel Start | Travel End |
+----------------+--------------+------------+
 
 
 
 
+-------------------------------------+
| No recent travel history available. |
+-------------------------------------+
 documented as of this encounter
 
 Discharge Summaries
 Interface, Transcription In - 2007  7:57 AM PDT  [address] [address] [phone]
   [facility]
  
  MEDICAL SUMMARY OF HOSPITALIZATION
  
  Med Rec No.: [id number] Admission Date: 96
  
  Name: Margaret Ferreira Discharge Date: 96
  
  
  STAFF PHYSICIAN: BRITTANY Perez M.D.
   Professor and ,
   Obstetrics and Gynecology
  
  PRINCIPAL FINAL DIAGNOSIS: 1. Complete procidentia.
  
  ADDITIONAL DIAGNOSIS(ES): 2. Cystocele.
   3. Hemoperitoneum.
  
  PRINCIPAL PROCEDURE: 1. Total vaginal hysterectomy with
   sacrospinous fixation.
  
  ADDITIONAL PROCEDURES: 2. Anterior colporrhaphy
   3. Exploratory laparotomy.
   4. Bilateral salpingo-oophorectomy.
   5. Transfusion of blood products.
   6. Arterial line placement.
  
  REASON FOR ADMISSION: The patient is a 65-year-old  6 para
   4-0-2-4 woman who has been postmenopausal
   for many years. She is
   referred from Dr. Marvin Cole in [place]
   for complete procidentia.
  
  SIGNIFICANT HISTORY: A Pap smear on 1995 which
   demonstrated endometrial cells. This was
   performed two days after
  Provera withdrawal. The patient has not been on hormone replacement
  therapy. Endometrial biopsy with endocervical curettage was obtained and
  was negative. Mammogram and electrocardiogram (EKG) performed on 
  were normal. The patient has suffered from significant uterine prolapse for
  a number of years. This is uncomfortable and often requires reduction to
  void and has made intercourse uncomfortable.
  
  PAST SURGERIES: Tonsillectomy and adenoidectomy as a child,
   dilation and curettage times two, left hip
   replacement in .
  
  PAST MEDICAL HISTORY: 1) Arthritis treated with nonsteroidals. 2)
 
   Hypertension for over ten years. 3) Peptic
   ulcer disease exacerbated by
   acids. 4) Radiation for hyperthyroidism as
   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
  
  MEDICATIONS: Hygroton, 25 mg p.o. q. day.
   K-Dur, 16 milliequivalents p.o. q. day.
   Zantac, 150 mg p.o. b.i.d.
   Calcium, 1200 mg q. day.
   Synthroid, 0.125 mg per week.
   Cardura, 2 mg q. day.
   Voltaren, 75 mg b.i.d.
  
  PAST OBSTETRIC HISTORY: Significant for four normal spontaneous
   vaginal deliveries without complication and
   two miscarriages.
  
  PHYSICAL EXAMINATION: GENERAL: Pleasant, in no acute distress.
   NECK: Supple, no adenopathy or thyromegaly.
   LUNGS: Clear.
  HEART: II/VI systolic ejection murmur, distant heart sounds. ABDOMEN:
  Slightly obese, soft, nontender. No organomegaly. PELVIC EXAM, EXTERNAL
  GENITALIA: Normal postmenopausal vulva. Cervix appears normal. There is a
  three out of four cystocele. Uterus prolapse approximately 3 to 4 cm beyond
  the introitus with strain. No rectocele. Minimal enterocele. With the
  uterus replaced, there is no urine leak with cough.
  
  HOSPITAL COURSE: The patient was admitted on 1996
   and underwent total vaginal hysterectomy,
   sacrospinous fixation
  and anterior colporrhaphy. The procedure went smoothly, without any
  complications. In the immediate postoperative period she was stable.
  However, on the night after surgery, she developed acute hypotension and
  worsening abdominal pain which was concerning for ongoing bleeding. A
  hematocrit was obtained which was 24.3. The patient was taken emergently
  back to the Operating Room, where she underwent exploratory laparotomy and
  evacuation of a hemoperitoneum, approximately 1.5 liters. No obvious
  bleeding site was identified, although there was a hematoma along the left
  broad ligament. At the time of her exploratory laparotomy, she had a
  bilateral salpingo-oophorectomy performed. Small atrophic ovaries were
  removed.
  
  The remainder of her hospital course was unremarkable. She received a total
  of seven units packed red blood cells and one unit of fresh frozen plasma
  perioperatively following her second surgery. Her Hollins catheter was
  removed on the second postoperative day and she was able to void
  spontaneously without any difficulty. Her postoperative ileus resolved
  spontaneously, and by the fourth postoperative day, she was tolerating a
  regular diet. Her hematocrit stabilized to a final value of 35.4. She
  remained afebrile throughout her hospital course. She was discharged on
  
  1996 in stable condition.
  
  DISPOSITION: Home.
  
  DISCHARGE MEDICATIONS: Tylox 1 to 2 p.o. q. 4 to 6 hours p.r.n.
   pain. She was given 40.
   Premarin 0.625 mg p.o. q. day.
   Colace 100 mg p.o. b.i.d. p.r.n.
   constipation.
 
   Ferrous sulfate 325 mg p.o. b.i.d.
  
  CONDITION ON DISCHARGE: Stable.
  
  DISCHARGE INSTRUCTION(S): Her follow-up care was to be with Dr. Perez
   approximately one week after discharge. She
   was instructed to call or
  return with fevers, chills, nausea, vomiting, worsening abdominal pain or
  leg pain.
  
  
  
  
  Nadja Carcamo M.D.
  Resident, Obstetrics and Gynecology
  BRITTANY Perez M.D.
  Professor and ,
  Obstetrics and Gynecology
  
  CLOVIS /porsche
  D: 96
  T: 96 5:02 P
  
  
  cc:
  
  
  
   GWEN RAMESH MD
   [address] [place] [address] [address]
   [place] [address]
  
  
  
   NICK COLE MD
    [id number]
   [place] [address]
   Electronically signed by Interface, Transcription In at 2007  7:57 AM PDTdocumented
 in this encounter
 
 Plan of Treatment
 Not on filedocumented as of this encounter
 
 Visit Diagnoses
 Not on filedocumented in this encounter

## 2019-12-04 NOTE — NUR
Patient laying in bed with family at bedside. Patient reports pain of 1/10
after prn pain medication (see MAR). Hematoma assessed, bruising remains
within outline. Vital signs stable. Patient denies further needs at this time,
call light within reach.

## 2019-12-04 NOTE — XMS
Encounter Summary
  Created on: 2019
 
 RejiMargaret hernández ANG
 : 30
 Sex: Female
 
 Demographics
 
 
+-----------------------+------------------------+
| Address               | [address]      |
|                       | [place]  [address]   |
+-----------------------+------------------------+
| Home Phone            | [phone]        |
+-----------------------+------------------------+
| Preferred Language    | Unknown                |
+-----------------------+------------------------+
| Marital Status        |                 |
+-----------------------+------------------------+
| Yazidism Affiliation | MET                    |
+-----------------------+------------------------+
| Race                  | White                  |
+-----------------------+------------------------+
| Ethnic Group          | Not  or  |
+-----------------------+------------------------+
 
 
 Author
 
 
+--------------+-------------+
| Organization | Unknown     |
+--------------+-------------+
| Address      | Unknown     |
+--------------+-------------+
| Phone        | Unavailable |
+--------------+-------------+
 
 
 
 Support
 
 
+--------------+--------------+---------------------+-----------------+
| Name         | Relationship | Address             | Phone           |
+--------------+--------------+---------------------+-----------------+
| Lawson Ferreira | [facility]         | [address]          | [phone] |
|              |              | [place], [place]   |                 |
|              |              | [address]               |                 |
+--------------+--------------+---------------------+-----------------+
 
 
 
 Care Team Providers
 
 
+-----------------------+------+-------------+
 
| Care Team Member Name | Role | Phone       |
+-----------------------+------+-------------+
 PCP  | Unavailable |
+-----------------------+------+-------------+
 
 
 
 Encounter Details
 
 
+--------+----------+------------+--------------------+-------------+
| Date   | Type     | Department | Care Team          | Description |
+--------+----------+------------+--------------------+-------------+
| / | Results  |            |   Other, Faculty   |             |
|    | Only     |            | [phone]       |             |
+--------+----------+------------+--------------------+-------------+
 
 
 
 Social History
 
 
+----------------+-------+-----------+--------+------+
| Tobacco Use    | Types | Packs/Day | Years  | Date |
|                |       |           | Used   |      |
+----------------+-------+-----------+--------+------+
| Never Assessed |       |           |        |      |
+----------------+-------+-----------+--------+------+
 
 
 
+------------------+---------------+
| Sex Assigned at  | Date Recorded |
| Birth            |               |
+------------------+---------------+
| Not on file      |               |
+------------------+---------------+
 
 
 
+----------------+-------------+-------------+
| Job Start Date | Occupation  | Industry    |
+----------------+-------------+-------------+
| Not on file    | Not on file | Not on file |
+----------------+-------------+-------------+
 
 
 
+----------------+--------------+------------+
| Travel History | Travel Start | Travel End |
+----------------+--------------+------------+
 
 
 
+-------------------------------------+
| No recent travel history available. |
+-------------------------------------+
 documented as of this encounter
 
 Plan of Treatment
 
 Not on filedocumented as of this encounter
 
 Procedures
 
 
+--------------------+--------+-------------+----------------------+----------------------+
| Procedure Name     | Priori | Date/Time   | Associated Diagnosis | Comments             |
|                    | ty     |             |                      |                      |
+--------------------+--------+-------------+----------------------+----------------------+
| CHEST, 1 VIEW,     | Urgent | 1996  |                      |   Results for this   |
| PORTABLE           |        |  3:00 AM    |                      | procedure are in the |
|                    |        | PST         |                      |  results section.    |
+--------------------+--------+-------------+----------------------+----------------------+
| SURGICAL PATHOLOGY | Routin | 1996  |                      |   Results for this   |
|                    | e      |             |                      | procedure are in the |
|                    |        |             |                      |  results section.    |
+--------------------+--------+-------------+----------------------+----------------------+
 documented in this encounter
 
 Results
 CHEST, 1 VIEW, PORTABLE (1996  3:00 AM PST)
 
+-----------+--------------------------+-----------+------------+--------------+
| Component | Value                    | Ref Range | Performed  | Pathologist  |
|           |                          |           | At         | Signature    |
+-----------+--------------------------+-----------+------------+--------------+
| CHEST, 1  | Radiologist 1: MARZENA,  |           |            |              |
| VIEW,     Kezia BARROW M.D.-Radiologist |           |            |              |
| PORTABLE  |  2: Nicolas Bryant, |           |            |              |
|           |  [facility]ARG[facility] HERNANDE[facility]  |           |            |              |
|           |                          |           |            |              |
|           |                          |           |            |              |
|           |          AP   |           |            |              |
|           | PORTABLE CHEST done on   |           |            |              |
|           | 96 at              |           |            |              |
|           | 03:00Dictated on         |           |            |              |
|           | 96 COMPARISON:     |           |            |              |
|           |   Exam of 96        |           |            |              |
|           | FINDINGS:   A right      |           |            |              |
|           | internal jugular         |           |            |              |
|           | intravenous catheter is  |           |            |              |
|           | in place.Its tip         |           |            |              |
|           | projects into the distal |           |            |              |
|           |  superior vena cava.     |           |            |              |
|           |   There is               |           |            |              |
|           | nopneumothorax. The lung |           |            |              |
|           |  volumes are low         |           |            |              |
|           | bilaterally.   There is  |           |            |              |
|           | interval increase inthe  |           |            |              |
|           | pulmonary vessels and    |           |            |              |
|           | their markings appear    |           |            |              |
|           | indistinct.   There      |           |            |              |
|           | isobscuration of the     |           |            |              |
|           | left hemidiaphragm by    |           |            |              |
|           | basilar opacity.         |           |            |              |
|           |   Heartappears grossly   |           |            |              |
|           | stable.   IMPRESSION: 1. |           |            |              |
|           |    Increasing pulmonary  |           |            |              |
|           | edema. 2.   Low lung     |           |            |              |
|           | volumes with left        |           |            |              |
 
|           | basilar opacity          |           |            |              |
|           | representing             |           |            |              |
|           | eitheratelectasis or     |           |            |              |
|           | infiltrate. 3.   The tip |           |            |              |
|           |  of the internal jugular |           |            |              |
|           |  line is in the superior |           |            |              |
|           |  vena cavaand there is   |           |            |              |
|           | no pneumothorax. END OF  |           |            |              |
|           | IMPRESSION:              |           |            |              |
+-----------+--------------------------+-----------+------------+--------------+
 
 
 
+----------+
| Specimen |
+----------+
|          |
+----------+
 
 
 
+---------------------------------+--------------+
| Narrative                       | Performed At |
+---------------------------------+--------------+
|   Ordered by OLMAN [facility]    |              |
+---------------------------------+--------------+
 
 
 
+----------------------+---------+--------------------+--------------+
| Performing           | Address | City/State/Zipcode | Phone Number |
| Organization         |         |                    |              |
+----------------------+---------+--------------------+--------------+
|   [facility] |         |                    |              |
|  RADIOLOGY           |         |                    |              |
+----------------------+---------+--------------------+--------------+
 SURGICAL PATHOLOGY (1996)
 
+-----------+--------------------------+-----------+-------------+--------------+
| Component | Value                    | Ref Range | Performed   | Pathologist  |
|           |                          |           | At          | Signature    |
+-----------+--------------------------+-----------+-------------+--------------+
| SURGICAL  | SOURCE OF SPECIMEN: SEE  |           | [facility]        |              |
| PATHOLOGY | RESULTS                  |           | DEPARTMENT  |              |
|           | Preliminary              |           | OF          |              |
|           | History:CLINICAL         |           | PATHOLOGY   |              |
|           | HISTORYThe patient is a  |           |             |              |
|           | 65 year old woman. NO    |           |             |              |
|           | HISTORY PROVIDED.        |           |             |              |
|           | GROSS DESCRIPTIONTwo     |           |             |              |
|           | specimens are received   |           |             |              |
|           | in formalin labeled as   |           |             |              |
|           | follows:       #1 RIGHT  |           |             |              |
|           | OVARY: Present is a      |           |             |              |
|           | distal portion of        |           |             |              |
|           | fallopian tube and       |           |             |              |
|           | ovarymeasuring 3.0 x 2.8 |           |             |              |
|           |  x 1.5 cm. in aggregate. |           |             |              |
|           |  The fallopian tube is   |           |             |              |
|           | ofnormal course and      |           |             |              |
 
|           | caliber, and has a       |           |             |              |
|           | smooth, pink serosal     |           |             |              |
|           | surface.Adjacent to the  |           |             |              |
|           | fimbriated edge of the   |           |             |              |
|           | tube is a cystic         |           |             |              |
|           | structure filledwith     |           |             |              |
|           | clear fluid. It measures |           |             |              |
|           |  1.1 x 0.8 x 0.6 cm. The |           |             |              |
|           |  ovary has aslightly     |           |             |              |
|           | nodular, white surface   |           |             |              |
|           | and measures 2.2 x 1.0 x |           |             |              |
|           |  0.6 cm. Oncross section |           |             |              |
|           |  the ovary has numerous  |           |             |              |
|           | cysts filled with clear  |           |             |              |
|           | fluid,ranging from 0.3   |           |             |              |
|           | to 0.5 cm. in diameter.  |           |             |              |
|           | The remainder of the     |           |             |              |
|           | parenchymais firm and    |           |             |              |
|           | white. Representative    |           |             |              |
|           | sections are submitted   |           |             |              |
|           | as follows:Cassette 1A   |           |             |              |
|           | contains fallopian tube  |           |             |              |
|           | and cyst adjacent to     |           |             |              |
|           | fallopian tube;cassette  |           |             |              |
|           | 1B contains ovary.       |           |             |              |
|           |  #2 LEFT OVARY: Present  |           |             |              |
|           | are a portion of         |           |             |              |
|           | fallopian tube and an    |           |             |              |
|           | ovarymeasuring 3.3 x 2.0 |           |             |              |
|           |  x 0.7 cm. in aggregate. |           |             |              |
|           |  The fallopian tube is   |           |             |              |
|           | ofnormal course and      |           |             |              |
|           | caliber. Its serosal     |           |             |              |
|           | surface is smooth and    |           |             |              |
|           | pink. Theovary itself    |           |             |              |
|           | measures 2.5 x 1.3 x 0.8 |           |             |              |
|           |  cm. The surface is      |           |             |              |
|           | white andslightly        |           |             |              |
|           | irregular. On cut        |           |             |              |
|           | section the parenchyma   |           |             |              |
|           | has a vague              |           |             |              |
|           | nodularityand is         |           |             |              |
|           | yellow-white. No cysts   |           |             |              |
|           | are identified.          |           |             |              |
|           | Representative sections  |           |             |              |
|           | offallopian tube and     |           |             |              |
|           | ovary are submitted in   |           |             |              |
|           | cassette 2A.       All   |           |             |              |
|           | tissue sections taken    |           |             |              |
|           | are submitted for        |           |             |              |
|           | microscopic              |           |             |              |
|           | evaluation.Case dictated |           |             |              |
|           |  by:   Андрей Costa,    |           |             |              |
|           | M.D./f                   |           |             |              |
|           | FINAL DIAGNOSISRIGHT     |           |             |              |
|           | OVARY:   FALLOPIAN TUBE  |           |             |              |
|           | WITH NO DIAGNOSTIC       |           |             |              |
|           | ABNORMALITY   PARATUBAL  |           |             |              |
|           | CYST   SENESCENT OVARY   |           |             |              |
|           |   INCLUSION CYSTSLEFT    |           |             |              |
 
|           | OVARY:   FALLOPIAN TUBE  |           |             |              |
|           | WITH NO DIAGNOSTIC       |           |             |              |
|           | ABNORMALITY   SENESCENT  |           |             |              |
|           | OVARY       Note: In the |           |             |              |
|           |  absence of clinical     |           |             |              |
|           | history, the diagnosis   |           |             |              |
|           | is based ongross and/or  |           |             |              |
|           | histologic findings      |           |             |              |
|           | only.       Case         |           |             |              |
|           | reviewed by:   Marshall   |           |             |              |
|           | Felix                 |           |             |              |
|           | M.D.T:96:dj         |           |             |              |
|           | My electronic signature  |           |             |              |
|           | indicates that I have    |           |             |              |
|           | personally reviewed      |           |             |              |
|           | alldiagnostic slides,    |           |             |              |
|           | the gross and/or         |           |             |              |
|           | microscopic portion of   |           |             |              |
|           | thisreport and           |           |             |              |
|           | formulated the final     |           |             |              |
|           | diagnosis.               |           |             |              |
+-----------+--------------------------+-----------+-------------+--------------+
 
 
 
+----------+
| Specimen |
+----------+
| Other    |
+----------+
 
 
 
+----------------------+------------------------+--------------------+--------------+
| Performing           | Address                | City/State/Zipcode | Phone Number |
| Organization         |                        |                    |              |
+----------------------+------------------------+--------------------+--------------+
|   [facility] |   [address]  | [place] [address] |              |
|  PATHOLOGY           | [address]                |                    |              |
+----------------------+------------------------+--------------------+--------------+
 documented in this encounter
 
 Visit Diagnoses
 Not on filedocumented in this encounter

## 2019-12-04 NOTE — XMS
Encounter Summary
  Created on: 2019
 
 Margaret Ferreira
 External Reference #: [id number]
 : 30
 Sex: Female
 
 Demographics
 
 
+-----------------------+----------------------+
| Address               | [address]        |
|                       | [place]  [address] |
+-----------------------+----------------------+
| Home Phone            | [phone]      |
+-----------------------+----------------------+
| Preferred Language    | Unknown              |
+-----------------------+----------------------+
| Marital Status        |               |
+-----------------------+----------------------+
| Latter day Affiliation | Unknown              |
+-----------------------+----------------------+
| Race                  | Unknown              |
+-----------------------+----------------------+
| Ethnic Group          | Unknown              |
+-----------------------+----------------------+
 
 
 Author
 
 
+--------------+--------------------------------------------+
| Author       | [facility]  |
|              | and [place]                                |
+--------------+--------------------------------------------+
| Organization | [facility]  |
|              | and [place]                                |
+--------------+--------------------------------------------+
| Address      | Unknown                                    |
+--------------+--------------------------------------------+
| Phone        | Unavailable                                |
+--------------+--------------------------------------------+
 
 
 
 Support
 
 
+--------------+--------------+---------+-----------------+
| Name         | Relationship | Address | Phone           |
+--------------+--------------+---------+-----------------+
| Lawson Hanna | ECON         | Unknown | [phone] |
+--------------+--------------+---------+-----------------+
 
 
 
 Care Team Providers
 
 
 
+-----------------------+------+-----------------+
| Care Team Member Name | Role | Phone           |
+-----------------------+------+-----------------+
| Calixto Chou MD | PCP  | [phone] |
+-----------------------+------+-----------------+
 
 
 
 Encounter Details
 
 
+--------+-------------+----------------------+----------------+---------------------+
| Date   | Type        | Department           | Care Team      | Description         |
+--------+-------------+----------------------+----------------+---------------------+
| / | Orders Only |   [facility]          |   [place],  | COPD (chronic       |
|    |             | PULMONARY  [address]     | Mary PADRON MD  | obstructive         |
|        |             | [place]  [place], |                | pulmonary disease)  |
|        |             |  [place] [address]       |                | ([facility]) (Primary Dx)  |
|        |             | [phone]         |                |                     |
+--------+-------------+----------------------+----------------+---------------------+
 
 
 
 Social History
 
 
+----------------+-------+-----------+--------+------+
| Tobacco Use    | Types | Packs/Day | Years  | Date |
|                |       |           | Used   |      |
+----------------+-------+-----------+--------+------+
| Never Assessed |       |           |        |      |
+----------------+-------+-----------+--------+------+
 
 
 
+------------------+---------------+
| Sex Assigned at  | Date Recorded |
| Birth            |               |
+------------------+---------------+
| Not on file      |               |
+------------------+---------------+
 
 
 
+----------------+-------------+-------------+
| Job Start Date | Occupation  | Industry    |
+----------------+-------------+-------------+
| Not on file    | Not on file | Not on file |
+----------------+-------------+-------------+
 
 
 
+----------------+--------------+------------+
| Travel History | Travel Start | Travel End |
+----------------+--------------+------------+
 
 
 
 
+-------------------------------------+
| No recent travel history available. |
+-------------------------------------+
 documented as of this encounter
 
 Plan of Treatment
 Not on filedocumented as of this encounter
 
 Results
 PFT PULMONARY FUNCTION TESTING ORDERS Full PFT ([place] w/BD, lung volumes, diffusion)?: Yes 
(2015  6:25 PM PST)
 
+------------------------------------------------------------------------+--------------+
| Narrative                                                              | Performed At |
+------------------------------------------------------------------------+--------------+
|   Mary Astudillo MD       2015 18:25        PULMONARY      |              |
| FUNCTION TESTING      METHOD: Spirometry was obtained pre and post     |              |
| administration of   inhaled bronchodilator. Lung volumes were not      |              |
| obtained, as the   patient was unable to pant fast enough. Diffusion   |              |
| capacity was   obtained by single breath method and was not corrected  |              |
| for a   measured hemoglobin.      ATS standards were met.              |              |
| SPIROMETRY: Prior to administration of inhaled bronchodilator,   FVC   |              |
| was normal at 2.01 L or 87% of predicted. FEV1 was moderately          |              |
| reduced at 0.85 L or 51% of predicted. FEV1/FVC ratio was reduced   at |              |
|  42%. After administration of inhaled bronchodilator, FVC   increased  |              |
| by 19 % to 2.38 L or 103% of predicted. FEV1 increased   by 22 % to    |              |
| 1.04 L or 63% of predicted. FEV1/FVC ratio was reduced   at 44%.       |              |
| DIFFUSION CAPACITY: Diffusion capacity was moderately reduced at       |              |
| 10.6 mL/mmHg per minute or 54% of predicted and was not corrected      |              |
| for a measured hemoglobin.     IMPRESSION: Spirometry is consistent    |              |
| with moderate obstructive   physiology. There was a significant        |              |
| response to inhaled   bronchodilator based on change in FVC. Diffusion |              |
|  capacity is   moderately reduced and is not corrected for measured    |              |
| hemoglobin.     Electronically signed by: Mary Astudillo MD    |              |
| 2015   18:20  [facility]     CC:    |              |
| Calixto Chou                                                      |              |
+------------------------------------------------------------------------+--------------+
 
 
 
+------------------------------------------------------------------------------------------+
| Procedure Note                                                                           |
+------------------------------------------------------------------------------------------+
|   Mary Astudillo MD - 2015  6:20 PM PST  PULMONARY FUNCTION TESTING        |
| METHOD: Spirometry was obtained pre and post administration of inhaled bronchodilator.   |
| Lung volumes were not obtained, as the patient was unable to pant fast enough. Diffusion |
|  capacity was obtained by single breath method and was not corrected for a measured      |
| hemoglobin. ATS standards were met. SPIROMETRY: Prior to administration of inhaled       |
| bronchodilator, FVC was normal at 2.01 L or 87% of predicted. FEV1 was moderately        |
| reduced at 0.85 L or 51% of predicted. FEV1/FVC ratio was reduced at 42%. After          |
| administration of inhaled bronchodilator, FVC increased by 19 % to 2.38 L or 103% of     |
| predicted. FEV1 increased by 22 % to 1.04 L or 63% of predicted. FEV1/FVC ratio was      |
| reduced at 44%.DIFFUSION CAPACITY: Diffusion capacity was moderately reduced at 10.6     |
| mL/mmHg per minute or 54% of predicted and was not corrected for a measured              |
| hemoglobin.IMPRESSION: Spirometry is consistent with moderate obstructive physiology.    |
| There was a significant response to inhaled bronchodilator based on change in FVC.       |
| Diffusion capacity is moderately reduced and is not corrected for measured               |
| hemoglobin.Electronically signed by: Mary Astudillo MD 2015 18:20[facility]        |
[facility] Calixto Chou                                |
|Electronically signed by: Mary Astudillo MD 2015 18:20                       |
 
|[facility]                                                  |
|                                                                                          |
|CC: Calixto Chou                                                                     |
+------------------------------------------------------------------------------------------+
 documented in this encounter
 
 Visit Diagnoses
 
 
+---------------------------------------------------------------------------------+
| Diagnosis                                                                       |
+---------------------------------------------------------------------------------+
|   COPD (chronic obstructive pulmonary disease) (HCC) - Primary  Chronic airway  |
| obstruction, not elsewhere classified                                           |
+---------------------------------------------------------------------------------+
 documented in this encounter

## 2019-12-04 NOTE — XMS
Encounter Summary
  Created on: 2019
 
 Margaret Ferreira
 External Reference #: [id number]
 : 30
 Sex: Female
 
 Demographics
 
 
+-----------------------+------------------------+
| Address               | [address]      |
|                       | [place]  [address]   |
+-----------------------+------------------------+
| Home Phone            | [phone]        |
+-----------------------+------------------------+
| Preferred Language    | Unknown                |
+-----------------------+------------------------+
| Marital Status        |                 |
+-----------------------+------------------------+
| Anabaptist Affiliation | MET                    |
+-----------------------+------------------------+
| Race                  | White                  |
+-----------------------+------------------------+
| Ethnic Group          | Not  or  |
+-----------------------+------------------------+
 
 
 Author
 
 
+--------------+------------------------------+
| Author       | [facility] |
+--------------+------------------------------+
| Organization | [facility] |
+--------------+------------------------------+
| Address      | Unknown                      |
+--------------+------------------------------+
| Phone        | Unavailable                  |
+--------------+------------------------------+
 
 
 
 Support
 
 
+--------------+--------------+---------------------+-----------------+
| Name         | Relationship | Address             | Phone           |
+--------------+--------------+---------------------+-----------------+
| Lawson Ferreira | ECON         | [address]          | [phone] |
|              |              | [place], [place]   |                 |
|              |              | [address]               |                 |
+--------------+--------------+---------------------+-----------------+
 
 
 
 Care Team Providers
 
 
 
+-----------------------+------+-------------+
| Care Team Member Name | Role | Phone       |
+-----------------------+------+-------------+
 PCP  | Unavailable |
+-----------------------+------+-------------+
 
 
 
 Encounter Details
 
 
+--------+-------------+----------------------+---------------------+---------------+
| Date   | Type        | Department           | Care Team           | Description   |
+--------+-------------+----------------------+---------------------+---------------+
| / | Office      |   General Internal   |   Note, Outpatient  | Progress Note |
|    | Visit-Trans | Medicine  [address]    | Clinic              |               |
|        | [address]      | [address]  |                     |               |
|        |             |  Mailcode: [address]      |                     |               |
|        |             | Outpatient Clinic    |                     |               |
|        |             | [address]       |                     |               |
|        |             | [place], [place]         |                     |               |
|        |             | [address]           |                     |               |
|        |             | [phone]         |                     |               |
+--------+-------------+----------------------+---------------------+---------------+
 
 
 
 Social History
 
 
+----------------+-------+-----------+--------+------+
| Tobacco Use    | Types | Packs/Day | Years  | Date |
|                |       |           | Used   |      |
+----------------+-------+-----------+--------+------+
| Never Assessed |       |           |        |      |
+----------------+-------+-----------+--------+------+
 
 
 
+------------------+---------------+
| Sex Assigned at  | Date Recorded |
| Birth            |               |
+------------------+---------------+
| Not on file      |               |
+------------------+---------------+
 
 
 
+----------------+-------------+-------------+
| Job Start Date | Occupation  | Industry    |
+----------------+-------------+-------------+
| Not on file    | Not on file | Not on file |
+----------------+-------------+-------------+
 
 
 
+----------------+--------------+------------+
| Travel History | Travel Start | Travel End |
 
+----------------+--------------+------------+
 
 
 
+-------------------------------------+
| No recent travel history available. |
+-------------------------------------+
 documented as of this encounter
 
 Progress Notes
 Interface, Transcription In - 2007  3:07 AM PDT CLINIC DATE: 96
  
  [facility]
  
  SUBJECTIVE: Ms. Ferreira returns to the [facility], having
  herself just returned from a trip to [place]. She reports that her
  energy levels continue to improve but she still has concerns regarding
  bladder function. She also has concerns regarding sweating of feet that has
  not improved with an increase in dose of Hygroton. With specific regard to
  bladder function, she describes relatively normal function during the day
  with no incontinence of urine, either of the urge or stress variety.
  However, at night time she finds that when she wakes at night and moves to
  go to the bathroom she voids immediately on standing. This is a very
  specific history, again suggesting an urge type of incontinence rather than
  stress incontinence. She also continues to be aware of some abdominal
  discomfort.
  
  PHYSICAL EXAMINATION: Examination does not demonstrate any urinary stress
  incontinence by simple examination, although there is some descensus of the
  anterior vaginal wall and urethra. The vaginal vault, however, is well
  supported, as is the posterior wall of the vagina.
  
  IMPRESSION: Ms. Ferreira continues to make progress, although the residual
  urge incontinence is frustrating. It is recommended that she continue with
  Levsinex over the next month or so and that she restart estrogen therapy and
  she also spent some time with Melvi Spencer to discuss behavioral
  modification to assist with this urge incontinence problem. A letter is
  also written to her internist in [place], Dr. Ramesh.
  
  
  
  BRITTANY Perez M.D.
  Professor and ,
  Obstetrics and Gynecology
  
  EPK:calista
  D: 96
  T: 96
  C: 06/10/96  C2: 96 av
  cc:
  
  
   GWEN RAMESH MD
   INTERNAL MEDICINE
   [address] [place] [id number]
   Electronically signed by Interface, Transcription In at 2007  3:07 AM PDTdocumented
 in this encounter
 
 Plan of Treatment
 
 Not on filedocumented as of this encounter
 
 Visit Diagnoses
 Not on filedocumented in this encounter

## 2019-12-04 NOTE — XMS
Clinical Summary
  Created on: 2019
 
 PaulMargaret baum
 : 30
 Sex: Female
 
 Demographics
 
 
+-----------------------+------------------------+
| Address               | [address]      |
|                       | [place]  [address]   |
+-----------------------+------------------------+
| Home Phone            | [phone]        |
+-----------------------+------------------------+
| Preferred Language    | Unknown                |
+-----------------------+------------------------+
| Marital Status        |                 |
+-----------------------+------------------------+
| Buddhism Affiliation | MET                    |
+-----------------------+------------------------+
| Race                  | White                  |
+-----------------------+------------------------+
| Ethnic Group          | Not  or  |
+-----------------------+------------------------+
 
 
 Author
 
 
+--------------+-------------+
| Organization | Unknown     |
+--------------+-------------+
| Address      | Unknown     |
+--------------+-------------+
| Phone        | Unavailable |
+--------------+-------------+
 
 
 
 Support
 
 
+--------------+--------------+---------------------+-----------------+
| Name         | Relationship | Address             | Phone           |
+--------------+--------------+---------------------+-----------------+
| Lawson Ferreira | ECON         | [address]          | [phone] |
|              |              | [place], [place]   |                 |
|              |              | [address]               |                 |
+--------------+--------------+---------------------+-----------------+
 
 
 
 Care Team Providers
 
 
+-----------------------+------+-------------+
 
| Care Team Member Name | Role | Phone       |
+-----------------------+------+-------------+
 PCP  | Unavailable |
+-----------------------+------+-------------+
 
 
 
 Source Comments
 [facility] is fully live on both [facility] and [facility]
 
 Allergies
 No Known Allergies
 
 Medications
 Not on file
 
 Active Problems
 Not on file
 
 Social History
 
 
+----------------+-------+-----------+--------+------+
| Tobacco Use    | Types | Packs/Day | Years  | Date |
|                |       |           | Used   |      |
+----------------+-------+-----------+--------+------+
| Never Assessed |       |           |        |      |
+----------------+-------+-----------+--------+------+
 
 
 
+------------------+---------------+
| Sex Assigned at  | Date Recorded |
| Birth            |               |
+------------------+---------------+
| Not on file      |               |
+------------------+---------------+
 
 
 
+----------------+-------------+-------------+
| Job Start Date | Occupation  | Industry    |
+----------------+-------------+-------------+
| Not on file    | Not on file | Not on file |
+----------------+-------------+-------------+
 
 
 
+----------------+--------------+------------+
| Travel History | Travel Start | Travel End |
+----------------+--------------+------------+
 
 
 
+-------------------------------------+
| No recent travel history available. |
+-------------------------------------+
 
 
 
 
 Last Filed Vital Signs
 Not on file
 
 Plan of Treatment
 
 
+----------------------+-----------+-----------+----------+
| Health Maintenance   | Due Date  | Last Done | Comments |
+----------------------+-----------+-----------+----------+
| Pneumococcal         |  |           |          |
| vaccination (1 of 2  | 5         |           |          |
| - PCV13)             |           |           |          |
+----------------------+-----------+-----------+----------+
| Influenza (Flu)      | 10/01/201 |           |          |
| vaccination (#1)     | 9         |           |          |
+----------------------+-----------+-----------+----------+
 
 
 
 Results
 Not on filefrom Last 3 Months

## 2019-12-04 NOTE — XMS
Encounter Summary
  Created on: 2019
 
 Margaret Ferreira
 External Reference #: [id number]
 : 30
 Sex: Female
 
 Demographics
 
 
+-----------------------+----------------------+
| Address               | [address]        |
|                       | [place]  [address] |
+-----------------------+----------------------+
| Home Phone            | [phone]      |
+-----------------------+----------------------+
| Preferred Language    | Unknown              |
+-----------------------+----------------------+
| Marital Status        |               |
+-----------------------+----------------------+
| Catholic Affiliation | Unknown              |
+-----------------------+----------------------+
| Race                  | Unknown              |
+-----------------------+----------------------+
| Ethnic Group          | Unknown              |
+-----------------------+----------------------+
 
 
 Author
 
 
+--------------+--------------------------------------------+
| Author       | [facility] [place]  |
|              | and [place]                                |
+--------------+--------------------------------------------+
| Organization | [facility] [place]  |
|              | and [place]                                |
+--------------+--------------------------------------------+
| Address      | Unknown                                    |
+--------------+--------------------------------------------+
| Phone        | Unavailable                                |
+--------------+--------------------------------------------+
 
 
 
 Support
 
 
+--------------+--------------+---------+-----------------+
| Name         | Relationship | Address | Phone           |
+--------------+--------------+---------+-----------------+
| Lawson Hanna | ECON         | Unknown | [phone] |
+--------------+--------------+---------+-----------------+
 
 
 
 Care Team Providers
 
 
 
+-----------------------------+------+-----------------+
| Care Team Member Name       | Role | Phone           |
+-----------------------------+------+-----------------+
| Bessy Paulino | PCP  | [phone] |
|  [facility]                       |      |                 |
+-----------------------------+------+-----------------+
 
 
 
 Reason for Visit
 
 
+-------------------+----------+
| Reason            | Comments |
+-------------------+----------+
| Medication Refill |          |
+-------------------+----------+
 
 
 
 Encounter Details
 
 
+--------+--------+----------------------+---------------------+-------------------+
| Date   | Type   | Department           | Care Team           | Description       |
+--------+--------+----------------------+---------------------+-------------------+
| / | Refill |   [facility]       |   Kvng Ontiveros  | Medication Refill |
|    |        | [facility]    | E, DO  [address]   |                   |
|        |        | [facility]  [address]  | [place], [place]       |                   |
|        |        | [address]  [place],   | [address]          |                   |
|        |        | [place] [address]        | [phone]        |                   |
|        |        | [phone]         | [phone] (Fax)  |                   |
+--------+--------+----------------------+---------------------+-------------------+
 
 
 
 Social History
 
 
+-------------------+-------+-----------+--------+------+
| Tobacco Use       | Types | Packs/Day | Years  | Date |
|                   |       |           | Used   |      |
+-------------------+-------+-----------+--------+------+
| Passive Smoke     |       |           |        |      |
| Exposure - Never  |       |           |        |      |
| Smoker            |       |           |        |      |
+-------------------+-------+-----------+--------+------+
 
 
 
+---------------------+---+---+---+
| Smokeless Tobacco:  |   |   |   |
| Never Used          |   |   |   |
+---------------------+---+---+---+
 
 
 
+-------------+----------------------+---------+----------+
 
| Alcohol Use | Drinks/Week          | oz/Week | Comments |
+-------------+----------------------+---------+----------+
| No          |   0 Standard drinks  | 0.0     |          |
|             | or equivalent        |         |          |
+-------------+----------------------+---------+----------+
 
 
 
+------------------+---------------+
| Sex Assigned at  | Date Recorded |
| Birth            |               |
+------------------+---------------+
| Not on file      |               |
+------------------+---------------+
 
 
 
+----------------+-------------+-------------+
| Job Start Date | Occupation  | Industry    |
+----------------+-------------+-------------+
| Not on file    | Not on file | Not on file |
+----------------+-------------+-------------+
 
 
 
+----------------+--------------+------------+
| Travel History | Travel Start | Travel End |
+----------------+--------------+------------+
 
 
 
+-------------------------------------+
| No recent travel history available. |
+-------------------------------------+
 documented as of this encounter
 
 Plan of Treatment
 Not on filedocumented as of this encounter
 
 Visit Diagnoses
 Not on filedocumented in this encounter

## 2019-12-04 NOTE — XMS
Encounter Summary
  Created on: 2019
 
 Margaret Ferreira
 External Reference #: [id number]
 : 30
 Sex: Female
 
 Demographics
 
 
+-----------------------+----------------------+
| Address               | [address]        |
|                       | [place]  [address] |
+-----------------------+----------------------+
| Home Phone            | [phone]      |
+-----------------------+----------------------+
| Preferred Language    | Unknown              |
+-----------------------+----------------------+
| Marital Status        |               |
+-----------------------+----------------------+
| Muslim Affiliation | Unknown              |
+-----------------------+----------------------+
| Race                  | Unknown              |
+-----------------------+----------------------+
| Ethnic Group          | Unknown              |
+-----------------------+----------------------+
 
 
 Author
 
 
+--------------+--------------------------------------------+
| Author       | [facility] [place]  |
|              | and [place]                                |
+--------------+--------------------------------------------+
| Organization | [facility] [place]  |
|              | and [place]                                |
+--------------+--------------------------------------------+
| Address      | Unknown                                    |
+--------------+--------------------------------------------+
| Phone        | Unavailable                                |
+--------------+--------------------------------------------+
 
 
 
 Support
 
 
+--------------+--------------+---------+-----------------+
| Name         | Relationship | Address | Phone           |
+--------------+--------------+---------+-----------------+
| Lawson Hanna | ECON         | Unknown | [phone] |
+--------------+--------------+---------+-----------------+
 
 
 
 Care Team Providers
 
 
 
+-----------------------+------+-----------------+
| Care Team Member Name | Role | Phone           |
+-----------------------+------+-----------------+
| Calixto Chou MD | PCP  | [phone] |
+-----------------------+------+-----------------+
 
 
 
 Encounter Details
 
 
+--------+----------+----------------------+----------------------+-------------+
| Date   | Type     | Department           | Care Team            | Description |
+--------+----------+----------------------+----------------------+-------------+
| / | Abstract |   [facility]          |   Michelle Vanegas,  |             |
|    |          | PULMONARY  [address]     | Medical Assistant    |             |
|        |          | [place], |                      |             |
|        |          |  [place] [address]       |                      |             |
|        |          | [phone]         |                      |             |
+--------+----------+----------------------+----------------------+-------------+
 
 
 
 Social History
 
 
+--------------+-------+-----------+--------+------+
| Tobacco Use  | Types | Packs/Day | Years  | Date |
|              |       |           | Used   |      |
+--------------+-------+-----------+--------+------+
| Never Smoker |       |           |        |      |
+--------------+-------+-----------+--------+------+
 
 
 
+---------------------+---+---+---+
| Smokeless Tobacco:  |   |   |   |
| Never Used          |   |   |   |
+---------------------+---+---+---+
 
 
 
+-------------------------------------------------------------------+
| Comments: her parents both smoked, also worked at a [employer] |
+-------------------------------------------------------------------+
 
 
 
+-------------+----------------------+---------+----------+
| Alcohol Use | Drinks/Week          | oz/Week | Comments |
+-------------+----------------------+---------+----------+
| No          |   0 Standard drinks  | 0.0     |          |
|             | or equivalent        |         |          |
+-------------+----------------------+---------+----------+
 
 
 
+------------------+---------------+
 
| Sex Assigned at  | Date Recorded |
| Birth            |               |
+------------------+---------------+
| Not on file      |               |
+------------------+---------------+
 
 
 
+----------------+-------------+-------------+
| Job Start Date | Occupation  | Industry    |
+----------------+-------------+-------------+
| Not on file    | Not on file | Not on file |
+----------------+-------------+-------------+
 
 
 
+----------------+--------------+------------+
| Travel History | Travel Start | Travel End |
+----------------+--------------+------------+
 
 
 
+-------------------------------------+
| No recent travel history available. |
+-------------------------------------+
 documented as of this encounter
 
 Plan of Treatment
 Not on filedocumented as of this encounter
 
 Visit Diagnoses
 Not on filedocumented in this encounter

## 2019-12-04 NOTE — XMS
Encounter Summary
  Created on: 2019
 
 Margaret Ferreira
 External Reference #: [id number]
 : 30
 Sex: Female
 
 Demographics
 
 
+-----------------------+----------------------+
| Address               | [address]        |
|                       | [place]  [address] |
+-----------------------+----------------------+
| Home Phone            | [phone]      |
+-----------------------+----------------------+
| Preferred Language    | Unknown              |
+-----------------------+----------------------+
| Marital Status        |               |
+-----------------------+----------------------+
| Mormonism Affiliation | Unknown              |
+-----------------------+----------------------+
| Race                  | Unknown              |
+-----------------------+----------------------+
| Ethnic Group          | Unknown              |
+-----------------------+----------------------+
 
 
 Author
 
 
+--------------+--------------------------------------------+
| Author       | [facility] [place]  |
|              | and [place]                                |
+--------------+--------------------------------------------+
| Organization | [facility] [place]  |
|              | and [place]                                |
+--------------+--------------------------------------------+
| Address      | Unknown                                    |
+--------------+--------------------------------------------+
| Phone        | Unavailable                                |
+--------------+--------------------------------------------+
 
 
 
 Support
 
 
+--------------+--------------+---------+-----------------+
| Name         | Relationship | Address | Phone           |
+--------------+--------------+---------+-----------------+
| Lawson Hanna | ECON         | Unknown | [phone] |
+--------------+--------------+---------+-----------------+
 
 
 
 Care Team Providers
 
 
 
+-----------------------------+------+-----------------+
| Care Team Member Name       | Role | Phone           |
+-----------------------------+------+-----------------+
| Bessy Paulino | PCP  | [phone] |
|  PA-C                       |      |                 |
+-----------------------------+------+-----------------+
 
 
 
 Encounter Details
 
 
+--------+----------+----------------------+----------------------+-------------+
| Date   | Type     | Department           | Care Team            | Description |
+--------+----------+----------------------+----------------------+-------------+
| / | Abstract |   [facility]          |   Provider,          |             |
|    |          | PHYSIATRY  [address]     | [address] |             |
|        |          | [place] |  [place]        |             |
|        |          |  [place] [address]       | [place] [address]     |             |
|        |          | [phone]         |                      |             |
+--------+----------+----------------------+----------------------+-------------+
 
 
 
 Social History
 
 
+-------------------+-------+-----------+--------+------+
| Tobacco Use       | Types | Packs/Day | Years  | Date |
|                   |       |           | Used   |      |
+-------------------+-------+-----------+--------+------+
| Passive Smoke     |       |           |        |      |
| Exposure - Never  |       |           |        |      |
| Smoker            |       |           |        |      |
+-------------------+-------+-----------+--------+------+
 
 
 
+---------------------+---+---+---+
| Smokeless Tobacco:  |   |   |   |
| Never Used          |   |   |   |
+---------------------+---+---+---+
 
 
 
+-------------+----------------------+---------+----------+
| Alcohol Use | Drinks/Week          | oz/Week | Comments |
+-------------+----------------------+---------+----------+
| No          |   0 Standard drinks  | 0.0     |          |
|             | or equivalent        |         |          |
+-------------+----------------------+---------+----------+
 
 
 
+------------------+---------------+
| Sex Assigned at  | Date Recorded |
| Birth            |               |
+------------------+---------------+
 
| Not on file      |               |
+------------------+---------------+
 
 
 
+----------------+-------------+-------------+
| Job Start Date | Occupation  | Industry    |
+----------------+-------------+-------------+
| Not on file    | Not on file | Not on file |
+----------------+-------------+-------------+
 
 
 
+----------------+--------------+------------+
| Travel History | Travel Start | Travel End |
+----------------+--------------+------------+
 
 
 
+-------------------------------------+
| No recent travel history available. |
+-------------------------------------+
 documented as of this encounter
 
 Plan of Treatment
 Not on filedocumented as of this encounter
 
 Visit Diagnoses
 Not on filedocumented in this encounter

## 2019-12-04 NOTE — XMS
Encounter Summary
  Created on: 2019
 
 Margaret Ferreira
 External Reference #: [id number]
 : 30
 Sex: Female
 
 Demographics
 
 
+-----------------------+----------------------+
| Address               | [address]        |
|                       | [place]  [address] |
+-----------------------+----------------------+
| Home Phone            | [phone]      |
+-----------------------+----------------------+
| Preferred Language    | Unknown              |
+-----------------------+----------------------+
| Marital Status        |               |
+-----------------------+----------------------+
| Jainism Affiliation | Unknown              |
+-----------------------+----------------------+
| Race                  | Unknown              |
+-----------------------+----------------------+
| Ethnic Group          | Unknown              |
+-----------------------+----------------------+
 
 
 Author
 
 
+--------------+--------------------------------------------+
| Author       | [facility] [place]  |
|              | and [place]                                |
+--------------+--------------------------------------------+
| Organization | [facility] [place]  |
|              | and [place]                                |
+--------------+--------------------------------------------+
| Address      | Unknown                                    |
+--------------+--------------------------------------------+
| Phone        | Unavailable                                |
+--------------+--------------------------------------------+
 
 
 
 Support
 
 
+--------------+--------------+---------+-----------------+
| Name         | Relationship | Address | Phone           |
+--------------+--------------+---------+-----------------+
| Lawson Hanna | ECON         | Unknown | [phone] |
+--------------+--------------+---------+-----------------+
 
 
 
 Care Team Providers
 
 
 
+-----------------------+------+-------------+
| Care Team Member Name | Role | Phone       |
+-----------------------+------+-------------+
 PCP  | Unavailable |
+-----------------------+------+-------------+
 
 
 
 Encounter Details
 
 
+--------+-----------+----------------------+---------------------+-------------+
| Date   | Type      | Department           | Care Team           | Description |
+--------+-----------+----------------------+---------------------+-------------+
| / | Hospital  |   [facility] |   [facility]  |             |
|    | Encounter |  MED CTR XRAY  [address] | [address]    |             |
|        |           |  [place]       | [place]  |             |
|        |           | [place] [address] | [place] [address]     |             |
|        |           |   [phone]       | [phone]        |             |
|        |           |                      | [phone] (Fax)  |             |
+--------+-----------+----------------------+---------------------+-------------+
 
 
 
 Social History
 
 
+----------------+-------+-----------+--------+------+
| Tobacco Use    | Types | Packs/Day | Years  | Date |
|                |       |           | Used   |      |
+----------------+-------+-----------+--------+------+
| Never Assessed |       |           |        |      |
+----------------+-------+-----------+--------+------+
 
 
 
+------------------+---------------+
| Sex Assigned at  | Date Recorded |
| Birth            |               |
+------------------+---------------+
| Not on file      |               |
+------------------+---------------+
 
 
 
+----------------+-------------+-------------+
| Job Start Date | Occupation  | Industry    |
+----------------+-------------+-------------+
| Not on file    | Not on file | Not on file |
+----------------+-------------+-------------+
 
 
 
+----------------+--------------+------------+
| Travel History | Travel Start | Travel End |
+----------------+--------------+------------+
 
 
 
 
+-------------------------------------+
| No recent travel history available. |
+-------------------------------------+
 documented as of this encounter
 
 Plan of Treatment
 Not on filedocumented as of this encounter
 
 Visit Diagnoses
 Not on filedocumented in this encounter

## 2019-12-04 NOTE — XMS
PreManage Notification: PAMELA VALENCIA MRN:[id number]
 
Security Information
 
Security Events
No recent Security Events currently on file
 
 
 
CRITERIA MET
------------
- [facility]
 
 
CARE PROVIDERS
-------------------------------------------------------------------------------------
Bessy John     Current
PAC
 
PHONE: Unknown
-------------------------------------------------------------------------------------
kahlil     Case or Care Manager     Current
 
PHONE: Unknown
-------------------------------------------------------------------------------------
Calixto Chou MD     Current
 
PHONE: Unknown
-------------------------------------------------------------------------------------
 
[facility] has no Care Guidelines for this patient.
 
E.D. VISIT COUNT (12 MO.)
-------------------------------------------------------------------------------------
1 NEIL [facility]
-------------------------------------------------------------------------------------
TOTAL 1
-------------------------------------------------------------------------------------
NOTE: Visits indicate total known visits.
 
ED/UCC VISIT TRACKING (12 MO.)
-------------------------------------------------------------------------------------
12/04/2019 07:18
[facility] [place]
 
TYPE: Emergency
 
COMPLAINT:
- FALL
-------------------------------------------------------------------------------------
 
 
INPATIENT VISIT TRACKING (12 MO.)
No inpatient visits to display in this time frame
 
[email or web address]

## 2019-12-04 NOTE — XMS
Encounter Summary
  Created on: 2019
 
 Margaret Ferreira
 External Reference #: [id number]
 : 30
 Sex: Female
 
 Demographics
 
 
+-----------------------+------------------------+
| Address               | [address]      |
|                       | [place]  [address]   |
+-----------------------+------------------------+
| Home Phone            | [phone]        |
+-----------------------+------------------------+
| Preferred Language    | Unknown                |
+-----------------------+------------------------+
| Marital Status        |                 |
+-----------------------+------------------------+
| Confucianist Affiliation | MET                    |
+-----------------------+------------------------+
| Race                  | White                  |
+-----------------------+------------------------+
| Ethnic Group          | Not  or  |
+-----------------------+------------------------+
 
 
 Author
 
 
+--------------+------------------------------+
| Author       | [facility] |
+--------------+------------------------------+
| Organization | [facility] |
+--------------+------------------------------+
| Address      | Unknown                      |
+--------------+------------------------------+
| Phone        | Unavailable                  |
+--------------+------------------------------+
 
 
 
 Support
 
 
+--------------+--------------+---------------------+-----------------+
| Name         | Relationship | Address             | Phone           |
+--------------+--------------+---------------------+-----------------+
| Lawson Ferreira | ECON         | [address]          | [phone] |
|              |              | [place], [place]   |                 |
|              |              | [address]               |                 |
+--------------+--------------+---------------------+-----------------+
 
 
 
 Care Team Providers
 
 
 
+-----------------------+------+-------------+
| Care Team Member Name | Role | Phone       |
+-----------------------+------+-------------+
 PCP  | Unavailable |
+-----------------------+------+-------------+
 
 
 
 Encounter Details
 
 
+--------+-------------+----------------------+---------------------+---------------+
| Date   | Type        | Department           | Care Team           | Description   |
+--------+-------------+----------------------+---------------------+---------------+
| / | Office      |   General Internal   |   Note, Outpatient  | Progress Note |
|    | Visit-Trans | Medicine  [address]    | Clinic              |               |
|        | [address]      | [address]  |                     |               |
|        |             |  Mailcode: [address]      |                     |               |
|        |             | Outpatient Clinic    |                     |               |
|        |             | [address]       |                     |               |
|        |             | [place], [place]         |                     |               |
|        |             | [address]           |                     |               |
|        |             | [phone]         |                     |               |
+--------+-------------+----------------------+---------------------+---------------+
 
 
 
 Social History
 
 
+----------------+-------+-----------+--------+------+
| Tobacco Use    | Types | Packs/Day | Years  | Date |
|                |       |           | Used   |      |
+----------------+-------+-----------+--------+------+
| Never Assessed |       |           |        |      |
+----------------+-------+-----------+--------+------+
 
 
 
+------------------+---------------+
| Sex Assigned at  | Date Recorded |
| Birth            |               |
+------------------+---------------+
| Not on file      |               |
+------------------+---------------+
 
 
 
+----------------+-------------+-------------+
| Job Start Date | Occupation  | Industry    |
+----------------+-------------+-------------+
| Not on file    | Not on file | Not on file |
+----------------+-------------+-------------+
 
 
 
+----------------+--------------+------------+
| Travel History | Travel Start | Travel End |
 
+----------------+--------------+------------+
 
 
 
+-------------------------------------+
| No recent travel history available. |
+-------------------------------------+
 documented as of this encounter
 
 Progress Notes
 Interface, Transcription In - 2007  3:07 AM PDT CLINIC DATE: 96
  
  [facility]
  
  SUBJECTIVE: Ms. Ferreira returns to the [facility], having
  herself just returned from a trip to [place]. She reports that her
  energy levels continue to improve but she still has concerns regarding
  bladder function. She also has concerns regarding sweating of feet that has
  not improved with an increase in dose of Hygroton. With specific regard to
  bladder function, she describes relatively normal function during the day
  with no incontinence of urine, either of the urge or stress variety.
  However, at night time she finds that when she wakes at night and moves to
  go to the bathroom she voids immediately on standing. This is a very
  specific history, again suggesting an urge type of incontinence rather than
  stress incontinence. She also continues to be aware of some abdominal
  discomfort.
  
  PHYSICAL EXAMINATION: Examination does not demonstrate any urinary stress
  incontinence by simple examination, although there is some descensus of the
  anterior vaginal wall and urethra. The vaginal vault, however, is well
  supported, as is the posterior wall of the vagina.
  
  IMPRESSION: Ms. Ferreira continues to make progress, although the residual
  urge incontinence is frustrating. It is recommended that she continue with
  Levsinex over the next month or so and that she restart estrogen therapy and
  she also spent some time with Melvi Spencer to discuss behavioral
  modification to assist with this urge incontinence problem. A letter is
  also written to her internist in [place], Dr. Ramesh.
  
  
  
  BRITTANY Perez M.D.
  Professor and ,
  Obstetrics and Gynecology
  
  EPK:calista
  D: 96
  T: 96
  C: 06/10/96  C2: 96 av
  cc:
  
  
   GWEN RAMESH MD
   INTERNAL MEDICINE
   [address] [place] [id number]
   Electronically signed by Interface, Transcription In at 2007  3:07 AM PDTdocumented
 in this encounter
 
 Plan of Treatment
 
 Not on filedocumented as of this encounter
 
 Visit Diagnoses
 Not on filedocumented in this encounter

## 2019-12-04 NOTE — XMS
Encounter Summary
  Created on: 2019
 
 Margaret Ferreira
 External Reference #: [id number]
 : 30
 Sex: Female
 
 Demographics
 
 
+-----------------------+----------------------+
| Address               | [address]        |
|                       | [place]  [address] |
+-----------------------+----------------------+
| Home Phone            | [phone]      |
+-----------------------+----------------------+
| Preferred Language    | Unknown              |
+-----------------------+----------------------+
| Marital Status        |               |
+-----------------------+----------------------+
| Anabaptism Affiliation | Unknown              |
+-----------------------+----------------------+
| Race                  | Unknown              |
+-----------------------+----------------------+
| Ethnic Group          | Unknown              |
+-----------------------+----------------------+
 
 
 Author
 
 
+--------------+--------------------------------------------+
| Author       | [facility]  |
|              | and [place]                                |
+--------------+--------------------------------------------+
| Organization | [facility]  |
|              | and [place]                                |
+--------------+--------------------------------------------+
| Address      | Unknown                                    |
+--------------+--------------------------------------------+
| Phone        | Unavailable                                |
+--------------+--------------------------------------------+
 
 
 
 Support
 
 
+--------------+--------------+---------+-----------------+
| Name         | Relationship | Address | Phone           |
+--------------+--------------+---------+-----------------+
| Lawson Hanna | ECON         | Unknown | [phone] |
+--------------+--------------+---------+-----------------+
 
 
 
 Care Team Providers
 
 
 
+-----------------------+------+-----------------+
| Care Team Member Name | Role | Phone           |
+-----------------------+------+-----------------+
| Calixto Chou MD | PCP  | [phone] |
+-----------------------+------+-----------------+
 
 
 
 Reason for Visit
 
 
+-------------------+----------+
| Reason            | Comments |
+-------------------+----------+
| Medication Refill |          |
+-------------------+----------+
 
 
 
 Encounter Details
 
 
+--------+-----------+----------------------+----------------+-------------------+
| Date   | Type      | Department           | Care Team      | Description       |
+--------+-----------+----------------------+----------------+-------------------+
| / | Telephone |   [facility]          |   [place],  | Medication Refill |
| 2016   |           | PULMONARY  [address]     | Mary PADRON MD  |                   |
|        |           | [place]  [place], |                |                   |
|        |           |  [place] [address]       |                |                   |
|        |           | [phone]         |                |                   |
+--------+-----------+----------------------+----------------+-------------------+
 
 
 
 Social History
 
 
+--------------+-------+-----------+--------+------+
| Tobacco Use  | Types | Packs/Day | Years  | Date |
|              |       |           | Used   |      |
+--------------+-------+-----------+--------+------+
| Never Smoker |       |           |        |      |
+--------------+-------+-----------+--------+------+
 
 
 
+---------------------+---+---+---+
| Smokeless Tobacco:  |   |   |   |
| Never Used          |   |   |   |
+---------------------+---+---+---+
 
 
 
+-------------------------------------------------------------------+
| Comments: her parents both smoked, also worked at a [employer] |
+-------------------------------------------------------------------+
 
 
 
 
+-------------+----------------------+---------+----------+
| Alcohol Use | Drinks/Week          | oz/Week | Comments |
+-------------+----------------------+---------+----------+
| No          |   0 Standard drinks  | 0.0     |          |
|             | or equivalent        |         |          |
+-------------+----------------------+---------+----------+
 
 
 
+------------------+---------------+
| Sex Assigned at  | Date Recorded |
| Birth            |               |
+------------------+---------------+
| Not on file      |               |
+------------------+---------------+
 
 
 
+----------------+-------------+-------------+
| Job Start Date | Occupation  | Industry    |
+----------------+-------------+-------------+
| Not on file    | Not on file | Not on file |
+----------------+-------------+-------------+
 
 
 
+----------------+--------------+------------+
| Travel History | Travel Start | Travel End |
+----------------+--------------+------------+
 
 
 
+-------------------------------------+
| No recent travel history available. |
+-------------------------------------+
 documented as of this encounter
 
 Plan of Treatment
 Not on filedocumented as of this encounter
 
 Visit Diagnoses
 Not on filedocumented in this encounter

## 2019-12-04 NOTE — XMS
Encounter Summary
  Created on: 2019
 
 Margaret Ferreira
 External Reference #: [id number]
 : 30
 Sex: Female
 
 Demographics
 
 
+-----------------------+------------------------+
| Address               | [address]      |
|                       | [place]  [address]   |
+-----------------------+------------------------+
| Home Phone            | [phone]        |
+-----------------------+------------------------+
| Preferred Language    | Unknown                |
+-----------------------+------------------------+
| Marital Status        |                 |
+-----------------------+------------------------+
| Sikh Affiliation | MET                    |
+-----------------------+------------------------+
| Race                  | White                  |
+-----------------------+------------------------+
| Ethnic Group          | Not  or  |
+-----------------------+------------------------+
 
 
 Author
 
 
+--------------+------------------------------+
| Author       | [facility] |
+--------------+------------------------------+
| Organization | [facility] |
+--------------+------------------------------+
| Address      | Unknown                      |
+--------------+------------------------------+
| Phone        | Unavailable                  |
+--------------+------------------------------+
 
 
 
 Support
 
 
+--------------+--------------+---------------------+-----------------+
| Name         | Relationship | Address             | Phone           |
+--------------+--------------+---------------------+-----------------+
| Lawson Ferreira | ECON         | [address]          | [phone] |
|              |              | [place], [place]   |                 |
|              |              | [address]               |                 |
+--------------+--------------+---------------------+-----------------+
 
 
 
 Care Team Providers
 
 
 
+-----------------------+------+-------------+
| Care Team Member Name | Role | Phone       |
+-----------------------+------+-------------+
 PCP  | Unavailable |
+-----------------------+------+-------------+
 
 
 
 Encounter Details
 
 
+--------+-------------+----------------------+---------------------+------------------+
| Date   | Type        | Department           | Care Team           | Description      |
+--------+-------------+----------------------+---------------------+------------------+
| / | Procedure - |   Digestive Health   |   Record, Operation | Operative Report |
|    |             | [facility]  [address] |                     |                  |
|        | Transcribed |  [address]         |                     |                  |
|        |             | Mailcode:  Center    |                     |                  |
|        |             | for [facility] and       |                     |                  |
|        |             | Healing, [address]  |                     |                  |
|        |             |  [place], [place]        |                     |                  |
|        |             | [id number]           |                     |                  |
|        |             | [phone]         |                     |                  |
+--------+-------------+----------------------+---------------------+------------------+
 
 
 
 Social History
 
 
+----------------+-------+-----------+--------+------+
| Tobacco Use    | Types | Packs/Day | Years  | Date |
|                |       |           | Used   |      |
+----------------+-------+-----------+--------+------+
| Never Assessed |       |           |        |      |
+----------------+-------+-----------+--------+------+
 
 
 
+------------------+---------------+
| Sex Assigned at  | Date Recorded |
| Birth            |               |
+------------------+---------------+
| Not on file      |               |
+------------------+---------------+
 
 
 
+----------------+-------------+-------------+
| Job Start Date | Occupation  | Industry    |
+----------------+-------------+-------------+
| Not on file    | Not on file | Not on file |
+----------------+-------------+-------------+
 
 
 
+----------------+--------------+------------+
| Travel History | Travel Start | Travel End |
 
+----------------+--------------+------------+
 
 
 
+-------------------------------------+
| No recent travel history available. |
+-------------------------------------+
 documented as of this encounter
 
 Plan of Treatment
 Not on filedocumented as of this encounter
 
 Procedures
 
 
+------------------+--------+-------------+----------------------+----------------------+
| Procedure Name   | Priori | Date/Time   | Associated Diagnosis | Comments             |
|                  | ty     |             |                      |                      |
+------------------+--------+-------------+----------------------+----------------------+
| OPERATION RECORD |        | 1996  |                      |   Results for this   |
|                  |        | 12:00 AM    |                      | procedure are in the |
|                  |        | PST         |                      |  results section.    |
+------------------+--------+-------------+----------------------+----------------------+
 documented in this encounter
 
 Results
 OPERATION RECORD (1996 12:00 AM PST)
 
+------------------------------------------------------------------------------+
| Procedure Note                                                               |
+------------------------------------------------------------------------------+
|   1996 12:00 AM [facility]                                          |
|   [facility]                                                 |
|  [address], [place] [address] [phone]  |
|   [facility]                                           |
|                                                                              |
|  OPERATION RECORD                                                            |
|                                                                              |
|  Med Rec No.: [id number] Date: 96                                     |
|                                                                              |
|  Name: Margaret Ferreira                                                      |
|                                                                              |
|                                                                              |
|  ATTENDING SURGEON: BRITTANY Perez M.D.                                       |
|   Professor and ,                                                    |
|   Obstetrics and Gynecology                                                  |
|                                                                              |
|  ASSISTANT(S): Kellee Herring M.D.                                         |
|   Resident, Obstetrics and Gynecology                                        |
|                                                                              |
|  PREOPERATIVE DIAGNOSIS(ES): Intraperitoneal versus retroperitoneal bleed    |
|   status post vaginal hysterectomy with                                      |
|   sacrospinous fixation.                                                     |
|                                                                              |
|  POSTOPERATIVE DIAGNOSIS(ES): Intraperitoneal hemorrhage from left pelvic    |
|   vascular pedicle.                                                          |
|                                                                              |
|  OPERATION(S) PERFORMED: Examination under anesthesia with                   |
|   exploratory laparotomy and bilateral                                       |
|   salpingo-oophorectomy.                                                     |
 
|                                                                              |
|  SPECIMEN(S) REMOVED: Bilateral adnexa.                                      |
|                                                                              |
|  ANESTHESIA: General endotracheal anesthesia.                                |
|                                                                              |
|  INDICATIONS: The patient is a 56-year-old woman who                         |
|   underwent a total vaginal hysterectomy with                                |
|   sacrospinous fixation and an                                               |
|  anterior colporrhaphy on 1996. During her postoperative period, |
|  she developed hypotension with tachycardia and relative oliguria. Her       |
|  abdominal exam became increasingly tender in the postoperative period. Her  |
|  postoperative hematocrit was 24.3.                                          |
|                                                                              |
|  FINDINGS: Examination under anesthesia did not reveal                       |
|   a pelvic hematoma. There was a 1.5 liter                                   |
|   hemoperitoneum. No                                                         |
|  obvious bleeding site was identified. However, there was a hematoma along   |
|  the left broad ligament.                                                    |
|                                                                              |
|  PROCEDURE: General anesthesia was administered. The                         |
|   patient was placed in low dorsolithotomy                                   |
|   position. Brief examination                                                |
|  under anesthesia was performed with no evidence of a pelvic hematoma. A     |
|  laparotomy was initiated. The patient was sterilely prepped and draped.     |
|                                                                              |
|  A Pfannenstiel incision was made and carried down through the subcutaneous  |
|  tissue to the fascia. The fascia was scored in the midline. The fascial     |
|  incision was carried laterally. The fascia was dissected off the rectus     |
|  muscles. The rectus muscles were bluntly divided in the midline. The        |
|  peritoneum was tented up. Hemoperitoneum was evident. The peritoneal        |
|  cavity was entered and approximately 1.5 liters of blood clot was evacuated |
|  from the peritoneum. After copious irrigation, the bowel was packed. The    |
|  vascular pedicles were inspected along the right side. All the vascular     |
|  pedicles were intact. The tube and ovary on the right looked normal, and    |
|  there was no retroperitoneal hemorrhage. On the left side, there was a left |
|  pelvic sidewall hematoma. The round ligament on the left was ligated with 0 |
|  Vicryl suture. The retroperitoneal space was opened and clot was evacuated. |
|                                                                              |
|  At this point, the left infundibulopelvic ligament was clamped, cut, and    |
|  doubly ligated with 0 Vicryl suture. The left adnexa was removed. The left  |
|  uterine artery pedicle was identified and re-ligated with 0 Vicryl suture,  |
|  although this area was not obviously bleeding. On the right side, the       |
|  infundibulopelvic ligament was isolated, clamped, cut, and doubly ligated   |
|  with 0 Vicryl suture. The right adnexa was removed.                         |
|                                                                              |
|  Copious irrigation was used. There were no bleeding points in the pelvis.   |
|  All the vascular pedicles were inspected and were hemostatic. The base of   |
|  the bladder was inspected and it was hemostatic as was the vaginal cuff.    |
|  The abdomen was irrigated again. The self-retaining retractor was withdrawn |
|  as well as the lap sponges. A David-Slaughter drain was placed in the         |
|  intraperitoneal cavity and sewn in with 2-0 nylon suture. The fascia was    |
|  closed with a running suture of 0 Vicryl from corner to midline. The        |
|  subcutaneous tissue was irrigated, and the skin was closed with 4-0 Vicryl  |
|  running suture.                                                             |
|                                                                              |
|  The patient was transferred in stable condition to the Post Anesthesia Care |
|  Unit. Sponge and needle counts were announced as correct.                   |
|                                                                              |
|  Estimated blood loss was 2 liters. The patient received 6 units of packed   |
|  red blood cells, 3 units of fresh frozen plasma, and 4 liters of            |
 
|  Crystalloid. There were no apparent complications.                          |
|                                                                              |
|  KAREN Chun M.D.                                    |
|  Resident, Obstetrics & Gynecology Professor and ,                   |
|   Obstetrics and Gynecology                                                  |
|                                                                              |
|  [employer]                                                                      |
|  D: 96                                                                 |
|  T: 96 1:41 P                                                          |
|                                                                              |
|  cc:                                                                         |
|                                                                              |
|  GWEN RAMESH MD                                                            |
|  [address]                                                      |
|  [place] [address]                                                          |
|                                                                              |
|  NICK COLE MD                                                         |
|  [address]                                                                 |
|  [place] [address]                                                          |
|                                                                              |
+------------------------------------------------------------------------------+
 documented in this encounter
 
 Visit Diagnoses
 Not on filedocumented in this encounter

## 2019-12-04 NOTE — XMS
Encounter Summary
  Created on: 2019
 
 Margaret Ferreira
 External Reference #: [id number]
 : 30
 Sex: Female
 
 Demographics
 
 
+-----------------------+----------------------+
| Address               | [address]        |
|                       | [place]  [address] |
+-----------------------+----------------------+
| Home Phone            | [phone]      |
+-----------------------+----------------------+
| Preferred Language    | Unknown              |
+-----------------------+----------------------+
| Marital Status        |               |
+-----------------------+----------------------+
| Hoahaoism Affiliation | Unknown              |
+-----------------------+----------------------+
| Race                  | Unknown              |
+-----------------------+----------------------+
| Ethnic Group          | Unknown              |
+-----------------------+----------------------+
 
 
 Author
 
 
+--------------+--------------------------------------------+
| Author       | [facility]  |
|              | and [place]                                |
+--------------+--------------------------------------------+
| Organization | [facility]  |
|              | and [place]                                |
+--------------+--------------------------------------------+
| Address      | Unknown                                    |
+--------------+--------------------------------------------+
| Phone        | Unavailable                                |
+--------------+--------------------------------------------+
 
 
 
 Support
 
 
+--------------+--------------+---------+-----------------+
| Name         | Relationship | Address | Phone           |
+--------------+--------------+---------+-----------------+
| Lawson Hanna | ECON         | Unknown | [phone] |
+--------------+--------------+---------+-----------------+
 
 
 
 Care Team Providers
 
 
 
+-----------------------+------+-----------------+
| Care Team Member Name | Role | Phone           |
+-----------------------+------+-----------------+
| Calixto Chou MD | PCP  | [phone] |
+-----------------------+------+-----------------+
 
 
 
 Encounter Details
 
 
+--------+-------------+----------------------+----------------+---------------------+
| Date   | Type        | Department           | Care Team      | Description         |
+--------+-------------+----------------------+----------------+---------------------+
| / | Orders Only |   [facility]          |   [place],  | COPD (chronic       |
|    |             | PULMONARY  [address]     | Mary PADRON MD  | obstructive         |
|        |             | [place], |                | pulmonary disease)  |
|        |             |  [place] [address]       |                | ([facility]) (Primary Dx)  |
|        |             | [phone]         |                |                     |
+--------+-------------+----------------------+----------------+---------------------+
 
 
 
 Social History
 
 
+----------------+-------+-----------+--------+------+
| Tobacco Use    | Types | Packs/Day | Years  | Date |
|                |       |           | Used   |      |
+----------------+-------+-----------+--------+------+
| Never Assessed |       |           |        |      |
+----------------+-------+-----------+--------+------+
 
 
 
+------------------+---------------+
| Sex Assigned at  | Date Recorded |
| Birth            |               |
+------------------+---------------+
| Not on file      |               |
+------------------+---------------+
 
 
 
+----------------+-------------+-------------+
| Job Start Date | Occupation  | Industry    |
+----------------+-------------+-------------+
| Not on file    | Not on file | Not on file |
+----------------+-------------+-------------+
 
 
 
+----------------+--------------+------------+
| Travel History | Travel Start | Travel End |
+----------------+--------------+------------+
 
 
 
 
+-------------------------------------+
| No recent travel history available. |
+-------------------------------------+
 documented as of this encounter
 
 Plan of Treatment
 Not on filedocumented as of this encounter
 
 Results
 PFT PULMONARY FUNCTION TESTING ORDERS Full PFT ([place] w/BD, lung volumes, diffusion)?: Yes 
(2015  6:25 PM PST)
 
+------------------------------------------------------------------------+--------------+
| Narrative                                                              | Performed At |
+------------------------------------------------------------------------+--------------+
|   Mary Astudillo MD       2015 18:25        PULMONARY      |              |
| FUNCTION TESTING      METHOD: Spirometry was obtained pre and post     |              |
| administration of   inhaled bronchodilator. Lung volumes were not      |              |
| obtained, as the   patient was unable to pant fast enough. Diffusion   |              |
| capacity was   obtained by single breath method and was not corrected  |              |
| for a   measured hemoglobin.      ATS standards were met.              |              |
| SPIROMETRY: Prior to administration of inhaled bronchodilator,   FVC   |              |
| was normal at 2.01 L or 87% of predicted. FEV1 was moderately          |              |
| reduced at 0.85 L or 51% of predicted. FEV1/FVC ratio was reduced   at |              |
|  42%. After administration of inhaled bronchodilator, FVC   increased  |              |
| by 19 % to 2.38 L or 103% of predicted. FEV1 increased   by 22 % to    |              |
| 1.04 L or 63% of predicted. FEV1/FVC ratio was reduced   at 44%.       |              |
| DIFFUSION CAPACITY: Diffusion capacity was moderately reduced at       |              |
| 10.6 mL/mmHg per minute or 54% of predicted and was not corrected      |              |
| for a measured hemoglobin.     IMPRESSION: Spirometry is consistent    |              |
| with moderate obstructive   physiology. There was a significant        |              |
| response to inhaled   bronchodilator based on change in FVC. Diffusion |              |
|  capacity is   moderately reduced and is not corrected for measured    |              |
| hemoglobin.     Electronically signed by: Mary Astudillo MD    |              |
| 2015   18:20  [facility]     CC:    |              |
| Calixto Chou                                                      |              |
+------------------------------------------------------------------------+--------------+
 
 
 
+------------------------------------------------------------------------------------------+
| Procedure Note                                                                           |
+------------------------------------------------------------------------------------------+
|   Mary Astudillo MD - 2015  6:20 PM PST  PULMONARY FUNCTION TESTING        |
| METHOD: Spirometry was obtained pre and post administration of inhaled bronchodilator.   |
| Lung volumes were not obtained, as the patient was unable to pant fast enough. Diffusion |
|  capacity was obtained by single breath method and was not corrected for a measured      |
| hemoglobin. ATS standards were met. SPIROMETRY: Prior to administration of inhaled       |
| bronchodilator, FVC was normal at 2.01 L or 87% of predicted. FEV1 was moderately        |
| reduced at 0.85 L or 51% of predicted. FEV1/FVC ratio was reduced at 42%. After          |
| administration of inhaled bronchodilator, FVC increased by 19 % to 2.38 L or 103% of     |
| predicted. FEV1 increased by 22 % to 1.04 L or 63% of predicted. FEV1/FVC ratio was      |
| reduced at 44%.DIFFUSION CAPACITY: Diffusion capacity was moderately reduced at 10.6     |
| mL/mmHg per minute or 54% of predicted and was not corrected for a measured              |
| hemoglobin.IMPRESSION: Spirometry is consistent with moderate obstructive physiology.    |
| There was a significant response to inhaled bronchodilator based on change in FVC.       |
| Diffusion capacity is moderately reduced and is not corrected for measured               |
| hemoglobin.Electronically signed by: Mary Astudillo MD 2015 18:20[facility]        |
| [facility] Calixto Chou                                |
|Electronically signed by: Mary Astudillo MD 2015 18:20                       |
 
|[facility]                                                  |
|                                                                                          |
|CC: Calixto Chou                                                                     |
+------------------------------------------------------------------------------------------+
 documented in this encounter
 
 Visit Diagnoses
 
 
+---------------------------------------------------------------------------------+
| Diagnosis                                                                       |
+---------------------------------------------------------------------------------+
|   COPD (chronic obstructive pulmonary disease) (HCC) - Primary  Chronic airway  |
| obstruction, not elsewhere classified                                           |
+---------------------------------------------------------------------------------+
 documented in this encounter

## 2019-12-04 NOTE — XMS
Encounter Summary
  Created on: 2019
 
 Margaret Ferreira
 External Reference #: [id number]
 : 30
 Sex: Female
 
 Demographics
 
 
+-----------------------+----------------------+
| Address               | [address]        |
|                       | [place]  [address] |
+-----------------------+----------------------+
| Home Phone            | [phone]      |
+-----------------------+----------------------+
| Preferred Language    | Unknown              |
+-----------------------+----------------------+
| Marital Status        |               |
+-----------------------+----------------------+
| Anabaptist Affiliation | Unknown              |
+-----------------------+----------------------+
| Race                  | Unknown              |
+-----------------------+----------------------+
| Ethnic Group          | Unknown              |
+-----------------------+----------------------+
 
 
 Author
 
 
+--------------+--------------------------------------------+
| Author       | [facility] [place]  |
|              | and [place]                                |
+--------------+--------------------------------------------+
| Organization | [facility]  |
|              | and [place]                                |
+--------------+--------------------------------------------+
| Address      | Unknown                                    |
+--------------+--------------------------------------------+
| Phone        | Unavailable                                |
+--------------+--------------------------------------------+
 
 
 
 Support
 
 
+--------------+--------------+---------+-----------------+
| Name         | Relationship | Address | Phone           |
+--------------+--------------+---------+-----------------+
| Lawson Hanna | ECON         | Unknown | [phone] |
+--------------+--------------+---------+-----------------+
 
 
 
 Care Team Providers
 
 
 
+-----------------------+------+-----------------+
| Care Team Member Name | Role | Phone           |
+-----------------------+------+-----------------+
| Calixto Chou MD | PCP  | [phone] |
+-----------------------+------+-----------------+
 
 
 
 Encounter Details
 
 
+--------+-----------+----------------------+----------------+---------------------+
| Date   | Type      | Department           | Care Team      | Description         |
+--------+-----------+----------------------+----------------+---------------------+
| / | [facility]  |   [facility] |   [place],  | COPD (chronic       |
| 2015   | Encounter |  [facility]   | Mary PADRON MD  | obstructive         |
|        |           | FUNCTION  [address]      |                | pulmonary disease)  |
|        |           | [place], |                | ([facility])               |
|        |           |  [place] [address]       |                |                     |
|        |           | [phone]         |                |                     |
+--------+-----------+----------------------+----------------+---------------------+
 
 
 
 Social History
 
 
+--------------+-------+-----------+--------+------+
| Tobacco Use  | Types | Packs/Day | Years  | Date |
|              |       |           | Used   |      |
+--------------+-------+-----------+--------+------+
| Never Smoker |       |           |        |      |
+--------------+-------+-----------+--------+------+
 
 
 
+-------------------------------------------------------------------+
| Comments: her parents both smoked, also worked at [employer] |
+-------------------------------------------------------------------+
 
 
 
+-------------+-------------+---------+----------+
| Alcohol Use | Drinks/Week | oz/Week | Comments |
+-------------+-------------+---------+----------+
| No          |             |         |          |
+-------------+-------------+---------+----------+
 
 
 
+------------------+---------------+
| Sex Assigned at  | Date Recorded |
| Birth            |               |
+------------------+---------------+
| Not on file      |               |
+------------------+---------------+
 
 
 
 
+----------------+-------------+-------------+
| Job Start Date | Occupation  | Industry    |
+----------------+-------------+-------------+
| Not on file    | Not on file | Not on file |
+----------------+-------------+-------------+
 
 
 
+----------------+--------------+------------+
| Travel History | Travel Start | Travel End |
+----------------+--------------+------------+
 
 
 
+-------------------------------------+
| No recent travel history available. |
+-------------------------------------+
 documented as of this encounter
 
 Medications at Time of Discharge
 
 
+----------------------+----------------------+-----------+---------+----------+-----------+
| Medication           | Sig                  | Dispensed | Refills | Start    | End Date  |
|                      |                      |           |         | Date     |           |
+----------------------+----------------------+-----------+---------+----------+-----------+
|   Calcium Carbonate  | Take 600 mg by mouth |           | 0       |          |           |
| (CALCIUM 600 PO)     |  Daily.              |           |         |          |           |
+----------------------+----------------------+-----------+---------+----------+-----------+
|   Cholecalciferol    | Take  by mouth       |           | 0       |          |           |
| (VITAMIN D-3) 2000   | Daily.               |           |         |          |           |
| units CAPS           |                      |           |         |          |           |
+----------------------+----------------------+-----------+---------+----------+-----------+
|   Docosahexaenoic    | Take  by mouth.      |           | 0       |          |           |
| Acid (DHA OMEGA 3)   |                      |           |         |          |           |
| 100 MG CAPS          |                      |           |         |          |           |
+----------------------+----------------------+-----------+---------+----------+-----------+
|   Multiple           | Take  by mouth       |           | 0       |          |           |
| Vitamins-Minerals    | Daily.               |           |         |          |           |
| (MULTIVITAMIN PO)    |                      |           |         |          |           |
+----------------------+----------------------+-----------+---------+----------+-----------+
|   omeprazole         | Take 20 mg by mouth  |           | 0       |          |           |
| (PRILOSEC) 20 mg     | every morning        |           |         |          |           |
| capsule              | (before breakfast).  |           |         |          |           |
+----------------------+----------------------+-----------+---------+----------+-----------+
|   Spacer/Aero        | Use with inhaler as  |   1 each  | 1       | 20 |           |
| Chamber Mouthpiece   | directed.            |           |         | 15       |           |
| MISC                 |                      |           |         |          |           |
+----------------------+----------------------+-----------+---------+----------+-----------+
|   traMADol (ULTRAM)  | Take 100 mg by mouth |           | 0       |          |           |
| 50 mg tablet         |  4 times daily.      |           |         |          |           |
+----------------------+----------------------+-----------+---------+----------+-----------+
|   albuterol (PROAIR  | Inhale 2 puffs into  |   1       | 5       | 20 |  |
| HFA) 90 mcg/puff     | the lungs every 6    | Inhaler   |         | 15       | 6         |
| inhaler              | hours as needed for  |           |         |          |           |
|                      | Wheezing or          |           |         |          |           |
|                      | Shortness of Breath. |           |         |          |           |
+----------------------+----------------------+-----------+---------+----------+-----------+
|   beclomethasone     | Inhale 2 puffs into  |           | 0       |          |  |
 
| (QVAR) 40 mcg/puff   | the lungs 2 times    |           |         |          | 5         |
| inhaler              | daily.               |           |         |          |           |
+----------------------+----------------------+-----------+---------+----------+-----------+
|   benzonatate        | Take 100 mg by mouth |           | 0       |          | 11/15/201 |
| (LEANDRA FUCHS)    |  3 times daily as    |           |         |          | 8         |
| 100 mg capsule       | needed.              |           |         |          |           |
+----------------------+----------------------+-----------+---------+----------+-----------+
|   diclofenac         | Take 100 mg by mouth |           | 0       |          |  |
| (VOLTAREN-XR) 100 mg |  daily (with         |           |         |          | 5         |
|  ER tablet           | breakfast).          |           |         |          |           |
+----------------------+----------------------+-----------+---------+----------+-----------+
|   Diclofenac         | Take  by mouth.      |           | 0       |          |  |
| Potassium 50 MG PACK |                      |           |         |          | 5         |
+----------------------+----------------------+-----------+---------+----------+-----------+
|   levothyroxine      | Take 150 mcg by      |           | 0       |          |  |
| (SYNTHROID,          | mouth every morning  |           |         |          | 8         |
| LEVOTHROID) 150 mcg  | (before breakfast).  |           |         |          |           |
| tablet               |                      |           |         |          |           |
+----------------------+----------------------+-----------+---------+----------+-----------+
|                      | Take 0.5 tablets by  |           | 0       |          |  |
| olmesartan-hydrochlo | mouth Daily.         |           |         |          | 6         |
| rothiazide (BENICAR  |                      |           |         |          |           |
| HCT) 40-25 MG per    |                      |           |         |          |           |
| tablet               |                      |           |         |          |           |
+----------------------+----------------------+-----------+---------+----------+-----------+
|   pramipexole        | Take 0.25 mg by      |           | 0       |          | 07/10/201 |
| (MIRAPEX) 0.25 mg    | mouth 2 times daily. |           |         |          | 8         |
| tablet               |                      |           |         |          |           |
+----------------------+----------------------+-----------+---------+----------+-----------+
|   pseudoePHEDrine    | Take 30 mg by mouth  |           | 0       |          | 11/15/201 |
| (SUDAFED) 30 mg      | every 4 hours as     |           |         |          | 8         |
| tablet               | needed.              |           |         |          |           |
+----------------------+----------------------+-----------+---------+----------+-----------+
|   tiotropium         | Inhale 1 capsule     |   30      | 11      | 20 |  |
| (SPIRIVA HANDIHALER) | into the lungs       | capsule   |         | 15       | 5         |
|  18 mcg inhalation   | Daily.               |           |         |          |           |
| capsule              |                      |           |         |          |           |
+----------------------+----------------------+-----------+---------+----------+-----------+
 documented as of this encounter
 
 Plan of Treatment
 Not on filedocumented as of this encounter
 
 Procedures
 
 
+----------------------+--------+-------------+----------------------+----------------------
+
| Procedure Name       | Priori | Date/Time   | Associated Diagnosis | Comments             
|
|                      | ty     |             |                      |                      
|
+----------------------+--------+-------------+----------------------+----------------------
+
| PFT PULMONARY        | ASAP   | 2015  |   COPD (chronic      |   Results for this   
|
| FUNCTION TESTING     |        |  6:25 PM    | obstructive          | procedure are in the 
|
| ORDERS               |        | PST         | pulmonary disease)   |  results section.    
|
 
|                      |        |             | ([facility])                |                      
|
+----------------------+--------+-------------+----------------------+----------------------
+
| PFT PULMONARY        | ASAP   | 2015  |   COPD (chronic      |   Results for this   
|
| FUNCTION TESTING     |        |  6:25 PM    | obstructive          | procedure are in the 
|
| ORDERS               |        | PST         | pulmonary disease)   |  results section.    
|
|                      |        |             | (HCC)                |                      
|
+----------------------+--------+-------------+----------------------+----------------------
+
| PFT PULMONARY        | ASAP   | 2015  |   COPD (chronic      |   Results for this   
|
| FUNCTION TESTING     |        |  6:25 PM    | obstructive          | procedure are in the 
|
| ORDERS               |        | PST         | pulmonary disease)   |  results section.    
|
|                      |        |             | (HCC)                |                      
|
+----------------------+--------+-------------+----------------------+----------------------
+
| DIAGNOSTIC REPORT -  |        | 2015  |                      |                      
|
| EXTERNAL SCAN        |        | 12:00 AM    |                      |                      
|
|                      |        | PST         |                      |                      
|
+----------------------+--------+-------------+----------------------+----------------------
+
 documented in this encounter
 
 Results
 PFT PULMONARY FUNCTION TESTING ORDERS Full PFT (Christiano w/BD, lung volumes, diffusion)?: Yes 
(2015  6:25 PM PST)
 
+------------------------------------------------------------------------+--------------+
| Narrative                                                              | Performed At |
+------------------------------------------------------------------------+--------------+
|   Mary Astudillo MD       2015 18:25        PULMONARY      |              |
| FUNCTION TESTING      METHOD: Spirometry was obtained pre and post     |              |
| administration of   inhaled bronchodilator. Lung volumes were not      |              |
| obtained, as the   patient was unable to pant fast enough. Diffusion   |              |
| capacity was   obtained by single breath method and was not corrected  |              |
| for a   measured hemoglobin.      ATS standards were met.              |              |
| SPIROMETRY: Prior to administration of inhaled bronchodilator,   FVC   |              |
| was normal at 2.01 L or 87% of predicted. FEV1 was moderately          |              |
| reduced at 0.85 L or 51% of predicted. FEV1/FVC ratio was reduced   at |              |
|  42%. After administration of inhaled bronchodilator, FVC   increased  |              |
| by 19 % to 2.38 L or 103% of predicted. FEV1 increased   by 22 % to    |              |
| 1.04 L or 63% of predicted. FEV1/FVC ratio was reduced   at 44%.       |              |
| DIFFUSION CAPACITY: Diffusion capacity was moderately reduced at       |              |
| 10.6 mL/mmHg per minute or 54% of predicted and was not corrected      |              |
| for a measured hemoglobin.     IMPRESSION: Spirometry is consistent    |              |
| with moderate obstructive   physiology. There was a significant        |              |
| response to inhaled   bronchodilator based on change in FVC. Diffusion |              |
|  capacity is   moderately reduced and is not corrected for measured    |              |
| hemoglobin.     Electronically signed by: aMry Astudillo MD    |              |
 
| 2015   18:20  [facility]     CC:    |              |
| Calixto Chou                                                      |              |
+------------------------------------------------------------------------+--------------+
 
 
 
+------------------------------------------------------------------------------------------+
| Procedure Note                                                                           |
+------------------------------------------------------------------------------------------+
|   Mary Astudillo MD - 2015  6:20 PM PST  PULMONARY FUNCTION TESTING        |
| METHOD: Spirometry was obtained pre and post administration of inhaled bronchodilator.   |
| Lung volumes were not obtained, as the patient was unable to pant fast enough. Diffusion |
|  capacity was obtained by single breath method and was not corrected for a measured      |
| hemoglobin. ATS standards were met. SPIROMETRY: Prior to administration of inhaled       |
| bronchodilator, FVC was normal at 2.01 L or 87% of predicted. FEV1 was moderately        |
| reduced at 0.85 L or 51% of predicted. FEV1/FVC ratio was reduced at 42%. After          |
| administration of inhaled bronchodilator, FVC increased by 19 % to 2.38 L or 103% of     |
| predicted. FEV1 increased by 22 % to 1.04 L or 63% of predicted. FEV1/FVC ratio was      |
| reduced at 44%.DIFFUSION CAPACITY: Diffusion capacity was moderately reduced at 10.6     |
| mL/mmHg per minute or 54% of predicted and was not corrected for a measured              |
| hemoglobin.IMPRESSION: Spirometry is consistent with moderate obstructive physiology.    |
| There was a significant response to inhaled bronchodilator based on change in FVC.       |
| Diffusion capacity is moderately reduced and is not corrected for measured               |
| hemoglobin.Electronically signed by: Mary Astudillo MD 2015 18:20[facility]        |
[facility] Calixto Chou                                |
|Electronically signed by: Mary Astudillo MD 2015 18:20                       |
|[facility]                                                  |
|                                                                                          |
|CC: Calixto Chou                                                                     |
+------------------------------------------------------------------------------------------+
 documented in this encounter
 
 Visit Diagnoses
 
 
+----------------------------------------------------------------------------------------+
| Diagnosis                                                                              |
+----------------------------------------------------------------------------------------+
|   COPD (chronic obstructive pulmonary disease) (HCC)  Chronic airway obstruction, not  |
| elsewhere classified                                                                   |
+----------------------------------------------------------------------------------------+
 documented in this encounter
 
 Administered Medications
 
 
+-----------------------------------+--------+----------+--------+------+------+
| Medication Order                  | MAR    | Action   | Dose   | Rate | Site |
|                                   | Action | Date     |        |      |      |
+-----------------------------------+--------+----------+--------+------+------+
|   albuterol 2.5 mg/3 mL nebulizer | Given  | 20 | 2.5 mg |      |      |
|  solution 2.5 mg  2.5 mg,         |        | 15  2:07 |        |      |      |
| Nebulization, RT Once, Tue        |        |  PM PST  |        |      |      |
| 1/13/15 at 1430, For 1 dose, RT   |        |          |        |      |      |
| will administer.,                 |        |          |        |      |      |
+-----------------------------------+--------+----------+--------+------+------+
 
 
 
+---+---+
 
|   |   |
+---+---+
 documented in this encounter

## 2019-12-04 NOTE — XMS
Encounter Summary
  Created on: 2019
 
 Margaret Ferreira
 External Reference #: [id number]
 : 30
 Sex: Female
 
 Demographics
 
 
+-----------------------+----------------------+
| Address               | [address]        |
|                       | [place]  [address] |
+-----------------------+----------------------+
| Home Phone            | [phone]      |
+-----------------------+----------------------+
| Preferred Language    | Unknown              |
+-----------------------+----------------------+
| Marital Status        |               |
+-----------------------+----------------------+
| Voodoo Affiliation | Unknown              |
+-----------------------+----------------------+
| Race                  | Unknown              |
+-----------------------+----------------------+
| Ethnic Group          | Unknown              |
+-----------------------+----------------------+
 
 
 Author
 
 
+--------------+--------------------------------------------+
| Author       | [facility] [place]  |
|              | and [place]                                |
+--------------+--------------------------------------------+
| Organization | [facility] [place]  |
|              | and [place]                                |
+--------------+--------------------------------------------+
| Address      | Unknown                                    |
+--------------+--------------------------------------------+
| Phone        | Unavailable                                |
+--------------+--------------------------------------------+
 
 
 
 Support
 
 
+--------------+--------------+---------+-----------------+
| Name         | Relationship | Address | Phone           |
+--------------+--------------+---------+-----------------+
| Lawson Hanna | ECON         | Unknown | [phone] |
+--------------+--------------+---------+-----------------+
 
 
 
 Care Team Providers
 
 
 
+-----------------------+------+-----------------+
| Care Team Member Name | Role | Phone           |
+-----------------------+------+-----------------+
| Calixto Chou MD | PCP  | [phone] |
+-----------------------+------+-----------------+
 
 
 
 Reason for Visit
 
 
+---------+--------------------+
| Reason  | Comments           |
+---------+--------------------+
| Results | nocturnal oximetry |
+---------+--------------------+
 
 
 
 Encounter Details
 
 
+--------+-----------+----------------------+----------------+---------------------+
| Date   | Type      | Department           | Care Team      | Description         |
+--------+-----------+----------------------+----------------+---------------------+
| / | Telephone |   [facility]          |   [place],  | Results (nocturnal  |
|    |           | PULMONARY  [address]     | Mary PADRON MD  | oximetry)           |
|        |           | [place]  [place], |                |                     |
|        |           |  [place] [address]       |                |                     |
|        |           | [phone]         |                |                     |
+--------+-----------+----------------------+----------------+---------------------+
 
 
 
 Social History
 
 
+--------------+-------+-----------+--------+------+
| Tobacco Use  | Types | Packs/Day | Years  | Date |
|              |       |           | Used   |      |
+--------------+-------+-----------+--------+------+
| Never Smoker |       |           |        |      |
+--------------+-------+-----------+--------+------+
 
 
 
+-------------------------------------------------------------------+
| Comments: her parents both smoked, also worked at [employer] |
+-------------------------------------------------------------------+
 
 
 
+-------------+-------------+---------+----------+
| Alcohol Use | Drinks/Week | oz/Week | Comments |
+-------------+-------------+---------+----------+
| No          |             |         |          |
+-------------+-------------+---------+----------+
 
 
 
 
+------------------+---------------+
| Sex Assigned at  | Date Recorded |
| Birth            |               |
+------------------+---------------+
| Not on file      |               |
+------------------+---------------+
 
 
 
+----------------+-------------+-------------+
| Job Start Date | Occupation  | Industry    |
+----------------+-------------+-------------+
| Not on file    | Not on file | Not on file |
+----------------+-------------+-------------+
 
 
 
+----------------+--------------+------------+
| Travel History | Travel Start | Travel End |
+----------------+--------------+------------+
 
 
 
+-------------------------------------+
| No recent travel history available. |
+-------------------------------------+
 documented as of this encounter
 
 Plan of Treatment
 Not on filedocumented as of this encounter
 
 Visit Diagnoses
 Not on filedocumented in this encounter

## 2019-12-04 NOTE — XMS
Encounter Summary
  Created on: 2019
 
 Margaret eFrreira
 External Reference #: [id number]
 : 30
 Sex: Female
 
 Demographics
 
 
+-----------------------+----------------------+
| Address               | [address]        |
|                       | [place]  [address] |
+-----------------------+----------------------+
| Home Phone            | [phone]      |
+-----------------------+----------------------+
| Preferred Language    | Unknown              |
+-----------------------+----------------------+
| Marital Status        |               |
+-----------------------+----------------------+
| Pentecostal Affiliation | Unknown              |
+-----------------------+----------------------+
| Race                  | Unknown              |
+-----------------------+----------------------+
| Ethnic Group          | Unknown              |
+-----------------------+----------------------+
 
 
 Author
 
 
+--------------+--------------------------------------------+
| Author       | [facility] [place]  |
|              | and [place]                                |
+--------------+--------------------------------------------+
| Organization | [facility] [place]  |
|              | and [place]                                |
+--------------+--------------------------------------------+
| Address      | Unknown                                    |
+--------------+--------------------------------------------+
| Phone        | Unavailable                                |
+--------------+--------------------------------------------+
 
 
 
 Support
 
 
+--------------+--------------+---------+-----------------+
| Name         | Relationship | Address | Phone           |
+--------------+--------------+---------+-----------------+
| Lawson Hanna | ECON         | Unknown | [phone] |
+--------------+--------------+---------+-----------------+
 
 
 
 Care Team Providers
 
 
 
+-----------------------------+------+-----------------+
| Care Team Member Name       | Role | Phone           |
+-----------------------------+------+-----------------+
| Bessy Paulino | PCP  | [phone] |
|  PADONNY                       |      |                 |
+-----------------------------+------+-----------------+
 
 
 
 Reason for Visit
 
 
+-------------------+----------+
| Reason            | Comments |
+-------------------+----------+
| Medication Refill |          |
+-------------------+----------+
 
 
 
 Encounter Details
 
 
+--------+--------+----------------------+---------------------+-------------------+
| Date   | Type   | Department           | Care Team           | Description       |
+--------+--------+----------------------+---------------------+-------------------+
| / | Refill |   [facility]       |   Kvng Ontiveros  | Medication Refill |
|    |        | [facility]    | E, DO  [address]   |                   |
|        |        | [facility]  [address]  | [place], [place]       |                   |
|        |        | [address]  [place],   | [address]          |                   |
|        |        | [place] [address]        | [phone]        |                   |
|        |        | [phone]         | [phone] (Fax)  |                   |
+--------+--------+----------------------+---------------------+-------------------+
 
 
 
 Social History
 
 
+-------------------+-------+-----------+--------+------+
| Tobacco Use       | Types | Packs/Day | Years  | Date |
|                   |       |           | Used   |      |
+-------------------+-------+-----------+--------+------+
| Passive Smoke     |       |           |        |      |
| Exposure - Never  |       |           |        |      |
| Smoker            |       |           |        |      |
+-------------------+-------+-----------+--------+------+
 
 
 
+---------------------+---+---+---+
| Smokeless Tobacco:  |   |   |   |
| Never Used          |   |   |   |
+---------------------+---+---+---+
 
 
 
+-------------+----------------------+---------+----------+
 
| Alcohol Use | Drinks/Week          | oz/Week | Comments |
+-------------+----------------------+---------+----------+
| No          |   0 Standard drinks  | 0.0     |          |
|             | or equivalent        |         |          |
+-------------+----------------------+---------+----------+
 
 
 
+------------------+---------------+
| Sex Assigned at  | Date Recorded |
| Birth            |               |
+------------------+---------------+
| Not on file      |               |
+------------------+---------------+
 
 
 
+----------------+-------------+-------------+
| Job Start Date | Occupation  | Industry    |
+----------------+-------------+-------------+
| Not on file    | Not on file | Not on file |
+----------------+-------------+-------------+
 
 
 
+----------------+--------------+------------+
| Travel History | Travel Start | Travel End |
+----------------+--------------+------------+
 
 
 
+-------------------------------------+
| No recent travel history available. |
+-------------------------------------+
 documented as of this encounter
 
 Plan of Treatment
 Not on filedocumented as of this encounter
 
 Visit Diagnoses
 Not on filedocumented in this encounter

## 2019-12-04 NOTE — NUR
RT NEELY CAME TO THIS RN AND ASKED ABOUT POSSIBILITY TO START DUONEBS AS PT
TAKES ALBUTEROL AT HOME. THIS RN DISCUSSED WITH DR FOUNTAIN AT NURSE'S STATION.
PER
DR FOUNTAIN, OKAY FOR PT TO RECEIVE QID DUONEBS.

## 2019-12-04 NOTE — XMS
Encounter Summary
  Created on: 2019
 
 Margaret Ferreira
 External Reference #: [id number]
 : 30
 Sex: Female
 
 Demographics
 
 
+-----------------------+----------------------+
| Address               | [address]        |
|                       | [place]  [address] |
+-----------------------+----------------------+
| Home Phone            | [phone]      |
+-----------------------+----------------------+
| Preferred Language    | Unknown              |
+-----------------------+----------------------+
| Marital Status        |               |
+-----------------------+----------------------+
| Islam Affiliation | Unknown              |
+-----------------------+----------------------+
| Race                  | Unknown              |
+-----------------------+----------------------+
| Ethnic Group          | Unknown              |
+-----------------------+----------------------+
 
 
 Author
 
 
+--------------+--------------------------------------------+
| Author       | [facility] [place]  |
|              | and [place]                                |
+--------------+--------------------------------------------+
| Organization | [facility] [place]  |
|              | and [place]                                |
+--------------+--------------------------------------------+
| Address      | Unknown                                    |
+--------------+--------------------------------------------+
| Phone        | Unavailable                                |
+--------------+--------------------------------------------+
 
 
 
 Support
 
 
+--------------+--------------+---------+-----------------+
| Name         | Relationship | Address | Phone           |
+--------------+--------------+---------+-----------------+
| Lawson Hanna | ECON         | Unknown | [phone] |
+--------------+--------------+---------+-----------------+
 
 
 
 Care Team Providers
 
 
 
+-----------------------+------+-----------------+
| Care Team Member Name | Role | Phone           |
+-----------------------+------+-----------------+
| Calixto Chou MD | PCP  | [phone] |
+-----------------------+------+-----------------+
 
 
 
 Encounter Details
 
 
+--------+-------------+----------------------+----------------+---------------------+
| Date   | Type        | Department           | Care Team      | Description         |
+--------+-------------+----------------------+----------------+---------------------+
| / | Orders Only |   [facility]          |   [place],  | COPD (chronic       |
|    |             | PULMONARY  [address]     | Mary PADRON MD  | obstructive         |
|        |             | [place]  [place], |                | pulmonary disease)  |
|        |             |  [place] [address]       |                | ([facility]) (Primary Dx)  |
|        |             | [phone]         |                |                     |
+--------+-------------+----------------------+----------------+---------------------+
 
 
 
 Social History
 
 
+----------------+-------+-----------+--------+------+
| Tobacco Use    | Types | Packs/Day | Years  | Date |
|                |       |           | Used   |      |
+----------------+-------+-----------+--------+------+
| Never Assessed |       |           |        |      |
+----------------+-------+-----------+--------+------+
 
 
 
+------------------+---------------+
| Sex Assigned at  | Date Recorded |
| Birth            |               |
+------------------+---------------+
| Not on file      |               |
+------------------+---------------+
 
 
 
+----------------+-------------+-------------+
| Job Start Date | Occupation  | Industry    |
+----------------+-------------+-------------+
| Not on file    | Not on file | Not on file |
+----------------+-------------+-------------+
 
 
 
+----------------+--------------+------------+
| Travel History | Travel Start | Travel End |
+----------------+--------------+------------+
 
 
 
 
+-------------------------------------+
| No recent travel history available. |
+-------------------------------------+
 documented as of this encounter
 
 Plan of Treatment
 Not on filedocumented as of this encounter
 
 Results
 PFT PULMONARY FUNCTION TESTING ORDERS Full PFT ([place] w/BD, lung volumes, diffusion)?: Yes 
(2015  6:25 PM PST)
 
+------------------------------------------------------------------------+--------------+
| Narrative                                                              | Performed At |
+------------------------------------------------------------------------+--------------+
|   Mray Astudillo MD       2015 18:25        PULMONARY      |              |
| FUNCTION TESTING      METHOD: Spirometry was obtained pre and post     |              |
| administration of   inhaled bronchodilator. Lung volumes were not      |              |
| obtained, as the   patient was unable to pant fast enough. Diffusion   |              |
| capacity was   obtained by single breath method and was not corrected  |              |
| for a   measured hemoglobin.      ATS standards were met.              |              |
| SPIROMETRY: Prior to administration of inhaled bronchodilator,   FVC   |              |
| was normal at 2.01 L or 87% of predicted. FEV1 was moderately          |              |
| reduced at 0.85 L or 51% of predicted. FEV1/FVC ratio was reduced   at |              |
|  42%. After administration of inhaled bronchodilator, FVC   increased  |              |
| by 19 % to 2.38 L or 103% of predicted. FEV1 increased   by 22 % to    |              |
| 1.04 L or 63% of predicted. FEV1/FVC ratio was reduced   at 44%.       |              |
| DIFFUSION CAPACITY: Diffusion capacity was moderately reduced at       |              |
| 10.6 mL/mmHg per minute or 54% of predicted and was not corrected      |              |
| for a measured hemoglobin.     IMPRESSION: Spirometry is consistent    |              |
| with moderate obstructive   physiology. There was a significant        |              |
| response to inhaled   bronchodilator based on change in FVC. Diffusion |              |
|  capacity is   moderately reduced and is not corrected for measured    |              |
| hemoglobin.     Electronically signed by: Mary Astudillo MD    |              |
| 2015   18:20  [facility]     CC:    |              |
| Calixto Chou                                                      |              |
+------------------------------------------------------------------------+--------------+
 
 
 
+------------------------------------------------------------------------------------------+
| Procedure Note                                                                           |
+------------------------------------------------------------------------------------------+
|   Mary Astudillo MD - 2015  6:20 PM PST  PULMONARY FUNCTION TESTING        |
| METHOD: Spirometry was obtained pre and post administration of inhaled bronchodilator.   |
| Lung volumes were not obtained, as the patient was unable to pant fast enough. Diffusion |
|  capacity was obtained by single breath method and was not corrected for a measured      |
| hemoglobin. ATS standards were met. SPIROMETRY: Prior to administration of inhaled       |
| bronchodilator, FVC was normal at 2.01 L or 87% of predicted. FEV1 was moderately        |
| reduced at 0.85 L or 51% of predicted. FEV1/FVC ratio was reduced at 42%. After          |
| administration of inhaled bronchodilator, FVC increased by 19 % to 2.38 L or 103% of     |
| predicted. FEV1 increased by 22 % to 1.04 L or 63% of predicted. FEV1/FVC ratio was      |
| reduced at 44%.DIFFUSION CAPACITY: Diffusion capacity was moderately reduced at 10.6     |
| mL/mmHg per minute or 54% of predicted and was not corrected for a measured              |
| hemoglobin.IMPRESSION: Spirometry is consistent with moderate obstructive physiology.    |
| There was a significant response to inhaled bronchodilator based on change in FVC.       |
| Diffusion capacity is moderately reduced and is not corrected for measured               |
| hemoglobin.Electronically signed by: Mary Astudillo MD 2015 18:20[facility]        |
| [facility] Calixto Chou                                |
|Electronically signed by: Mary Astudillo MD 2015 18:20                       |
 
|[facility]                                                  |
|                                                                                          |
|CC: Calixto Chou                                                                     |
+------------------------------------------------------------------------------------------+
 documented in this encounter
 
 Visit Diagnoses
 
 
+---------------------------------------------------------------------------------+
| Diagnosis                                                                       |
+---------------------------------------------------------------------------------+
|   COPD (chronic obstructive pulmonary disease) (HCC) - Primary  Chronic airway  |
| obstruction, not elsewhere classified                                           |
+---------------------------------------------------------------------------------+
 documented in this encounter

## 2019-12-04 NOTE — XMS
Encounter Summary
  Created on: 2019
 
 Margaret Ferreira
 External Reference #: [id number]
 : 30
 Sex: Female
 
 Demographics
 
 
+-----------------------+----------------------+
| Address               | [address]        |
|                       | [place]  [address] |
+-----------------------+----------------------+
| Home Phone            | [phone]      |
+-----------------------+----------------------+
| Preferred Language    | Unknown              |
+-----------------------+----------------------+
| Marital Status        |               |
+-----------------------+----------------------+
| Buddhist Affiliation | Unknown              |
+-----------------------+----------------------+
| Race                  | Unknown              |
+-----------------------+----------------------+
| Ethnic Group          | Unknown              |
+-----------------------+----------------------+
 
 
 Author
 
 
+--------------+--------------------------------------------+
| Author       | [facility] [place]  |
|              | and [place]                                |
+--------------+--------------------------------------------+
| Organization | [facility]  |
|              | and [place]                                |
+--------------+--------------------------------------------+
| Address      | Unknown                                    |
+--------------+--------------------------------------------+
| Phone        | Unavailable                                |
+--------------+--------------------------------------------+
 
 
 
 Support
 
 
+--------------+--------------+---------+-----------------+
| Name         | Relationship | Address | Phone           |
+--------------+--------------+---------+-----------------+
| Lawson Hanna | ECON         | Unknown | [phone] |
+--------------+--------------+---------+-----------------+
 
 
 
 Care Team Providers
 
 
 
+-----------------------------+------+-----------------+
| Care Team Member Name       | Role | Phone           |
+-----------------------------+------+-----------------+
| Bessy Paulino | PCP  | [phone] |
|  PA-C                       |      |                 |
+-----------------------------+------+-----------------+
 
 
 
 Encounter Details
 
 
+--------+-------------+---------------------+----------------------+-------------+
| Date   | Type        | Department          | Care Team            | Description |
+--------+-------------+---------------------+----------------------+-------------+
| / | Orders Only |   [facility]    |   Provider,          |             |
|    |             | [facility] GENER[facility] OP   | [address] |             |
|        |             | CONVERSION  [address]  |  [address]        |             |
|        |             | [address]  [place], [place]  | [place] [address]     |             |
|        |             | [address]          |                      |             |
|        |             | [phone]        |                      |             |
+--------+-------------+---------------------+----------------------+-------------+
 
 
 
 Social History
 
 
+-------------------+-------+-----------+--------+------+
| Tobacco Use       | Types | Packs/Day | Years  | Date |
|                   |       |           | Used   |      |
+-------------------+-------+-----------+--------+------+
| Passive Smoke     |       |           |        |      |
| Exposure - Never  |       |           |        |      |
| Smoker            |       |           |        |      |
+-------------------+-------+-----------+--------+------+
 
 
 
+---------------------+---+---+---+
| Smokeless Tobacco:  |   |   |   |
| Never Used          |   |   |   |
+---------------------+---+---+---+
 
 
 
+-------------+----------------------+---------+----------+
| Alcohol Use | Drinks/Week          | oz/Week | Comments |
+-------------+----------------------+---------+----------+
| No          |   0 Standard drinks  | 0.0     |          |
|             | or equivalent        |         |          |
+-------------+----------------------+---------+----------+
 
 
 
+------------------+---------------+
| Sex Assigned at  | Date Recorded |
| Birth            |               |
 
+------------------+---------------+
| Not on file      |               |
+------------------+---------------+
 
 
 
+----------------+-------------+-------------+
| Job Start Date | Occupation  | Industry    |
+----------------+-------------+-------------+
| Not on file    | Not on file | Not on file |
+----------------+-------------+-------------+
 
 
 
+----------------+--------------+------------+
| Travel History | Travel Start | Travel End |
+----------------+--------------+------------+
 
 
 
+-------------------------------------+
| No recent travel history available. |
+-------------------------------------+
 documented as of this encounter
 
 Plan of Treatment
 Not on filedocumented as of this encounter
 
 Visit Diagnoses
 Not on filedocumented in this encounter

## 2019-12-04 NOTE — XMS
Encounter Summary
  Created on: 2019
 
 Margaret Ferreira
 External Reference #: [id number]
 : 30
 Sex: Female
 
 Demographics
 
 
+-----------------------+----------------------+
| Address               | [address]        |
|                       | [place]  [address] |
+-----------------------+----------------------+
| Home Phone            | [phone]      |
+-----------------------+----------------------+
| Preferred Language    | Unknown              |
+-----------------------+----------------------+
| Marital Status        |               |
+-----------------------+----------------------+
| Buddhism Affiliation | Unknown              |
+-----------------------+----------------------+
| Race                  | Unknown              |
+-----------------------+----------------------+
| Ethnic Group          | Unknown              |
+-----------------------+----------------------+
 
 
 Author
 
 
+--------------+--------------------------------------------+
| Author       | [facility]  |
|              | and [place]                                |
+--------------+--------------------------------------------+
| Organization | [facility]  |
|              | and [place]                                |
+--------------+--------------------------------------------+
| Address      | Unknown                                    |
+--------------+--------------------------------------------+
| Phone        | Unavailable                                |
+--------------+--------------------------------------------+
 
 
 
 Support
 
 
+--------------+--------------+---------+-----------------+
| Name         | Relationship | Address | Phone           |
+--------------+--------------+---------+-----------------+
| Lawson Hanna | ECON         | Unknown | [phone] |
+--------------+--------------+---------+-----------------+
 
 
 
 Care Team Providers
 
 
 
+-----------------------+------+-----------------+
| Care Team Member Name | Role | Phone           |
+-----------------------+------+-----------------+
| Calixto Chou MD | PCP  | [phone] |
+-----------------------+------+-----------------+
 
 
 
 Reason for Visit
 
 
+--------+--------------------+
| Reason | Comments           |
+--------+--------------------+
| COPD   | 6 month follow up  |
+--------+--------------------+
 
 
 
 Encounter Details
 
 
+--------+---------+----------------------+----------------+----------------------+
| Date   | Type    | Department           | Care Team      | Description          |
+--------+---------+----------------------+----------------+----------------------+
| / | Office  |   [facility] [place]          |   [place],  | Chronic obstructive  |
| 2015   | Visit   | PULMONARY  [address]     | Mary PADRON MD  | pulmonary disease,   |
|        |         | [place]  [place], |                | unspecified COPD     |
|        |         |  [place] [address]       |                | type (HCC); Cough;   |
|        |         | [phone]         |                | Gastroesophageal     |
|        |         |                      |                | reflux disease,      |
|        |         |                      |                | esophagitis presence |
|        |         |                      |                |  not specified; Need |
|        |         |                      |                |  for vaccination     |
|        |         |                      |                | with 13-polyvalent   |
|        |         |                      |                | pneumococcal         |
|        |         |                      |                | conjugate vaccine    |
+--------+---------+----------------------+----------------+----------------------+
 
 
 
 Social History
 
 
+--------------+-------+-----------+--------+------+
| Tobacco Use  | Types | Packs/Day | Years  | Date |
|              |       |           | Used   |      |
+--------------+-------+-----------+--------+------+
| Never Smoker |       |           |        |      |
+--------------+-------+-----------+--------+------+
 
 
 
+---------------------+---+---+---+
| Smokeless Tobacco:  |   |   |   |
| Never Used          |   |   |   |
+---------------------+---+---+---+
 
 
 
 
+-------------------------------------------------------------------+
| Tobacco Cessation: Counseling Given: No                           |
| Comments: her parents both smoked, also worked at [employer] |
+-------------------------------------------------------------------+
 
 
 
+-------------+----------------------+---------+----------+
| Alcohol Use | Drinks/Week          | oz/Week | Comments |
+-------------+----------------------+---------+----------+
| No          |   0 Standard drinks  | 0.0     |          |
|             | or equivalent        |         |          |
+-------------+----------------------+---------+----------+
 
 
 
+------------------+---------------+
| Sex Assigned at  | Date Recorded |
| Birth            |               |
+------------------+---------------+
| Not on file      |               |
+------------------+---------------+
 
 
 
+----------------+-------------+-------------+
| Job Start Date | Occupation  | Industry    |
+----------------+-------------+-------------+
| Not on file    | Not on file | Not on file |
+----------------+-------------+-------------+
 
 
 
+----------------+--------------+------------+
| Travel History | Travel Start | Travel End |
+----------------+--------------+------------+
 
 
 
+-------------------------------------+
| No recent travel history available. |
+-------------------------------------+
 documented as of this encounter
 
 Last Filed Vital Signs
 
 
+-------------------+------------------+----------------------+----------+
| Vital Sign        | Reading          | Time Taken           | Comments |
+-------------------+------------------+----------------------+----------+
| Blood Pressure    | 110/64           | 2015 12:42 PM  |          |
|                   |                  | PST                  |          |
+-------------------+------------------+----------------------+----------+
| Pulse             | 76               | 2015 12:42 PM  |          |
|                   |                  | PST                  |          |
+-------------------+------------------+----------------------+----------+
| Temperature       | -                | -                    |          |
+-------------------+------------------+----------------------+----------+
 
| Respiratory Rate  | -                | -                    |          |
+-------------------+------------------+----------------------+----------+
| Oxygen Saturation | 95%              | 2015 12:42 PM  |          |
|                   |                  | PST                  |          |
+-------------------+------------------+----------------------+----------+
| Inhaled Oxygen    | -                | -                    |          |
| Concentration     |                  |                      |          |
+-------------------+------------------+----------------------+----------+
| Weight            | 63.5 kg (140 lb) | 2015 12:42 PM  |          |
|                   |                  | PST                  |          |
+-------------------+------------------+----------------------+----------+
| Height            | 162.6 cm (5' 4") | 2015 12:42 PM  |          |
|                   |                  | PST                  |          |
+-------------------+------------------+----------------------+----------+
| Body Mass Index   | 24.03            | 2015 12:42 PM  |          |
|                   |                  | PST                  |          |
+-------------------+------------------+----------------------+----------+
 documented in this encounter
 
 Patient Instructions
 Patient Instructions Mary Astudillo MD - 2015  1:52 PM PSTI would try to cut 
the coffee back to 1 cup in the morning. I would also skip the ice cream before bed time. If
 you want it, you should eat supper a little earlier, and then eat the ice cream 4 hours bef
ore bedtime. Try making your lunch the biggest meal of the day, then eat a small dinner garcia
y, then have the ice cream at 6:30pm. 
 
 Go ahead and stop the Qvar. If you have symptoms, use the ProAir (red one).
 
 When you run out of your current Spiriva, start on Incruse Ellipta 1 inhalation once daily.
 If this is not covered, let us know and we will try to find something else. 
 
 When you see Dr. Chou next, make sure you get the Prevnar.Electronically signed by Marty Astudillo MD at 2015  2:02 PM PST
 documented in this encounter
 
 Progress Notes
 Mary Astudillo MD - 2015 12:47 PM PSTFormatting of this note might be differe
nt from the original.
 
 
 Pulmonary Follow Up
 
 HPI 
  
 Margaret Ferreira is a 85 y.o. female patient of Calixto Chou MD here today for foll
ow up of COPD. 
 
 At their last visit, we had continued her on Spiriva. She trialed stopping this in July as 
she was having difficulty walking due to hip and knee issues, and she thought the Spiriva mi
ght be causing it. She went back on it as this did not seem to get better off of this. 
 
 She has had a few episodes of difficulty breathing in the evenings when it was cold out. Trevor hernández used her Qvar inhaler, and felt like it helped her. She had been busy during the daytime, 
and usually if she sits and rests, this will improve, but a couple of times, this did not, s
o she used the Qvar, and she did not feel like she was short of breath that night. She does 
also have ProAir in the car. 
 
 She is currently on a regimen of Spriva once daily. She has never tried using the ProAir as
 she was uncertain if she should use this or the Qvar for acute symptoms. She returns today 
for routine follow up. 
 
 
 Currently she is able to walk very little at her own pace on level ground. She is not exerc
ising regularly. She has limited her walking because of her hips and knees, which just don't
 work. She was doing physical therapy in the water, which is on hold right now for physical 
therapy. 
 
 She has been coughing in the night. She also coughs in the morning when she first gets up a
nd then it subsides. In the night when she coughs, she has one time had to get up and vomit 
up her stomach contents. She does have a lot of mucous production, which is cloudy/clear. Trevor hernández has not had hemoptysis. 
 
 She has been evaluated for nocturnal oxygen and does not need to use it.
 
 She does not have symptoms of heartburn or reflux. She takes omeprazole once daily for this
. She sleeps with the head of the bed elevated 6 inches. She eats dinner about 4 hours befor
e bedtime, and about 50% of the time eats ice cream before bedtime. She does drink 2-3 cups 
of coffee in the morning. 
 
 Past Medical History
 Past Medical History 
 Diagnosis Date 
   COPD (chronic obstructive pulmonary disease) (HCC)  
   Peptic ulcer disease  
   GERD (gastroesophageal reflux disease)  
   Hiatal hernia  
   Hypertension  
   Hypothyroidism  
   Urine incontinence  
   Aortic valve stenosis  
   trivial 
   Hearing loss  
   Osteoarthritis  
   Osteoporosis  
   Plantar wart  
   RLS (restless legs syndrome)  
   Scoliosis  
   Pneumonia  
   Pulmonary nodule  
   Macular degeneration  
   bilateral now 
   
 Past Surgical History
 Past Surgical History 
 Procedure Laterality Date 
   Jj and bso   
   Cholecystectomy   
   Total hip arthroplasty Left  
   Abscess drainage on calf   
   Bladder suspension   
   Upper gastrointestinal endoscopy   
  
 Social History: 
 History 
 
 Social History 
   Marital Status:  
   Spouse Name: N/A 
   Number of Children: N/A 
   Years of Education: N/A 
 
 
 Occupational History 
     
 
 Social History Main Topics 
   Smoking status: Never Smoker  
   Smokeless tobacco: Never Used 
    Comment: her parents both smoked, also worked at a [employer] 
   Alcohol Use: No 
   Drug Use: No 
   Sexual Activity: None 
 
 Other Topics Concern 
   None 
 
 Social History Narrative 
  Lives: in [place]  
  With: her  
  Grew up: in [place]  
  Has previously lived in: [place], [place] 
   
  Exposure to toxic chemicals: has been exposed to halon (1301) before, they had to evacuate
 the computer room at the time 
  Exposure to asbestos: no 
  Exposure to tuberculosis: no  
  Has had a PPD or Quantiferon before: no 
   
  Has pets at home: no  
  Has ever owned birds: no  
  Other animal exposures: has had a dog and cat before 
   
  Hobbies: playing cards 
    
   
   
 
 Allergies:
 Allergies 
 Allergen Reactions 
   Lisinopril  
   Cough 
   
 Medications:
 Outpatient Encounter Prescriptions as of 2015 
 Medication Sig Dispense Refill 
   albuterol (PROAIR HFA) 90 mcg/puff inhaler Inhale 2 puffs into the lungs every 6 hours 
as needed for Wheezing or Shortness of Breath. 1 Inhaler 5 
   benzonatate (TESSALON PERLES) 100 mg capsule Take 100 mg by mouth 3 times daily as need
ed.   
   Calcium Carbonate (CALCIUM 600 PO) Take  by mouth Daily.   
   Cholecalciferol (VITAMIN D-3) 2000 units CAPS Take  by mouth Daily.   
   [DISCONTINUED] diclofenac (VOLTAREN-XR) 100 mg ER tablet Take 100 mg by mouth daily (wi
th breakfast).   
   [DISCONTINUED] Diclofenac Potassium 50 MG PACK Take  by mouth.   
   Docosahexaenoic Acid (DHA OMEGA 3) 100 MG CAPS Take  by mouth.   
   gabapentin (NEURONTIN) 300 mg capsule Take 300 mg by mouth 2 times daily.   
   levothyroxine (SYNTHROID, LEVOTHROID) 150 mcg tablet Take 150 mcg by mouth every mornin
g (before breakfast).   
   Multiple Vitamins-Minerals (MULTIVITAMIN PO) Take  by mouth Daily.   
   olmesartan-hydrochlorothiazide (BENICAR HCT) 40-25 MG per tablet Take 0.5 tablets by mo
uth Daily.   
 
   omeprazole (PRILOSEC) 20 mg capsule Take 20 mg by mouth every morning (before breakfast
).   
   pramipexole (MIRAPEX) 0.25 mg tablet Take 0.25 mg by mouth 3 times daily.   
   pseudoePHEDrine (SUDAFED) 30 mg tablet Take 30 mg by mouth every 4 hours as needed.   
   Spacer/Aero Chamber Mouthpiece MISC Use with inhaler as directed. 1 each 1 
   tiotropium (SPIRIVA HANDIHALER) 18 mcg inhalation capsule Inhale 1 capsule into the biju
gs Daily. 30 capsule 11 
   traMADol (ULTRAM) 50 mg tablet Take 50 mg by mouth every 6 hours as needed.   
 
 No facility-administered encounter medications on file as of 2015. 
 
 Review of Systems:
 General: 
 []Weight loss/gain (over 10 lbs) []Fever/chills/sweats  []Night sweats
 EENT: 
 []Hearing loss      []Vision loss/change []Sinus congestion/nasal drainage []Nosebleeds  []
Hoarseness
 Cardiac: 
 []Chest pain []Palpitations/heart racing  [x]Swelling of legs/ankles  []Waking up at night 
short of breath []Difficulty sleeping flat
 Gastrointestinal: 
 []Nausea/vomiting []Difficulty swallowing  []Heartburn/acid reflux []Loss of appetite  []Ab
dominal pain 
 Urologic: 
 []Blood in urine []Frequent urination at night []Burning/painful urination []Difficulty wit
h urination
 
 Objective 
 
 /64 mmHg | Pulse 76 | Ht 1.626 m (5' 4") | Wt 63.504 kg (140 lb) | BMI 24.02 kg/m2 | 
SpO2 95% | Breastfeeding? No RA
 
 General Appearance:  Alert, cooperative, no distress, appears stated age 
 Head:  Normocephalic, without obvious abnormality, atraumatic 
 Eyes:  PERRL, conjunctiva clear, no scleral icterus, EOM's intact 
 Ears:  Normal TM's, external auditory canals, normal acuity 
 Nose: Nares normal, septum midline, mucosa normal 
 Mouth: No oral lesions or exudate 
 Neck: Supple, symmetrical, no adenopathy 
 Lungs:   No accessory muscle use, breath sounds are diminished bilaterally with prolongatio
n of the expiratory phase, no wheezes, crackles or rhonchi 
 Chest Wall:  No deformity 
 Heart:  Regular rate and rhythm, no murmur, rub or gallop 
 Abdomen:   Soft, non-tender, non-distended 
 Extremities:  No cyanosis, clubbing, 1+ bilateral lower extremity edema 
 Pulses: Radial pulses 2+ and symmetric 
 Skin: Warm and dry 
 Lymph nodes: Cervical and supraclavicular nodes normal 
 
 Data:
 Immunization History 
 Administered Date(s) Administered 
   INFLUENZA, HIGH DOSE SEASONAL (ADULT) 10/21/2015 
   INFLUENZA, TRIVALENT PRESERVATIVE FREE (PED/ADOL/ADULT) 2014 
   PNEUMOCOCCAL POLYSACCHARIDE 23-VALENT (PPSV23) 2012 
  
 Assessment  
 
   ICD-10-CM ICD-9-CM  
 1. Chronic obstructive pulmonary disease, unspecified COPD type (HCC) J44.9 496 This seems 
 
well controlled on Spiriva once daily in terms of dyspnea.  
 2. Cough R05 786.2 Has had cough occuring recently on and off, with some mucous production 
with no associated illness. Given that it is nocturnal and morning in occurrence, without pu
rulent sputum, dyspnea or fever, I would not say she is having an exacerbation. I do wonder 
if she is having worsening silent nocturnal reflux as a side effect of the Spiriva, which is
 a known possibility. 
 Though reluctant, I suggested trying switching this to Incruse in the same class, if covere
d by her insurance, to see if the cough improves as being a newer generation agent, it repor
tedly has less side effects like heartburn.  
 3. Gastroesophageal reflux disease, esophagitis presence not specified K21.9 530.81 On once
 daily omeprazole. She has had post tussive emesis, unclear if this was reflux related. She 
does drink a fair amount of coffee and often eats ice cream before bedtime, which we address
ed.  
 4. Need for vaccination with 13-polyvalent pneumococcal conjugate vaccine Z23 V03.82 Discus
sed today and she notes Dr. Chou had mentioned she needed this at her next appointment at 
his office, so we will defer to his next visit, so she can keep her vaccine records all in o
ne place.  
 
 Plan 
 1.Discussed anti reflux measures. 
 2.Change Spiriva to Incruse one inhalation once daily. 
 3.Stop the Qvar.
 4. Use the ProAir as needed. 
 5. Get Prevnar vaccine at next appointment with Dr. Chou. 
 
 She was advised to call if new pulmonary symptoms were to develop.
 
 Return to clinic in 3 months, or sooner with concerns.
 
 CC: Calixto Chou MD
 
 Portions of this report were transcribed using voice recognition software.  Every effort wa
s made to ensure accuracy; however, inadvertent computerized transcription errors may be pre
sent.
 
 Electronically signed by: Mary Astudillo MD  Electronically signed by Mary Astudillo MD at 2015  3:57 PM PSTdocumented in this encounter
 
 Plan of Treatment
 Not on filedocumented as of this encounter
 
 Visit Diagnoses
 
 
+--------------------------------------------------------------------------+
| Diagnosis                                                                |
+--------------------------------------------------------------------------+
|   Chronic obstructive pulmonary disease, unspecified COPD type (HCC)     |
+--------------------------------------------------------------------------+
|   Cough                                                                  |
+--------------------------------------------------------------------------+
|   Gastroesophageal reflux disease, esophagitis presence not specified    |
+--------------------------------------------------------------------------+
|   Need for vaccination with 13-polyvalent pneumococcal conjugate vaccine |
+--------------------------------------------------------------------------+
 documented in this encounter

## 2019-12-04 NOTE — XMS
Encounter Summary
  Created on: 2019
 
 Margaret Ferreira
 External Reference #: [id number]
 : 30
 Sex: Female
 
 Demographics
 
 
+-----------------------+----------------------+
| Address               | [address]        |
|                       | [place]  [address] |
+-----------------------+----------------------+
| Home Phone            | [phone]      |
+-----------------------+----------------------+
| Preferred Language    | Unknown              |
+-----------------------+----------------------+
| Marital Status        |               |
+-----------------------+----------------------+
| Bahai Affiliation | Unknown              |
+-----------------------+----------------------+
| Race                  | Unknown              |
+-----------------------+----------------------+
| Ethnic Group          | Unknown              |
+-----------------------+----------------------+
 
 
 Author
 
 
+--------------+--------------------------------------------+
| Author       | [facility]  |
|              | and [place]                                |
+--------------+--------------------------------------------+
| Organization | [facility]  |
|              | and [place]                                |
+--------------+--------------------------------------------+
| Address      | Unknown                                    |
+--------------+--------------------------------------------+
| Phone        | Unavailable                                |
+--------------+--------------------------------------------+
 
 
 
 Support
 
 
+--------------+--------------+---------+-----------------+
| Name         | Relationship | Address | Phone           |
+--------------+--------------+---------+-----------------+
| Lawson Hanna | ECON         | Unknown | [phone] |
+--------------+--------------+---------+-----------------+
 
 
 
 Care Team Providers
 
 
 
+-----------------------------+------+-----------------+
| Care Team Member Name       | Role | Phone           |
+-----------------------------+------+-----------------+
| Bessy Paulino | PCP  | [phone] |
|  [facility]                       |      |                 |
+-----------------------------+------+-----------------+
 
 
 
 Reason for Visit
 
 
+-------------------+----------+
| Reason            | Comments |
+-------------------+----------+
| Medication Refill |          |
+-------------------+----------+
 
 
 
 Encounter Details
 
 
+--------+--------+----------------------+---------------------+-------------------+
| Date   | Type   | Department           | Care Team           | Description       |
+--------+--------+----------------------+---------------------+-------------------+
| / | Refill |   [facility]       |   Kvng Ontiveros  | Medication Refill |
|    |        | [facility]    | E, DO  [address]   |                   |
|        |        | [facility]  [address]  | [place], [place]       |                   |
|        |        | [address]  [place],   | [address]          |                   |
|        |        | [place] [address]        | [phone]        |                   |
|        |        | [phone]         | [phone] (Fax)  |                   |
+--------+--------+----------------------+---------------------+-------------------+
 
 
 
 Social History
 
 
+-------------------+-------+-----------+--------+------+
| Tobacco Use       | Types | Packs/Day | Years  | Date |
|                   |       |           | Used   |      |
+-------------------+-------+-----------+--------+------+
| Passive Smoke     |       |           |        |      |
| Exposure - Never  |       |           |        |      |
| Smoker            |       |           |        |      |
+-------------------+-------+-----------+--------+------+
 
 
 
+---------------------+---+---+---+
| Smokeless Tobacco:  |   |   |   |
| Never Used          |   |   |   |
+---------------------+---+---+---+
 
 
 
+-------------+----------------------+---------+----------+
 
| Alcohol Use | Drinks/Week          | oz/Week | Comments |
+-------------+----------------------+---------+----------+
| No          |   0 Standard drinks  | 0.0     |          |
|             | or equivalent        |         |          |
+-------------+----------------------+---------+----------+
 
 
 
+------------------+---------------+
| Sex Assigned at  | Date Recorded |
| Birth            |               |
+------------------+---------------+
| Not on file      |               |
+------------------+---------------+
 
 
 
+----------------+-------------+-------------+
| Job Start Date | Occupation  | Industry    |
+----------------+-------------+-------------+
| Not on file    | Not on file | Not on file |
+----------------+-------------+-------------+
 
 
 
+----------------+--------------+------------+
| Travel History | Travel Start | Travel End |
+----------------+--------------+------------+
 
 
 
+-------------------------------------+
| No recent travel history available. |
+-------------------------------------+
 documented as of this encounter
 
 Plan of Treatment
 Not on filedocumented as of this encounter
 
 Visit Diagnoses
 Not on filedocumented in this encounter

## 2019-12-04 NOTE — XMS
Encounter Summary
  Created on: 2019
 
 Margaret Ferreira
 External Reference #: [id number]
 : 30
 Sex: Female
 
 Demographics
 
 
+-----------------------+------------------------+
| Address               | [address]      |
|                       | [place]  [address]   |
+-----------------------+------------------------+
| Home Phone            | [phone]        |
+-----------------------+------------------------+
| Preferred Language    | Unknown                |
+-----------------------+------------------------+
| Marital Status        |                 |
+-----------------------+------------------------+
| Restoration Affiliation | MET                    |
+-----------------------+------------------------+
| Race                  | White                  |
+-----------------------+------------------------+
| Ethnic Group          | Not  or  |
+-----------------------+------------------------+
 
 
 Author
 
 
+--------------+------------------------------+
| Author       | [facility] |
+--------------+------------------------------+
| Organization | [facility] |
+--------------+------------------------------+
| Address      | Unknown                      |
+--------------+------------------------------+
| Phone        | Unavailable                  |
+--------------+------------------------------+
 
 
 
 Support
 
 
+--------------+--------------+---------------------+-----------------+
| Name         | Relationship | Address             | Phone           |
+--------------+--------------+---------------------+-----------------+
| Lawson Ferreira | ECON         | [address]          | [phone] |
|              |              | [place], [place]   |                 |
|              |              | [address]               |                 |
+--------------+--------------+---------------------+-----------------+
 
 
 
 Care Team Providers
 
 
 
+-----------------------+------+-------------+
| Care Team Member Name | Role | Phone       |
+-----------------------+------+-------------+
 PCP  | Unavailable |
+-----------------------+------+-------------+
 
 
 
 Encounter Details
 
 
+--------+-------------+----------------------+---------------------+------------------+
| Date   | Type        | Department           | Care Team           | Description      |
+--------+-------------+----------------------+---------------------+------------------+
| / | Procedure - |   [facility]   |   Record, Operation | Operative Report |
|    |             | [place] [facility]  [address] |                     |                  |
|        | Transcribed |  [address]         |                     |                  |
|        |             | Mailcode:  Center    |                     |                  |
|        |             | [facility]       |                     |                  |
|        |             | Healing, [address]  |                     |                  |
|        |             |  [place], [place]        |                     |                  |
|        |             | [address]           |                     |                  |
|        |             | [phone]         |                     |                  |
+--------+-------------+----------------------+---------------------+------------------+
 
 
 
 Social History
 
 
+----------------+-------+-----------+--------+------+
| Tobacco Use    | Types | Packs/Day | Years  | Date |
|                |       |           | Used   |      |
+----------------+-------+-----------+--------+------+
| Never Assessed |       |           |        |      |
+----------------+-------+-----------+--------+------+
 
 
 
+------------------+---------------+
| Sex Assigned at  | Date Recorded |
| Birth            |               |
+------------------+---------------+
| Not on file      |               |
+------------------+---------------+
 
 
 
+----------------+-------------+-------------+
| Job Start Date | Occupation  | Industry    |
+----------------+-------------+-------------+
| Not on file    | Not on file | Not on file |
+----------------+-------------+-------------+
 
 
 
+----------------+--------------+------------+
| Travel History | Travel Start | Travel End |
 
+----------------+--------------+------------+
 
 
 
+-------------------------------------+
| No recent travel history available. |
+-------------------------------------+
 documented as of this encounter
 
 Plan of Treatment
 Not on filedocumented as of this encounter
 
 Procedures
 
 
+------------------+--------+-------------+----------------------+----------------------+
| Procedure Name   | Priori | Date/Time   | Associated Diagnosis | Comments             |
|                  | ty     |             |                      |                      |
+------------------+--------+-------------+----------------------+----------------------+
| OPERATION RECORD |        | 1996  |                      |   Results for this   |
|                  |        | 12:00 AM    |                      | procedure are in the |
|                  |        | PST         |                      |  results section.    |
+------------------+--------+-------------+----------------------+----------------------+
 documented in this encounter
 
 Results
 OPERATION RECORD (1996 12:00 AM PST)
 
+-------------------------------------------------------------------------------+
| Procedure Note                                                                |
+-------------------------------------------------------------------------------+
|   1996 12:00 AM [facility]                                           |
|   [facility]                                                  |
|  [address], [place] [address] [phone]   |
|   [facility]                                            |
|                                                                               |
|  OPERATION RECORD                                                             |
|                                                                               |
|  Med Rec No.: [id number] Date: 96                                      |
|                                                                               |
|  Name: Margaret Ferreira                                                       |
|                                                                               |
|                                                                               |
|  ATTENDING SURGEON: BRITTANY Perez M.D.                                        |
|   Professor and ,                                                     |
|   Obstetrics and Gynecology                                                   |
|                                                                               |
|  ASSISTANT(S): Nadja Carcamo M.D.                                                 |
|   Resident, Obstetrics and Gynecology                                         |
|                                                                               |
|  POSTOPERATIVE DIAGNOSIS(ES): 1. Complete procidentia.                        |
|   2. Cystocele.                                                               |
|                                                                               |
|  OPERATION(S) PERFORMED: 1. Total vaginal hysterectomy.                       |
|   2. Anterior colporrhaphy.                                                   |
|   3. Transvaginal sacrospinous ligament                                       |
|   fixation.                                                                   |
|                                                                               |
|  ANESTHESIA: General endotracheal anesthesia.                                 |
|                                                                               |
 
|  ESTIMATED BLOOD LOSS: 450 cc.                                                |
|                                                                               |
|  SPECIMEN(S) REMOVED: Uterus with cervix and vaginal mucosa to                |
|   Pathology.                                                                  |
|                                                                               |
|  FLUIDS: 2100 cc Crystalloid.                                                 |
|                                                                               |
|  INDICATIONS: The patient is a 65-year-old, multiparous                       |
|   woman with a progressively worsening vaginal                                |
|   vault relaxation. She                                                       |
|  presented with complete procidentia. She had no associated with              |
|  incontinence of stool or urine. She presented for definite surgical          |
|  correction of her vaginal vault relaxation.                                  |
|                                                                               |
|  FINDINGS: There was complete eversion of the vaginal                         |
|   vault and prolapse of the cervix and uterus.                                |
|   There was a large                                                           |
|  cystocele and an unsupported vaginal apex. At the termination of the case,   |
|  there was excellent elevation of the vaginal apex. There was no obvious      |
|  rectocele or enterocele sac present. The ovaries were not well seen but      |
|  were palpably quite atrophic and small.                                      |
|                                                                               |
|  PROCEDURE: The patient was taken to the Operating Room                       |
|   where she was placed in the dorsolithotomy                                  |
|   position with her legs in                                                   |
|  sling stirrups. She was positioned while awake to help position her          |
|  comfortably given her history of left hip replacement. General anesthesia    |
|  was administered without complication. She was prepped and draped in the     |
|  usual sterile fashion. Her bladder was emptied prior to beginning the case.  |
|                                                                               |
|  The hysterectomy was performed first. The cervix was grasped with a          |
|  single-tooth tenaculum. The mucosa was injected at the cervicovaginal        |
|  junction using 1% lidocaine with dilute epinephrine. The vaginal mucosa was  |
|  incised using the scalpel at the cervicovaginal junction circumferentially   |
|  around the cervix. The anterior colpotomy was then attempted but was not     |
|  initially successful. Therefore, attention was turned to a posterior         |
|  colpotomy. Using sharp dissection, the posterior peritoneum and the          |
|  posterior cul-de-sac were identified and entered sharply without trauma to   |
|  bowel. The uterosacral ligaments were clamped bilaterally, transected, and   |
|  ligated using 0 Vicryl suture ligature.                                      |
|                                                                               |
|  The anterior colpotomy was performed, carefully identifying the anterior     |
|  peritoneum. The vesicouterine pouch was entered atraumatically. The          |
|  cardinal ligaments were sequentially clamped, cut, and ligated using 0       |
|  Vicryl suture ligature. Ends of suture for both the uterosacral ligament     |
|  and cardinal ligaments were held for plication at the termination of the     |
|  case. The uterine vessels were clamped bilaterally, transected, and doubly   |
|  ligated using 0 Vicryl suture ligature. The utero-ovarian ligaments were     |
|  identified, clamped, and transected. These were ligated bilaterally using a  |
|  free tie of 0 Vicryl suture ligature. The uterus was removed. Pedicles       |
|  were inspected and all were found to be hemostatic. The cuff was packed      |
|  temporarily using a pediatric lap sponge.                                    |
|                                                                               |
|  Attention was turned to the anterior colporrhaphy. The vaginal mucosa was    |
|  grasped at the apex using Allis clamps. Using 1% lidocaine with dilute       |
|  epinephrine, the vaginal mucosa was injected in a linear fashion from just   |
|  below the urethra to the vaginal apex in the midline. The vaginal mucosa     |
|  was undermined using Metzenbaum scissors and incised in the midline to       |
|  approximately 2 cm below the urethral meatus. The vaginal mucosa was         |
|  dissected laterally away from the underlying bladder, with care being taken  |
 
|  to avoid injury to bladder during the dissection. When the redundant         |
|  vaginal mucosa had been completely dissected away from the bladder, the      |
|  vagina was plicated in the midline using 0 Vicryl suture ligature in         |
|  interrupted stitches to completely obliterate the cystocele. Upon placing    |
|  the initial stitch on the right side of the urethra, some bleeding was       |
|  encountered. Hemostasis was obtained using figure-of-eight stitches. When    |
|  the cystocele had been completely obliterated by medial plication, excess    |
|  vaginal mucosa was excised. The vaginal mucosa was closed using mattress     |
|  sutures with 0 Vicryl suture ligature.                                       |
|                                                                               |
|  Attention was now turned to the sacrospinous ligament fixation. An incision  |
|  was made just inside the hymenal ring in the posterior vaginal mucosa. The   |
|                                                                               |
|  vaginal mucosa was undermined for a length of approximately 3 cm for entry   |
|  into the right ischiorectal fossa. Using sharp and blunt dissection, the     |
|  sacrospinous ligament was identified and dissected for adequate              |
|  visualization. Using retractors for visualization and a Delgado ligature        |
|  carrier, two stitches of Ocheyedan-Jerman suture were placed through the coccygeus   |
|  muscle and uterosacral ligament, approximately 1-1/2 to 2 fingerbreadths     |
|  medial to the ischial spine. These sutures were plicated to the apex of the  |
|  vagina. During plication of the second stitch, it was noted to have pulled   |
|  through the coccygeus muscle and was removed. The remaining stitch was       |
|  plicated in a pulley-like stitch and tied down, thus elevating the apex of   |
|  the vagina in the usual fashion. The vaginal mucosa was closed using 0       |
|  Vicryl suture in interrupted mattress stitches. The vagina was packed with   |
|  vaginal packing soaked in Sultrin Cream.                                     |
|                                                                               |
|  After the termination of the surgical procedure and just prior to attempts   |
|  at extubation, the patient was noted to have a bradycardic episode to        |
|  approximately 46 beats/minute with associated hypotension. Her lowest blood  |
|  pressure was 80/44. She received 10 mg of ephedrine and 200 cc of            |
|  additional Crystalloid fluid. She responded to this with an elevation in     |
|  her blood pressure to approximately 100/50 and an increase in her pulse rate |
|  to the 60s.                                                                  |
|                                                                               |
|  Sponge and needle counts were correct times two. The patient tolerated the   |
|  procedure well. She was taken to the Post Anesthesia Care Unit, awake but    |
|  intubated. Once the Post Anesthesia Care Unit, she was extubated without     |
|  difficulty. She was breathing spontaneously.                                 |
|                                                                               |
|  KAREN Fontanez M.D.                                            |
|  Resident, Professor and ,                                            |
|  Obstetrics & Gynecology Obstetrics and Gynecology                            |
|                                                                               |
|  [facility]                                                                        |
|  D: 96                                                                  |
|  T: 96 9:15 A                                                           |
|                                                                               |
|  cc:                                                                          |
|                                                                               |
|  GWEN RAMESH MD                                                             |
|  [address] [facility] [address]                                                       |
|  [place] [address]                                                           |
|                                                                               |
 
|  NICK COLE MD                                                          |
|   [address]                                                                  |
|  [place] [address]                                                           |
|                                                                               |
+-------------------------------------------------------------------------------+
 documented in this encounter
 
 Visit Diagnoses
 Not on filedocumented in this encounter

## 2019-12-04 NOTE — XMS
Encounter Summary
  Created on: 2019
 
 Margaret Ferreira
 External Reference #: [id number]
 : 30
 Sex: Female
 
 Demographics
 
 
+-----------------------+----------------------+
| Address               | [address]        |
|                       | [place]  [address] |
+-----------------------+----------------------+
| Home Phone            | [phone]      |
+-----------------------+----------------------+
| Preferred Language    | Unknown              |
+-----------------------+----------------------+
| Marital Status        |               |
+-----------------------+----------------------+
| Religion Affiliation | Unknown              |
+-----------------------+----------------------+
| Race                  | Unknown              |
+-----------------------+----------------------+
| Ethnic Group          | Unknown              |
+-----------------------+----------------------+
 
 
 Author
 
 
+--------------+--------------------------------------------+
| Author       | [facility]  |
|              | and [place]                                |
+--------------+--------------------------------------------+
| Organization | [facility]  |
|              | and [place]                                |
+--------------+--------------------------------------------+
| Address      | Unknown                                    |
+--------------+--------------------------------------------+
| Phone        | Unavailable                                |
+--------------+--------------------------------------------+
 
 
 
 Support
 
 
+--------------+--------------+---------+-----------------+
| Name         | Relationship | Address | Phone           |
+--------------+--------------+---------+-----------------+
| Lawson Hanna | ECON         | Unknown | [phone] |
+--------------+--------------+---------+-----------------+
 
 
 
 Care Team Providers
 
 
 
+-----------------------+------+-----------------+
| Care Team Member Name | Role | Phone           |
+-----------------------+------+-----------------+
| Calixto Chou MD | PCP  | [phone] |
+-----------------------+------+-----------------+
 
 
 
 Reason for Visit
 
 
+----------------+----------+
| Reason         | Comments |
+----------------+----------+
| Establish Care |          |
+----------------+----------+
 Evaluate & Treat (Routine)
 
+--------+--------+-------------+--------------+--------------+---------------+
| Status | Reason | Specialty   | Diagnoses /  | Referred By  | Referred To   |
|        |        |             | Procedures   | Contact      | Contact       |
+--------+--------+-------------+--------------+--------------+---------------+
| Closed |        | Pulmonology |   Diagnoses  |   Marier,    |   Wilda, |
|        |        |             |  Chronic     | Calixto Sage,  |  Mary PADRON   |
|        |        |             | airway       | MD  [address]     | MD  [address]     |
|        |        |             | obstruction, | [place]    | [address]     |
|        |        |             |  not         | [address]        | [place],  |
|        |        |             | elsewhere    | [place],   | [place] [address]      |
|        |        |             | classified   | [place]           |               |
|        |        |             | Procedures   | [id number]   |               |
|        |        |             | NEW PT       | Phone:       |               |
|        |        |             | CONSULT      | [phone] |               |
|        |        |             |              |   Fax:       |               |
|        |        |             |              | [phone] |               |
+--------+--------+-------------+--------------+--------------+---------------+
 
 
 
 
 Encounter Details
 
 
+--------+---------+----------------------+----------------+----------------------+
| Date   | Type    | Department           | Care Team      | Description          |
+--------+---------+----------------------+----------------+----------------------+
| / | Office  |   [facility]          |   Wilda,  | COPD (chronic        |
|    | Visit   | PULMONARY  [address]     | Mary PADRON MD  | obstructive          |
|        |         | [address]  [place], |                | pulmonary disease)   |
|        |         |  [place] [address]       |                | (Formerly [facility]) (Primary Dx);  |
|        |         | [phone]         |                | Aortic valve         |
|        |         |                      |                | stenosis             |
+--------+---------+----------------------+----------------+----------------------+
 
 
 
 Social History
 
 
 
+--------------+-------+-----------+--------+------+
| Tobacco Use  | Types | Packs/Day | Years  | Date |
|              |       |           | Used   |      |
+--------------+-------+-----------+--------+------+
| Never Smoker |       |           |        |      |
+--------------+-------+-----------+--------+------+
 
 
 
+-------------------------------------------------------------------+
| Comments: her parents both smoked, also worked at [employer] |
+-------------------------------------------------------------------+
 
 
 
+-------------+-------------+---------+----------+
| Alcohol Use | Drinks/Week | oz/Week | Comments |
+-------------+-------------+---------+----------+
| No          |             |         |          |
+-------------+-------------+---------+----------+
 
 
 
+------------------+---------------+
| Sex Assigned at  | Date Recorded |
| Birth            |               |
+------------------+---------------+
| Not on file      |               |
+------------------+---------------+
 
 
 
+----------------+-------------+-------------+
| Job Start Date | Occupation  | Industry    |
+----------------+-------------+-------------+
| Not on file    | Not on file | Not on file |
+----------------+-------------+-------------+
 
 
 
+----------------+--------------+------------+
| Travel History | Travel Start | Travel End |
+----------------+--------------+------------+
 
 
 
+-------------------------------------+
| No recent travel history available. |
+-------------------------------------+
 documented as of this encounter
 
 Last Filed Vital Signs
 
 
+-------------------+---------------------+----------------------+----------+
| Vital Sign        | Reading             | Time Taken           | Comments |
+-------------------+---------------------+----------------------+----------+
| Blood Pressure    | 124/58              | 2015  3:12 PM  |          |
|                   |                     | PST                  |          |
+-------------------+---------------------+----------------------+----------+
 
| Pulse             | 92                  | 2015  3:12 PM  |          |
|                   |                     | PST                  |          |
+-------------------+---------------------+----------------------+----------+
| Temperature       | 36.4   C (97.6   F) | 2015  3:12 PM  |          |
|                   |                     | PST                  |          |
+-------------------+---------------------+----------------------+----------+
| Respiratory Rate  | -                   | -                    |          |
+-------------------+---------------------+----------------------+----------+
| Oxygen Saturation | 90%                 | 2015  3:12 PM  |          |
|                   |                     | PST                  |          |
+-------------------+---------------------+----------------------+----------+
| Inhaled Oxygen    | -                   | -                    |          |
| Concentration     |                     |                      |          |
+-------------------+---------------------+----------------------+----------+
| Weight            | 59.4 kg (131 lb)    | 2015  3:12 PM  |          |
|                   |                     | PST                  |          |
+-------------------+---------------------+----------------------+----------+
| Height            | 157.5 cm (5' 2")    | 2015  3:12 PM  |          |
|                   |                     | PST                  |          |
+-------------------+---------------------+----------------------+----------+
| Body Mass Index   | 23.96               | 2015  3:12 PM  |          |
|                   |                     | PST                  |          |
+-------------------+---------------------+----------------------+----------+
 documented in this encounter
 
 Patient Instructions
 Patient Instructions Mary Astudillo MD - 2015  4:24 PM PSTStay on Qvar as bef
ore. 
 
 Start on Spiriva one capsule inhaled once daily. 
 
 Start on ProAir 2 puffs inhaled every 6 hours as needed. 
 
 Do an overnight oxygen test through [facility]. Call the [employer] before yo
u pick it up to make sure they have a box available. You will  a box at the [facility]. Do the test on room air. Wear the finger probe through the night and then return
 the box for a download the next day. 
 
 Do walking oxygen test at [facility].
 
 Use a spacer with the Qvar and ProAir. 
 Electronically signed by Mary Astudillo MD at 2015  4:31 PM PST
 documented in this encounter
 
 Progress Notes
 Mary Astudillo MD - 2015  3:32 PM PSTFormatting of this note might be differe
nt from the original.
 
 Pulmonary Consult
 
 Referring Provider: Calixto Chou MD 
 
 HPI 
  
 Margaret Ferreira is a 84 y.o. female patient of Calixto Chou here today for evalua
tion of COPD. 
 
 She notes she was diagnosed with COPD a couple of years ago, and how long she has been on a
n inhaler. She has had shortness of breath for about 2 years as well. She feels like her stefanie
athing has been about the same over the last 2 years. She notes she has had pneumonia and santana hernández has also has episodes of bronchitis a couple of times a year, basically any time she gets 
a cold. 
 
 Currently they are able to walk 100 feet at their own pace on level ground, using a walker 
or a shopping cart to assist. The distance walked is predominately limited by shortness of b
reath or back pain. One year ago, she feels that they could walk possibly a little further, 
maybe twice the distance.  She is not exercising regularly at the moment. She was doing wate
r walking, but not with the weather being cold. 
 
 She does cough in the mornings when she first gets up (her  thinks for 1-2 hours), a
nd does produce mucous. The mucous is clear typically in color. She has not had hemoptysis.
 
 Triggers for their shortness of breath include exertion, and she notes she is more short of
 breath towards the end of the day. Relieving factors include relaxing. 
 
 Treatments that they have tried to this point include Qvar 40 mcg 2 puffs twice daily and a
 rescue inhaler (she is not sure which one). Currently they are on Qvar 40 mcg 2 puffs twice
 daily. She thinks this has helped her some. She has been taking this for about 2 years. She
 has never used the rescue inhaler. 
 
 She has not been evaluated for nocturnal oxygen and does not use it. She has not had ambula
ting oximetry testing. 
 
 Past Medical History
 Past Medical History 
 Diagnosis Date 
   COPD (chronic obstructive pulmonary disease) (HCC)  
   Peptic ulcer disease  
   GERD (gastroesophageal reflux disease)  
   Hiatal hernia  
   Hypertension  
   Hypothyroidism  
   Urine incontinence  
   Aortic valve stenosis  
   trivial 
   Hearing loss  
   Osteoarthritis  
   Osteoporosis  
   Plantar wart  
   RLS (restless legs syndrome)  
   Scoliosis  
   Pneumonia  
   Pulmonary nodule  
   Macular degeneration  
  
  
 Past Surgical History
 Past Surgical History 
 Procedure Date 
   Jj and bso 1996 
   Cholecystectomy 1996 
   Total hip arthroplasty  
   Left 
   Abscess drainage on calf  
   Bladder suspension  
   Upper gastrointestinal endoscopy  
  
 
 Family History: 
 Family History 
 
 Problem Relation Age of Onset 
   COPD Father  
   Asthma Father  
   Allergies Father  
   Hypertension Father  
   Tobacco Use Father  
   Asthma Sister  
   Hypertension Mother  
   Stroke Mother  
  
 
 Social History: 
 History 
 
 Social History 
   Marital Status:  
   Spouse Name: N/A 
   Number of Children: N/A 
   Years of Education: N/A 
 
 Occupational History 
     
 
 Social History Main Topics 
   Smoking status: Never Smoker  
   Smokeless tobacco: None 
    Comment: her parents both smoked, also worked at a [employer] 
   Alcohol Use: No 
   Drug Use: No 
   Sexually Active: None 
 
 Other Topics Concern 
   None 
 
 Social History Narrative 
  Lives: in [place] With: her husbandGrew up: in [place] Has previously lived in: [place], [place]
Exposure to toxic chemicals: has been exposed to halon (1301) before, they had to evacuate t
he computer room at the timeExposure to asbestos: noExposure to tuberculosis: no Has had a P
PD or Quantiferon before: noHas pets at home: no Has ever owned birds: no Other animal expos
ures: has had a dog and cat beforeHobbies: playing cards  
 
 Allergies:
 Allergies 
 Allergen Reactions 
   Lisinopril  
   Cough 
   
 
 Medications:
 Outpatient Encounter Prescriptions as of 2015 
 Medication Sig Dispense Refill 
   beclomethasone (QVAR) 40 mcg/puff inhaler Inhale 2 puffs into the lungs 2 times daily. 
    
   benzonatate (TESSALON PERLES) 100 mg capsule Take 100 mg by mouth 3 times daily as need
ed.     
   Calcium Carbonate (CALCIUM 600 PO) Take  by mouth Daily.     
   Cholecalciferol (VITAMIN D-3) 2000 units CAPS Take  by mouth Daily.     
   diclofenac (VOLTAREN-XR) 100 mg ER tablet Take 100 mg by mouth daily (with breakfast). 
    
   Diclofenac Potassium 50 MG PACK Take  by mouth.     
 
   Docosahexaenoic Acid (DHA OMEGA 3) 100 MG CAPS Take  by mouth.     
   levothyroxine (SYNTHROID, LEVOTHROID) 150 mcg tablet Take 150 mcg by mouth every mornin
g (before breakfast).     
   Multiple Vitamins-Minerals (MULTIVITAMIN PO) Take  by mouth Daily.     
   olmesartan-hydrochlorothiazide (BENICAR HCT) 40-25 MG per tablet Take 0.5 tablets by mo
uth Daily.     
   omeprazole (PRILOSEC) 20 mg capsule Take 20 mg by mouth every morning (before breakfast
).     
   pramipexole (MIRAPEX) 0.25 mg tablet Take 0.25 mg by mouth 3 times daily.     
   pseudoePHEDrine (SUDAFED) 30 mg tablet Take 30 mg by mouth every 4 hours as needed.    
 
   traMADol (ULTRAM) 50 mg tablet Take 50 mg by mouth every 6 hours as needed.     
 
 Facility-Administered Encounter Medications as of 2015 
 Medication Dose Route Frequency Provider Last Rate Last Dose 
   [COMPLETED] albuterol 2.5 mg/3 mL nebulizer solution 2.5 mg  2.5 mg Nebulization RT Onc
e Mary Astudillo MD   2.5 mg at 01/13/15 1407 
  
 Review of Systems
 Constitutional:  Denies fever, chills, sweats, and change in weight. 
 Eyes:  Denies vision change and eye irritation. 
 ENT:  Denies earache,  decreased hearing, nosebleeds, sore throat, and hoarseness. She does
 get nasal drainage and congestion. It does not seem to be seasonal.  
 Resp:   See HPI.  
 CV:  Denies chest pain, palpitations, syncope, and peripheral edema. 
 GI:  Denies heartburn, nausea, vomiting, and abdominal pain. 
 : She does get some urinary incontinence, but no difficulty urinating.  
 Musculoskeletal: Denies joint swelling. Has joint stiffness and some leg cramps.  
 Derm: Denies rash, itching, dryness, and suspicious lesions. 
 Neurologic: Denies frequent headaches, seizures, and vertigo. She has tingling in her hand,
 which is related to neck arthritis.  
 Psych: Denies depression, anxiety, and suicidal ideation. 
 Endo: Denies cold intolerance, heat intolerance, and unusual weight change. 
 Heme: Denies abnormal bruising, bleeding, and enlarged lymph nodes. 
 Allergy: Denies food allergies, allergic rash. 
 
 Objective 
 
 /58 | Pulse 92 | Temp 36.4 C (97.6 F) (Tympanic) | Ht 1.575 m (5' 2") | Wt 59.421
 kg (131 lb) | BMI 23.95 kg/m2 | SpO2 90%  RA
 
 General Appearance:  Alert, cooperative, no distress, appears stated age 
 Head:  Normocephalic, without obvious abnormality, atraumatic 
 Eyes:  PERRL, conjunctiva clear, no scleral icterus, EOM's intact 
 Ears:  Normal TM's, external auditory canals, slightly diminished acuity 
 Nose: Nares normal, septum midline, mucosa normal 
 Mouth: No oral lesions or exudate 
 Neck: Supple, symmetrical, no adenopathy 
 Lungs:   No accessory muscle use, breath sounds are diminished bilaterally with prolongatio
n of the expiratory phase, no wheezes, crackles or rhonchi 
 Chest Wall:  No deformity 
 Heart:  Regular rate and rhythm, 2/6 systolic murmur, no rub or gallop 
 Abdomen:   Soft, non-tender, non-distended 
 Extremities:  No cyanosis, clubbing, or edema 
 Pulses: Radial pulses 2+ and symmetric 
 Skin: Warm and dry 
 Lymph nodes: Cervical and supraclavicular nodes normal 
 
 Data:
 Chest x-ray done 2014 was reviewed and interpreted in clinic today. It shows a l
 
arge non reduced hiatal hernia.
 
 Pulmonary function tests were performed prior to clinic today and were reviewed and interpr
eted in clinic today.  They show moderate obstructive disease with a significant response to
 inhaled bronchodilator on spirometry, and a moderately reduced diffusion capacity. She coul
d not pant fast enough to do lung volume testing. 
 
 Echocardiogram was performed on July 15, 2011 and results were reviewed in clinic today. It
 shows a densely sclerotic aortic valve, with a 15 mmHg gradient, mild AI, mild MR, mild TR,
 mild pulmonary hypertension. LV systolic function was normal with grade 1 diastolic dysfunc
tion. 
 
 Calixto Chou's notes were reviewed in clinic today.
 
 Immunization History 
 Administered Date(s) Administered 
   PNEUMOCOCCAL POLYSACCHARIDE 23-VALENT (PPSV23) 2012 
   TRIVALENT INFLUENZA, PRESERATIVE FREE (PED/ADOL/ADULT) 2014 
 
 Assessment  
 
 1. COPD (chronic obstructive pulmonary disease) (HCC) - This is moderate with a significant
 bronchodilator effect noted, though no asthma type symptoms noted. I suggested she stay on 
her ICS given bronchodilator effect seen, and start on a LAAC for now, with consideration of
 also transitioning to a ICS/LABA inhaler as well. In addition, given borderline oxygen satu
rations, I recommended ambulating oximetry and overnight oximetry testing.  
 2. Aortic valve stenosis - This was trivial back in  with dense sclerosis noted. I do n
ot think that a more recent echo has been obtained, and she does not appear to have volume i
ssues on exam today, but does have a fairly prominent murmur (which could be just the sclero
sis alone). If we cannot improve her symptoms, consideration could be made for repeat echoca
rdiogram.  
 
 Plan 
 1.Stay on Qvar twice daily. 
 2.Start on Spiriva once daily. 
 3.Start on ProAir 2 puffs every 6 hours as needed. 
 4. Check overnight oximetry on room air through [facility]. 
 5. Check ambulating oximetry at [facility].
 6. I recommended use of a spacer with both her Qvar and her ProAir, and this was ordered. 
 
 She was advised to call if new pulmonary symptoms were to develop.
 
 Return to clinic in 2 months, or sooner with concerns.
 
 CC: Calixto Chou MD
 
 Portions of this report were transcribed using voice recognition software.  Every effort wa
s made to ensure accuracy; however, inadvertent computerized transcription errors may be pre
sent.
 
 Electronically signed by: Mary Astudillo MD  
 
 Electronically signed by Mary Astudillo MD at 2015  9:55 AM PSTdocumented in t
his encounter
 
 Plan of Treatment
 
 
+----------------------+-------------+--------+----------------------+----------------------
+
 
| Name                 | Type        | Priori | Associated Diagnoses | Order Schedule       
|
|                      |             | ty     |                      |                      
|
+----------------------+-------------+--------+----------------------+----------------------
+
| Ambulating oximetry- | Respiratory | ASAP   |   COPD (chronic      | Expected:            
|
|  Ancillary           |  Care       |        | obstructive          | 2015, Expires: 
|
|                      |             |        | pulmonary disease)   |  2016          
|
|                      |             |        | (HCC)                |                      
|
+----------------------+-------------+--------+----------------------+----------------------
+
| Overnight oximetry,  | Respiratory | Routin |   COPD (chronic      | Expected:            
|
|  room air            |  Care       | e      | obstructive          | 2015, Expires: 
|
|                      |             |        | pulmonary disease)   |  2016          
|
|                      |             |        | (HCC)                |                      
|
+----------------------+-------------+--------+----------------------+----------------------
+
 documented as of this encounter
 
 Visit Diagnoses
 
 
+---------------------------------------------------------------------------------+
| Diagnosis                                                                       |
+---------------------------------------------------------------------------------+
|   COPD (chronic obstructive pulmonary disease) (HCC) - Primary  Chronic airway  |
| obstruction, not elsewhere classified                                           |
+---------------------------------------------------------------------------------+
|   Aortic valve stenosis  Aortic valve disorders                                 |
+---------------------------------------------------------------------------------+
 documented in this encounter

## 2019-12-04 NOTE — XMS
Encounter Summary
  Created on: 2019
 
 Margaret Ferreira
 External Reference #: [id number]
 : 30
 Sex: Female
 
 Demographics
 
 
+-----------------------+------------------------+
| Address               | [address]      |
|                       | [place]  [address]   |
+-----------------------+------------------------+
| Home Phone            | [phone]        |
+-----------------------+------------------------+
| Preferred Language    | Unknown                |
+-----------------------+------------------------+
| Marital Status        |                 |
+-----------------------+------------------------+
| Religion Affiliation | MET                    |
+-----------------------+------------------------+
| Race                  | White                  |
+-----------------------+------------------------+
| Ethnic Group          | Not  or  |
+-----------------------+------------------------+
 
 
 Author
 
 
+--------------+------------------------------+
| Author       | [facility] |
+--------------+------------------------------+
| Organization | [facility] |
+--------------+------------------------------+
| Address      | Unknown                      |
+--------------+------------------------------+
| Phone        | Unavailable                  |
+--------------+------------------------------+
 
 
 
 Support
 
 
+--------------+--------------+---------------------+-----------------+
| Name         | Relationship | Address             | Phone           |
+--------------+--------------+---------------------+-----------------+
| Lawson Ferreira | ECON         | [address]          | [phone] |
|              |              | [place], [place]   |                 |
|              |              | [address]               |                 |
+--------------+--------------+---------------------+-----------------+
 
 
 
 Care Team Providers
 
 
 
+-----------------------+------+-------------+
| Care Team Member Name | Role | Phone       |
+-----------------------+------+-------------+
 PCP  | Unavailable |
+-----------------------+------+-------------+
 
 
 
 Encounter Details
 
 
+--------+-------------+----------------------+---------------------+---------------+
| Date   | Type        | Department           | Care Team           | Description   |
+--------+-------------+----------------------+---------------------+---------------+
| / | Office      |   General Internal   |   Note, Outpatient  | Progress Note |
|    | Visit-Trans | Medicine  [address]    | Clinic              |               |
|        | [address]      | [address]  |                     |               |
|        |             |  Mailcode: [address]      |                     |               |
|        |             | Outpatient Clinic    |                     |               |
|        |             | [address]       |                     |               |
|        |             | [place], [place]         |                     |               |
|        |             | [address]           |                     |               |
|        |             | [phone]         |                     |               |
+--------+-------------+----------------------+---------------------+---------------+
 
 
 
 Social History
 
 
+----------------+-------+-----------+--------+------+
| Tobacco Use    | Types | Packs/Day | Years  | Date |
|                |       |           | Used   |      |
+----------------+-------+-----------+--------+------+
| Never Assessed |       |           |        |      |
+----------------+-------+-----------+--------+------+
 
 
 
+------------------+---------------+
| Sex Assigned at  | Date Recorded |
| Birth            |               |
+------------------+---------------+
| Not on file      |               |
+------------------+---------------+
 
 
 
+----------------+-------------+-------------+
| Job Start Date | Occupation  | Industry    |
+----------------+-------------+-------------+
| Not on file    | Not on file | Not on file |
+----------------+-------------+-------------+
 
 
 
+----------------+--------------+------------+
| Travel History | Travel Start | Travel End |
 
+----------------+--------------+------------+
 
 
 
+-------------------------------------+
| No recent travel history available. |
+-------------------------------------+
 documented as of this encounter
 
 Progress Notes
 Interface, Transcription In - 2007  6:36 AM PDT CLINIC DATE: 96
  
  [facility]
  
  The patient is seen for her second postoperative visit on 96. She
  continues to make satisfactory although rather slow progress following her
  recent hospital admission at which time she had a pelvic floor repair
  including vaginal hysterectomy and sacrospinous fixation. Her surgery was
  complicated by intraperitoneal bleed requiring laparotomy. She continues to
  stay with her daughter in [place] and is becoming more active, has a
  reasonable appetite and normal bowel function. She continues to report some
  urgency and urge incontinence. On examination, the abdominal wound is
  well-healed. There are no local areas of induration, though there is still
  some tenderness on palpation of the rectus muscle. The patient still needs
  a fair amount of pain medication and is moving cautiously. Overall,
  however, she appears to be making a slow but steady and satisfactory
  postoperative recovery. She will remain in the [place] area for a few more
  days before returning to [place]. A further follow-up appointment has not
  been given but the patient has been asked to report back by phone to clinic
  before she returns to [place].
  
  
  
  BRITTANY Perez M.D.
  Professor and 
  Obstetrics and Gynecology
  
  EPK:carolina
  D: 96
  T: 96
  
  cc:
  
  
  
  THALIA COLE MD
  [address] [place] [address] 8
  [place] [address]
   Electronically signed by Interface, Transcription In at 2007  6:36 AM PDTdocumented
 in this encounter
 
 Plan of Treatment
 Not on filedocumented as of this encounter
 
 Visit Diagnoses
 Not on filedocumented in this encounter

## 2019-12-04 NOTE — XMS
Clinical Summary
  Created on: 2019
 
 PaulMargaret baum
 : 30
 Sex: Female
 
 Demographics
 
 
+-----------------------+------------------------+
| Address               | [address]      |
|                       | [place]  [address]   |
+-----------------------+------------------------+
| Home Phone            | [phone]        |
+-----------------------+------------------------+
| Preferred Language    | Unknown                |
+-----------------------+------------------------+
| Marital Status        |                 |
+-----------------------+------------------------+
| Restorationism Affiliation | MET                    |
+-----------------------+------------------------+
| Race                  | White                  |
+-----------------------+------------------------+
| Ethnic Group          | Not  or  |
+-----------------------+------------------------+
 
 
 Author
 
 
+--------------+-------------+
| Organization | Unknown     |
+--------------+-------------+
| Address      | Unknown     |
+--------------+-------------+
| Phone        | Unavailable |
+--------------+-------------+
 
 
 
 Support
 
 
+--------------+--------------+---------------------+-----------------+
| Name         | Relationship | Address             | Phone           |
+--------------+--------------+---------------------+-----------------+
| Lawson Ferreira | ECON         | [address]          | [phone] |
|              |              | [place], [place]   |                 |
|              |              | [address]               |                 |
+--------------+--------------+---------------------+-----------------+
 
 
 
 Care Team Providers
 
 
+-----------------------+------+-------------+
 
| Care Team Member Name | Role | Phone       |
+-----------------------+------+-------------+
 PCP  | Unavailable |
+-----------------------+------+-------------+
 
 
 
 Source Comments
 [facility] is fully live on both [facility] and [facility]
 
 Allergies
 No Known Allergies
 
 Medications
 Not on file
 
 Active Problems
 Not on file
 
 Social History
 
 
+----------------+-------+-----------+--------+------+
| Tobacco Use    | Types | Packs/Day | Years  | Date |
|                |       |           | Used   |      |
+----------------+-------+-----------+--------+------+
| Never Assessed |       |           |        |      |
+----------------+-------+-----------+--------+------+
 
 
 
+------------------+---------------+
| Sex Assigned at  | Date Recorded |
| Birth            |               |
+------------------+---------------+
| Not on file      |               |
+------------------+---------------+
 
 
 
+----------------+-------------+-------------+
| Job Start Date | Occupation  | Industry    |
+----------------+-------------+-------------+
| Not on file    | Not on file | Not on file |
+----------------+-------------+-------------+
 
 
 
+----------------+--------------+------------+
| Travel History | Travel Start | Travel End |
+----------------+--------------+------------+
 
 
 
+-------------------------------------+
| No recent travel history available. |
+-------------------------------------+
 
 
 
 
 Last Filed Vital Signs
 Not on file
 
 Plan of Treatment
 
 
+----------------------+-----------+-----------+----------+
| Health Maintenance   | Due Date  | Last Done | Comments |
+----------------------+-----------+-----------+----------+
| Pneumococcal         |  |           |          |
| vaccination (1 of 2  | 5         |           |          |
| - PCV13)             |           |           |          |
+----------------------+-----------+-----------+----------+
| Influenza (Flu)      | 10/01/201 |           |          |
| vaccination (#1)     | 9         |           |          |
+----------------------+-----------+-----------+----------+
 
 
 
 Results
 Not on filefrom Last 3 Months

## 2019-12-04 NOTE — XMS
Encounter Summary
  Created on: 2019
 
 Margaret Ferreira
 External Reference #: [id number]
 : 30
 Sex: Female
 
 Demographics
 
 
+-----------------------+----------------------+
| Address               | [address]        |
|                       | [place]  [address] |
+-----------------------+----------------------+
| Home Phone            | [phone]      |
+-----------------------+----------------------+
| Preferred Language    | Unknown              |
+-----------------------+----------------------+
| Marital Status        |               |
+-----------------------+----------------------+
| Scientologist Affiliation | Unknown              |
+-----------------------+----------------------+
| Race                  | Unknown              |
+-----------------------+----------------------+
| Ethnic Group          | Unknown              |
+-----------------------+----------------------+
 
 
 Author
 
 
+--------------+--------------------------------------------+
| Author       | [facility] [place]  |
|              | and [place]                                |
+--------------+--------------------------------------------+
| Organization | [facility] [place]  |
|              | and [place]                                |
+--------------+--------------------------------------------+
| Address      | Unknown                                    |
+--------------+--------------------------------------------+
| Phone        | Unavailable                                |
+--------------+--------------------------------------------+
 
 
 
 Support
 
 
+--------------+--------------+---------+-----------------+
| Name         | Relationship | Address | Phone           |
+--------------+--------------+---------+-----------------+
| Lawson Hanna | ECON         | Unknown | [phone] |
+--------------+--------------+---------+-----------------+
 
 
 
 Care Team Providers
 
 
 
+-----------------------+------+-----------------+
| Care Team Member Name | Role | Phone           |
+-----------------------+------+-----------------+
| Calixto Chou MD | PCP  | [phone] |
+-----------------------+------+-----------------+
 
 
 
 Reason for Visit
 
 
+----------------+----------+
| Reason         | Comments |
+----------------+----------+
| Establish Care |          |
+----------------+----------+
 Evaluate & Treat (Routine)
 
+--------+--------+-------------+--------------+--------------+---------------+
| Status | Reason | Specialty   | Diagnoses /  | Referred By  | Referred To   |
|        |        |             | Procedures   | Contact      | Contact       |
+--------+--------+-------------+--------------+--------------+---------------+
| Closed |        | Pulmonology |   Diagnoses  |   Marier,    |   Wilda, |
|        |        |             |  Chronic     | Calixto Sage,  |  Mary PADRON   |
|        |        |             | airway       | MD  [address]     | MD  [address]     |
|        |        |             | obstruction, | [place]    | [address]     |
|        |        |             |  not         | [address]        | [place],  |
|        |        |             | elsewhere    | [place],   | [place] [address]      |
|        |        |             | classified   | [place]           |               |
|        |        |             | Procedures   | [address]   |               |
|        |        |             | NEW PT       | Phone:       |               |
|        |        |             | CONSULT      | [phone] |               |
|        |        |             |              |   Fax:       |               |
|        |        |             |              | [phone] |               |
+--------+--------+-------------+--------------+--------------+---------------+
 
 
 
 
 Encounter Details
 
 
+--------+---------+----------------------+----------------+----------------------+
| Date   | Type    | Department           | Care Team      | Description          |
+--------+---------+----------------------+----------------+----------------------+
| / | Office  |   [facility]          |   Wilda,  | COPD (chronic        |
|    | Visit   | PULMONARY  [address]     | Mary PADRON MD  | obstructive          |
|        |         | [place], |                | pulmonary disease)   |
|        |         |  [place] [address]       |                | ([facility]) (Primary Dx);  |
|        |         | [phone]         |                | Aortic valve         |
|        |         |                      |                | stenosis             |
+--------+---------+----------------------+----------------+----------------------+
 
 
 
 Social History
 
 
 
+--------------+-------+-----------+--------+------+
| Tobacco Use  | Types | Packs/Day | Years  | Date |
|              |       |           | Used   |      |
+--------------+-------+-----------+--------+------+
| Never Smoker |       |           |        |      |
+--------------+-------+-----------+--------+------+
 
 
 
+-------------------------------------------------------------------+
| Comments: her parents both smoked, also worked at [employer] |
+-------------------------------------------------------------------+
 
 
 
+-------------+-------------+---------+----------+
| Alcohol Use | Drinks/Week | oz/Week | Comments |
+-------------+-------------+---------+----------+
| No          |             |         |          |
+-------------+-------------+---------+----------+
 
 
 
+------------------+---------------+
| Sex Assigned at  | Date Recorded |
| Birth            |               |
+------------------+---------------+
| Not on file      |               |
+------------------+---------------+
 
 
 
+----------------+-------------+-------------+
| Job Start Date | Occupation  | Industry    |
+----------------+-------------+-------------+
| Not on file    | Not on file | Not on file |
+----------------+-------------+-------------+
 
 
 
+----------------+--------------+------------+
| Travel History | Travel Start | Travel End |
+----------------+--------------+------------+
 
 
 
+-------------------------------------+
| No recent travel history available. |
+-------------------------------------+
 documented as of this encounter
 
 Last Filed Vital Signs
 
 
+-------------------+---------------------+----------------------+----------+
| Vital Sign        | Reading             | Time Taken           | Comments |
+-------------------+---------------------+----------------------+----------+
| Blood Pressure    | 124/58              | 2015  3:12 PM  |          |
|                   |                     | PST                  |          |
+-------------------+---------------------+----------------------+----------+
 
| Pulse             | 92                  | 2015  3:12 PM  |          |
|                   |                     | PST                  |          |
+-------------------+---------------------+----------------------+----------+
| Temperature       | 36.4   C (97.6   F) | 2015  3:12 PM  |          |
|                   |                     | PST                  |          |
+-------------------+---------------------+----------------------+----------+
| Respiratory Rate  | -                   | -                    |          |
+-------------------+---------------------+----------------------+----------+
| Oxygen Saturation | 90%                 | 2015  3:12 PM  |          |
|                   |                     | PST                  |          |
+-------------------+---------------------+----------------------+----------+
| Inhaled Oxygen    | -                   | -                    |          |
| Concentration     |                     |                      |          |
+-------------------+---------------------+----------------------+----------+
| Weight            | 59.4 kg (131 lb)    | 2015  3:12 PM  |          |
|                   |                     | PST                  |          |
+-------------------+---------------------+----------------------+----------+
| Height            | 157.5 cm (5' 2")    | 2015  3:12 PM  |          |
|                   |                     | PST                  |          |
+-------------------+---------------------+----------------------+----------+
| Body Mass Index   | 23.96               | 2015  3:12 PM  |          |
|                   |                     | PST                  |          |
+-------------------+---------------------+----------------------+----------+
 documented in this encounter
 
 Patient Instructions
 Patient Instructions Mary Astudillo MD - 2015  4:24 PM PSTStay on Qvar as bef
ore. 
 
 Start on Spiriva one capsule inhaled once daily. 
 
 Start on ProAir 2 puffs inhaled every 6 hours as needed. 
 
 Do an overnight oxygen test through [facility] [facility]. Call the [facility] before yo
u pick it up to make sure they have a box available. You will  a box at the [facility]. Do the test on room air. Wear the finger probe through the night and then return
 the box for a download the next day. 
 
 Do walking oxygen test at [facility].
 
 Use a spacer with the Qvar and ProAir. 
 Electronically signed by Mary Astudillo MD at 2015  4:31 PM PST
 documented in this encounter
 
 Progress Notes
 Mary Astudillo MD - 2015  3:32 PM PSTFormatting of this note might be differe
nt from the original.
 
 Pulmonary Consult
 
 Referring Provider: Calixto Chuo MD 
 
 HPI 
  
 Margaret Ferreira is a 84 y.o. female patient of Calixto Chou here today for evalua
tion of COPD. 
 
 She notes she was diagnosed with COPD a couple of years ago, and how long she has been on a
n inhaler. She has had shortness of breath for about 2 years as well. She feels like her stefanie
athing has been about the same over the last 2 years. She notes she has had pneumonia and santana hernández has also has episodes of bronchitis a couple of times a year, basically any time she gets 
a cold. 
 
 Currently they are able to walk 100 feet at their own pace on level ground, using a walker 
or a shopping cart to assist. The distance walked is predominately limited by shortness of b
reath or back pain. One year ago, she feels that they could walk possibly a little further, 
maybe twice the distance.  She is not exercising regularly at the moment. She was doing wate
r walking, but not with the weather being cold. 
 
 She does cough in the mornings when she first gets up (her  thinks for 1-2 hours), a
nd does produce mucous. The mucous is clear typically in color. She has not had hemoptysis.
 
 Triggers for their shortness of breath include exertion, and she notes she is more short of
 breath towards the end of the day. Relieving factors include relaxing. 
 
 Treatments that they have tried to this point include Qvar 40 mcg 2 puffs twice daily and a
 rescue inhaler (she is not sure which one). Currently they are on Qvar 40 mcg 2 puffs twice
 daily. She thinks this has helped her some. She has been taking this for about 2 years. She
 has never used the rescue inhaler. 
 
 She has not been evaluated for nocturnal oxygen and does not use it. She has not had ambula
ting oximetry testing. 
 
 Past Medical History
 Past Medical History 
 Diagnosis Date 
   COPD (chronic obstructive pulmonary disease) (HCC)  
   Peptic ulcer disease  
   GERD (gastroesophageal reflux disease)  
   Hiatal hernia  
   Hypertension  
   Hypothyroidism  
   Urine incontinence  
   Aortic valve stenosis  
   trivial 
   Hearing loss  
   Osteoarthritis  
   Osteoporosis  
   Plantar wart  
   RLS (restless legs syndrome)  
   Scoliosis  
   Pneumonia  
   Pulmonary nodule  
   Macular degeneration  
  
  
 Past Surgical History
 Past Surgical History 
 Procedure Date 
   Jj and bso 1996 
   Cholecystectomy 1996 
   Total hip arthroplasty  
   Left 
   Abscess drainage on calf  
   Bladder suspension  
   Upper gastrointestinal endoscopy  
  
 
 Family History: 
 Family History 
 
 Problem Relation Age of Onset 
   COPD Father  
   Asthma Father  
   Allergies Father  
   Hypertension Father  
   Tobacco Use Father  
   Asthma Sister  
   Hypertension Mother  
   Stroke Mother  
  
 
 Social History: 
 History 
 
 Social History 
   Marital Status:  
   Spouse Name: N/A 
   Number of Children: N/A 
   Years of Education: N/A 
 
 Occupational History 
     
 
 Social History Main Topics 
   Smoking status: Never Smoker  
   Smokeless tobacco: None 
    Comment: her parents both smoked, also worked at a [employer] 
   Alcohol Use: No 
   Drug Use: No 
   Sexually Active: None 
 
 Other Topics Concern 
   None 
 
 Social History Narrative 
  [place] With: her husbandGrew up: in [place] Has previously lived in: [place], [place]
Exposure to toxic chemicals: has been exposed to halon (1301) before, they had to evacuate t
he computer room at the timeExposure to asbestos: noExposure to tuberculosis: no Has had a P
PD or Quantiferon before: noHas pets at home: no Has ever owned birds: no Other animal expos
ures: has had a dog and cat beforeHobbies: playing cards  
 
 Allergies:
 Allergies 
 Allergen Reactions 
   Lisinopril  
   Cough 
   
 
 Medications:
 Outpatient Encounter Prescriptions as of 2015 
 Medication Sig Dispense Refill 
   beclomethasone (QVAR) 40 mcg/puff inhaler Inhale 2 puffs into the lungs 2 times daily. 
    
   benzonatate (TESSALON PERLES) 100 mg capsule Take 100 mg by mouth 3 times daily as need
ed.     
   Calcium Carbonate (CALCIUM 600 PO) Take  by mouth Daily.     
   Cholecalciferol (VITAMIN D-3) 2000 units CAPS Take  by mouth Daily.     
   diclofenac (VOLTAREN-XR) 100 mg ER tablet Take 100 mg by mouth daily (with breakfast). 
    
   Diclofenac Potassium 50 MG PACK Take  by mouth.     
 
   Docosahexaenoic Acid (DHA OMEGA 3) 100 MG CAPS Take  by mouth.     
   levothyroxine (SYNTHROID, LEVOTHROID) 150 mcg tablet Take 150 mcg by mouth every mornin
g (before breakfast).     
   Multiple Vitamins-Minerals (MULTIVITAMIN PO) Take  by mouth Daily.     
   olmesartan-hydrochlorothiazide (BENICAR HCT) 40-25 MG per tablet Take 0.5 tablets by mo
uth Daily.     
   omeprazole (PRILOSEC) 20 mg capsule Take 20 mg by mouth every morning (before breakfast
).     
   pramipexole (MIRAPEX) 0.25 mg tablet Take 0.25 mg by mouth 3 times daily.     
   pseudoePHEDrine (SUDAFED) 30 mg tablet Take 30 mg by mouth every 4 hours as needed.    
 
   traMADol (ULTRAM) 50 mg tablet Take 50 mg by mouth every 6 hours as needed.     
 
 Facility-Administered Encounter Medications as of 2015 
 Medication Dose Route Frequency Provider Last Rate Last Dose 
   [COMPLETED] albuterol 2.5 mg/3 mL nebulizer solution 2.5 mg  2.5 mg Nebulization RT Onc
e Mary Astudillo MD   2.5 mg at 01/13/15 1407 
  
 Review of Systems
 Constitutional:  Denies fever, chills, sweats, and change in weight. 
 Eyes:  Denies vision change and eye irritation. 
 ENT:  Denies earache,  decreased hearing, nosebleeds, sore throat, and hoarseness. She does
 get nasal drainage and congestion. It does not seem to be seasonal.  
 Resp:   See HPI.  
 CV:  Denies chest pain, palpitations, syncope, and peripheral edema. 
 GI:  Denies heartburn, nausea, vomiting, and abdominal pain. 
 : She does get some urinary incontinence, but no difficulty urinating.  
 Musculoskeletal: Denies joint swelling. Has joint stiffness and some leg cramps.  
 Derm: Denies rash, itching, dryness, and suspicious lesions. 
 Neurologic: Denies frequent headaches, seizures, and vertigo. She has tingling in her hand,
 which is related to neck arthritis.  
 Psych: Denies depression, anxiety, and suicidal ideation. 
 Endo: Denies cold intolerance, heat intolerance, and unusual weight change. 
 Heme: Denies abnormal bruising, bleeding, and enlarged lymph nodes. 
 Allergy: Denies food allergies, allergic rash. 
 
 Objective 
 
 /58 | Pulse 92 | Temp 36.4 C (97.6 F) (Tympanic) | Ht 1.575 m (5' 2") | Wt 59.421
 kg (131 lb) | BMI 23.95 kg/m2 | SpO2 90%  RA
 
 General Appearance:  Alert, cooperative, no distress, appears stated age 
 Head:  Normocephalic, without obvious abnormality, atraumatic 
 Eyes:  PERRL, conjunctiva clear, no scleral icterus, EOM's intact 
 Ears:  Normal TM's, external auditory canals, slightly diminished acuity 
 Nose: Nares normal, septum midline, mucosa normal 
 Mouth: No oral lesions or exudate 
 Neck: Supple, symmetrical, no adenopathy 
 Lungs:   No accessory muscle use, breath sounds are diminished bilaterally with prolongatio
n of the expiratory phase, no wheezes, crackles or rhonchi 
 Chest Wall:  No deformity 
 Heart:  Regular rate and rhythm, 2/6 systolic murmur, no rub or gallop 
 Abdomen:   Soft, non-tender, non-distended 
 Extremities:  No cyanosis, clubbing, or edema 
 Pulses: Radial pulses 2+ and symmetric 
 Skin: Warm and dry 
 Lymph nodes: Cervical and supraclavicular nodes normal 
 
 Data:
 Chest x-ray done 2014 was reviewed and interpreted in clinic today. It shows a l
 
arge non reduced hiatal hernia.
 
 Pulmonary function tests were performed prior to clinic today and were reviewed and interpr
eted in clinic today.  They show moderate obstructive disease with a significant response to
 inhaled bronchodilator on spirometry, and a moderately reduced diffusion capacity. She coul
d not pant fast enough to do lung volume testing. 
 
 Echocardiogram was performed on July 15, 2011 and results were reviewed in clinic today. It
 shows a densely sclerotic aortic valve, with a 15 mmHg gradient, mild AI, mild MR, mild TR,
 mild pulmonary hypertension. LV systolic function was normal with grade 1 diastolic dysfunc
tion. 
 
 Calixto Chou's notes were reviewed in clinic today.
 
 Immunization History 
 Administered Date(s) Administered 
   PNEUMOCOCCAL POLYSACCHARIDE 23-VALENT (PPSV23) 2012 
   TRIVALENT INFLUENZA, PRESERATIVE FREE (PED/ADOL/ADULT) 2014 
 
 Assessment  
 
 1. COPD (chronic obstructive pulmonary disease) (HCC) - This is moderate with a significant
 bronchodilator effect noted, though no asthma type symptoms noted. I suggested she stay on 
her ICS given bronchodilator effect seen, and start on a LAAC for now, with consideration of
 also transitioning to a ICS/LABA inhaler as well. In addition, given borderline oxygen satu
rations, I recommended ambulating oximetry and overnight oximetry testing.  
 2. Aortic valve stenosis - This was trivial back in  with dense sclerosis noted. I do n
ot think that a more recent echo has been obtained, and she does not appear to have volume i
ssues on exam today, but does have a fairly prominent murmur (which could be just the sclero
sis alone). If we cannot improve her symptoms, consideration could be made for repeat echoca
rdiogram.  
 
 Plan 
 1.Stay on Qvar twice daily. 
 2.Start on Spiriva once daily. 
 3.Start on ProAir 2 puffs every 6 hours as needed. 
 4. Check overnight oximetry on room air through [facility]. 
 5. Check ambulating oximetry at [facility].
 6. I recommended use of a spacer with both her Qvar and her ProAir, and this was ordered. 
 
 She was advised to call if new pulmonary symptoms were to develop.
 
 Return to clinic in 2 months, or sooner with concerns.
 
 CC: Calixto Chou MD
 
 Portions of this report were transcribed using voice recognition software.  Every effort wa
s made to ensure accuracy; however, inadvertent computerized transcription errors may be pre
sent.
 
 Electronically signed by: Mary Astudillo MD  
 
 Electronically signed by Mary Astudillo MD at 2015  9:55 AM PSTdocumented in t
his encounter
 
 Plan of Treatment
 
 
+----------------------+-------------+--------+----------------------+----------------------
+
 
| Name                 | Type        | Priori | Associated Diagnoses | Order Schedule       
|
|                      |             | ty     |                      |                      
|
+----------------------+-------------+--------+----------------------+----------------------
+
| Ambulating oximetry- | Respiratory | ASAP   |   COPD (chronic      | Expected:            
|
|  Ancillary           |  Care       |        | obstructive          | 2015, Expires: 
|
|                      |             |        | pulmonary disease)   |  2016          
|
|                      |             |        | (HCC)                |                      
|
+----------------------+-------------+--------+----------------------+----------------------
+
| Overnight oximetry,  | Respiratory | Routin |   COPD (chronic      | Expected:            
|
|  room air            |  Care       | e      | obstructive          | 2015, Expires: 
|
|                      |             |        | pulmonary disease)   |  2016          
|
|                      |             |        | (HCC)                |                      
|
+----------------------+-------------+--------+----------------------+----------------------
+
 documented as of this encounter
 
 Visit Diagnoses
 
 
+---------------------------------------------------------------------------------+
| Diagnosis                                                                       |
+---------------------------------------------------------------------------------+
|   COPD (chronic obstructive pulmonary disease) (HCC) - Primary  Chronic airway  |
| obstruction, not elsewhere classified                                           |
+---------------------------------------------------------------------------------+
|   Aortic valve stenosis  Aortic valve disorders                                 |
+---------------------------------------------------------------------------------+
 documented in this encounter

## 2019-12-04 NOTE — XMS
Encounter Summary
  Created on: 2019
 
 Margaret Ferreira
 External Reference #: [id number]
 : 30
 Sex: Female
 
 Demographics
 
 
+-----------------------+------------------------+
| Address               | [address]      |
|                       | [place]  [address]   |
+-----------------------+------------------------+
| Home Phone            | [phone]        |
+-----------------------+------------------------+
| Preferred Language    | Unknown                |
+-----------------------+------------------------+
| Marital Status        |                 |
+-----------------------+------------------------+
| Protestant Affiliation | MET                    |
+-----------------------+------------------------+
| Race                  | White                  |
+-----------------------+------------------------+
| Ethnic Group          | Not  or  |
+-----------------------+------------------------+
 
 
 Author
 
 
+--------------+------------------------------+
| Author       | [facility] |
+--------------+------------------------------+
| Organization | [facility] |
+--------------+------------------------------+
| Address      | Unknown                      |
+--------------+------------------------------+
| Phone        | Unavailable                  |
+--------------+------------------------------+
 
 
 
 Support
 
 
+--------------+--------------+---------------------+-----------------+
| Name         | Relationship | Address             | Phone           |
+--------------+--------------+---------------------+-----------------+
| Lawson Ferreira | ECON         | [address]          | [phone] |
|              |              | [place], [place]   |                 |
|              |              | [address]               |                 |
+--------------+--------------+---------------------+-----------------+
 
 
 
 Care Team Providers
 
 
 
+-----------------------+------+-------------+
| Care Team Member Name | Role | Phone       |
+-----------------------+------+-------------+
 PCP  | Unavailable |
+-----------------------+------+-------------+
 
 
 
 Encounter Details
 
 
+--------+-------------+----------------------+---------------------+---------------+
| Date   | Type        | Department           | Care Team           | Description   |
+--------+-------------+----------------------+---------------------+---------------+
| / | Office      |   General Internal   |   Note, Outpatient  | Progress Note |
|    | Visit-Trans | Medicine  [address]    | Clinic              |               |
|        | [address]      | [address]  |                     |               |
|        |             |  Mailcode: [id number]      |                     |               |
|        |             | Outpatient Clinic    |                     |               |
|        |             | [address]       |                     |               |
|        |             | [place], [place]         |                     |               |
|        |             | [address]           |                     |               |
|        |             | [phone]         |                     |               |
+--------+-------------+----------------------+---------------------+---------------+
 
 
 
 Social History
 
 
+----------------+-------+-----------+--------+------+
| Tobacco Use    | Types | Packs/Day | Years  | Date |
|                |       |           | Used   |      |
+----------------+-------+-----------+--------+------+
| Never Assessed |       |           |        |      |
+----------------+-------+-----------+--------+------+
 
 
 
+------------------+---------------+
| Sex Assigned at  | Date Recorded |
| Birth            |               |
+------------------+---------------+
| Not on file      |               |
+------------------+---------------+
 
 
 
+----------------+-------------+-------------+
| Job Start Date | Occupation  | Industry    |
+----------------+-------------+-------------+
| Not on file    | Not on file | Not on file |
+----------------+-------------+-------------+
 
 
 
+----------------+--------------+------------+
| Travel History | Travel Start | Travel End |
 
+----------------+--------------+------------+
 
 
 
+-------------------------------------+
| No recent travel history available. |
+-------------------------------------+
 documented as of this encounter
 
 Progress Notes
 Interface, Transcription In - 2007  3:09 AM PDT CLINIC DATE: 96
  
  [facility] CLINIC:
  
  Ms. Ferreira returns to the [facility] seven months after surgery
  which included anterior colporrhaphy, vaginal hysterectomy, sacral spinous
  fixation and subsequent laparotomy for hemoperitoneum. She had a slow
  recovery from her surgery and particularly with regard to abdominal
  discomfort and bladder symptoms of bladder irritability. She now returns
  reporting general improvement. Her bladder function has essentially
  returned to normal although she occasionally has episodes of urgency at
  night time. She continues to have some upper abdominal discomfort which is
  somewhat nonspecific, but seems to be aggravated by certain food stuffs.
  Her energy level is slowly returning to normal. Bowel function has been
  entirely normal.
  
  EXAMINATION: Examination shows the vaginal vault to be well supported,
  there is some laxity of the anterior vaginal wall, but this does not
  represent a significant defect and the posterior wall is well supported.
  
  IMPRESSION: Ms. Ferreira continues to make satisfactory but slow recovery
  from her surgery.
  
  PLAN: She will be seeing her internist, Dr. Chou in [place] for ongoing
  medical care and she was encouraged to increase her level of activity and
  particularly to do pelvic floor exercises. She returns to [place] on a
  regular basis as her daughter lives in this area and she will return for
  further check one year from now.
  
  
  
  BRITTANY Perez M.D.
  Professor and ,
  Obstetrics and Gynecology
  
  EPK/sct
  D: 96
  T: 96
   Electronically signed by Interface, Transcription In at 2007  3:09 AM PDTdocumented
 in this encounter
 
 Plan of Treatment
 Not on filedocumented as of this encounter
 
 Visit Diagnoses
 Not on filedocumented in this encounter

## 2019-12-05 PROCEDURE — 30233N1 TRANSFUSION OF NONAUTOLOGOUS RED BLOOD CELLS INTO PERIPHERAL VEIN, PERCUTANEOUS APPROACH: ICD-10-PCS | Performed by: SURGERY

## 2019-12-05 NOTE — NUR
BLOOD TRANSFUSION UNIT 2 OF 2 STARTED. 15 MIN VITALS COMPLETED.
PT WITHOUT WIHOUT S/S OF TRANSFUSION REACTION, PT INSTRUCTED TO
NOTIFY RN IF HAVING ANY NEW SYMPTOMS. CALL LIGHT IN REACH. DENIES NEEDS.

## 2019-12-05 NOTE — NUR
PT COMPLETED WITH ECHO AND WITH BLOOD TRANSFUSION. NO S/S OF TRANSFUSION
REACTION, VSS. PT COMPLAINT OF RIGHT SIDE AND BACK PAIN DURING ECHO, DISCUSSED
PAIN MANAGEMENT WITH PT AND NEED TO CONTROL PAIN SO THAT SHE IS ABLE TO MOVE
IN THE BED AND GET OOB, PT UNDERSTANDS AND REQUESTS PAIN MEDICATION, 2MG IV
MORPHINE GIVEN.

## 2019-12-05 NOTE — NUR
PT SLEPT ON AND OFF THIS SHIFT, VSS. PT DENIED PAIN WHEN RESTING IN BED AND
DENIED NEED OF PAIN MEDICATIONS ALL SHIFT. 6AM H&H OF 7.7 AND 22.9. IV FLUIDS
INFUSING AT 85MLS/HR, SITE WNL.

## 2019-12-05 NOTE — NUR
BLOOD TRANSFUSION UNIT 1 OF 2 STARTED. THIS RN REMIANED WITH PT FOR FIRST 15
MINUTES OF TRANSFUSION, WIHOUT S/S OF TRANSFUSION REACTION, PT INSTRUCTED TO
NOTIFY RN IF HAVING ANY NEW SYMPTOMS, FAMILY AT BEDSIDE. BLOOD UNIT VERIFIED
AGAINST PT BLODD BAND WITH VIDAL OBRIEN BEFORE TRANSFUSION BEGAN.

## 2019-12-05 NOTE — NUR
DR. FOUNTAIN CALLED ABOUT LABS AFTER BLOOD TRANSFUSION, VERBAL ORDER FOR CBC,
DISCUSSED PAIN MANAGEMENT, PLAN TO CONTNUE WITH IV MORPHINE AND MAY ADD PO
PAIN MEDICATION WHEN TAKING ORAL, DOES NOT FEEL A LIDOCAINE PATCH WILL BENEFIT
PT AT THIS TIME. VERBAL ORDER FOR REGULAR DIET TONIGHT.

## 2019-12-05 NOTE — NUR
BLOOD UNIT COMPLETED, SALINE FLUSH STARTED. RIGHT SIDE HEMATOMA WITH BRUISING
WITHIN MARGINS, SWELLING APPEARS SLIGHTLY LARGER THAN THIS AM. ABD
CIRCUMFERENCE MEASURED AND MARKED WITH PLASTIC TUBING, WILL CONTINUE TO
MONITOR SWELLING. PT AND FAMILY DENY OTHER NEEDS AT THIS TIME.

## 2019-12-05 NOTE — NUR
CALLED DR. FOUNTAIN TO CLARIFY DUONEB ORDER, ORDER TO STOP DUONEB, START
ALBUTEROL BID AND PRN. NOTIFIED DR. FOUNTAIN THAT PT UNABLE TO TOLERATE LEFT LEG
SCD DUE TO PAIN AT HEMATOMA, MD MADRID.

## 2019-12-05 NOTE — NUR
PT RESTING IN BED. PT DENIES PAIN AT REST, REPORTS PAIN WITH MOVEMENT TO RIGHT
SIDE. PT ON ROOM AIR, LUNG SOUNS CLEAR. PT WITH HEMATOMA TO LEFT LEFT WITHIN
MARGIN, HEMATOMA TO RIGHT SIDE WITH SWELLING TO RIGHT SIDE. BOWEL TONES
ACTIVE, DENIES NAUSEA, NPO AT THIS TIME FOR POSSIBLE SURGERY TODAY. IV FLUIDS
INFUSING LR AT 85 ML/HR, IV PEPCID GIVEN. DR. FOUNTAIN TO BEDSIDE, VERBAL ORDER
TO RESTART HOME INHALER ANORO, APPLY BLE SCDS, DISCUSSED BLOOD TRANSFUSION
AND POSSIBLE SURGERY LATER TODAY. CONSENT FOR BLOOD TRANSFUSION SIGNED AND
WITNESSED. PT DENIES OTHER NEEDS AT THIS TIME.

## 2019-12-05 NOTE — NUR
ASSESSMENT COMPLETE, SCHEDULED MEDS GIVEN (SEE EMAR). PRN MORPHINE ALSO GIVEN
FOR 8/10 PAIN IN RIGHT FLANK AREA. HEMATOMA TO RIGHT FLANK NOTED, ABDOMINAL
CIRCUMFRENCE MEASURED WITH PLASTIC TUBING, NO CHANGES NOTED. HEMATOMA TO RIGHT
FLANK AND LEFT SHIN OUTLINED. WILL MONITOR FOR INCREASING SWELLING OR
EXPANSION OF HEMATOMA. PT DENIES ADDITIONAL NEEDS, CALL LIGHT IN REACH.

## 2019-12-05 NOTE — NUR
INFORMED PT WAS INCONTINENT AND COMPLETE BED CHANGE WAS COMPLETED. THIS RN IN
ROOM TO REPOSITION PT PER PT REQUEST. INITIAL TEMP RESULT OF 99.3, IS
DEMONSTRATED, REPEAT ORAL TEMP OF 98.4. PT DENIES ADDITIONAL NEEDS, CALL LIGHT
IN REACH.

## 2019-12-05 NOTE — NUR
BEDSIDE HANDOFF REPORT RECEIVED FROM NIGHT SHIFT RN. P TRESTING IN BED,
DAUGHTER AT BEDSIDE. PT DENIES NEEDS AT THIS TIME.

## 2019-12-05 NOTE — NUR
PT ASSISTED TO ORDER DINNER. LAB IN ROOM DRAWING CBC. PT PROVIDED WITH WATER.
PT DENIES OTHER NEEDS AT THIS TIME.

## 2019-12-05 NOTE — NUR
NEW BAG OF IV FLUIDS HUNG AND INFUSING AT 85MLS/HR, SITE WNL. FAMILY IN ROOM.
PT DENIES NEED FOR PAIN MEDICATION AT THIS TIME. CALL LIGHT IN REACH.

## 2019-12-05 NOTE — NUR
SHIFT REPORT RECEIVED FROM DAYSHIFT VIDAL PERSAUD AT BEDSIDE. PT AWAKE AND
RESTING IN BED. FAMILY IN ROOM. RIGHT FLANK HEMATOMA AND LEFT KNEE/SHIN
HEMATOMA NOTED AND OUTLINED. NO NEEDS VERBALIED AT THIS TIME. CALL LIGHT IN
REACH.

## 2019-12-05 NOTE — NUR
PT TRANSFUSED WITH 2 UNITS PRBC TODAY, IMPROVED H/H. PT ON ROOM AIR, LUNG
SOUNDS CLEAR. ECHO COMPLETED, AWAITING RESULTS. PT WITH PAIN WHEN MOVING,
GIVEN MORPHINE X1 WITH GOOD RELIEF. PT WITH HEMATOMA TO RIGHT SIDE AND LEFT
SHIN, WIHTOUT SIGNIFICANT CHANGE. SCD TO RIGHT LEG UNABLE TO TOLERATE LEFT
LEG. PT INCONTINENT OF URINE.

## 2019-12-05 NOTE — NUR
ASSESSMENT COMPLETE, VSS. PT ON RA, RR WNL. PT DENIES PAIN WHEN RESTING IN
BED, DENIES WISHES FOR PAIN MEDICATION. WILL MONITOR FOR CHANGES. HEMATOMA TO
RIGHT FLANK SLIGHTLY BEYOND OUTLINE AT PROXIMAL END OF BRUISE. NO CHANGES
REGARDING FIRMNESS NOTED TO HEMATOMA. PT STATES, "I FEEL BETTER. IT DOESN'T
HURT AS MUCH WHEN YOU PRESS ON IT AS IT DID EARLIER". WILL MONITOR. PT DENIES
NAUSEA. IV FLUIDS INFUSING AT 85MLS/HR, SITE WNL. NO FURTHER NEEDS, CALL LIGHT
IN REACH.

## 2019-12-06 NOTE — NUR
PT ON ROOM AIR, LUNG SOUNDS CLEAR. IV FLUIDS INFUSING LR AT 85ML/HR, IV PEPCID
GIVEN PER ORDER. PT WITH RIGHT SIDE HEMATOMA, BRUISING IS MORE PURPLE TODAY
BUT SWELLING IS UNCHANGED. LEFT SHIN HEMATOMA UNCHANGED. CMS INTACT, WIHTOUT
EDEMA, LEG SCD TO RIGHT LEG FOOT SCD TO LEFT LEG. PT DENIES PAIN AT THIS TIME.
DISCUSSED PLAN OF CARE FOR THE DAY, AWAITING ECHO RESULTS, INCREASED ACTIVTY
AND OOB. ALL QUESTIONS ANSWERED. PT DENIES OTHER NEEDS AT THIS TIME.

## 2019-12-06 NOTE — NUR
PT RESTING IN BED-ALERT, ORIENTED AND FAMILY AT BS. PT IS WAITING TO HEAR IF
SHE WILL HAVE SURGERY LATER TODAY. HAD GOOD VISIT, PT REQUESTED PRAYER. PTS'
DAUGHTER REQUESTED I CONTACT FAMILY -WHICH I DID. WILL FOLLOW AS NEEDED

## 2019-12-06 NOTE — NUR
PT RESTING IN BED, EYES CLOSED, RESPIRATIONS EVEN AND UNLABORED. NO DISTRESS
NOTED, FAMILY IN ROOM. CALL LIGHT IN REACH.

## 2019-12-06 NOTE — NUR
CHARGE RN ROUNDING NOTE. PT'S PRIMARY RN AND CNA IN ROOM. PT AND FAMILY DENY
QUESTIONS OR CONCERNS AT THIS TIME. WHITE BOARD UPDATED. CALL LIGHT IN REACH.

## 2019-12-06 NOTE — NUR
PT RESTING IN BED. TRAMADOL GIVEN PER ORDER, PT DENIES PAIN AT REST. PT
VOICING CONCERN ABOUT POSSIBLE DISCHARGE TOMORROW AND CARE AT HOME. PT WORRIED
SHE IS GOING TO REQUIRE INCREASED LEVEL OF CARE AND HER SPOUSE WILL BE UNABLE
TO PROVIDE THAT LEVEL. CASE MANAGEMENT CALLED AND WILL MEET WITH PT.

## 2019-12-06 NOTE — NUR
BEDSIDE HANDOFF REPORT RECEIVED FROM NIGHT SHIFT RN. PT SLEEPING, LEFT
UNDSITURBED. LR INFUSING AT 85 ML/HR.

## 2019-12-06 NOTE — NUR
PT HOB ELEVATED, RESTING, EYES CLOSED, NO C/O PAIN OR SOB . CALL LIGHT AT
BEDSIDE,SCDS AND FOOT PUMP IN PLACE.  NO FAMILY IN ROOM AT THIS TIME

## 2019-12-06 NOTE — NUR
vss and i&o's done. pt incontinent, leyla care done, dry attends in place. pt
repositioned in bed, no further needs. assessment complete, no new changes or
concerns regarding right flank hematoma. pt denies pain at this time. call
light in reach.

## 2019-12-06 NOTE — NUR
PT REPORTING 8/10 PAIN, PRN PAIN MEDICATIONS GIVEN (SEE EMAR). VSS, PT DENIES
FURTHER NEEDS, CALL LIGHT IN REACH.

## 2019-12-06 NOTE — NUR
YESTERDAY PATIENT WAS IN TO MUCH PAIN TO TAKE A SHOWER. IT WAS EVEN HARD FOR
HER TO ROLL TO CHANGE HER. SO WE ARE GOING TO TRY TODAY.

## 2019-12-06 NOTE — NUR
SCHEDULED THYROID PILL GIVEN (SEE EMAR). NEW BAG OF IV FLUIDS HUNG AND
INFUSING AT 85MLS/HR, IV SITE WNL. NO FURTHER NEEDS, CALL LIGHT IN REACH.

## 2019-12-06 NOTE — NUR
ASKED BY CHARGE RN TO COME TALK WITH PATIENT AND FAMILY AS THEY HAVE SOME
QUESTIONS REGARDING POSSIBLE HOME HEALTH.
 
SPOKE WITH PATIENT, HER  AND HER DAUGHTER IN ROOM.  DISCUSSED HER
DIAGNOSIS AND HER DISCHARGE PLAN.  SHE STATES THEY HAVE BEEN TALKING AND SHE
IS CONCERNED IF SHE IS READY TO GO HOME YET.  SHE STATES SHE LIVES WITH HER
 WHO IS MAIN CAREGIVER.  HE IS GETTING KNEE SURGERY COMING UP, BUT THAT
THEY CAN POSTPONE THAT IF NEEDED.  HER DAUGHTER IS FROM [place] AND COMES UP
ABOUT TWICE A MONTH.  THEY HAVE A  WHO COMES TWICE A MONTH AND SHE
HAS A PERSONAL CAREGIVER WHO COMES IN ONCE A WEEK BUT IS RETIRING SOON.  SHE
STATES SHE HAS NOT BEEN UP MUCH YET IN ROOM.  SHE DOES NOT FEEL SHE IS
BASELINE YET ON HER ACTIVITY WHICH IS NORMALLY NEEDING ASSIST.  SHE USES A
WALKER AND A WHEELCHAIR AT HOME.  WE DISCUSSED OPTIONS SUCH AS SNF, OR
POSSIBLE TRANSITIONAL CARE PROGRAM IF NEEDED.  WE DISCUSSED HOME HEALTH WHICH
THEY HAVE HAD IN THE PAST AND ARE FAMILIAR WITH.  AND DISCUSSED HIRING MORE
HELP AT HOME THROUGH PERSONAL HIRING OR THAT THERE IS A COMPANY IN TOWN THAT
THEY CAN GO THROUGH.  WE DISCUSSED OUTPATIENT THERAPY ALSO.
 
WE DISCUSSED THAT A PT EVAL HAS BEEN PLACED AND ONCE THAT IS DONE WE WILL KNOW
MORE OF WHAT HER NEEDS ARE AND THEN A DECISION CAN BE MADE.  THEY ARE VERY
AGREEABLE TO THIS.
 
QUESTIONS ANSWERED.  WILL CONTINUE TO FOLLOW.  UPDATED STAFF NURSE HUNG.

## 2019-12-06 NOTE — NUR
PT GIVEN 2 MG IV MORPHINE BEFORE ACTIVTY, INCREASED PAIN WITH MOVEMENT, PLAN
FOR PT TO GET INTO SHOWER BEFORE LUNCH. PT DENIES OTHER NEEDS AT THIS TIME.
AIDE TO ASSIST PT WITH SHOWER.

## 2019-12-06 NOTE — NUR
PT ASSISTED TO BATHROOM WITH 2PA WITH FWW, PT VERY SLOW TO AMBULATE, SPOUSE
STATES AT BASELINE PT IS SLOW TO MOVE DUE TO ARTHRITIS. PT HAD BM AND HAD
SHOWER. BED LINENS CHANGED. PT 2PA BACK TO BED. PT GIVEN 2 MG IV MORPHINE
BEFORE ACTIVITY TO HELP WITH RIGHT SIDE PAIN. PT DENIES OTHER NEEDS AT THIS
TIME.

## 2019-12-07 NOTE — NUR
PATIENT WAS CHANGE TWICE DURING THE NIGHT AND REPOSITION SEVERAL TIME, FRESH
WATER WAS GIVEN AND CALL LIGHT IN REACH.

## 2019-12-07 NOTE — NUR
PT HAD BETTER DAY TODAY. PAIN WELL CONTROLLED WITH DICLOFENAC, TRAMADOL AND
1 TAB NORCO X2. PT ON ROOM AIR, LUNG SOUNDS CLEAR. BRUISING TO RIGHT SIDE
SPREADING DOWNSIDE INTO UPPER THIGH, SWELLING IS UNCHANGE. PT ABLE TO TOLERATE
WALKING WITH P.T. AND WITH NURSE AIDES, MINIMAL PAIN WITH WALKING. SALINE
LOCKED, TOLERATING REGULAR DIET. PT INCONTINENT OF URINE, QS.

## 2019-12-07 NOTE — NUR
PT GIVEN SCHEDULED TRAMADOL. PT REQUESTING PAIN MEDICATION BEFORE TAKING A
WALK, PLAN FOR NORCO AT 1415 AND THEN TO WALK AT 1500. PT DENIES OTHER NEEDS
AT THIS TIME.

## 2019-12-07 NOTE — NUR
pt has slept all shift, incontinent of large amounts of urine. declines to be
turned or checked at night. Large R flank/hip hematoma, tender and firm to
touch. L area below knee hematoma w/o changes. scds and foot pump in place.
Tolerating fluids and diet well. received ultram and voltaren at begining of
shift per pain, no further c/o or requests. call light at bedside. Family in
room

## 2019-12-07 NOTE — NUR
CHARGE RN ROUNDING NOTE. PT RESTING IN BED, MULTIPLE FAMILY MEMBERS PRESENT AT
BEDSIDE VITING. PT DENIES QUESTIONS OR CONCERNS AT THIS TIME. FAMILY ASSURED
THEY WERE NOT BEING TOO LOUD. FAMILY JOKES AND LAUGHS, DENIES NEEDS. CALL
LIGHT IN PT'S REACH. WHITE BOARD UPDATED.

## 2019-12-07 NOTE — NUR
PATIENT AMBULATED IN ROOM, 2PA, GAIT BELT, WALKER. PATIENT AMBULATED 3 TIMES
AROUND BED. PATIENT DID VERY WELL AND HARDLY NEEDED ASSITANCE TO STAND.
TOLERATED AMBULATION WELL AND DID NOT COMPLAIN OF ANY PAIN. PATIENT NOW BACK
IN BED, FAMILY IN ROOM. CALL LIGHT IN REACH. NO FURTHER NEEDS AT THIS TIME.

## 2019-12-07 NOTE — NUR
pt coop with assessment. incontinent of large amount of urine, skin care done.
clean attends in place. Pt up to edge of bed, ambulated in room using gait
belt, home walker and 2PA, slight sob with exertion noted, sats 94% on return
to bed. No other c/o, medicated with scheduled ultram and voltaren, back to
bed, scds R leg and foot pump L leg. Bruising R flank w/o changes. L below
knee area w/o changes

## 2019-12-07 NOTE — NUR
PT RESTING IN BED. RRT AT BEDSIDE FOR NEB. PT ON ROOM AIR, LUNG SOUNDS CLEAR.
PT DENIES NAUSEA. PT REPORTS 0/10 AT REST, 1 TAB NORCO GIVEN FOR PROPHYLAXIS
FOR P.T. EVAL THIS AM. RIGHT HEMATOMA WITH SLIGHTLY INCREASED BRUISING,
SWELLING IS UNCHANGED. LEFT SHIN HEMATOMA APPEARS SLIGHTLY LESS SWOLLEN, LESS
PAINFUL TO TOUCH. IV SALINE LOCKED, FLUSHED, PATENT. DISCUSSED PLAN OF CARE
FOR THE DAY. PT DENIES OTHER NEEDS AT THIS TIME.

## 2019-12-07 NOTE — NUR
SCHEDULED TRAMADOL GIVEN PER ORDER. PT STATES PAIN IS MUCH BETTER TODAY. WAS
ABLE TO WALK WITH MINIMAL ASSISTANCE WITH NURSE AIDES THIS AFTERNOON. FAMILY
AT BEDSIDE. PT DENIES OTHER NEEDS AT THIS TIME.

## 2019-12-07 NOTE — NUR
Awakens easily, c/o tenderness R flank/hip hematoma area. helped with turning.
Incontinent of urine, no redness. fresh attends in place. Tolerating fluids
well. Levothroid given at this time as its her usual med time. using call
light appropriately

## 2019-12-08 NOTE — NUR
IN ROOM TO ASSESS PATIENT, MEDICATION ADMINISTRATION, AND TO PROVIDED
INCONTINENT CARE. PT VOIDED A LARGE AMOUNT. TURNED WELL IN BED WITH MINIMAL
PAIN. BRUISING HAS NOT INCREASED FROM PREVIOUSLY MARKED MARGINS. GIVEN FRESH
WATER AND A WARM BLANKET. NO FURTHER NEEDS AT THIS TIME.

## 2019-12-08 NOTE — NUR
RESTING, NO DISTRESS, RESP EVEN, UNLABORED, HOB ELEVATED TO HER COMFORT, PT
AND FAMILY STATED EARLIER THAT PT NOT BE DISTURBED DURING THE NIGHT AND JUST
CHANGE HER ATTENDS IN AM AS SHE IS INCONTINENT DURING THE NIGHT.

## 2019-12-08 NOTE — NUR
PT ALERT AND ORIETNED RESTING IN BED WATCHING TV. ASSESSMENT COMPLETED AND MED
ADMINISTERED -SEE EMAR. PT DENIES PAIN SOB OR NAUSEA. NO NEEDS OR CONCERNS
VOICED.

## 2019-12-08 NOTE — NUR
PT RESTING IN BED, BREATHING EVEN AND UNLABORED. CALL LIGHT WITHIN REACH. NO
FURTHER NEEDS AT THIS TIME.

## 2019-12-08 NOTE — NUR
PT RESTING SUPINE IN BED EYES CLOSED AND RESPIRATIONS EVEN AND UNLABORED. CALL
LIGHT AND H2O IN REACH. REPORT RECEIVED FROM VIDAL MCGOVERN.

## 2019-12-08 NOTE — NUR
PT ALERT AND ORIENTED RESTING IN SEMIFOWLERS POSITION IN BED. PT DENIES NEEDS
OR CONCERNS. CALL LIGHT AND H2O IN REACH.

## 2019-12-08 NOTE — NUR
PT RESTING IN SEMIFOWLERS POSITION IN BED, REQEUSTS TO AMBULATE AND HAVE
ATTENDS CHANGED. RISA CAPONE IN TO ASSIST PATIENT.

## 2019-12-08 NOTE — NUR
ASSISTED PATIENT WALK AROUND THE BED TO BATHROOM AND BACK USING WALKER AND
GAIT BELT. CHANGED PATIENT'S ATTENDS AND BLUE KELSEY WITH VOIDINGS. PATIENT IS
BACK IN BED CALL LIGHT IN REACH.  IN THE ROOM.

## 2019-12-08 NOTE — NUR
PT RESTING SUPINE IN BED ALERT AND ORIENTED WATCHING TV AND EATING
BLUEBERRIES. PT ASSESSMENT COMPLETED AND AM MEDS ADMINISTERED. CALL LIGHT AND
H2O IN REACH. NO NEEDS OR CONCERNS VOICED.

## 2019-12-09 NOTE — NUR
STOPPED IN AND SPOKE WITH PATIENT.  SHE STATES SHE DID WELL OVER THE WEEKEND
AND IS PLANNING TO GO HOME WITH POSSIBLE HOME HEALTH.  SHE FEELS SHE IS READY
FOR THIS.  WILL CONTINUE TO FOLLOW.

## 2019-12-09 NOTE — NUR
PT RESTING IN BED WITH EYES CLOSED. RESPIRATIONS EVEN AND UNLABORED. PT
APPEARS TO BE SLEEPING. CALL LIGHT IN REACH.

## 2019-12-09 NOTE — NUR
PT RESTING IN BED WITH EYES CLOSED, MOUTH OPEN. RESPIRATIONS ARE EVEN AND
UNLABORED. PT APPEARS TO BE SLEEPING. CALL LIGHT IN REACH.

## 2019-12-09 NOTE — NUR
CALLED TO SCHEDULE A FOLLOW UP APPT TO SEE DR FOUNTAIN SINCE HIS OFFICE WAS
CLOSED, HIS OFFICE IS GOING TO CALL PATIENT WITH THE APPOINTMENT TIME

## 2019-12-09 NOTE — NUR
PT SITTING ON SIDE OF BED,  AND SON AT BS. PT PREPARING FOR DC LATER
THIS PM. PT PLEASANT, ALERT AND ORIENTED. GAVE BLESSING, WILL FOLLOW AS NEEDED

## 2019-12-09 NOTE — NUR
PT ASSESSMENT COMPLETE. PT DENIES PAIN, NAUSEA, OR SOB. PT'S ATTENDS CHANGED.
PT STATES THAT SHE DOES HAVE PAIN TO FLANK HEMATOMA WITH BED MOBILITY, DENIES
NEED FOR PAIN MEDICATION. COFFEE PROVIDED. PT DENIES FURTHER NEEDS AT THIS
TIME. CALL LIGHT IN REACH.

## 2019-12-09 NOTE — NUR
EMAILED THE REQUIRED DOCUMENTS TO ESTABLISH CARE FOR HOME HEALTH. I CALLED
EARLIER AND FOUND OUT EXACTLY WHAT INFORMATION SHE NEEDED. WILL EMAIL HER THE
DISCHARGE SUMMARY WHEN IT BECOMES AVAILABLE.

## 2019-12-09 NOTE — NUR
PT EDUCATION PROVIDED WITH DISCHARGE EDUCATIONS AND SCRIPT FOR BLOOD DRAW, AND
NORCO. DISCUSSED ANY SIGNS OR SYMPTOMS TO SEEK MEDICAL CARE SOONER THAN FOLLOW
UP APPOINTMENT. PT REPORTS SHE WILL HAVE PLENTY OF FAMILY TO ASSIST
AMBULATING AT HOME. HOME HEALTH PHYSICAL THERAPY ORDERED AND FORMS SENT. PT
REPORTS PAIN WELL MANAGED AT THIS TIME. FAMILY/SPOUSE IN ROOM FOR ALL
EDCUATIONS. IV SITE REMOVED ISABELLA BENSON FROM PHARMACY IN ROOM FOR MEDICATION
EDCUATIONS. PT EAGER TO DISCHARGE.

## 2019-12-10 NOTE — DS
[facility]
                                    [address], [place]  [address]
_________________________________________________________________________________________
                                                                 Signed   
 
 
ADMISSION DATE:  12/04/2019
 
DISCHARGE DATE:  12/09/2019
 
REASON FOR ADMISSION:
This 89-year-old white woman is very frail and lives at home with her .  She was
getting out of bed at approximately 7 in the morning on the day of admission when she
fell initially bruising her left leg, but with some amount of trauma hitting the
nightstand on her right flank.  She presented by EMS services to the emergency room and
evaluated by Dr. Cottrell.  A contusion of her left anterolateral calf was noted as well as
her right lateral abdominal wall.  Lateral abdominal wall bruising became rapidly
enlarging consistent with expanding hematoma.  A chest x-ray was performed showing no
sign of pneumothorax, but she did have possible rib buckling as noted on the subsequent
CT scan that was ordered.  The CT scan did show an extraabdominal and extrathoracic
hematoma with a blush suggestive of active bleeding.  There was no sign of pneumothorax
in 10th and 11th ribs, may have had buckling but without gross fracture otherwise. 
 
She is admitted for further evaluation and care.
 
PERTINENT PHYSICAL EXAMINATION:
GENERAL:  Showed a pleasant white woman with signs of dementia in anyway.  She is alert
and oriented.  She is accompanied by her . 
NECK:  Trachea is midline.  No jugular venous distention.   
LUNGS:  Breath sounds diminished bilaterally, but no wheeze or rhonchi.  An Ace wrap was
noted around her torso in conjunction with an ice pack related to right anterolateral
flank abdominal wall hematoma.  A sizable hematoma of the size of a softball was noted.
There is no sign of thrill within it.  There is no fluctuance. 
ABDOMEN:  Soft, nontender elsewhere.   
EXTREMITIES:  Left leg showed a small hematoma of the left lateral calf area.
 
DIAGNOSTIC DATA:
Her initial hematocrit was 33.3.
 
HOSPITAL COURSE:
She was admitted and pressure applied to the hematoma site, which appeared not to be
expanding.  She was given intravenous fluids and pain control.  The left leg hematoma
was not expanding and was relatively small, about 4 cm in size.  Monitoring of her CBC
showed her to progress from 34.5 hematocrit down to 22.9 by December 5th at 5 in the
morning, on that basis given her advanced age and so forth 2 units of blood were
transfused.  Post transfusion hematocrit was 33.3.  She remained reasonably stable with
her hematocrit from that point forward. 
 
 
    Electronically Signed By: JACKI FOUNTAIN MD  12/10/19 0939
_________________________________________________________________________________________
PATIENT NAME:     PAMELA VALENCIA                  
MEDICAL RECORD #: [id number]            DISCHARGE SUMMARY             
          ACCT #: [id number]  
DATE OF BIRTH:   [birth year]            REPORT #: [id number]      
PHYSICIAN:        JACKI FOUNTAIN MD                 
PCP:              NO PRIMARY CARE PHYSICIAN     
REPORT IS CONFIDENTIAL AND NOT TO BE RELEASED WITHOUT AUTHORIZATION
 
 
                                  [facility]
                                    [address] [place] [address]  [id number]
_________________________________________________________________________________________
                                                                 Signed   
 
 
The hematoma was well organized by day of discharge.  Consideration had been made for
drainage of the hematoma to expedite her recovery, however she does have underlying
severe cardiac ailment including aortic stenosis.  Evaluation was undertaken by the
hospitalist, Dr. Waite.  She underwent an echocardiogram, which did not show severe
aortic stenosis, but did have significant pulmonary hypertension with elevated right
ventricular pressures. 
 
In any case, plans for any operative intervention were deemed inadvisable under the
circumstances as the hematoma was stable, not expanding, and her hematocrit was stable. 
 
We acknowledged that it will be a fair amount of time before the hematoma resolves
itself.  The risks of operation were deemed excessive in comparison to the hematoma
itself. 
 
The patient is discharged to home in improved condition.  Home health arrangements have
been made for physical therapy two days a week and a home assessment by visiting nurse.
This will be accomplished by the [facility] team. 
 
MEDICATIONS:
Her discharge medications will include:
1. Iron sulfate 325 mg tablet one p.o. b.i.d. with meals #30.
2. Norco 5/325 one p.o. q.6 hours as needed for pain, #20.
3. Pepcid 20 mg p.o. q.12 hours #60, refill one.
4. Tylenol Extra Strength 500 mg 1-2 q.6 as needed for pain #60.   
 
She will resume her usual medications which include:
1. Pramipexole 0.25 mg h.s.
2. Tramadol 50 mg p.o. q.i.d. as needed (up to two pills q.i.d.).
3. Omeprazole 20 mg p.o. daily.
4. Synthroid 150 mcg p.o. daily.
5. Multivitamin one tablet p.o. daily.
6. Benefiber 1 tablespoon daily.
7. Losartan 100 mg p.o. daily.
8. Duloxetine 60 mg p.o. daily.
9. Diclofenac 50 mg p.o. daily.
10. Anoro Ellipta inhaler one inhalation daily.
11. Albuterol sulfate two puffs b.i.d. as needed for shortness of breath.
12. Omega-3 fatty acid, fish oil tablet one p.o. daily.
13. Vitamin A, C, and lutein one tablet p.o. daily.   
 
We will discontinue the famotidine that was previously described since she is previously
on omeprazole. 
 
    Electronically Signed By: JACKI FOUNTAIN MD  12/10/19 0939
_________________________________________________________________________________________
PATIENT NAME:     PAMELA VALENCIA                  
MEDICAL RECORD #: [id number]            DISCHARGE SUMMARY             
          ACCT #: [id number]  
DATE OF BIRTH:   [birth year]            REPORT #: [id number]      
PHYSICIAN:        JACKI FOUNTAIN MD                 
PCP:              NO PRIMARY CARE PHYSICIAN     
REPORT IS CONFIDENTIAL AND NOT TO BE RELEASED WITHOUT AUTHORIZATION
 
 
                                  [facility]
                                    [address], [place]  [address]
_________________________________________________________________________________________
                                                                 Signed   
 
 
 
DISCHARGE DIAGNOSES:
1. Ground level fall with large expanding right flank hematoma with resultant anemia
(hematocrit 22.3, status post transfusion of 2 units packed red cells). 
2. Small hematoma of left leg related to ground level fall.
3. Chronic obstructive pulmonary disease.
4. Pulmonary hypertension with elevated right ventricular pressures, low-grade aortic
stenosis. 
5. Chronic pain issues.
6. Restless legs syndrome.
7. Clinical gastroesophageal reflux symptoms.
8. Hypothyroidism.
9. Hypertension.
10. Reactive airways.
 
FOLLOWUP PLAN:
She is to return to see me in approximately 4-6 weeks.  A CBC will be obtained prior to
her visit.  She will return to the ongoing general care of Dr. Hernandez. 
 
 
 
            ________________________________________
            MD MARILY Lozano/MODL
Job #:  [id number]
DD:  12/09/2019 13:06:20
DT:  12/10/2019 02:55:31
 
cc[facility]            [facility]
 
 
Copies:  LINDA WAITE DO
~
 
 
 
 
    Electronically Signed By: JACKI FOUNTAIN MD  12/10/19 0939
_________________________________________________________________________________________
PATIENT NAME:     PAMELA VALENCIA                  
MEDICAL RECORD #: [id number]            DISCHARGE SUMMARY             
          ACCT #: [id number]  
DATE OF BIRTH:   [birth year]            REPORT #: [id number]      
PHYSICIAN:        JACKI FOUNTAIN MD                 
PCP:              NO PRIMARY CARE PHYSICIAN     
REPORT IS CONFIDENTIAL AND NOT TO BE RELEASED WITHOUT AUTHORIZATION

## 2024-02-05 NOTE — XMS
Encounter Summary
  Created on: 2019
 
 Margaret Ferreira
 External Reference #: [id number]
 : 30
 Sex: Female
 
 Demographics
 
 
+-----------------------+------------------------+
| Address               | [address]      |
|                       | [place]  [address]   |
+-----------------------+------------------------+
| Home Phone            | [phone]        |
+-----------------------+------------------------+
| Preferred Language    | Unknown                |
+-----------------------+------------------------+
| Marital Status        |                 |
+-----------------------+------------------------+
| [employer] Affiliation | MET                    |
+-----------------------+------------------------+
| Race                  | White                  |
+-----------------------+------------------------+
| Ethnic Group          | Not  or  |
+-----------------------+------------------------+
 
 
 Author
 
 
+--------------+------------------------------+
| Author       | [facility] |
+--------------+------------------------------+
| Organization | [facility] |
+--------------+------------------------------+
| Address      | Unknown                      |
+--------------+------------------------------+
| Phone        | Unavailable                  |
+--------------+------------------------------+
 
 
 
 Support
 
 
+--------------+--------------+---------------------+-----------------+
| Name         | Relationship | Address             | Phone           |
+--------------+--------------+---------------------+-----------------+
| Lawson Ferreira | ECON         | [address]          | [phone] |
|              |              | [place], [place]   |                 |
|              |              | [address]               |                 |
+--------------+--------------+---------------------+-----------------+
 
 
 
 Care Team Providers
 
 
 
+-----------------------+------+-------------+
| Care Team Member Name | Role | Phone       |
+-----------------------+------+-------------+
 PCP  | Unavailable |
+-----------------------+------+-------------+
 
 
 
 Encounter Details
 
 
+--------+-------------+----------------------+---------------------+---------------+
| Date   | Type        | Department           | Care Team           | Description   |
+--------+-------------+----------------------+---------------------+---------------+
| / | Office      |   General Internal   |   Note, Outpatient  | Progress Note |
|    | Visit-Trans | Medicine  [address]    | Clinic              |               |
|        | [address]      | [address]  |                     |               |
|        |             |  Mailcode: [address]      |                     |               |
|        |             | Outpatient Clinic    |                     |               |
|        |             | [address]       |                     |               |
|        |             | [place], [place]         |                     |               |
|        |             | [address]           |                     |               |
|        |             | [phone]         |                     |               |
+--------+-------------+----------------------+---------------------+---------------+
 
 
 
 Social History
 
 
+----------------+-------+-----------+--------+------+
| Tobacco Use    | Types | Packs/Day | Years  | Date |
|                |       |           | Used   |      |
+----------------+-------+-----------+--------+------+
| Never Assessed |       |           |        |      |
+----------------+-------+-----------+--------+------+
 
 
 
+------------------+---------------+
| Sex Assigned at  | Date Recorded |
| Birth            |               |
+------------------+---------------+
| Not on file      |               |
+------------------+---------------+
 
 
 
+----------------+-------------+-------------+
| Job Start Date | Occupation  | Industry    |
+----------------+-------------+-------------+
| Not on file    | Not on file | Not on file |
+----------------+-------------+-------------+
 
 
 
+----------------+--------------+------------+
| Travel History | Travel Start | Travel End |
 
+----------------+--------------+------------+
 
 
 
+-------------------------------------+
| No recent travel history available. |
+-------------------------------------+
 documented as of this encounter
 
 Progress Notes
 Interface, Transcription In - 2007  7:06 AM PST CLINIC DATE: 96
  
  [facility]
  
  SUBJECTIVE: [facility]
  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
  hemorrhage requiring laparotomy and control of the bleeding. She had a slow
  post-operative course, but has now made steady progress and reports that she
  is feeling much better although "she still has some way to go". Her most
  troublesome symptom at present is of urge incontinence, particularly on
  waking during the night or on getting out of bed in the morning. She
  continues to have pain with her arthritis which is being managed in
  [place] by Dr. Avendaño. She has had limited activity and feels tired, but
  is much more confident about her progress than when last seen. She will be
  traveling to [place] next week and will be staying there for one month
  and requested that a report be sent to Dr. Douglas in [place], [place].
  
  PHYSICAL EXAMINATION: Examination shows the vaginal vault to be well
  supported and the vaginal canal of adequate length and capacity. There is
  some descensus of the anterior vaginal wall which is not aggravated by
  straining. No stress incontinence is demonstrated. Bimanual examination
  does not demonstrate any pelvic masses and no local areas of tenderness.
  
  IMPRESSION: Ms. Ferreira now appears to be making satisfactory progress.
  
  PLAN: A prescription for Levsinex was provided for use at night time and
  she will be seen again on her return from [place] in one month's time.
  
  
  
  BRITTANY Perez M.D.
  Professor and ,
  Obstetrics and Gynecology
  
  EPK/zoie
  D: 96
  T: 96
   Electronically signed by Interface, Transcription In at 2007  7:06 AM PSTdocumented
 in this encounter
 
 Plan of Treatment
 Not on filedocumented as of this encounter
 
 Visit Diagnoses
 Not on filedocumented in this encounter soft/nondistended/nontender